# Patient Record
Sex: FEMALE | Race: WHITE | Employment: UNEMPLOYED | URBAN - METROPOLITAN AREA
[De-identification: names, ages, dates, MRNs, and addresses within clinical notes are randomized per-mention and may not be internally consistent; named-entity substitution may affect disease eponyms.]

---

## 2017-01-05 ENCOUNTER — OFFICE VISIT (OUTPATIENT)
Dept: ORTHOPEDIC SURGERY | Age: 52
End: 2017-01-05

## 2017-01-05 VITALS
DIASTOLIC BLOOD PRESSURE: 80 MMHG | SYSTOLIC BLOOD PRESSURE: 130 MMHG | BODY MASS INDEX: 31.54 KG/M2 | WEIGHT: 178 LBS | HEIGHT: 63 IN

## 2017-01-05 DIAGNOSIS — M25.512 CHRONIC LEFT SHOULDER PAIN: ICD-10-CM

## 2017-01-05 DIAGNOSIS — G89.29 CHRONIC LEFT SHOULDER PAIN: ICD-10-CM

## 2017-01-05 DIAGNOSIS — M54.2 NECK PAIN: Primary | ICD-10-CM

## 2017-01-05 PROCEDURE — 73030 X-RAY EXAM OF SHOULDER: CPT | Performed by: ORTHOPAEDIC SURGERY

## 2017-01-05 PROCEDURE — 99214 OFFICE O/P EST MOD 30 MIN: CPT | Performed by: ORTHOPAEDIC SURGERY

## 2017-01-05 PROCEDURE — 72040 X-RAY EXAM NECK SPINE 2-3 VW: CPT | Performed by: ORTHOPAEDIC SURGERY

## 2017-01-05 RX ORDER — PREDNISONE 10 MG/1
TABLET ORAL
Qty: 30 TABLET | Refills: 0 | Status: SHIPPED | OUTPATIENT
Start: 2017-01-05 | End: 2020-05-11

## 2017-01-12 ENCOUNTER — OFFICE VISIT (OUTPATIENT)
Dept: ORTHOPEDIC SURGERY | Age: 52
End: 2017-01-12

## 2017-01-12 VITALS — WEIGHT: 178 LBS | BODY MASS INDEX: 31.54 KG/M2 | HEIGHT: 63 IN

## 2017-01-12 DIAGNOSIS — M54.2 NECK PAIN: Primary | ICD-10-CM

## 2017-01-12 PROCEDURE — 99213 OFFICE O/P EST LOW 20 MIN: CPT | Performed by: ORTHOPAEDIC SURGERY

## 2017-01-13 RX ORDER — DIAZEPAM 5 MG/1
TABLET ORAL
Qty: 30 TABLET | Refills: 0 | OUTPATIENT
Start: 2017-01-13 | End: 2021-10-05

## 2017-01-24 ENCOUNTER — HOSPITAL ENCOUNTER (OUTPATIENT)
Dept: PHYSICAL THERAPY | Age: 52
Discharge: HOME | End: 2017-01-24
Attending: ORTHOPAEDIC SURGERY | Admitting: ORTHOPAEDIC SURGERY

## 2017-01-31 ENCOUNTER — HOSPITAL ENCOUNTER (OUTPATIENT)
Dept: PHYSICAL THERAPY | Age: 52
Discharge: HOME | End: 2017-01-31
Attending: ORTHOPAEDIC SURGERY | Admitting: ORTHOPAEDIC SURGERY

## 2017-02-02 ENCOUNTER — HOSPITAL ENCOUNTER (OUTPATIENT)
Dept: PHYSICAL THERAPY | Age: 52
Discharge: HOME | End: 2017-02-02
Attending: ORTHOPAEDIC SURGERY | Admitting: ORTHOPAEDIC SURGERY

## 2017-02-07 ENCOUNTER — HOSPITAL ENCOUNTER (OUTPATIENT)
Dept: PHYSICAL THERAPY | Age: 52
Discharge: HOME | End: 2017-02-07
Attending: ORTHOPAEDIC SURGERY | Admitting: ORTHOPAEDIC SURGERY

## 2017-02-09 ENCOUNTER — HOSPITAL ENCOUNTER (OUTPATIENT)
Dept: PHYSICAL THERAPY | Age: 52
Discharge: HOME | End: 2017-02-09
Attending: ORTHOPAEDIC SURGERY | Admitting: ORTHOPAEDIC SURGERY

## 2017-02-21 ENCOUNTER — HOSPITAL ENCOUNTER (OUTPATIENT)
Dept: PHYSICAL THERAPY | Age: 52
Discharge: HOME | End: 2017-02-21
Attending: ORTHOPAEDIC SURGERY | Admitting: ORTHOPAEDIC SURGERY

## 2017-02-22 ENCOUNTER — HOSPITAL ENCOUNTER (OUTPATIENT)
Dept: PHYSICAL THERAPY | Age: 52
Discharge: HOME | End: 2017-02-22
Attending: ORTHOPAEDIC SURGERY | Admitting: ORTHOPAEDIC SURGERY

## 2017-02-28 ENCOUNTER — HOSPITAL ENCOUNTER (OUTPATIENT)
Dept: PHYSICAL THERAPY | Age: 52
Discharge: HOME | End: 2017-02-28
Attending: ORTHOPAEDIC SURGERY | Admitting: ORTHOPAEDIC SURGERY

## 2017-03-02 ENCOUNTER — HOSPITAL ENCOUNTER (OUTPATIENT)
Dept: PHYSICAL THERAPY | Age: 52
Discharge: HOME | End: 2017-03-02
Attending: ORTHOPAEDIC SURGERY | Admitting: ORTHOPAEDIC SURGERY

## 2017-03-07 ENCOUNTER — HOSPITAL ENCOUNTER (OUTPATIENT)
Dept: PHYSICAL THERAPY | Age: 52
Discharge: HOME | End: 2017-03-07
Attending: ORTHOPAEDIC SURGERY | Admitting: ORTHOPAEDIC SURGERY

## 2017-03-09 ENCOUNTER — HOSPITAL ENCOUNTER (OUTPATIENT)
Dept: PHYSICAL THERAPY | Age: 52
Discharge: HOME | End: 2017-03-09
Attending: ORTHOPAEDIC SURGERY | Admitting: ORTHOPAEDIC SURGERY

## 2017-03-16 ENCOUNTER — HOSPITAL ENCOUNTER (OUTPATIENT)
Dept: PHYSICAL THERAPY | Age: 52
Discharge: HOME | End: 2017-03-16
Attending: ORTHOPAEDIC SURGERY | Admitting: ORTHOPAEDIC SURGERY

## 2017-03-23 ENCOUNTER — HOSPITAL ENCOUNTER (OUTPATIENT)
Dept: PHYSICAL THERAPY | Age: 52
Discharge: HOME | End: 2017-03-23
Attending: ORTHOPAEDIC SURGERY | Admitting: ORTHOPAEDIC SURGERY

## 2017-03-30 ENCOUNTER — HOSPITAL ENCOUNTER (OUTPATIENT)
Dept: PHYSICAL THERAPY | Age: 52
Discharge: HOME | End: 2017-03-30
Attending: ORTHOPAEDIC SURGERY | Admitting: ORTHOPAEDIC SURGERY

## 2017-04-04 ENCOUNTER — HOSPITAL ENCOUNTER (OUTPATIENT)
Dept: PHYSICAL THERAPY | Age: 52
Discharge: HOME | End: 2017-04-04
Attending: ORTHOPAEDIC SURGERY | Admitting: ORTHOPAEDIC SURGERY

## 2017-04-25 ENCOUNTER — HOSPITAL ENCOUNTER (OUTPATIENT)
Dept: PHYSICAL THERAPY | Age: 52
Discharge: HOME | End: 2017-04-25
Attending: ORTHOPAEDIC SURGERY | Admitting: ORTHOPAEDIC SURGERY

## 2017-04-27 ENCOUNTER — HOSPITAL ENCOUNTER (OUTPATIENT)
Dept: PHYSICAL THERAPY | Age: 52
Discharge: HOME | End: 2017-04-27
Attending: ORTHOPAEDIC SURGERY | Admitting: ORTHOPAEDIC SURGERY

## 2017-10-04 ENCOUNTER — HOSPITAL ENCOUNTER (OUTPATIENT)
Dept: PHYSICAL THERAPY | Age: 52
Discharge: HOME | End: 2017-10-04
Admitting: NEUROLOGICAL SURGERY

## 2017-10-04 NOTE — FLOWSHEET NOTE
Cervical Extension     Cervical sidebending               PRE's     External Rotation  Attempted, but unable to perform due to pain    Internal Rotation     Serratus     Biceps     Triceps     Shrugs     Horizontal Abd with ER     Reverse Flys     EXT     Flexion     Abduction          Cable Column/Theraband     Scapular Retraction 3x10 red    External Rotation 2x5 red Discontinued due to pain   Internal Rotation     Ext     TRIC     Lats     Shrugs     Flex     BIC     PNF                Manual Intervention      Cerv mobs/manip      Suboccipital release/ massage      Scalenes Stretch L 2'     Rib mobilizations 1/2nd rib mob       Therapeutic Exercise and NMR EXR  [x] (25647) Provided verbal/tactile cueing for activities related to strengthening, flexibility, endurance, ROM  for improvements in cervical, postural, scapular, scapulothoracic and UE control with self care, reaching, carrying, lifting, house/yardwork, driving/computer work. [x] (70483) Provided verbal/tactile cueing for activities related to improving balance, coordination, kinesthetic sense, posture, motor skill, proprioception  to assist with cervical, scapular, scapulothoracic and UE control with self care, reaching, carrying, lifting, house/yardwork, driving/computer work. Therapeutic Activities:    [] (31521 or 95760) Provided verbal/tactile cueing for activities related to improving balance, coordination, kinesthetic sense, posture, motor skill, proprioception and motor activation to allow for proper function of cervical, scapular, scapulothoracic and UE control with self care, carrying, lifting, driving/computer work.      Home Exercise Program:    [x] (87618) Reviewed/Progressed HEP activities related to strengthening, flexibility, endurance, ROM of cervical, scapular, scapulothoracic and UE control with self care, reaching, carrying, lifting, house/yardwork, driving/computer work  [] (84351) Reviewed/Progressed HEP activities related to improving balance, coordination, kinesthetic sense, posture, motor skill, proprioception of cervical, scapular, scapulothoracic and UE control with self care, reaching, carrying, lifting, house/yardwork, driving/computer work      Manual Treatments:  PROM / STM / Oscillations-Mobs:  G-I, II, III, IV (PA's, Inf., Post.)  [x] (11989) Provided manual therapy to mobilize soft tissue/joints of cervical/CT, scapular GHJ and UE for the purpose of decreasing headache, modulating pain, promoting relaxation,  increasing ROM, reducing/eliminating soft tissue swelling/inflammation/restriction, improving soft tissue extensibility and allowing for proper ROM for normal function with self care, reaching, carrying, lifting, house/yardwork, driving/computer work    Modalities:  15' ICE 10/4    Charges:  Timed Code Treatment Minutes: 25   Total Treatment Minutes: 10:23-11:37       [x] EVAL (LOW) 21540 (typically 20 minutes face-to-face)  [] EVAL (MOD) 20246 (typically 30 minutes face-to-face)  [] EVAL (HIGH) 62935 (typically 45 minutes face-to-face)  [] RE-EVAL     [x] QO(48293) x  1   [] IONTO  [] NMR (50353) x      [] VASO  [x] Manual (52806) x  1    [] Other:  [] TA x       [] Mech Traction (18631)  [] ES(attended) (71037)      [] ES (un) (73923):     GOALS:  Patient stated goal: Return to PLOF     Therapist goals for Patient:   Short Term Goals: To be achieved in: 2 weeks  1. Independent in HEP and progression per patient tolerance, in order to prevent re-injury. 2. Patient will have a decrease in pain to facilitate improvement in movement, function, and ADLs as indicated by Functional Deficits.     Long Term Goals: To be achieved in: 4 weeks  1. Disability index score of 15% or less for the NDI to assist with reaching prior level of function. 2. Patient will demonstrate increased AROM cervical SB to 30 deg motion to allow for proper joint functioning and decreased pain as indicated by patients Functional Deficits.    3.

## 2017-10-04 NOTE — PLAN OF CARE
Relevant Medical History: Discectomy/ Fusion 6/29/17 20lbs weight restriction, migraines frequently before surgery  Functional Disability Index: PT G-Codes  Functional Assessment Tool Used: NDI  Score: 30% deficit  Functional Limitation: Changing and maintaining body position  Changing and Maintaining Body Position Current Status (): At least 20 percent but less than 40 percent impaired, limited or restricted  Changing and Maintaining Body Position Goal Status (): At least 1 percent but less than 20 percent impaired, limited or restricted    Pain Scale: 0/10  Easing factors: heat, Flexoral  Provocative factors: Lifting overhead, Holding arms at 90 deg. Type: []Constant   [x]Intermittent  []Radiating [x]Localized [x]other:     Numbness/Tingling: None    Occupation/School: Unemployed/ Takes care of grandchildren    Living Status/Prior Level of Function: Independent with ADLs and IADLs, Regular exercise, riding bike. OBJECTIVE:     ROM AROM Comments   Flexion 40    Extension 45    Side bend R 15    Side bend L 15    Rotation R ~45    Rotation L ~45    Shoulder AROM clearing ~150 deg Flexion L ~160 R Pulleys PROM            Strength L R Comments   Neck flexion (C1-2)      Neck side bend (C3)      Shoulder elevation (C4) 5 5    Shoulder abduction (C5) 4- 4+    Elbow flexion and /or wrist extension  (C6) 4 5    Elbow extension and/or wrist flexion  (C7) 4 5    Thumb extension and/or ulnar deviation ((C8)  5 L not assessed due to cortizone shot   Shoulder ER 4- 5    Shoulder IR 4 5    DTR L R Comments   Biceps (C5,C6) 1+ 1+    Brachioradialis (C5,C6) 1+ 1+    Triceps (C7,C8) 1+ 1+              Joint mobility: Cervical PIVM    []Normal    [x]Hypo   []Hyper    Palpation: Elevated 1st/2nd rib on left shoulder. 1/3 TTP    Functional Mobility/Transfers: WNL    Posture: WNL, mild forward shoulders    Gait: (include devices/WB status):  WNL                            [x] Patient history, allergies, meds reviewed. Medical chart reviewed. See intake form. Review Of Systems (ROS):  [x]Performed Review of systems (Integumentary, CardioPulmonary, Neurological) by intake and observation. Intake form has been scanned into medical record. Patient has been instructed to contact their primary care physician regarding ROS issues if not already being addressed at this time. Co-morbidities/Complexities (which will affect course of rehabilitation):   []None           Arthritic conditions   []Rheumatoid arthritis (M05.9)  []Osteoarthritis (M19.91)   Cardiovascular conditions   [x]Hypertension (I10)  []Hyperlipidemia (E78.5)  []Angina pectoris (I20)  []Atherosclerosis (I70)   Musculoskeletal conditions   []Disc pathology   []Congenital spine pathologies   []Prior surgical intervention  []Osteoporosis (M81.8)  []Osteopenia (M85.8)   Endocrine conditions   []Hypothyroid (E03.9)  []Hyperthyroid Gastrointestinal conditions   []Constipation (F99.30)   Metabolic conditions   []Morbid obesity (E66.01)  []Diabetes type 1(E10.65) or 2 (E11.65)   []Neuropathy (G60.9)     Pulmonary conditions   []Asthma (J45)  []Coughing   []COPD (J44.9)   Psychological Disorders  []Anxiety (F41.9)  []Depression (F32.9)   []Other:   [x]Other:   Headaches and migraines       Barriers to/and or personal factors that will affect rehab potential:              [x]Age  [x]Sex              [x]Motivation/Lack of Motivation                        []Co-Morbidities              []Cognitive Function, education/learning barriers              [x]Environmental, home barriers              [x]profession/work barriers  [x]past PT/medical experience  []other:  Justification:     Falls Risk Assessment (30 days):   [x] Falls Risk assessed and no intervention required.   [] Falls Risk assessed and Patient requires intervention due to being higher risk   TUG score (>12s at risk):     [] Falls education provided, including       G-Codes:  PT G-Codes  Functional Assessment Tool Used: NDI  Score: 30% deficit  Functional Limitation: Changing and maintaining body position  Changing and Maintaining Body Position Current Status (): At least 20 percent but less than 40 percent impaired, limited or restricted  Changing and Maintaining Body Position Goal Status (): At least 1 percent but less than 20 percent impaired, limited or restricted    ASSESSMENT: Pt is a 47 yo F who presents today 3 mos. Post cervical discectomy and fusion at C6-7. Examination reveals decreased ROM in CS and Mickey UEs, hypomobility in CS PIVM abd and decreased strength on the L lower extremity which are consistent with the patitents history of cervical pathology, radicular symptoms and recent surgery. Pt will benefit from skilled PT to improve pain, ROM and LUE strength.  10/4    Functional Impairments:     [x]Noted cervical/thoracic/GHJ joint hypomobility   []Noted cervical/thoracic/GHJ joint hypermobility   [x]Decreased cervical/UE functional ROM   [x]Noted Headache pain aggravated by neck movements without dizziness   []Abnormal reflexes/sensation/myotomal/dermatomal deficits   []Decreased DCF control or ability to hold head up   []Decreased RC/scapular/core strength and neuromuscular control    [x]Decreased UE functional strength   []other:      Functional Activity Limitations (from functional questionnaire and intake)   [x]Reduced ability to tolerate prolonged functional positions   [x]Reduced ability or difficulty with changes of positions or transfers between positions   [x]Reduced ability to maintain good posture and demonstrate good body mechanics with sitting, bending, and lifting   [x] Reduced ability or tolerance with driving and/or computer work   [x]Reduced ability to perform lifting, reaching, carrying tasks   [x]Reduced ability to concentrate   [x]Reduced ability to sleep    [x]Reduced ability to tolerate any impact through UE or spine   [x]Reduced ability to ambulate prolonged functional periods/distances   []other:    Participation Restrictions   []Reduced participation in self care activities   [x]Reduced participation in home management activities   [x]Reduced participation in work activities   [x]Reduced participation in social activities. [x]Reduced participation in sport/recreational activities. Classification/Subgrouping:   [x]signs/symptoms consistent with neck pain with mobility deficits     [x]signs/symptoms consistent with neck pain with movement coordinated impairments    []signs/symptoms consistent with neck pain with radiating pain    []signs/symptoms consistent with neck pain with headaches (cervicogenic)    []Signs/symptoms consistent with nerve root involvement including myotome & dermatome dysfunction   []sign/symptoms consistent with facet dysfunction of cervical and thoracic spine    []signs/symptoms consistent suggesting central cord compression/UMN syndromes   []signs/symptoms consistent with discogenic cervical pain   []signs/symptoms consistent with rib dysfunction   []signs/symptoms consistent with postural dysfunction   []signs/symptoms consistent with shoulder pathology    [x]signs/symptoms consistent with post-surgical status including decreased ROM, strength and function.    []signs/symptoms consistent with pathology which may benefit from Dry Needling   []signs/symptoms which may limit the use of advanced manual therapy techniques: (Hypertension, recent trauma, intolerance to end range positions, prior TIA, visual issues, UE myotomes loss )     Prognosis/Rehab Potential:      []Excellent   [x]Good    []Fair   []Poor    Tolerance of evaluation/treatment:    []Excellent   [x]Good    []Fair   []Poor      Physical Therapy Evaluation Complexity Justification  [x] A history of present problem with:  [] no personal factors and/or comorbidities that impact the plan of care;  []1-2 personal factors and/or comorbidities that impact the plan of care  [x]3 personal factors a decrease in pain to facilitate improvement in movement, function, and ADLs as indicated by Functional Deficits. Long Term Goals: To be achieved in: 4 weeks  1. Disability index score of 15% or less for the NDI to assist with reaching prior level of function. 2. Patient will demonstrate increased AROM cervical SB to 30 deg motion to allow for proper joint functioning and decreased pain as indicated by patients Functional Deficits. 3. Patient will demonstrate improve LUE strength to 5/5 shoulder abduction for improved UE function in ADLs. 4. Patient will return to all ADLs and functional activities without increased symptoms or restriction. Electronically signed by:  Yamileth Morales, JESSICA Roman, Student Physical Therapist  Therapist was present, directed the patient's care, made skilled judgement, and was responsible for assessment and treatment of the patient.

## 2017-10-09 ENCOUNTER — HOSPITAL ENCOUNTER (OUTPATIENT)
Dept: PHYSICAL THERAPY | Age: 52
Discharge: HOME | End: 2017-10-09
Admitting: NEUROLOGICAL SURGERY

## 2017-10-09 NOTE — FLOWSHEET NOTE
[facility]  Orthopaedics and Sports Rehabilitation, [facility] [place]  Phone: [phone]   Fax:     [phone]                                                     Physical Therapy Daily Treatment Note  Date:  10/9/2017    Patient Name:  Abdullahi Michaels    :  1965  MRN: [id number]  Restrictions/Precautions:    Medical/Treatment Diagnosis Information:  Diagnosis: M54.2 (ICD-10-CM) - Neck pain, M25.512  Treatment Diagnosis: Post op Neck Pain  Insurance/Certification information:  PT Insurance Information: Tabatha Nuñez  Physician Information:  Referring Practitioner: Toni Das NP  Plan of care signed (Y/N):     Date of Patient follow up with Physician:     G-Code (if applicable):      Date G-Code Applied:  10/4/2017       Progress Note: [x]  Yes  []  No  Next due by: Visit #10      Latex Allergy:  [x]NO      []YES  Preferred Language for Healthcare:   [x]English       []other:    Visit # Insurance Allowable   2    Eval 10/4 unlim     Pain level:  0/10     BEGIN WITH HEAT NPV 10/9    SUBJECTIVE:  Pt reports she has tightness/soreness.   She woke up at about 4 in the morning with a headache that took about 1 hour to wear off.  10/9    OBJECTIVE: See eval   Observation:   Test measurements:      RESTRICTIONS/PRECAUTIONS:     Exercises/Interventions:   Exercise/Equipment Resistance/Repetitions Other comments   Stretching/PROM     CROM     Chin tuck     UT side bend stretch Reviewed for HEP    Levater scap stretch     Scalene stretch     Pectoral stretch 5x30\" Doorway        Isometrics     Cervical retraction 10x10\"    shrugs     Cervical Flexion      Cervical Extension     Cervical sidebending               PRE's     External Rotation  Attempted, but unable to perform due to pain    Internal Rotation     Serratus 2x10 supine  Added 10/9   Biceps     Triceps     Shrugs     Horizontal Abd with ER     Reverse Flys     EXT     Flexion     Abduction          Cable

## 2017-10-11 ENCOUNTER — HOSPITAL ENCOUNTER (OUTPATIENT)
Dept: PHYSICAL THERAPY | Age: 52
Discharge: HOME | End: 2017-10-11
Admitting: NEUROLOGICAL SURGERY

## 2017-10-11 NOTE — FLOWSHEET NOTE
increased symptoms or restriction.                       Progression Towards Functional goals:  [] Patient is progressing as expected towards functional goals listed. [] Progression is slowed due to complexities listed. [] Progression has been slowed due to co-morbidities. [x] Plan just implemented, too soon to assess goals progression  [] Other:     ASSESSMENT:  Pt did respond well to heat and noted decreased muscle tensions. Able to tolerated exercises without any increased pain. She responded well to manual tx and stated at end of session she did not feel at \"tight\" as when she arrived. Continue to monitor and progress as tolerated. 10/11        Treatment/Activity Tolerance:  [x] Patient tolerated treatment well [] Patient limited by fatique  [] Patient limited by pain  [] Patient limited by other medical complications  [] Other:     Patient education :  10/4- Pt educated on implications of pathology, HEP and the course of therapy.     Prognosis: [x] Good [] Fair  [] Poor    Patient Requires Follow-up: [x] Yes  [] No    PLAN: See eval  [x] Continue per plan of care [] Alter current plan (see comments)  [] Plan of care initiated [] Hold pending MD visit [] Discharge    Electronically signed by: Sai Middleton, PT

## 2017-10-17 ENCOUNTER — HOSPITAL ENCOUNTER (OUTPATIENT)
Dept: PHYSICAL THERAPY | Age: 52
Discharge: HOME | End: 2017-10-17
Admitting: NEUROLOGICAL SURGERY

## 2017-10-17 NOTE — FLOWSHEET NOTE
mobilize soft tissue/joints of cervical/CT, scapular GHJ and UE for the purpose of decreasing headache, modulating pain, promoting relaxation,  increasing ROM, reducing/eliminating soft tissue swelling/inflammation/restriction, improving soft tissue extensibility and allowing for proper ROM for normal function with self care, reaching, carrying, lifting, house/yardwork, driving/computer work    Modalities:    10' heat to neck and upper back pre-tx 10/17  15' ICE 10/17    Charges:  Timed Code Treatment Minutes: 30  TE MT    Total Treatment Minutes: [phone]       [x] EVAL (LOW) 50845 (typically 20 minutes face-to-face)  [] EVAL (MOD) 27471 (typically 30 minutes face-to-face)  [] EVAL (HIGH) 61412 (typically 45 minutes face-to-face)  [] RE-EVAL     [x] TO(85655) x  1   [] IONTO  [] NMR (04679) x      [] VASO  [x] Manual (20282) x  1    [] Other:  [x] TA x  1    [] Mech Traction (74548)  [] ES(attended) (64579)      [] ES (un) (92593):     GOALS:  Patient stated goal: Return to PLOF     Therapist goals for Patient:   Short Term Goals: To be achieved in: 2 weeks  1. Independent in HEP and progression per patient tolerance, in order to prevent re-injury. 2. Patient will have a decrease in pain to facilitate improvement in movement, function, and ADLs as indicated by Functional Deficits.     Long Term Goals: To be achieved in: 4 weeks  1. Disability index score of 15% or less for the NDI to assist with reaching prior level of function. 2. Patient will demonstrate increased AROM cervical SB to 30 deg motion to allow for proper joint functioning and decreased pain as indicated by patients Functional Deficits. 3. Patient will demonstrate improve LUE strength to 5/5 shoulder abduction for improved UE function in ADLs.   4. Patient will return to all ADLs and functional activities without increased symptoms or restriction.                       Progression Towards Functional goals:  [] Patient is progressing as expected towards functional goals listed. [] Progression is slowed due to complexities listed. [] Progression has been slowed due to co-morbidities. [x] Plan just implemented, too soon to assess goals progression  [] Other:     ASSESSMENT:  Pt did well today and is challenged by current program.  She does respond well to warm heat - consider dynamic warm up as tolerated by pt.  10/17      Treatment/Activity Tolerance:  [x] Patient tolerated treatment well [] Patient limited by fatique  [] Patient limited by pain  [] Patient limited by other medical complications  [] Other:     Patient education :  10/4- Pt educated on implications of pathology, HEP and the course of therapy.     Prognosis: [x] Good [] Fair  [] Poor    Patient Requires Follow-up: [x] Yes  [] No    PLAN: 2x for 4 weeks 10/4/17-11/4/17  [x] Continue per plan of care [] Alter current plan (see comments)  [] Plan of care initiated [] Hold pending MD visit [] Discharge    Electronically signed by: Sai Middleton, PT

## 2017-10-19 ENCOUNTER — HOSPITAL ENCOUNTER (OUTPATIENT)
Dept: PHYSICAL THERAPY | Age: 52
Discharge: HOME | End: 2017-10-19
Admitting: NEUROLOGICAL SURGERY

## 2017-10-19 NOTE — FLOWSHEET NOTE
[facility]  Orthopaedics and Sports Rehabilitation, [facility] [address]  Phone: [phone]   Fax:     [phone]                                                     Physical Therapy Daily Treatment Note  Date:  10/19/2017    Patient Name:  Lala Whiteside    :  1965  MRN: [id number]  Restrictions/Precautions:    Medical/Treatment Diagnosis Information:  Diagnosis: M54.2 (ICD-10-CM) - Neck pain, M25.512  Treatment Diagnosis: Post op Neck Pain  Insurance/Certification information:  PT Insurance Information: [facility]  Physician Information:  Referring Practitioner: Sheryl Mcmanus NP  Plan of care signed (Y/N):     Date of Patient follow up with Physician:     G-Code (if applicable):      Date G-Code Applied:  10/4/2017       Progress Note: [x]  Yes  []  No  Next due by: Visit #10      Latex Allergy:  [x]NO      []YES  Preferred Language for Healthcare:   [x]English       []other:    Visit # Insurance Allowable   5  10/19    Eval 10/4 unlim     Pain level:  0/10       SUBJECTIVE:  Not as tight as it has been.   10/19    OBJECTIVE:   Observation: posture [facility] 10/19  Test measurements:      RESTRICTIONS/PRECAUTIONS:     Exercises/Interventions:   Exercise/Equipment Resistance/Repetitions Other comments   Stretching/PROM     CROM     Chin tuck     UT side bend stretch Reviewed for HEP    Levater scap stretch     Scalene stretch     Pectoral stretch 5x30\" Doorway        Isometrics     Cervical retraction 10x10\"    shrugs     Cervical Flexion      Cervical Extension     Cervical sidebending               PRE's     External Rotation 2x10 bilat Added 10/19   Internal Rotation     Serratus 2x10 supine  Added 10/9   Biceps     Triceps     Shrugs     Horizontal Abd with ER     Reverse Flys on ball 2x10  Added 10/19   Snow indra on ball 2x10  Added 10/19   Flexion     Abduction          Cable Column/Theraband     Scapular Retraction 3x10 red    External Rotation 2x10 red bilat ^ 10/11   Internal Rotation 3x10 red bilat ^ 10/19   Ext 3x10 red ^ 10/17   TRIC     Lats     Shrugs     Flex     BIC     PNF               Manual intervention   Hawk  - strumming to bilat UT 8'               Therapeutic Exercise and NMR EXR  [x] (53863) Provided verbal/tactile cueing for activities related to strengthening, flexibility, endurance, ROM  for improvements in cervical, postural, scapular, scapulothoracic and UE control with self care, reaching, carrying, lifting, house/yardwork, driving/computer work. [x] (63225) Provided verbal/tactile cueing for activities related to improving balance, coordination, kinesthetic sense, posture, motor skill, proprioception  to assist with cervical, scapular, scapulothoracic and UE control with self care, reaching, carrying, lifting, house/yardwork, driving/computer work. Therapeutic Activities:    [] (24547 or 08450) Provided verbal/tactile cueing for activities related to improving balance, coordination, kinesthetic sense, posture, motor skill, proprioception and motor activation to allow for proper function of cervical, scapular, scapulothoracic and UE control with self care, carrying, lifting, driving/computer work.      Home Exercise Program:    [x] (37613) Reviewed/Progressed HEP activities related to strengthening, flexibility, endurance, ROM of cervical, scapular, scapulothoracic and UE control with self care, reaching, carrying, lifting, house/yardwork, driving/computer work  [] (99567) Reviewed/Progressed HEP activities related to improving balance, coordination, kinesthetic sense, posture, motor skill, proprioception of cervical, scapular, scapulothoracic and UE control with self care, reaching, carrying, lifting, house/yardwork, driving/computer work      Manual Treatments:  PROM / STM / Oscillations-Mobs:  G-I, II, III, IV (PA's, Inf., Post.)  [x] (04246) Provided manual therapy to mobilize soft tissue/joints of cervical/CT, scapular GHJ and UE for the purpose of decreasing headache, modulating pain, promoting relaxation,  increasing ROM, reducing/eliminating soft tissue swelling/inflammation/restriction, improving soft tissue extensibility and allowing for proper ROM for normal function with self care, reaching, carrying, lifting, house/yardwork, driving/computer work    Modalities:    10' heat to neck and upper back pre-tx 10/19  10' ICE 10/19    Charges:  Timed Code Treatment Minutes: 40  TE TA MT    Total Treatment Minutes: 230-025 (65)       [] EVAL (LOW) 92569 (typically 20 minutes face-to-face)  [] EVAL (MOD) 69598 (typically 30 minutes face-to-face)  [] EVAL (HIGH) 03498 (typically 45 minutes face-to-face)  [] RE-EVAL     [x] QL(14881) x  1   [] IONTO  [] NMR (82214) x      [] VASO  [x] Manual (82111) x  1    [] Other:  [x] TA x  1    [] Mech Traction (24760)  [] ES(attended) (34753)      [] ES (un) (75394):     GOALS:  Patient stated goal: Return to PLOF     Therapist goals for Patient:   Short Term Goals: To be achieved in: 2 weeks  1. Independent in HEP and progression per patient tolerance, in order to prevent re-injury. 2. Patient will have a decrease in pain to facilitate improvement in movement, function, and ADLs as indicated by Functional Deficits.     Long Term Goals: To be achieved in: 4 weeks  1. Disability index score of 15% or less for the NDI to assist with reaching prior level of function. 2. Patient will demonstrate increased AROM cervical SB to 30 deg motion to allow for proper joint functioning and decreased pain as indicated by patients Functional Deficits. 3. Patient will demonstrate improve LUE strength to 5/5 shoulder abduction for improved UE function in ADLs. 4. Patient will return to all ADLs and functional activities without increased symptoms or restriction.                       Progression Towards Functional goals:  [] Patient is progressing as expected towards functional goals listed.     [] Progression is slowed due to complexities listed. [] Progression has been slowed due to co-morbidities. [x] Plan just implemented, too soon to assess goals progression  [] Other:     ASSESSMENT:  Pt was able to progress today without increased symptoms. She did experience mild tightness on the left upper trap and referred pain into left upper arm. Plan to transition to more dynamic warm up as tolerated. 10/19      Treatment/Activity Tolerance:  [x] Patient tolerated treatment well [] Patient limited by fatique  [] Patient limited by pain  [] Patient limited by other medical complications  [] Other:     Patient education :  10/4- Pt educated on implications of pathology, HEP and the course of therapy.     Prognosis: [x] Good [] Fair  [] Poor    Patient Requires Follow-up: [x] Yes  [] No    PLAN: 2x for 4 weeks 10/4/17-11/4/17  [x] Continue per plan of care [] Alter current plan (see comments)  [] Plan of care initiated [] Hold pending MD visit [] Discharge    Electronically signed by: Tello Cardenas, PT

## 2017-10-24 ENCOUNTER — HOSPITAL ENCOUNTER (OUTPATIENT)
Dept: PHYSICAL THERAPY | Age: 52
Discharge: HOME | End: 2017-10-24
Admitting: NEUROLOGICAL SURGERY

## 2017-10-24 NOTE — FLOWSHEET NOTE
[facility] [address]  Phone: [phone]   Fax:     [phone]                                                     Physical Therapy Daily Treatment Note  Date:  10/24/2017    Patient Name:  Vikki Birmingham    :  1965  MRN: [id number]  Restrictions/Precautions:    Medical/Treatment Diagnosis Information:  Diagnosis: M54.2 (ICD-10-CM) - Neck pain, M25.512  Treatment Diagnosis: Post op Neck Pain  Insurance/Certification information:  PT Insurance Information: [employer]  Physician Information:  Referring Practitioner: Alfredo Borden NP  Plan of care signed (Y/N):     Date of Patient follow up with Physician:     G-Code (if applicable):      Date G-Code Applied:  10/4/2017       Progress Note: [x]  Yes  []  No  Next due by: Visit #10      Latex Allergy:  [x]NO      []YES  Preferred Language for Healthcare:   [x]English       []other:    Visit # Insurance Allowable   6  10/24    Eval 10/4 unlim     Pain level:  010 10/24    SUBJECTIVE:  Has some excess stiffness from vacuuming over the weekend but has no pain only stiffness L>R  10/24    OBJECTIVE:   Observation:   Test measurements:  Notable restrictions in IASTM assessment of Upper Trap, L>R 10/24    RESTRICTIONS/PRECAUTIONS:     Exercises/Interventions:   Exercise/Equipment Resistance/Repetitions Other comments   Stretching/PROM     CROM     Chin tuck     UT side bend stretch Reviewed for HEP    Levater scap stretch     Scalene stretch     Pectoral stretch 5x30\" Doorway        Isometrics     Cervical retraction 10x10\"    shrugs     Cervical Flexion      Cervical Extension     Cervical sidebending               PRE's     External Rotation 2x10 bilat Added 10/19   Internal Rotation     Serratus 2x10 supine  Added 10/9   Biceps     Triceps     Shrugs     Horizontal Abd with ER     Reverse Flys on ball 2x10  Added 10/19   Snow indra on ball 2x10  Added 10/19 Flexion     Abduction     [place] x10; Cat camel x10 Added 10/24   Cable Column/Theraband     Scapular Retraction 3x10 red Super Set c Ext. External Rotation 3x10 red bilat ^ 10/24   Internal Rotation 3x10 red bilat ^ 10/19   Ext 3x10 red ^ 10/17   TRIC     Lats     Shrugs     Flex     BIC     PNF               Manual intervention   Hawk  - strumming to bilat UT 8' Performed first   Bike BUE/LE 5' Added 10/24         Therapeutic Exercise and NMR EXR  [x] (58280) Provided verbal/tactile cueing for activities related to strengthening, flexibility, endurance, ROM  for improvements in cervical, postural, scapular, scapulothoracic and UE control with self care, reaching, carrying, lifting, house/yardwork, driving/computer work. [x] (13719) Provided verbal/tactile cueing for activities related to improving balance, coordination, kinesthetic sense, posture, motor skill, proprioception  to assist with cervical, scapular, scapulothoracic and UE control with self care, reaching, carrying, lifting, house/yardwork, driving/computer work. Therapeutic Activities:    [] (14507 or 00188) Provided verbal/tactile cueing for activities related to improving balance, coordination, kinesthetic sense, posture, motor skill, proprioception and motor activation to allow for proper function of cervical, scapular, scapulothoracic and UE control with self care, carrying, lifting, driving/computer work.      Home Exercise Program:    [x] (95777) Reviewed/Progressed HEP activities related to strengthening, flexibility, endurance, ROM of cervical, scapular, scapulothoracic and UE control with self care, reaching, carrying, lifting, house/yardwork, driving/computer work  [] (73836) Reviewed/Progressed HEP activities related to improving balance, coordination, kinesthetic sense, posture, motor skill, proprioception of cervical, scapular, scapulothoracic and UE control with self care, reaching, carrying, lifting, house/yardwork, driving/computer work      Manual Treatments:  PROM / STM / Oscillations-Mobs:  G-I, II, III, IV (PA's, Inf., Post.)  [x] (33968) Provided manual therapy to mobilize soft tissue/joints of cervical/CT, scapular GHJ and UE for the purpose of decreasing headache, modulating pain, promoting relaxation,  increasing ROM, reducing/eliminating soft tissue swelling/inflammation/restriction, improving soft tissue extensibility and allowing for proper ROM for normal function with self care, reaching, carrying, lifting, house/yardwork, driving/computer work    Modalities:      10' ICE 10/24    Charges:  Timed Code Treatment Minutes: 39  TE TA MT    Total Treatment Minutes: 12:20-12:35       [] EVAL (LOW) 92827 (typically 20 minutes face-to-face)  [] EVAL (MOD) 88184 (typically 30 minutes face-to-face)  [] EVAL (HIGH) 56968 (typically 45 minutes face-to-face)  [] RE-EVAL     [x] TE(58878) x  1   [] IONTO  [] NMR (26060) x      [] VASO  [x] Manual (29395) x  1    [] Other:  [x] TA x  1    [] Mech Traction (11476)  [] ES(attended) (64475)      [] ES (un) (25935):     GOALS:  Patient stated goal: Return to PLOF     Therapist goals for Patient:   Short Term Goals: To be achieved in: 2 weeks  1. Independent in HEP and progression per patient tolerance, in order to prevent re-injury. 2. Patient will have a decrease in pain to facilitate improvement in movement, function, and ADLs as indicated by Functional Deficits.     Long Term Goals: To be achieved in: 4 weeks  1. Disability index score of 15% or less for the NDI to assist with reaching prior level of function. 2. Patient will demonstrate increased AROM cervical SB to 30 deg motion to allow for proper joint functioning and decreased pain as indicated by patients Functional Deficits. 3. Patient will demonstrate improve LUE strength to 5/5 shoulder abduction for improved UE function in ADLs.   4. Patient will return to all ADLs and functional activities without increased

## 2017-10-26 ENCOUNTER — HOSPITAL ENCOUNTER (OUTPATIENT)
Dept: PHYSICAL THERAPY | Age: 52
Discharge: HOME | End: 2017-10-26
Admitting: NEUROLOGICAL SURGERY

## 2017-10-26 NOTE — FLOWSHEET NOTE
[facility]  Orthopaedics and Sports Rehabilitation, [address] [place] [address]  Phone: [phone]   Fax:     [phone]                                                     Physical Therapy Daily Treatment Note  Date:  10/26/2017    Patient Name:  Indra Yu    :  1965  MRN: [id number]  Restrictions/Precautions:    Medical/Treatment Diagnosis Information:  Diagnosis: M54.2 (ICD-10-CM) - Neck pain, M25.512  Treatment Diagnosis: Post op Neck Pain  Insurance/Certification information:  PT Insurance Information: [facility]  Physician Information:  Referring Practitioner: Iron Sparks NP  Plan of care signed (Y/N):     Date of Patient follow up with Physician:     G-Code (if applicable):      Date G-Code Applied:  10/4/2017       Progress Note: [x]  Yes  []  No  Next due by: Visit #10      Latex Allergy:  [x]NO      []YES  Preferred Language for Healthcare:   [x]English       []other:    Visit # Insurance Allowable   7  10/26    Eval 10/4 unlim     Pain level:  0/10 10/26    SUBJECTIVE:  Pt reports she felt really good on Tuesday, a little tight on Wednesday, and okay today.   10/26    OBJECTIVE:   Observation:   Test measurements:  Trigger point on left upper trap, TTP 1+/3  (10/26)     RESTRICTIONS/PRECAUTIONS:     Exercises/Interventions:   Exercise/Equipment Resistance/Repetitions Other comments   Stretching/PROM     CROM     Chin tuck     UT side bend stretch Reviewed for HEP    Levater scap stretch     Scalene stretch     Pectoral stretch 5x30\" Doorway        Isometrics     Cervical retraction 10x10\"    shrugs     Cervical Flexion      Cervical Extension     Cervical sidebending               PRE's     External Rotation 2x10 bilat Added 10/19   Internal Rotation     Serratus 2x10 supine  Added 10/9   Biceps     Triceps     Shrugs     Horizontal Abd with ER     Reverse Flys on ball 2x10  Added 10/19   Snow indra on ball 2x10  Added 10   Flexion carrying, lifting, house/yardwork, driving/computer work      Manual Treatments:  PROM / STM / Oscillations-Mobs:  G-I, II, III, IV (PA's, Inf., Post.)  [x] (76920) Provided manual therapy to mobilize soft tissue/joints of cervical/CT, scapular GHJ and UE for the purpose of decreasing headache, modulating pain, promoting relaxation,  increasing ROM, reducing/eliminating soft tissue swelling/inflammation/restriction, improving soft tissue extensibility and allowing for proper ROM for normal function with self care, reaching, carrying, lifting, house/yardwork, driving/computer work    Modalities:      10' ICE 10/26    Charges:  Timed Code Treatment Minutes: 39  TE TA MT    Total Treatment Minutes: 798-739       [] EVAL (LOW) 87124 (typically 20 minutes face-to-face)  [] EVAL (MOD) 92497 (typically 30 minutes face-to-face)  [] EVAL (HIGH) 54103 (typically 45 minutes face-to-face)  [] RE-EVAL     [x] LD(46524) x  1   [] IONTO  [] NMR (44226) x      [] VASO  [x] Manual (46865) x  1    [] Other:  [x] TA x  1    [] Mech Traction (00921)  [] ES(attended) (63881)      [] ES (un) (01253):     GOALS:  Patient stated goal: Return to PLOF     Therapist goals for Patient:   Short Term Goals: To be achieved in: 2 weeks  1. Independent in HEP and progression per patient tolerance, in order to prevent re-injury. 2. Patient will have a decrease in pain to facilitate improvement in movement, function, and ADLs as indicated by Functional Deficits.     Long Term Goals: To be achieved in: 4 weeks  1. Disability index score of 15% or less for the NDI to assist with reaching prior level of function. 2. Patient will demonstrate increased AROM cervical SB to 30 deg motion to allow for proper joint functioning and decreased pain as indicated by patients Functional Deficits. 3. Patient will demonstrate improve LUE strength to 5/5 shoulder abduction for improved UE function in ADLs.   4. Patient will return to all ADLs and functional activities without increased symptoms or restriction.                       Progression Towards Functional goals:  [] Patient is progressing as expected towards functional goals listed. [] Progression is slowed due to complexities listed. [] Progression has been slowed due to co-morbidities. [x] Plan just implemented, too soon to assess goals progression  [] Other:     ASSESSMENT:  Pt continues to be challenged with current program -- unable to increase resistance yet. Improved scapular activation with less upper trap activation during band exercises. She is able to tolerate dynamic warm up without complaints and responds well to [facility]  -- decreased resistance noted on right, however trigger point on L upper trap with increased antihistamine reaction following IASTM. 10/26    Treatment/Activity Tolerance:  [x] Patient tolerated treatment well [] Patient limited by fatique  [] Patient limited by pain  [] Patient limited by other medical complications  [] Other:     Patient education :  10/4- Pt educated on implications of pathology, HEP and the course of therapy.     Prognosis: [x] Good [] Fair  [] Poor    Patient Requires Follow-up: [x] Yes  [] No    PLAN: 2x for 4 weeks 10/4/17-11/4/17  [x] Continue per plan of care [] Alter current plan (see comments)  [] Plan of care initiated [] Hold pending MD visit [] Discharge    Electronically signed by: Clarence Bauman PT

## 2017-10-31 ENCOUNTER — HOSPITAL ENCOUNTER (OUTPATIENT)
Dept: PHYSICAL THERAPY | Age: 52
Discharge: HOME | End: 2017-10-31
Admitting: NEUROLOGICAL SURGERY

## 2017-10-31 NOTE — FLOWSHEET NOTE
[facility]  Orthopaedics and Sports Rehabilitation, [facility] [place] [address], [address] [place] [address]  Phone: [phone]   Fax:     [phone]                                                     Physical Therapy Daily Treatment Note  Date:  10/31/2017    Patient Name:  Sj Farrell    :  1965  MRN: [id number]  Restrictions/Precautions:    Medical/Treatment Diagnosis Information:  Diagnosis: M54.2 (ICD-10-CM) - Neck pain, M25.512  Treatment Diagnosis: Post op Neck Pain  Insurance/Certification information:  PT Insurance Information: [facility]  Physician Information:  Referring Practitioner: Reshma Dominique NP  Plan of care signed (Y/N):     Date of Patient follow up with Physician:     G-Code (if applicable):      Date G-Code Applied:  10/4/2017       Progress Note: [x]  Yes  []  No  Next due by: Visit #10      Latex Allergy:  [x]NO      []YES  Preferred Language for Healthcare:   [x]English       []other:    Visit # Insurance Allowable   8  10/31    Eval 10/4 unlim     Pain level:  2/10 10/31    SUBJECTIVE:  Pt reports she is really tight this morning but does not feel like she did anything yesterday to make it worse. She has been doing her HEP without any issues.   10/31    OBJECTIVE:   Observation: good postural awareness 10/31  Test measurements:      RESTRICTIONS/PRECAUTIONS:     Exercises/Interventions:   Exercise/Equipment Resistance/Repetitions Other comments   Stretching/PROM     CROM     Chin tuck     UT side bend stretch Reviewed for HEP    Levater scap stretch     Scalene stretch     Pectoral stretch 5x30\" Doorway        Isometrics     Cervical retraction 10x10\"    shrugs     Cervical Flexion      Cervical Extension     Cervical sidebending               PRE's     External Rotation Internal Rotation     Serratus 2x10 supine  Added 10/9   Biceps     Triceps     Shrugs     Horizontal Abd with ER     Reverse Flys on ball 2x10  Added 10/19   Snow indra on ball 2x10  Added 10/19   Flexion     Abduction     [place] x10; Cat camel x10 Added 10/24   Cable Column/Theraband     Scapular Retraction 3x10 red    External Rotation 1x10 green   2x10 red bilat ^ 10/31   Internal Rotation - bilat 1x10 green   2x10 red  ^ 10/31   Ext 3x10 red ^ 10/17   TRIC     Lats     Shrugs     Flex     BIC     Bilateral ER w/ band  3x5 red Added 10/31             Manual intervention   Hawk  - strumming to bilat UT and scapular region 8' Performed first   Bike BUE/LE 5' Added 10/24         Therapeutic Exercise and NMR EXR  [x] (26282) Provided verbal/tactile cueing for activities related to strengthening, flexibility, endurance, ROM  for improvements in cervical, postural, scapular, scapulothoracic and UE control with self care, reaching, carrying, lifting, house/yardwork, driving/computer work. [x] (11834) Provided verbal/tactile cueing for activities related to improving balance, coordination, kinesthetic sense, posture, motor skill, proprioception  to assist with cervical, scapular, scapulothoracic and UE control with self care, reaching, carrying, lifting, house/yardwork, driving/computer work. Therapeutic Activities:    [x] (03474 or 98457) Provided verbal/tactile cueing for activities related to improving balance, coordination, kinesthetic sense, posture, motor skill, proprioception and motor activation to allow for proper function of cervical, scapular, scapulothoracic and UE control with self care, carrying, lifting, driving/computer work.      Home Exercise Program:    [x] (91936) Reviewed/Progressed HEP activities related to strengthening, flexibility, endurance, ROM of cervical, scapular, scapulothoracic and UE control with self care, reaching, carrying, lifting, house/yardwork, driving/computer work  [] (93587) Reviewed/Progressed HEP activities related to improving balance, coordination, kinesthetic sense, posture, motor skill, proprioception of cervical, scapular, scapulothoracic for improved UE function in ADLs. 4. Patient will return to all ADLs and functional activities without increased symptoms or restriction.                       Progression Towards Functional goals:  [] Patient is progressing as expected towards functional goals listed. [] Progression is slowed due to complexities listed. [] Progression has been slowed due to co-morbidities. [x] Plan just implemented, too soon to assess goals progression  [] Other:     ASSESSMENT:  Pt did well today without complaints. She was able to increase resistance slightly on bands, but program is still appropriately challenging. Utilized estim at the end of session to help decrease muscle tightness. 10/31    Treatment/Activity Tolerance:  [x] Patient tolerated treatment well [] Patient limited by fatique  [] Patient limited by pain  [] Patient limited by other medical complications  [] Other:     Patient education :  10/4- Pt educated on implications of pathology, HEP and the course of therapy.     Prognosis: [x] Good [] Fair  [] Poor    Patient Requires Follow-up: [x] Yes  [] No    PLAN: 2x for 4 weeks 10/4/17-11/4/17  [x] Continue per plan of care [] Alter current plan (see comments)  [] Plan of care initiated [] Hold pending MD visit [] Discharge    Electronically signed by: Ty Chilel PT

## 2017-11-01 ENCOUNTER — HOSPITAL ENCOUNTER (OUTPATIENT)
Dept: PHYSICAL THERAPY | Age: 52
Discharge: HOME | End: 2017-11-30
Attending: NEUROLOGICAL SURGERY | Admitting: NEUROLOGICAL SURGERY

## 2017-11-02 ENCOUNTER — HOSPITAL ENCOUNTER (OUTPATIENT)
Dept: PHYSICAL THERAPY | Age: 52
Discharge: HOME | End: 2017-11-02
Admitting: NEUROLOGICAL SURGERY

## 2017-11-02 NOTE — FLOWSHEET NOTE
[facility]  Orthopaedics and Sports Rehabilitation, [facility] [address], [address] [place] [address]  Phone: [phone]   Fax:     [phone]                                                     Physical Therapy Daily Treatment Note  Date:  2017    Patient Name:  Sj Farrell    :  1965  MRN: [id number]  Restrictions/Precautions:    Medical/Treatment Diagnosis Information:  Diagnosis: M54.2 (ICD-10-CM) - Neck pain, M25.512  Treatment Diagnosis: Post op Neck Pain  Insurance/Certification information:  PT Insurance Information: [facility]  Physician Information:  Referring Practitioner: Reshma Dominique NP  Plan of care signed (Y/N):     Date of Patient follow up with Physician:     G-Code (if applicable):      Date G-Code Applied:  10/4/2017 Reassess npv       Progress Note: [x]  Yes  []  No  Next due by: Visit #10      Latex Allergy:  [x]NO      []YES  Preferred Language for Healthcare:   [x]English       []other:    Visit # Insurance Allowable       Eval 10/4 unlim     Pain level:  2/10 11/2    SUBJECTIVE:  Pt reports an older lady fell in the parking lot at [employer] yesterday -- she had to help her which increased neck pain and created a headache, but not a migraine.      OBJECTIVE:   Observation: good postural awareness 10/31  Test measurements:      RESTRICTIONS/PRECAUTIONS:     Exercises/Interventions:   Exercise/Equipment Resistance/Repetitions Other comments   Stretching/PROM     CROM     Chin tuck     UT side bend stretch Reviewed for HEP    Levater scap stretch     Scalene stretch     Pectoral stretch 5x30\" Doorway        Isometrics     Cervical retraction 10x10\"    shrugs     Cervical Flexion      Cervical Extension     Cervical sidebending               PRE's     External Rotation Internal Rotation     Serratus 2x10 supine  Added 10/9   Biceps     Triceps     Shrugs     Horizontal Abd with ER     Reverse Flys on ball 2x10  Added 10/19   Snow indra on ball 2x10 Added 10/19   Flexion     Abduction     Quadruped [place]ocks x10; Cat camel x10 Added 10/24   Cable Column/Theraband     Scapular Retraction 3x10 green  ^ 11/2   External Rotation 3x10 green  ^ 11/2   Internal Rotation - bilat 3x10 green  ^ 11/2   Ext 3x10 green  ^ 11/2   TRIC     Lats     Shrugs     Flex     BIC     Bilateral ER w/ band  3x5 red Added 10/31             Manual intervention   Hawk  - strumming to bilat UT and scapular region 8' Performed first   Bike BUE/LE 5' Added 10/24         Therapeutic Exercise and NMR EXR  [x] (64384) Provided verbal/tactile cueing for activities related to strengthening, flexibility, endurance, ROM  for improvements in cervical, postural, scapular, scapulothoracic and UE control with self care, reaching, carrying, lifting, house/yardwork, driving/computer work. [x] (01064) Provided verbal/tactile cueing for activities related to improving balance, coordination, kinesthetic sense, posture, motor skill, proprioception  to assist with cervical, scapular, scapulothoracic and UE control with self care, reaching, carrying, lifting, house/yardwork, driving/computer work. Therapeutic Activities:    [x] (81750 or 99474) Provided verbal/tactile cueing for activities related to improving balance, coordination, kinesthetic sense, posture, motor skill, proprioception and motor activation to allow for proper function of cervical, scapular, scapulothoracic and UE control with self care, carrying, lifting, driving/computer work.      Home Exercise Program:    [x] (49985) Reviewed/Progressed HEP activities related to strengthening, flexibility, endurance, ROM of cervical, scapular, scapulothoracic and UE control with self care, reaching, carrying, lifting, house/yardwork, driving/computer work  [] (07940) Reviewed/Progressed HEP activities related to improving balance, coordination, kinesthetic sense, posture, motor skill, proprioception of cervical, scapular, scapulothoracic and UE

## 2017-11-08 ENCOUNTER — HOSPITAL ENCOUNTER (OUTPATIENT)
Dept: PHYSICAL THERAPY | Age: 52
Discharge: HOME | End: 2017-11-08
Admitting: NEUROLOGICAL SURGERY

## 2017-11-08 NOTE — PLAN OF CARE
Information: [facility]  Physician Information:  Referring Practitioner: Janet Billings NP  Plan of care signed (Y/N):     Date of Patient follow up with Physician: January 2018    G-Code (if applicable):      Date G-Code Applied:  11/8/17  PT G-Codes  Functional Assessment Tool Used: NDI  Score: 30% deficit   Functional Limitation: Changing and maintaining body position  Changing and Maintaining Body Position Current Status (): At least 20 percent but less than 40 percent impaired, limited or restricted  Changing and Maintaining Body Position Goal Status (): At least 1 percent but less than 20 percent impaired, limited or restricted    Progress Note: [x]  Yes  []  No  Next due by: Visit #20      Latex Allergy:  [x]NO      []YES  Preferred Language for Healthcare:   [x]English       []other:    Visit # Insurance Allowable   10  11/8    Eval 10/4 unlim     Pain level:  2/10 11/8    SUBJECTIVE:  Pt reports she continues to be tight. She still reports difficulty performing house tasks without increasing pain/symptoms. She also has yet to return to recreational activities like sewing in fear of it aggravating her symptoms.  11/8     OBJECTIVE:   Observation: good postural awareness 11/8  Test measurements:         ROM 11/8 AROM Comments   Flexion 45     Extension 45     Side bend R 35  \"uncomfortable\"    Side bend L 35     Rotation R 65 Goniometer   Rotation L 65 Goniometer    Shoulder AROM clearing WFL                   [id number]/8 L R Comments   Neck flexion (C1-2)         Neck side bend (C3)    0     Shoulder elevation (C4) 5 5     Shoulder abduction (C5) 4+  4+     Elbow flexion and /or wrist extension  (C6) 4+  5     Elbow extension and/or wrist flexion  (C7) 4+ pain  5     Thumb extension and/or ulnar deviation ((C8) 5 5    Shoulder ER 4 5     Shoulder IR 4+  5             RESTRICTIONS/PRECAUTIONS:     Exercises/Interventions:   Exercise/Equipment Resistance/Repetitions Other comments   Stretching/PROM CROM     Chin tuck     UT side bend stretch Reviewed for HEP    Levater scap stretch 3x30\" each  Added 11/8   Child's pose with arm thread 3x30\"  Added 11/8   Pectoral stretch 5x30\" Doorway   Pulleys           Isometrics     Cervical retraction 10x10\"    shrugs     Cervical Flexion      Cervical Extension     Cervical sidebending               PRE's     External Rotation Internal Rotation     Serratus 2x10 supine  Added 10/9   Biceps     Triceps     Shrugs     Horizontal Abd with ER     Reverse Flys on ball 2x10  Added 10/19   Snow indra on ball 2x10  Added 10/19   Flexion     Abduction     [place] x10; Cat camel x10 Added 10/24   Cable Column/Theraband     Scapular Retraction 3x10 green  ^ 11/2   External Rotation 3x10 green  ^ 11/2   Internal Rotation - bilat 3x10 green  ^ 11/2   Ext 3x10 green  ^ 11/2   TRIC     Lats     Shrugs     Flex     BIC     Bilateral ER w/ band  3x5 red Added 10/31             Manual intervention   Hawk  - strumming to bilat UT and scapular region 8' Performed first   Bike BUE 5' Added 10/24         Therapeutic Exercise and NMR EXR  [x] (58816) Provided verbal/tactile cueing for activities related to strengthening, flexibility, endurance, ROM  for improvements in cervical, postural, scapular, scapulothoracic and UE control with self care, reaching, carrying, lifting, house/yardwork, driving/computer work. [x] (07998) Provided verbal/tactile cueing for activities related to improving balance, coordination, kinesthetic sense, posture, motor skill, proprioception  to assist with cervical, scapular, scapulothoracic and UE control with self care, reaching, carrying, lifting, house/yardwork, driving/computer work.     Therapeutic Activities:    [x] (58316 or 91473) Provided verbal/tactile cueing for activities related to improving balance, coordination, kinesthetic sense, posture, motor skill, proprioception and motor activation to allow for proper function of cervical, scapular, scapulothoracic and UE control with self care, carrying, lifting, driving/computer work. Home Exercise Program:    [x] (32411) Reviewed/Progressed HEP activities related to strengthening, flexibility, endurance, ROM of cervical, scapular, scapulothoracic and UE control with self care, reaching, carrying, lifting, house/yardwork, driving/computer work  [] (78079) Reviewed/Progressed HEP activities related to improving balance, coordination, kinesthetic sense, posture, motor skill, proprioception of cervical, scapular, scapulothoracic and UE control with self care, reaching, carrying, lifting, house/yardwork, driving/computer work      Manual Treatments:  PROM / STM / Oscillations-Mobs:  G-I, II, III, IV (PA's, Inf., Post.)  [x] (43277) Provided manual therapy to mobilize soft tissue/joints of cervical/CT, scapular GHJ and UE for the purpose of decreasing headache, modulating pain, promoting relaxation,  increasing ROM, reducing/eliminating soft tissue swelling/inflammation/restriction, improving soft tissue extensibility and allowing for proper ROM for normal function with self care, reaching, carrying, lifting, house/yardwork, driving/computer work    Modalities:      15' ICE & INF Estim 11/8      Charges:  Timed Code Treatment Minutes: 39  TE TA MT    Total Treatment Minutes: 4450-6431  Estim/ice        [] EVAL (LOW) 41893 (typically 20 minutes face-to-face)  [] EVAL (MOD) 08267 (typically 30 minutes face-to-face)  [] EVAL (HIGH) 37160 (typically 45 minutes face-to-face)  [] RE-EVAL     [x] WM(82404) x  1   [] IONTO  [] NMR (86939) x      [] VASO  [x] Manual (17308) x  1    [] Other:  [x] TA x  1    [] Mech Traction (46796)  [] ES(attended) (87684)      [x] ES (un) (75376): INF to shoulders/neck    GOALS:  Patient stated goal: Return to PLOF     Therapist goals for Patient:   Short Term Goals: To be achieved in: 2 weeks  1.  Independent in HEP and progression per patient tolerance, in order to prevent

## 2017-11-15 ENCOUNTER — HOSPITAL ENCOUNTER (OUTPATIENT)
Dept: PHYSICAL THERAPY | Age: 52
Discharge: HOME | End: 2017-11-15
Admitting: NEUROLOGICAL SURGERY

## 2017-11-15 NOTE — FLOWSHEET NOTE
[facility]  Orthopaedics and Sports Rehabilitation, [address] [place] [address]  Phone: [phone]   Fax:     [phone]                                                    Physical Therapy Daily Treatment Note  Date:  11/15/2017    Patient Name:  Indra Yu    :  1965  MRN: [id number]  Restrictions/Precautions:    Medical/Treatment Diagnosis Information:  Diagnosis: M54.2 (ICD-10-CM) - Neck pain, M25.512  Treatment Diagnosis: Post op Neck Pain  Insurance/Certification information:  PT Insurance Information: [facility]  Physician Information:  Referring Practitioner: Iron Sparks NP  Plan of care signed (Y/N):     Date of Patient follow up with Physician: 2018    G-Code (if applicable):      Date G-Code Applied:  17       Progress Note: [x]  Yes  []  No  Next due by: Visit #20      Latex Allergy:  [x]NO      []YES  Preferred Language for Healthcare:   [x]English       []other:    Visit # Insurance Allowable   11  11/15    Eval 10/4 unlim     Pain level:  1/10 11/15     SUBJECTIVE:  Still getting tightness, especially on L side of the neck.    11/15     OBJECTIVE:   Observation: good postural awareness   Test measurements:         ROM  AROM Comments   Flexion 45     Extension 45     Side bend R 35  \"uncomfortable\"    Side bend L 35     Rotation R 65 Goniometer   Rotation L 65 Goniometer    Shoulder AROM clearing WFL                   Dldskrpc82/8 L R Comments   Neck flexion (C1-2)         Neck side bend (C3)    0     Shoulder elevation (C4) 5 5     Shoulder abduction (C5) 4+  4+     Elbow flexion and /or wrist extension  (C6) 4+  5     Elbow extension and/or wrist flexion  (C7) 4+ pain  5     Thumb extension and/or ulnar deviation ((C8) 5 5    Shoulder ER 4 5     Shoulder IR 4+  5             RESTRICTIONS/PRECAUTIONS:     Exercises/Interventions:   Exercise/Equipment Resistance/Repetitions Other comments   Stretching/PROM     CROM     Chin

## 2017-11-28 ENCOUNTER — HOSPITAL ENCOUNTER (OUTPATIENT)
Dept: PHYSICAL THERAPY | Age: 52
Discharge: HOME | End: 2017-11-28
Admitting: NEUROLOGICAL SURGERY

## 2017-11-28 NOTE — FLOWSHEET NOTE
Resistance/Repetitions Other comments   Stretching/PROM     CROM     Chin tuck     UT side bend stretch Reviewed for HEP    Levater scap stretch 3x30\" each  Added 11/8   Cross body stretch  3x30\"  Added 11/8   Pectoral stretch 5x30\" Doorway   Pulleys  5x5\" hold each  Added 11/15        Isometrics     Cervical retraction 10x10\"    shrugs     Cervical Flexion      Cervical Extension     Cervical sidebending               PRE's     External Rotation Internal Rotation     Serratus 3x10 supine  ^ 11/15   Biceps     Triceps     Shrugs     Horizontal Abd with ER     Reverse Flys on ball 3x10  ^ 11/15   Snow indra on ball 3x10  ^ 11/15   Flexion     Abduction     [place]rup[place] x10; Cat camel x10 Added 10/24   Cable Column/Theraband     Scapular Retraction 3x10 red Dec 11/28   External Rotation 3x10 red Dec 11/28   Internal Rotation - bilat 3x10 red  Dec 11/28   Ext 3x10 red Dec 11/28   TRIC     Lats     Shrugs     Flex     BIC     Bilateral ER w/ band  3x5 red Added 10/31             Manual intervention   Hawk  - strumming to bilat UT and scapular region 12'   Bike BUE 5' Added 10/24         Therapeutic Exercise and NMR EXR  [x] (54465) Provided verbal/tactile cueing for activities related to strengthening, flexibility, endurance, ROM  for improvements in cervical, postural, scapular, scapulothoracic and UE control with self care, reaching, carrying, lifting, house/yardwork, driving/computer work. [x] (31369) Provided verbal/tactile cueing for activities related to improving balance, coordination, kinesthetic sense, posture, motor skill, proprioception  to assist with cervical, scapular, scapulothoracic and UE control with self care, reaching, carrying, lifting, house/yardwork, driving/computer work.     Therapeutic Activities:    [x] (11701 or 79319) Provided verbal/tactile cueing for activities related to improving balance, coordination, kinesthetic sense, posture, motor skill, proprioception and motor activation

## 2017-12-01 ENCOUNTER — HOSPITAL ENCOUNTER (OUTPATIENT)
Dept: PHYSICAL THERAPY | Age: 52
Discharge: HOME | End: 2017-12-31
Attending: NEUROLOGICAL SURGERY | Admitting: NEUROLOGICAL SURGERY

## 2017-12-06 ENCOUNTER — HOSPITAL ENCOUNTER (OUTPATIENT)
Dept: PHYSICAL THERAPY | Age: 52
Discharge: HOME | End: 2017-12-06
Admitting: NEUROLOGICAL SURGERY

## 2017-12-06 NOTE — PLAN OF CARE
[facility]  Orthopaedics and Sports Rehabilitation, [address] [place] [address], [address] [place] [address]  Phone: [phone]   Fax:     [phone]                                                   Physical Therapy Re-Certification Plan of Care    Dear Riky Orta NP,    We had the pleasure of treating the following patient for physical therapy services at [address] [place] [address]. A summary of our findings can be found in the updated assessment below. This includes our plan of care. If you have any questions or concerns regarding these findings, please do not hesitate to contact me at the office phone number checked above. Thank you for the referral.     Physician Signature:________________________________Date:__________________  By signing above (or electronic signature), therapists plan is approved by physician      Overall Response to Treatment:   [x]Patient is responding well to treatment and improvement is noted with regards to goals   []Patient should continue to improve in reasonable time if they continue HEP   []Patient has plateaued and is no longer responding to skilled PT intervention    []Patient is getting worse and would benefit from return to referring MD   []Patient unable to adhere to initial POC   [x]Other: Rocky Griffiths has been making progress with her cervical ROM, strength, and function. She continues to report increased muscle tightness with increases in her activity that is hindering her overall progress. She has responded well to IASTM and estim. At this time, it would be beneficial to try dry needling to assist with this issue. Also, consider home estim unit to assist with pain/symptom management.       Date range of previous POC Visits: 13    Total Visits: 17-17            Physical Therapy Daily Treatment Note  Date:  2017    Patient Name:  Fabián Dunlap    :  1965  MRN: [id number]  Restrictions/Precautions: Medical/Treatment Diagnosis Information:  Diagnosis: M54.2 (ICD-10-CM) - Neck pain, M25.512  Treatment Diagnosis: Post op Neck Pain  Insurance/Certification information:  PT Insurance Information: [facility]  Physician Information:  Referring Practitioner: Trini Bhardwaj NP  Plan of care signed (Y/N):     Date of Patient follow up with Physician: January 2018    G-Code (if applicable):      Date G-Code Applied:  12/6/17  PT G-Codes  Functional Assessment Tool Used: NDI   Score: 20% deficit   Functional Limitation: Changing and maintaining body position  Changing and Maintaining Body Position Current Status (): At least 20 percent but less than 40 percent impaired, limited or restricted  Changing and Maintaining Body Position Goal Status (): At least 1 percent but less than 20 percent impaired, limited or restricted    Progress Note:     Next due by: Visit #20      Latex Allergy:  [x]NO      []YES  Preferred Language for Healthcare:   [x]English       []other:    Visit # Insurance Allowable   13  12/6    Eval 10/4 unlim     Pain level:  0/10, just stiffness 12/6     SUBJECTIVE: Only complaints of tightness/soreness. Mobility and strength seems to be improving, but muscles still get tight with increased activities. 12/6     OBJECTIVE:  Reassess ROM and strength at next visit.     Observation: good postural awareness 12/6  Test measurements:         ROM 11/8 AROM Comments   Flexion 45     Extension 45     Side bend R 35  \"uncomfortable\"    Side bend L 35     Rotation R 65 Goniometer   Rotation L 65 Goniometer    Shoulder AROM clearing WFL                   Strength 11/8 L R Comments   Neck flexion (C1-2)         Neck side bend (C3)       Shoulder elevation (C4) 5 5     Shoulder abduction (C5) 4+  4+     Elbow flexion and /or wrist extension  (C6) 4+  5     Elbow extension and/or wrist flexion  (C7) 4+ pain  5     Thumb extension and/or ulnar deviation ((C8) 5 5    Shoulder ER 4 5     Shoulder IR 4+  5   coordination, kinesthetic sense, posture, motor skill, proprioception and motor activation to allow for proper function of cervical, scapular, scapulothoracic and UE control with self care, carrying, lifting, driving/computer work.      Home Exercise Program:    [x] (60991) Reviewed/Progressed HEP activities related to strengthening, flexibility, endurance, ROM of cervical, scapular, scapulothoracic and UE control with self care, reaching, carrying, lifting, house/yardwork, driving/computer work  [] (00435) Reviewed/Progressed HEP activities related to improving balance, coordination, kinesthetic sense, posture, motor skill, proprioception of cervical, scapular, scapulothoracic and UE control with self care, reaching, carrying, lifting, house/yardwork, driving/computer work      Manual Treatments:  PROM / STM / Oscillations-Mobs:  G-I, II, III, IV (PA's, Inf., Post.)  [x] (81192) Provided manual therapy to mobilize soft tissue/joints of cervical/CT, scapular GHJ and UE for the purpose of decreasing headache, modulating pain, promoting relaxation,  increasing ROM, reducing/eliminating soft tissue swelling/inflammation/restriction, improving soft tissue extensibility and allowing for proper ROM for normal function with self care, reaching, carrying, lifting, house/yardwork, driving/computer work    Modalities:      15' IFC 12/6      Charges:  Timed Code Treatment Minutes: 40  TE TA MT    Total Treatment Minutes: 324-1972  Estim       [] EVAL (LOW) 85614 (typically 20 minutes face-to-face)  [] EVAL (MOD) 23618 (typically 30 minutes face-to-face)  [] EVAL (HIGH) 17977 (typically 45 minutes face-to-face)  [] RE-EVAL     [x] GH(20076) x  1   [] IONTO  [] NMR (00056) x      [] VASO  [x] Manual (65014) x  1    [] Other:  [x] TA x  1    [] Mech Traction (28880)  [] ES(attended) (61239)      [x] ES (un) (04166): VMS burst to left upper trap 10', Premod 10'    GOALS:  Patient stated goal: Return to [facility]     Therapist goals for care initiated [] Hold pending MD visit [] Discharge    Electronically signed by: Stephane Saldivar PT

## 2017-12-12 ENCOUNTER — HOSPITAL ENCOUNTER (OUTPATIENT)
Dept: PHYSICAL THERAPY | Age: 52
Discharge: HOME | End: 2017-12-12
Admitting: NEUROLOGICAL SURGERY

## 2017-12-12 NOTE — PLAN OF CARE
[facility]  Orthopaedics and Sports Rehabilitation, [facility] [address]  Phone: [phone]   Fax:     [phone]                   Physical Therapy Daily Treatment Note  Date:  2017    Patient Name:  Kehinde Seth    :  1965  MRN: [id number]  Restrictions/Precautions:    Medical/Treatment Diagnosis Information:  Diagnosis: M54.2 (ICD-10-CM) - Neck pain, M25.512  Treatment Diagnosis: Post op Neck Pain  Insurance/Certification information:  PT Insurance Information: [facility]  Physician Information:  Referring Practitioner: Shabbir Cormier NP  Plan of care signed (Y/N):     Date of Patient follow up with Physician: 2018    G-Code (if applicable):      Date G-Code Applied:  17       Progress Note:     Next due by: Visit #20      Latex Allergy:  [x]NO      []YES  Preferred Language for Healthcare:   [x]English       []other:    Visit # Insurance Allowable   14      Eval 10/4 unlim     Pain level:  0/10, just stiffness      SUBJECTIVE: Increased pain after last session.        OBJECTIVE:    Observation: good postural awareness   Test measurements:         ROM 11/8 AROM Comments   Flexion 45     Extension 45     Side bend R 35  \"uncomfortable\"    Side bend L 35     Rotation R 65 Goniometer   Rotation L 65 Goniometer    Shoulder AROM clearing WFL                   Strength 11/8 L R Comments   Neck flexion (C1-2)         Neck side bend (C3)       Shoulder elevation (C4) 5 5     Shoulder abduction (C5) 4+  4+     Elbow flexion and /or wrist extension  (C6) 4+  5     Elbow extension and/or wrist flexion  (C7) 4+ pain  5     Thumb extension and/or ulnar deviation ((C8) 5 5    Shoulder ER 4 5     Shoulder IR 4+  5             RESTRICTIONS/PRECAUTIONS:     Exercises/Interventions:   Exercise/Equipment Resistance/Repetitions Other comments   Stretching/PROM     CROM     Chin tuck     UT side bend stretch Reviewed for HEP    Stacey scap stretch 3x30\" each  Added 11/8   Cross body stretch  3x30\"  Added 11/8   Pectoral stretch 5x30\" Doorway   Pulleys  10x10\" hold each  ^ 12/6        Isometrics     Cervical retraction 10x10\"    shrugs     Cervical Flexion      Cervical Extension     Cervical sidebending               PRE's     External Rotation 1x10 1#  2x10 bilat  ^ 12/12   Internal Rotation     Serratus 3x10 supine  ^ 11/15   Biceps 3x10 3#  Added 12/12   Triceps     Shrugs     Horizontal Abd with ER     Reverse Flys on ball 3x10  ^ 11/15   Snow indra on ball 3x10  ^ 11/15   Flexion     Abduction     Quadruped Rocks x10; Cat camel x10 Added 10/24   Cable Column/Theraband     Scapular Retraction 3x10 blue  ^ 12/12   External Rotation 3x10 blue ^ 12/12   Internal Rotation - bilat 3x10 blue ^ 12/12   Ext 3x10 blue   ^ 12/12   TRIC 3x10 green  Added 12/12   Lats     Shrugs     Flex     BIC     Bilateral ER w/ band  3x5 green  ^ 12/12             Manual intervention Suboccipital release, distraction, levator scap stretch, UT stretch   8' - after dry needling   Bike BUE 5' Added 10/24         Therapeutic Exercise and NMR EXR  [x] (81930) Provided verbal/tactile cueing for activities related to strengthening, flexibility, endurance, ROM  for improvements in cervical, postural, scapular, scapulothoracic and UE control with self care, reaching, carrying, lifting, house/yardwork, driving/computer work. [x] (65701) Provided verbal/tactile cueing for activities related to improving balance, coordination, kinesthetic sense, posture, motor skill, proprioception  to assist with cervical, scapular, scapulothoracic and UE control with self care, reaching, carrying, lifting, house/yardwork, driving/computer work.     Therapeutic Activities:    [x] (13450 or 12014) Provided verbal/tactile cueing for activities related to improving balance, coordination, kinesthetic sense, posture, motor skill, proprioception and motor activation to allow for proper function of cervical, scapular, scapulothoracic and UE control with self care, carrying, lifting, driving/computer work. Home Exercise Program:    [x] (98577) Reviewed/Progressed HEP activities related to strengthening, flexibility, endurance, ROM of cervical, scapular, scapulothoracic and UE control with self care, reaching, carrying, lifting, house/yardwork, driving/computer work  [] (45454) Reviewed/Progressed HEP activities related to improving balance, coordination, kinesthetic sense, posture, motor skill, proprioception of cervical, scapular, scapulothoracic and UE control with self care, reaching, carrying, lifting, house/yardwork, driving/computer work      Manual Treatments:  PROM / STM / Oscillations-Mobs:  G-I, II, III, IV (PA's, Inf., Post.)  [x] (84673) Provided manual therapy to mobilize soft tissue/joints of cervical/CT, scapular GHJ and UE for the purpose of decreasing headache, modulating pain, promoting relaxation,  increasing ROM, reducing/eliminating soft tissue swelling/inflammation/restriction, improving soft tissue extensibility and allowing for proper ROM for normal function with self care, reaching, carrying, lifting, house/yardwork, driving/computer work    PT reviewed precautions, contraindications, and indications with pt in addition to application of dry needling within established plan of care and expected outcomes; pt verbalized understanding with all questions answered. Pt had signed consent form for dry needling and PT obtained written consent with updated plan of care from MD before beginning. Dry needling manual therapy: consisted on the placement of 4 needles in the following muscles: thoracic left multifidus T3-4, left infraspintatus, left upper trap, and Iliocostalis thoracis. A 60 mm needle was inserted, piston, rotated, and coned to produce intramuscular mobilization.   These techniques were used to restore functional range of motion, reduce muscle spasm and induce healing in the      Progression Towards Functional goals:  [x] Patient is progressing as expected towards functional goals listed. [] Progression is slowed due to complexities listed. [] Progression has been slowed due to co-morbidities. [] Plan just implemented, too soon to assess goals progression  [] Other:     ASSESSMENT:  Pt jacqueline the addition of dry needling well. PT observed an LTR with dry needling in the infraspinatus. She reported pinching with dry needling. Pt did well with progressions on her exercises without complaints. 12/12    Treatment/Activity Tolerance:  [x] Patient tolerated treatment well [] Patient limited by fatique  [] Patient limited by pain  [] Patient limited by other medical complications  [] Other:     Patient education :  10/4- Pt educated on implications of pathology, HEP and the course of therapy.     Prognosis: [x] Good [] Fair  [] Poor    Patient Requires Follow-up: [x] Yes  [] No    PLAN: 1-2x for 4 weeks 12/6/17-1/6/18    [x] Continue per plan of care [] Alter current plan (see comments)  [] Plan of care initiated [] Hold pending MD visit [] Discharge    Electronically signed by: Aguilar Ballard PT

## 2017-12-14 ENCOUNTER — HOSPITAL ENCOUNTER (OUTPATIENT)
Dept: PHYSICAL THERAPY | Age: 52
Discharge: HOME | End: 2017-12-14
Admitting: NEUROLOGICAL SURGERY

## 2017-12-14 NOTE — FLOWSHEET NOTE
[facility]  Orthopaedics and Sports Rehabilitation, [address] [place] [address]  Phone: [phone]   Fax:     [phone]                   Physical Therapy Daily Treatment Note  Date:  2017    Patient Name:  Kehinde Seth    :  1965  MRN: [id number]  Restrictions/Precautions:    Medical/Treatment Diagnosis Information:  Diagnosis: M54.2 (ICD-10-CM) - Neck pain, M25.512  Treatment Diagnosis: Post op Neck Pain  Insurance/Certification information:  PT Insurance Information: [facility]  Physician Information:  Referring Practitioner: Shabbir Cormier NP  Plan of care signed (Y/N):     Date of Patient follow up with Physician: 2018    G-Code (if applicable):      Date G-Code Applied:  17       Progress Note:     Next due by: Visit #20      Latex Allergy:  [x]NO      []YES  Preferred Language for Healthcare:   [x]English       []other:    Visit # Insurance Allowable   15      Eval 10/4 unlim     Pain level:  0/10, just stiffness      SUBJECTIVE: Pt reports her left upper trap has been feeling very good after dry needling -- she feels like the right side is tighter now. She was able to sew for several hours without issues.        OBJECTIVE:    Observation: good postural awareness   Test measurements:         ROM 11/8 AROM Comments   Flexion 45     Extension 45     Side bend R 35  \"uncomfortable\"    Side bend L 35     Rotation R 65 Goniometer   Rotation L 65 Goniometer    Shoulder AROM clearing WFL                   Strength 11/8 L R Comments   Neck flexion (C1-2)         Neck side bend (C3)       Shoulder elevation (C4) 5 5     Shoulder abduction (C5) 4+  4+     Elbow flexion and /or wrist extension  (C6) 4+  5     Elbow extension and/or wrist flexion  (C7) 4+ pain  5     Thumb extension and/or ulnar deviation ((C8) 5 5    Shoulder ER 4 5     Shoulder IR 4+  5             RESTRICTIONS/PRECAUTIONS:     Exercises/Interventions: related to improving balance, coordination, kinesthetic sense, posture, motor skill, proprioception and motor activation to allow for proper function of cervical, scapular, scapulothoracic and UE control with self care, carrying, lifting, driving/computer work. Home Exercise Program:    [x] (63043) Reviewed/Progressed HEP activities related to strengthening, flexibility, endurance, ROM of cervical, scapular, scapulothoracic and UE control with self care, reaching, carrying, lifting, house/yardwork, driving/computer work  [] (48906) Reviewed/Progressed HEP activities related to improving balance, coordination, kinesthetic sense, posture, motor skill, proprioception of cervical, scapular, scapulothoracic and UE control with self care, reaching, carrying, lifting, house/yardwork, driving/computer work      Manual Treatments:  PROM / STM / Oscillations-Mobs:  G-I, II, III, IV (PA's, Inf., Post.)  [x] (54185) Provided manual therapy to mobilize soft tissue/joints of cervical/CT, scapular GHJ and UE for the purpose of decreasing headache, modulating pain, promoting relaxation,  increasing ROM, reducing/eliminating soft tissue swelling/inflammation/restriction, improving soft tissue extensibility and allowing for proper ROM for normal function with self care, reaching, carrying, lifting, house/yardwork, driving/computer work    PT reviewed precautions, contraindications, and indications with pt in addition to application of dry needling within established plan of care and expected outcomes; pt verbalized understanding with all questions answered. Pt had signed consent form for dry needling and PT obtained written consent with updated plan of care from MD before beginning. Dry needling manual therapy: consisted on the placement of 4 needles in the following muscles: bilateral infraspintatus and bilateral upper trap. A 60 mm needle was inserted, piston, rotated, and coned to produce intramuscular mobilization. These techniques were used to restore functional range of motion, reduce muscle spasm and induce healing in the corresponding musculature. (02869)  Clean Technique was utilized today while applying Dry needling treatment. The treatment sites where cleaned with 70% solution of  isopropyl alcohol . The PT washed their hands and utilized treatment gloves along with hand  prior to inserting the needles. All needles where removed and discarded in the appropriate sharps container. 15' Perfromed by Rachana Grier, PT  12/14      Modalities:          Charges:  Timed Code Treatment Minutes: 2[facility]    Total Treatment Minutes: [id number]        [] EVAL (LOW) 51660 (typically 20 minutes face-to-face)  [] EVAL (MOD) 99729 (typically 30 minutes face-to-face)  [] EVAL (HIGH) 88308 (typically 45 minutes face-to-face)  [] RE-EVAL     [x] HA(80930) x  1   [] IONTO  [] NMR (99934) x      [] VASO  [x] Manual (79805) x  2    [] Other:  [x] TA x  1    [] Mech Traction (89541)  [] ES(attended) (29136)      [] ES (un) (08700): VMS burst to left upper trap 10', Premod 10'    GOALS:  Patient stated goal: Return to [facility]     Therapist goals for Patient:   Short Term Goals: To be achieved in: 2 weeks  1. Independent in HEP and progression per patient tolerance, in order to prevent re-injury. MET  2. Patient will have a decrease in pain to facilitate improvement in movement, function, and ADLs as indicated by Functional Deficits. MET     Long Term Goals: To be achieved in: 4 weeks  1. Disability index score of 15% or less for the NDI to assist with reaching prior level of function. In progress  2. Patient will demonstrate increased AROM cervical SB to 30 deg motion to allow for proper joint functioning and decreased pain as indicated by patients Functional Deficits. MET  3. Patient will demonstrate improve LUE strength to 5/5 shoulder abduction for improved UE function in ADLs. In progress  4.  Patient will return to all ADLs and functional activities without increased symptoms or restriction. In progress                       Progression Towards Functional goals:  [x] Patient is progressing as expected towards functional goals listed. [] Progression is slowed due to complexities listed. [] Progression has been slowed due to co-morbidities. [] Plan just implemented, too soon to assess goals progression  [] Other:     ASSESSMENT:  PT observed bilateral LTRs with dry needling with infraspinatus and upper trap. She reported being sore upon completion, but was able to jacqueline exercises. 12/14    Treatment/Activity Tolerance:  [x] Patient tolerated treatment well [] Patient limited by fatique  [] Patient limited by pain  [] Patient limited by other medical complications  [] Other:     Patient education :  10/4- Pt educated on implications of pathology, HEP and the course of therapy.     Prognosis: [x] Good [] Fair  [] Poor    Patient Requires Follow-up: [x] Yes  [] No    PLAN: 1-2x for 4 weeks 12/6/17-1/6/18    [x] Continue per plan of care [] Alter current plan (see comments)  [] Plan of care initiated [] Hold pending MD visit [] Discharge    Electronically signed by: Keyona Jacobs, PT

## 2017-12-19 ENCOUNTER — HOSPITAL ENCOUNTER (OUTPATIENT)
Dept: PHYSICAL THERAPY | Age: 52
Discharge: HOME | End: 2017-12-19
Admitting: NEUROLOGICAL SURGERY

## 2017-12-19 NOTE — FLOWSHEET NOTE
[facility]  Orthopaedics and Sports Rehabilitation, [facility] [address] [place] [address]  Phone: [phone]   Fax:     [phone]                   Physical Therapy Daily Treatment Note  Date:  2017    Patient Name:  Indra Yu    :  1965  MRN: [id number]  Restrictions/Precautions:    Medical/Treatment Diagnosis Information:  Diagnosis: M54.2 (ICD-10-CM) - Neck pain, M25.512  Treatment Diagnosis: Post op Neck Pain  Insurance/Certification information:  PT Insurance Information: [facility]  Physician Information:  Referring Practitioner: Iron Sparks NP  Plan of care signed (Y/N):     Date of Patient follow up with Physician: 2018    G-Code (if applicable):      Date G-Code Applied:  17       Progress Note:     Next due by: Visit #20      Latex Allergy:  [x]NO      []YES  Preferred Language for Healthcare:   [x]English       []other:    Visit # Insurance Allowable   16      Eval 10/ unlim     Pain level:  0/10, just stiffness      SUBJECTIVE: Pt reports she is doing well, just a little stiffness in the mornings.        OBJECTIVE:    Observation: good postural awareness   Test measurements:         ROM 11/8 AROM Comments   Flexion 45     Extension 45     Side bend R 35  \"uncomfortable\"    Side bend L 35     Rotation R 65 Goniometer   Rotation L 65 Goniometer    Shoulder AROM clearing WFL                   Strength 11/8 L R Comments   Neck flexion (C1-2)         Neck side bend (C3)       Shoulder elevation (C4) 5 5     Shoulder abduction (C5) 4+  4+     Elbow flexion and /or wrist extension  (C6) 4+  5     Elbow extension and/or wrist flexion  (C7) 4+ pain  5     Thumb extension and/or ulnar deviation ((C8) 5 5    Shoulder ER 4 5     Shoulder IR 4+  5             RESTRICTIONS/PRECAUTIONS:     Exercises/Interventions:   Exercise/Equipment Resistance/Repetitions Other comments   Stretching/PROM     CROM     Chin tuck     UT side bend activation to allow for proper function of cervical, scapular, scapulothoracic and UE control with self care, carrying, lifting, driving/computer work. Home Exercise Program:    [x] (31626) Reviewed/Progressed HEP activities related to strengthening, flexibility, endurance, ROM of cervical, scapular, scapulothoracic and UE control with self care, reaching, carrying, lifting, house/yardwork, driving/computer work  [] (57802) Reviewed/Progressed HEP activities related to improving balance, coordination, kinesthetic sense, posture, motor skill, proprioception of cervical, scapular, scapulothoracic and UE control with self care, reaching, carrying, lifting, house/yardwork, driving/computer work      Manual Treatments:  PROM / STM / Oscillations-Mobs:  G-I, II, III, IV (PA's, Inf., Post.)  [x] (75155) Provided manual therapy to mobilize soft tissue/joints of cervical/CT, scapular GHJ and UE for the purpose of decreasing headache, modulating pain, promoting relaxation,  increasing ROM, reducing/eliminating soft tissue swelling/inflammation/restriction, improving soft tissue extensibility and allowing for proper ROM for normal function with self care, reaching, carrying, lifting, house/yardwork, driving/computer work    PT reviewed precautions, contraindications, and indications with pt in addition to application of dry needling within established plan of care and expected outcomes; pt verbalized understanding with all questions answered. Pt had signed consent form for dry needling and PT obtained written consent with updated plan of care from MD before beginning. Dry needling manual therapy: consisted on the placement of 4 needles in the following muscles: bilateral thoracic iliocostalis and bilateral infraspinatus. A 60 mm needle was inserted, piston, rotated, and coned to produce intramuscular mobilization.   These techniques were used to restore functional range of motion, reduce muscle spasm and induce      Progression Towards Functional goals:  [x] Patient is progressing as expected towards functional goals listed. [] Progression is slowed due to complexities listed. [] Progression has been slowed due to co-morbidities. [] Plan just implemented, too soon to assess goals progression  [] Other:     ASSESSMENT:  PT observed bilateral LTRs with dry needling with infraspinatus, but pt reported pinching with iliocostalis dry needling. She reported soreness upon completion. Able to tolerate exercises with minimal soreness noted. 12/19    Treatment/Activity Tolerance:  [x] Patient tolerated treatment well [] Patient limited by fatique  [] Patient limited by pain  [] Patient limited by other medical complications  [] Other:     Patient education :  10/4- Pt educated on implications of pathology, HEP and the course of therapy.     Prognosis: [x] Good [] Fair  [] Poor    Patient Requires Follow-up: [x] Yes  [] No    PLAN: 1-2x for 4 weeks 12/6/17-1/6/18    [x] Continue per plan of care [] Alter current plan (see comments)  [] Plan of care initiated [] Hold pending MD visit [] Discharge    Electronically signed by: Demetrice May PT

## 2018-01-01 ENCOUNTER — HOSPITAL ENCOUNTER (OUTPATIENT)
Dept: PHYSICAL THERAPY | Age: 53
Discharge: HOME | End: 2018-01-31
Attending: NEUROLOGICAL SURGERY | Admitting: NEUROLOGICAL SURGERY

## 2018-01-02 ENCOUNTER — HOSPITAL ENCOUNTER (OUTPATIENT)
Dept: PHYSICAL THERAPY | Age: 53
Discharge: HOME | End: 2018-01-02
Admitting: NEUROLOGICAL SURGERY

## 2018-01-02 NOTE — PLAN OF CARE
scapulothoracic and UE control with self care, carrying, lifting, driving/computer work. Home Exercise Program:    [x] (15998) Reviewed/Progressed HEP activities related to strengthening, flexibility, endurance, ROM of cervical, scapular, scapulothoracic and UE control with self care, reaching, carrying, lifting, house/yardwork, driving/computer work  [] (41324) Reviewed/Progressed HEP activities related to improving balance, coordination, kinesthetic sense, posture, motor skill, proprioception of cervical, scapular, scapulothoracic and UE control with self care, reaching, carrying, lifting, house/yardwork, driving/computer work      Manual Treatments:  PROM / STM / Oscillations-Mobs:  G-I, II, III, IV (PA's, Inf., Post.)  [x] (19833) Provided manual therapy to mobilize soft tissue/joints of cervical/CT, scapular GHJ and UE for the purpose of decreasing headache, modulating pain, promoting relaxation,  increasing ROM, reducing/eliminating soft tissue swelling/inflammation/restriction, improving soft tissue extensibility and allowing for proper ROM for normal function with self care, reaching, carrying, lifting, house/yardwork, driving/computer work      Modalities:          Charges:  Timed Code Treatment Minutes:  39  TE TA MT    Total Treatment Minutes: 847-941        [] EVAL (LOW) 49977 (typically 20 minutes face-to-face)  [] EVAL (MOD) 97371 (typically 30 minutes face-to-face)  [] EVAL (HIGH) 68859 (typically 45 minutes face-to-face)  [] RE-EVAL     [x] FS(95992) x  1   [] IONTO  [] NMR (02945) x      [] VASO  [x] Manual (75867) x  1    [] Other:  [x] TA x  1    [] Mech Traction (46141)  [] ES(attended) (59411)      [] ES (un) (28621): VMS burst to left upper trap 10', Premod 10'    GOALS:  Patient stated goal: Return to [facility]     Therapist goals for Patient:   Short Term Goals: To be achieved in: 2 weeks  1. Independent in HEP and progression per patient tolerance, in order to prevent re-injury. MET  2.

## 2018-01-10 ENCOUNTER — HOSPITAL ENCOUNTER (OUTPATIENT)
Dept: PHYSICAL THERAPY | Age: 53
Discharge: HOME | End: 2018-01-10
Admitting: NEUROLOGICAL SURGERY

## 2018-01-10 NOTE — FLOWSHEET NOTE
Exercise/Equipment Resistance/Repetitions Other comments   Stretching/PROM     CROM     Chin tuck     UT side bend stretch Reviewed for HEP    Levater scap stretch 3x30\" each  Added 11/8   Cross body stretch  3x30\"  Added 11/8   Pectoral stretch 5x30\" Doorway   Pulleys  10x10\" hold each flexion & extension ^ 12/19        Isometrics     Cervical retraction 10x10\"    shrugs     Cervical Flexion      Cervical Extension     Cervical sidebending               PRE's     External Rotation 3x10 2# ^ 12/19   Internal Rotation     Serratus 3x10 supine  ^ 11/15   Biceps 3x10 4#  ^ 1/2   Triceps     Shrugs     Horizontal Abd with ER     Reverse Flys on ball 3x10  ^ 11/15   Snow indra on ball 3x10  ^ 11/15   Flexion     Abduction     Quadruped Rocks x10; Cat camel x10 Added 10/24   Cable Column/Theraband     Scapular Retraction 3x10 blk  ^ 1/2   External Rotation 3x10 blk ^ 1/2   Internal Rotation - bilat 3x10 blk ^ 1/2   Ext 3x10 blk ^ 1/2   TRIC 3x10 blk ^ 1/2   Lats     Shrugs     Flex     BIC     Bilateral ER w/ band  3x10 blue  ^ 1/2             Manual intervention Suboccipital release, distraction, levator scap stretch, UT stretch   8' - after dry needling   Bike BUE 5' Added 10/24         Therapeutic Exercise and NMR EXR  [x] (46580) Provided verbal/tactile cueing for activities related to strengthening, flexibility, endurance, ROM  for improvements in cervical, postural, scapular, scapulothoracic and UE control with self care, reaching, carrying, lifting, house/yardwork, driving/computer work. [x] (26359) Provided verbal/tactile cueing for activities related to improving balance, coordination, kinesthetic sense, posture, motor skill, proprioception  to assist with cervical, scapular, scapulothoracic and UE control with self care, reaching, carrying, lifting, house/yardwork, driving/computer work.     Therapeutic Activities:    [x] (37540 or 88137) Provided verbal/tactile cueing for activities related to improving balance, coordination, kinesthetic sense, posture, motor skill, proprioception and motor activation to allow for proper function of cervical, scapular, scapulothoracic and UE control with self care, carrying, lifting, driving/computer work. Home Exercise Program:    [x] (70879) Reviewed/Progressed HEP activities related to strengthening, flexibility, endurance, ROM of cervical, scapular, scapulothoracic and UE control with self care, reaching, carrying, lifting, house/yardwork, driving/computer work  [] (95009) Reviewed/Progressed HEP activities related to improving balance, coordination, kinesthetic sense, posture, motor skill, proprioception of cervical, scapular, scapulothoracic and UE control with self care, reaching, carrying, lifting, house/yardwork, driving/computer work      Manual Treatments:  PROM / STM / Oscillations-Mobs:  G-I, II, III, IV (PA's, Inf., Post.)  [x] (41031) Provided manual therapy to mobilize soft tissue/joints of cervical/CT, scapular GHJ and UE for the purpose of decreasing headache, modulating pain, promoting relaxation,  increasing ROM, reducing/eliminating soft tissue swelling/inflammation/restriction, improving soft tissue extensibility and allowing for proper ROM for normal function with self care, reaching, carrying, lifting, house/yardwork, driving/computer work     PT reviewed precautions, contraindications, and indications with pt in addition to application of dry needling within established plan of care and expected outcomes; pt verbalized understanding with all questions answered. Pt had signed consent form for dry needling and PT obtained written consent with updated plan of care from MD before beginning.       Dry needling manual therapy: consisted on the placement of 4 needles in the following muscles: bilateral thoracic iliocostalis and bilateral infraspinatus, and bilateral upper trapezius.  A 60 mm needle was inserted, piston, rotated, and coned to produce intramuscular mobilization.  These techniques were used to restore functional range of motion, reduce muscle spasm and induce healing in the corresponding musculature. (70216)  Clean Technique was utilized today while applying Dry needling treatment.  The treatment sites where cleaned with 70% solution of  isopropyl alcohol.  The PT washed their hands and utilized treatment gloves along with hand  prior to inserting the needles.  All needles where removed and discarded in the appropriate sharps container.      Modalities:          Charges:  Timed Code Treatment Minutes:  45  TE TA MT    Total Treatment Minutes: 80  705-825        [] EVAL (LOW) 08411 (typically 20 minutes face-to-face)  [] EVAL (MOD) 45442 (typically 30 minutes face-to-face)  [] EVAL (HIGH) 07772 (typically 45 minutes face-to-face)  [] RE-EVAL     [x] HT(71219) x  1   [] IONTO  [] NMR (30946) x      [] VASO  [x] Manual (45031) x  1    [] Other:  [x] TA x  1    [] Mech Traction (90948)  [] ES(attended) (06409)      [] ES (un) (11078): VMS burst to left upper trap 10', Premod 10'    GOALS:  Patient stated goal: Return to [facility]     Therapist goals for Patient:   Short Term Goals: To be achieved in: 2 weeks  1. Independent in HEP and progression per patient tolerance, in order to prevent re-injury. MET  2. Patient will have a decrease in pain to facilitate improvement in movement, function, and ADLs as indicated by Functional Deficits. MET     Long Term Goals: To be achieved in: 4 weeks  1. Disability index score of 15% or less for the NDI to assist with reaching prior level of function. In progress  2. Patient will demonstrate increased AROM cervical SB to 30 deg motion to allow for proper joint functioning and decreased pain as indicated by patients Functional Deficits. MET  3. Patient will demonstrate improve LUE strength to 5/5 shoulder abduction for improved UE function in ADLs. In progress  4.  Patient will return to all ADLs and functional activities without increased symptoms or restriction. In progress                       Progression Towards Functional goals:  [x] Patient is progressing as expected towards functional goals listed. [] Progression is slowed due to complexities listed. [] Progression has been slowed due to co-morbidities. [] Plan just implemented, too soon to assess goals progression  [] Other:     ASSESSMENT:      Treatment/Activity Tolerance:  [x] Patient tolerated treatment well [] Patient limited by fatique  [] Patient limited by pain  [] Patient limited by other medical complications  [x] Other: 1/10 Good tolerance to dry needling this session. Muscle twitch noted in bilateral upper trapezius and patient reporting soreness following needling session. Patient able to complete all exercises with no increase in symptoms or pain. Monitor response next session. Patient education :  10/4- Pt educated on implications of pathology, HEP and the course of therapy. 1/2 - Discussed modifying position while reading to decrease prolonged cervical flexion     Prognosis: [x] Good [] Fair  [] Poor    Patient Requires Follow-up: [x] Yes  [] No    PLAN: 1x every other week for 1 month.   1/2/18-2/2/18   [x] Continue per plan of care [] Alter current plan (see comments)  [] Plan of care initiated [] Hold pending MD visit [] Discharge    Electronically signed by: Sophie Fabian, PT, DPT

## 2018-01-16 ENCOUNTER — HOSPITAL ENCOUNTER (OUTPATIENT)
Dept: PHYSICAL THERAPY | Age: 53
Discharge: HOME | End: 2018-01-16
Admitting: NEUROLOGICAL SURGERY

## 2018-01-16 NOTE — FLOWSHEET NOTE
each  Added 11/8   Cross body stretch  3x30\"  Added 11/8   Pectoral stretch 5x30\" Doorway   Pulleys  10x10\" hold each flexion & extension ^ 12/19        Isometrics     Cervical retraction 10x10\"    shrugs     Cervical Flexion      Cervical Extension     Cervical sidebending               PRE's     External Rotation 3x10 2# ^ 12/19   Internal Rotation     Serratus 3x10 supine  ^ 11/15   Biceps 3x10 4#  ^ 1/2   Triceps     Shrugs     Horizontal Abd with ER     Reverse Flys on ball 3x10 1# ^ 1/16   Snow indra on ball 3x10 1# ^ 1/16   Flexion     Abduction     Quadruped Rocks x10; Cat camel x10 Added 10/24   Cable Column/Theraband     Scapular Retraction 3x10 blk  ^ 1/2   External Rotation 3x10 blk ^ 1/2   Internal Rotation - bilat 3x10 blk ^ 1/2   Ext 3x10 blk ^ 1/2   TRIC 3x10 blk ^ 1/2   Lats     Shrugs     Flex     BIC     Bilateral ER w/ band  3x10 blue  ^ 1/2             Manual intervention Suboccipital release, distraction, levator scap stretch, UT stretch   8' - after dry needling   Bike BUE 5' Added 10/24         Therapeutic Exercise and NMR EXR  [x] (10522) Provided verbal/tactile cueing for activities related to strengthening, flexibility, endurance, ROM  for improvements in cervical, postural, scapular, scapulothoracic and UE control with self care, reaching, carrying, lifting, house/yardwork, driving/computer work. [x] (30656) Provided verbal/tactile cueing for activities related to improving balance, coordination, kinesthetic sense, posture, motor skill, proprioception  to assist with cervical, scapular, scapulothoracic and UE control with self care, reaching, carrying, lifting, house/yardwork, driving/computer work.     Therapeutic Activities:    [x] (20993 or 48400) Provided verbal/tactile cueing for activities related to improving balance, coordination, kinesthetic sense, posture, motor skill, proprioception and motor activation to allow for proper function of cervical, scapular, scapulothoracic and

## 2018-01-30 ENCOUNTER — HOSPITAL ENCOUNTER (OUTPATIENT)
Dept: PHYSICAL THERAPY | Age: 53
Discharge: HOME | End: 2018-01-30
Admitting: NEUROLOGICAL SURGERY

## 2018-01-30 NOTE — FLOWSHEET NOTE
[facility]  Orthopaedics and Sports Rehabilitation, [facility] [address] [place] [address]  Phone: [phone]   Fax:     [phone]    Physical Therapy Daily Treatment Note  Date:  2018    Patient Name:  Maykel Coto    :  1965  MRN: [id number]  Restrictions/Precautions:    Medical/Treatment Diagnosis Information:  Diagnosis: M54.2 (ICD-10-CM) - Neck pain, M25.512  Treatment Diagnosis: Post op Neck Pain  Insurance/Certification information:  PT Insurance Information: [facility]  Physician Information:  Referring Practitioner: Lefty Mac NP  Plan of care signed (Y/N):     Date of Patient follow up with Physician: 2018    G-Code (if applicable):      Date G-Code Applied:  17       Progress Note:     Next due by: Visit #20      Latex Allergy:  [x]NO      []YES  Preferred Language for Healthcare:   [x]English       []other:    Visit # Insurance Allowable   16 in 2017    3  1/30    Eval 10/4 unlim     Pain level:  0/10, just tightness       SUBJECTIVE:  Patient states that she has good days and bad days. She states that she has had a lot going on at home but is doing okay. She states that she thinks she is heading in the right direction and is having more good days than bad. She reports having no nerve pain in UE for past two weeks after last dry needling session.     OBJECTIVE:    Observation: good postural awareness 1/2  Test measurements:         ROM 1/2 AROM Comments   Flexion 50     Extension 45     Side bend R 40    Side bend L 40     Rotation R 80 Goniometer   Rotation L 80 Goniometer    Shoulder AROM clearing WFL                   Strength 1/2 L R Comments   Neck flexion (C1-2)         Neck side bend (C3)       Shoulder elevation (C4) 5 5     Shoulder abduction (C5) 5 5     Elbow flexion and /or wrist extension  (C6) 5 5     Elbow extension and/or wrist flexion  (C7) 5 5     Thumb extension and/or ulnar deviation ((C8) 5 5    Shoulder ER 4+ 5     Shoulder IR 5 5             RESTRICTIONS/PRECAUTIONS:     Exercises/Interventions:   Exercise/Equipment Resistance/Repetitions Other comments   Stretching/PROM     CROM     Chin tuck     UT side bend stretch Reviewed for HEP    Levater scap stretch 3x30\" each  Added 11/8   Cross body stretch  3x30\"  Added 11/8   Pectoral stretch 5x30\" Doorway   Pulleys  10x10\" hold each flexion & extension ^ 12/19        Isometrics     Cervical retraction 10x10\"    shrugs     Cervical Flexion      Cervical Extension     Cervical sidebending               PRE's     External Rotation Internal Rotation Serratus Biceps Triceps Shrugs Horizontal Abd with ER Reverse Flys on ball Snow indra on ball Flexion Abduction Quadruped Cable Column/Theraband     Scapular Retraction 3x10 blk  ^ 1/2   External Rotation 3x10 blk ^ 1/2   Internal Rotation - bilat 3x10 blk ^ 1/2   Ext 3x10 blk ^ 1/2   TRIC 3x10 blk ^ 1/2   Lats     Shrugs     Flex     BIC     Bilateral ER w/ band  3x10 blue  ^ 1/2             Manual intervention Suboccipital release, distraction, levator scap stretch, UT stretch   8' - after dry needling   Bike BUE 5' Added 10/24         Therapeutic Exercise and NMR EXR  [x] (44468) Provided verbal/tactile cueing for activities related to strengthening, flexibility, endurance, ROM  for improvements in cervical, postural, scapular, scapulothoracic and UE control with self care, reaching, carrying, lifting, house/yardwork, driving/computer work. [x] (93134) Provided verbal/tactile cueing for activities related to improving balance, coordination, kinesthetic sense, posture, motor skill, proprioception  to assist with cervical, scapular, scapulothoracic and UE control with self care, reaching, carrying, lifting, house/yardwork, driving/computer work.     Therapeutic Activities:    [x] (86331 or 25648) Provided verbal/tactile cueing for activities related to improving balance, coordination, kinesthetic sense, posture, motor skill, functional activities without increased symptoms or restriction. In progress                       Progression Towards Functional goals:  [x] Patient is progressing as expected towards functional goals listed. [] Progression is slowed due to complexities listed. [] Progression has been slowed due to co-morbidities. [] Plan just implemented, too soon to assess goals progression  [] Other:     ASSESSMENT:      Treatment/Activity Tolerance:  [x] Patient tolerated treatment well [] Patient limited by fatique  [] Patient limited by pain  [] Patient limited by other medical complications  [x] Other: 1/30 Patient tolerated treatment well this session. Noted muscle twitch in bilateral UT this session. Patient reported feeling fine at end of session. Continue to progress as tolerated. Patient education :  10/4- Pt educated on implications of pathology, HEP and the course of therapy. 1/2 - Discussed modifying position while reading to decrease prolonged cervical flexion     Prognosis: [x] Good [] Fair  [] Poor    Patient Requires Follow-up: [x] Yes  [] No    PLAN: 1x every other week for 1 month.   1/2/18-2/2/18   [x] Continue per plan of care [] Alter current plan (see comments)  [] Plan of care initiated [] Hold pending MD visit [] Discharge    Electronically signed by: Mahsa Luis, PT, DPT

## 2018-02-01 ENCOUNTER — HOSPITAL ENCOUNTER (OUTPATIENT)
Dept: PHYSICAL THERAPY | Age: 53
Discharge: HOME | End: 2018-02-28
Attending: NEUROLOGICAL SURGERY | Admitting: NEUROLOGICAL SURGERY

## 2018-02-20 ENCOUNTER — HOSPITAL ENCOUNTER (OUTPATIENT)
Dept: PHYSICAL THERAPY | Age: 53
Discharge: HOME | End: 2018-02-21
Admitting: NEUROLOGICAL SURGERY

## 2018-02-20 NOTE — DISCHARGE SUMMARY
[facility]  Orthopaedics and Sports Rehabilitation, [address] [place] [address], [address] [place] [address]  Phone: [phone]   Fax:     [phone]       Physical Therapy Discharge Summary    Dear Ting Alvarado NP,     We had the pleasure of treating the following patient for physical therapy services at [address] [place] [address]. A summary of our findings can be found in the discharge summary below. If you have any questions or concerns regarding these findings, please do not hesitate to contact me at the office phone number checked above. Thank you for the referral.     Physician Signature:________________________________Date:__________________  By signing above (or electronic signature), therapists plan is approved by physician      Overall Response to Treatment:   []Patient is responding well to treatment and improvement is noted with regards  to goals   [x]Patient should continue to improve in reasonable time if they continue HEP   []Patient has plateaued and is no longer responding to skilled PT intervention    []Patient is getting worse and would benefit from return to referring MD   []Patient unable to adhere to initial POC   [x]Other: Patient is currently independent with symptom management and home exercise program. Patient reports understanding of the importance of continued compliance with home exercise program after discharge in order to prevent further injury. Patient should reach all long term goals with compliance to home exercise program upon discharge.         Date range of Visits: 10/4/18-18    Total Visits: 20        Physical Therapy Daily Treatment Note  Date:  2018    Patient Name:  Ganesh Magdaleno    :  1965  MRN: [id number]  Restrictions/Precautions:    Medical/Treatment Diagnosis Information:  Diagnosis: M54.2 (ICD-10-CM) - Neck pain, M25.512  Treatment Diagnosis: Post op Neck Pain  Insurance/Certification information:  PT Insurance Information: [facility]  Physician Information:  Referring Practitioner: Yolette Poole NP  Plan of care signed (Y/N):     Date of Patient follow up with Physician: prn    G-Code (if applicable):      Date G-Code Applied:  2/20/18  PT G-Codes  Functional Assessment Tool Used: NDI   Score: 12% deficit   Functional Limitation: Changing and maintaining body position  Changing and Maintaining Body Position Current Status (): At least 1 percent but less than 20 percent impaired, limited or restricted  Changing and Maintaining Body Position Goal Status (): At least 1 percent but less than 20 percent impaired, limited or restricted  Changing and Maintaining Body Position Discharge Status (): At least 1 percent but less than 20 percent impaired, limited or restricted    Progress Note:     Next due by: Visit #20      Latex Allergy:  [x]NO      []YES  Preferred Language for Healthcare:   [x]English       []other:    Visit # Insurance Allowable   16 in 2017    4  2/20    Eval 10/4 unlim     Pain level:  0/10 2/20     SUBJECTIVE: 2/20 Pt reports she has been doing very well. No major issues since she was last seen.       OBJECTIVE:    Observation: good postural awareness 2/20  Test measurements:         ROM 1/2 AROM Comments   Flexion 50     Extension 45     Side bend R 40    Side bend L 40     Rotation R 80 Goniometer   Rotation L 80 Goniometer    Shoulder AROM clearing WFL                   Strength 1/2 L R Comments   Neck flexion (C1-2)         Neck side bend (C3)       Shoulder elevation (C4) 5 5     Shoulder abduction (C5) 5 5     Elbow flexion and /or wrist extension  (C6) 5 5     Elbow extension and/or wrist flexion  (C7) 5 5     Thumb extension and/or ulnar deviation ((C8) 5 5    Shoulder ER 4+ 5     Shoulder IR 5 5             RESTRICTIONS/PRECAUTIONS:     Exercises/Interventions:   Exercise/Equipment Resistance/Repetitions Other comments   Stretching/PROM     CROM     Chin tuck     UT side bend stretch Reviewed self care, carrying, lifting, driving/computer work. Home Exercise Program:    [x] (16569) Reviewed/Progressed HEP activities related to strengthening, flexibility, endurance, ROM of cervical, scapular, scapulothoracic and UE control with self care, reaching, carrying, lifting, house/yardwork, driving/computer work  [] (12154) Reviewed/Progressed HEP activities related to improving balance, coordination, kinesthetic sense, posture, motor skill, proprioception of cervical, scapular, scapulothoracic and UE control with self care, reaching, carrying, lifting, house/yardwork, driving/computer work      Manual Treatments:  PROM / STM / Oscillations-Mobs:  G-I, II, III, IV (PA's, Inf., Post.)  [x] (92820) Provided manual therapy to mobilize soft tissue/joints of cervical/CT, scapular GHJ and UE for the purpose of decreasing headache, modulating pain, promoting relaxation,  increasing ROM, reducing/eliminating soft tissue swelling/inflammation/restriction, improving soft tissue extensibility and allowing for proper ROM for normal function with self care, reaching, carrying, lifting, house/yardwork, driving/computer work     PT reviewed precautions, contraindications, and indications with pt in addition to application of dry needling within established plan of care and expected outcomes; pt verbalized understanding with all questions answered. Pt had signed consent form for dry needling and PT obtained written consent with updated plan of care from MD before beginning.       Dry needling manual therapy: consisted on the placement of 1 needle in the following muscles: Left upper trapezius. A  30 mm needle was inserted, piston, rotated, and coned to produce intramuscular mobilization.  These techniques were used to restore functional range of motion, reduce muscle spasm and induce healing in the corresponding musculature.  (02577)  Clean Technique was utilized today while applying Dry needling treatment.  The treatment sites where cleaned with 70% solution of  isopropyl alcohol.  The PT washed their hands and utilized treatment gloves along with hand  prior to inserting the needles.  All needles where removed and discarded in the appropriate sharps container.     Dry needling treatment provided by Rupinder Lancaster PT, DPT  2/20    Assessment: muscle twitch noted in left upper trapezius with increased ROM and decreased muscle tension noted following treatment  2/20    Modalities:        Charges:  Timed Code Treatment Minutes: 267 Striiv    Total Treatment Minutes: 852-248        [] EVAL (LOW) 03606 (typically 20 minutes face-to-face)  [] EVAL (MOD) 55235 (typically 30 minutes face-to-face)  [] EVAL (HIGH) 63705 (typically 45 minutes face-to-face)  [] RE-EVAL     [x] KS(76018) x  1   [] IONTO  [] NMR (09467) x      [] VASO  [x] Manual (52718) x  2    [] Other:  [x] TA x  1    [] Mech Traction (73731)  [] ES(attended) (95808)      [] ES (un) (15116): VMS burst to left upper trap 10', Premod 10'    GOALS:  Patient stated goal: Return to [facility]     Therapist goals for Patient:   Short Term Goals: To be achieved in: 2 weeks  1. Independent in HEP and progression per patient tolerance, in order to prevent re-injury. MET  2. Patient will have a decrease in pain to facilitate improvement in movement, function, and ADLs as indicated by Functional Deficits. MET     Long Term Goals: To be achieved in: 4 weeks  1. Disability index score of 15% or less for the NDI to assist with reaching prior level of function. MET  2. Patient will demonstrate increased AROM cervical SB to 30 deg motion to allow for proper joint functioning and decreased pain as indicated by patients Functional Deficits. MET  3. Patient will demonstrate improve LUE strength to 5/5 shoulder abduction for improved UE function in ADLs. In progress  4. Patient will return to all ADLs and functional activities without increased symptoms or restriction.  MET

## 2018-03-01 ENCOUNTER — HOSPITAL ENCOUNTER (OUTPATIENT)
Dept: PHYSICAL THERAPY | Age: 53
Discharge: HOME | End: 2018-03-31
Attending: NEUROLOGICAL SURGERY | Admitting: NEUROLOGICAL SURGERY

## 2019-05-10 ENCOUNTER — HOSPITAL ENCOUNTER (OUTPATIENT)
Dept: PHYSICAL THERAPY | Age: 54
Setting detail: THERAPIES SERIES
Discharge: HOME | End: 2019-05-10
Payer: COMMERCIAL

## 2019-05-10 PROCEDURE — 97140 MANUAL THERAPY 1/> REGIONS: CPT | Performed by: PHYSICAL THERAPIST

## 2019-05-10 PROCEDURE — 97161 PT EVAL LOW COMPLEX 20 MIN: CPT | Performed by: PHYSICAL THERAPIST

## 2019-05-10 PROCEDURE — 97110 THERAPEUTIC EXERCISES: CPT | Performed by: PHYSICAL THERAPIST

## 2019-05-10 NOTE — FLOWSHEET NOTE
[address] [facility], [facility] [employer] [address], [address] [place] [address]  Phone: [phone]   Fax:     [phone]                                                     Physical Therapy Daily Treatment Note  Date:  5/10/2019    Patient Name:  Alvarado Hsu    :  1965  MRN: [id number]  Restrictions/Precautions:    Medical/Treatment Diagnosis Information:  · Diagnosis: M54.12 (ICD-10-CM) - Radiculopathy, cervical region  · Treatment Diagnosis: M54.2 (ICD-10-CM) - Neck pain  Insurance/Certification information:  PT Insurance Information: [facility] (unlimited)   Physician Information:  Referring Practitioner: Cristiana Zamorano MD   Plan of care signed (Y/N):     Date of Patient follow up with Physician:     G-Code (if applicable):      Date G-Code Applied: 5/10    NDI 42% deficit     Progress Note: [x]  Yes  []  No  Next due by: Visit #10      Latex Allergy:  [x]NO      []YES  Preferred Language for Healthcare:   [x]English       []other:    Visit # Insurance Allowable   1 Unlimited     Pain level:  -10/10     SUBJECTIVE:  See eval    OBJECTIVE: See eval   Observation:    Test measurements:      RESTRICTIONS/PRECAUTIONS:     Exercises/Interventions:   Exercise/Equipment Resistance/Repetitions Other comments   Stretching/PROM     CROM     Chin tuck     UT side bend stretch     Levater scap stretch     Scalene stretch     Pectoral stretch 5x30\" rotation to R     Medial nerve flossing           Isometrics     Cervical retraction 10x10\"     shrugs     Cervical Flexion      Cervical Extension     Cervical sidebending               PRE's     External Rotation     Internal Rotation     Serratus     Biceps     Triceps     Shrugs     Horizontal Abd with ER     Reverse Flys     EXT     Flexion     Abduction          Cable Column/Theraband     Scapular Retraction     External Rotation     Internal Rotation     Ext     TRIC     Lats     Shrugs     Flex     BIC     PNF Manul interventions 8' suboccipital release   6' STM L upper trap              Therapeutic Exercise and NMR EXR  [x] (62154) Provided verbal/tactile cueing for activities related to strengthening, flexibility, endurance, ROM  for improvements in cervical, postural, scapular, scapulothoracic and UE control with self care, reaching, carrying, lifting, house/yardwork, driving/computer work. [x] (57202) Provided verbal/tactile cueing for activities related to improving balance, coordination, kinesthetic sense, posture, motor skill, proprioception  to assist with cervical, scapular, scapulothoracic and UE control with self care, reaching, carrying, lifting, house/yardwork, driving/computer work. Therapeutic Activities:    [] (32934 or 18463) Provided verbal/tactile cueing for activities related to improving balance, coordination, kinesthetic sense, posture, motor skill, proprioception and motor activation to allow for proper function of cervical, scapular, scapulothoracic and UE control with self care, carrying, lifting, driving/computer work.      Home Exercise Program:    [x] (54968) Reviewed/Progressed HEP activities related to strengthening, flexibility, endurance, ROM of cervical, scapular, scapulothoracic and UE control with self care, reaching, carrying, lifting, house/yardwork, driving/computer work  [] (28803) Reviewed/Progressed HEP activities related to improving balance, coordination, kinesthetic sense, posture, motor skill, proprioception of cervical, scapular, scapulothoracic and UE control with self care, reaching, carrying, lifting, house/yardwork, driving/computer work      Manual Treatments:  PROM / STM / Oscillations-Mobs:  G-I, II, III, IV (PA's, Inf., Post.)  [x] (74567) Provided manual therapy to mobilize soft tissue/joints of cervical/CT, scapular GHJ and UE for the purpose of decreasing headache, modulating pain, promoting relaxation,  increasing ROM, reducing/eliminating soft tissue

## 2019-05-10 NOTE — PLAN OF CARE
The [address] [place] [address] and [address]  Phone [phone]  Fax [phone]                                                          Physical Therapy Certification    Dear Referring Practitioner: Jenise Curtis MD ,    We had the pleasure of evaluating the following patient for physical therapy services at [address] [place] [address]. A summary of our findings can be found in the initial assessment below. This includes our plan of care. If you have any questions or concerns regarding these findings, please do not hesitate to contact me at the office phone number checked above. Thank you for the referral.       Physician Signature:_______________________________Date:__________________  By signing above (or electronic signature), therapists plan is approved by physician      Patient: Mary Chamberlain   : 1965   MRN: [id number]  Referring Physician: Referring Practitioner: Jenise Curtis MD       Evaluation Date: 5/10/2019      Medical Diagnosis Information:  Diagnosis: M54.12 (ICD-10-CM) - Radiculopathy, cervical region   Treatment Diagnosis: M54.2 (ICD-10-CM) - Neck pain                                         Insurance information: PT Insurance Information: [employer] (unlimited)     Precautions/ Contra-indications:   Latex Allergy:  [x]NO      []YES  Preferred Language for Healthcare:   [x]English       []other:    SUBJECTIVE: Patient stated complaint: Pt reports she has been having radiating symptoms that came back about 7 months ago. No JULY. She states it started with a L sided headache that would not go away. It gradually progressed to radiating symptoms into the face, arm to the hand, and shoulder blade. She does state her depth perception occasionally feels off. She has been having swelling again in the neck and upper trap. She states the headaches and radiating symptoms are the worst part.   She has been compliant with her home exercises she was given following her last round of physical therapy.       Imaging: MRI of neck - slight bulge in C5-6, MRI of brain, EMG (no significant findings in any)       Relevant Medical History: previous surgery   Functional Disability Index:   NDI - 42% deficit     Pain Scale: 4-10/10  Easing factors: ice sometimes helps   Provocative factors: reaching behind the back, lifting kids, sewing, reading     Type: [x]Constant   []Intermittent  []Radiating []Localized []other:     Numbness/Tingling: + in L UE mainly to thumb and index finger and burning sensation in scalp and shoulder blade    Occupation/School: Watching [employer]    Living Status/Prior Level of Function: Independent with ADLs and IADLs, playing with grandchildren     OBJECTIVE:     ROM 5/10 AROM Comments   Flexion 35 Stretch    Extension 45 Stretch    Side bend R 30 Stretch    Side bend L 25 Pain    Rotation R Min restriction    Rotation L Mod restriction  Pain    Shoulder AROM clearing WFL (pain and radiating symptoms into L UE)              Strength 5/10 L R Comments   Neck flexion (C1-2)      Neck side bend (C3)      Shoulder elevation (C4) 4 p!  5    Shoulder abduction (C5) 4- p!  5    Elbow flexion and /or wrist extension  (C6) 5 5    Elbow extension and/or wrist flexion  (C7) 5 5    Thumb extension and/or ulnar deviation ((C8) 5 5    Abduction and /or adduction of hand intrinsics (T1) 5 5          DTR L R Comments   Biceps (C5,C6) 2+ 2+    Brachioradialis (C5,C6) 2+ 2+    Triceps (C7,C8) 2+ 2+              Special Tests Results Comments   Foraminal compression test  NT    Distraction test NT     Upper limb tension test +    Shoulder abduction(relief) test  +    Vertebral artery test  -    Valsalva test -    Neurologic Signs +    Joint play assessments  NT     other         Joint mobility:    []Normal    [x]Hypo   []Hyper    Palpation: TTP on left 1st rib, paraspinals, left clavicle, and left side of suboccipital reation    Functional Mobility/Transfers: WFL     Posture: WFL     Bandages/Dressings/Incisions: incision from previous surgery healed     Gait: (include devices/WB status): WNL     Orthopedic Special Tests: see above                       [x] Patient history, allergies, meds reviewed. Medical chart reviewed. See intake form. Review Of Systems (ROS):  [x]Performed Review of systems (Integumentary, CardioPulmonary, Neurological) by intake and observation. Intake form has been scanned into medical record. Patient has been instructed to contact their primary care physician regarding ROS issues if not already being addressed at this time.       Co-morbidities/Complexities (which will affect course of rehabilitation):   []None           Arthritic conditions   []Rheumatoid arthritis (M05.9)  []Osteoarthritis (M19.91)   Cardiovascular conditions   []Hypertension (I10)  []Hyperlipidemia (E78.5)  []Angina pectoris (I20)  []Atherosclerosis (I70)   Musculoskeletal conditions   [x]Disc pathology   []Congenital spine pathologies   [x]Prior surgical intervention  []Osteoporosis (M81.8)  []Osteopenia (M85.8)   Endocrine conditions   []Hypothyroid (E03.9)  []Hyperthyroid Gastrointestinal conditions   []Constipation (U39.60)   Metabolic conditions   []Morbid obesity (E66.01)  []Diabetes type 1(E10.65) or 2 (E11.65)   [x]Neuropathy (G60.9)     Pulmonary conditions   []Asthma (J45)  []Coughing   []COPD (J44.9)   Psychological Disorders  []Anxiety (F41.9)  []Depression (F32.9)   []Other:   []Other:          Barriers to/and or personal factors that will affect rehab potential:              [x]Age  [x]Sex              [x]Motivation/Lack of Motivation                        []Co-Morbidities              []Cognitive Function, education/learning barriers              []Environmental, home barriers              []profession/work barriers  [x]past PT/medical experience  []other:  Justification: Above factors may effect pt's ability to progress as expected    Falls Risk Assessment (30 days):   [x] Falls Risk assessed and no intervention required. [] Falls Risk assessed and Patient requires intervention due to being higher risk   TUG score (>12s at risk):     [] Falls education provided, including       G-Codes:   NDI 42% deficit     ASSESSMENT:   Functional Impairments:     [x]Noted cervical/thoracic/GHJ joint hypomobility   []Noted cervical/thoracic/GHJ joint hypermobility   [x]Decreased cervical/UE functional ROM   [x]Noted Headache pain aggravated by neck movements with/without dizziness   []Abnormal reflexes/sensation/myotomal/dermatomal deficits   [x]Decreased DCF control or ability to hold head up   [x]Decreased RC/scapular/core strength and neuromuscular control    [x]Decreased UE functional strength   []other:      Functional Activity Limitations (from functional questionnaire and intake)   [x]Reduced ability to tolerate prolonged functional positions   [x]Reduced ability or difficulty with changes of positions or transfers between positions   [x]Reduced ability to maintain good posture and demonstrate good body mechanics with sitting, bending, and lifting   [x] Reduced ability or tolerance with driving and/or computer work   [x]Reduced ability to perform lifting, reaching, carrying tasks   [x]Reduced ability to concentrate   [x]Reduced ability to sleep    [x]Reduced ability to tolerate any impact through UE or spine   []Reduced ability to ambulate prolonged functional periods/distances   []other:    Participation Restrictions   []Reduced participation in self care activities   [x]Reduced participation in home management activities   [x]Reduced participation in work activities   [x]Reduced participation in social activities. []Reduced participation in sport/recreational activities.     Classification/Subgrouping:   []signs/symptoms consistent with neck pain with mobility deficits     []signs/symptoms consistent with neck pain with movement coordinated impairments    []signs/symptoms consistent with neck pain with radiating pain    [x]signs/symptoms consistent with neck pain with headaches (cervicogenic)    []Signs/symptoms consistent with nerve root involvement including myotome & dermatome dysfunction   []sign/symptoms consistent with facet dysfunction of cervical and thoracic spine    []signs/symptoms consistent suggesting central cord compression/UMN syndromes   []signs/symptoms consistent with discogenic cervical pain   []signs/symptoms consistent with rib dysfunction   []signs/symptoms consistent with postural dysfunction   []signs/symptoms consistent with shoulder pathology    []signs/symptoms consistent with post-surgical status including decreased ROM, strength and function.    []signs/symptoms consistent with pathology which may benefit from Dry Needling   []signs/symptoms which may limit the use of advanced manual therapy techniques: (Hypertension, recent trauma, intolerance to end range positions, prior TIA, visual issues, UE myotomes loss )     Prognosis/Rehab Potential:      []Excellent   [x]Good    []Fair   []Poor    Tolerance of evaluation/treatment:    []Excellent   [x]Good    []Fair   []Poor      Physical Therapy Evaluation Complexity Justification  [x] A history of present problem with:  [] no personal factors and/or comorbidities that impact the plan of care;  [x]1-2 personal factors and/or comorbidities that impact the plan of care  []3 personal factors and/or comorbidities that impact the plan of care  [x] An examination of body systems using standardized tests and measures addressing any of the following: body structures and functions (impairments), activity limitations, and/or participation restrictions;:  [] a total of 1-2 or more elements   [] a total of 3 or more elements   [x] a total of 4 or more elements   [x] A clinical presentation with:  [x] stable and/or uncomplicated characteristics   [] evolving clinical presentation with changing characteristics  [] unstable and unpredictable characteristics;   [x] Clinical decision making of [x] low, [] moderate, [] high complexity using standardized patient assessment instrument and/or measurable assessment of functional outcome. [x] EVAL (LOW) 91177 (typically 20 minutes face-to-face)  [] EVAL (MOD) 82731 (typically 30 minutes face-to-face)  [] EVAL (HIGH) 67924 (typically 45 minutes face-to-face)  [] RE-EVAL     PLAN:   Frequency/Duration:  2 days per week for 6 Weeks:  Interventions:  [x]  Therapeutic exercise including: strength training, ROM, for cervical spine,scapula, core and Upper extremity, including postural re-education. [x]  NMR activation and proprioception for Deep cervical flexors, periscapular and RC muscles and Core, including postural re-education. [x]  Manual therapy as indicated for C/T spine, ribs, Soft tissue to include: Dry Needling/IASTM, STM, PROM, Gr I-IV mobilizations, manipulation. [x] Modalities as needed that may include: thermal agents, E-stim, Biofeedback, US, iontophoresis as indicated  [x] Patient education on joint protection, postural re-education, activity modification, progression of HEP. HEP instruction: (see scanned forms)    GOALS:  Patient stated goal: Pt would like to get rid of headaches and gain strength; Return to sewing, bowling, and lifting grandkids without pain     Therapist goals for Patient:   Short Term Goals: To be achieved in: 2-4 weeks  1. Independent in HEP and progression per patient tolerance, in order to prevent re-injury. 2. Patient will have a decrease in pain to facilitate improvement in movement, function, and ADLs as indicated by Functional Deficits. Long Term Goals: To be achieved in: 8-10 weeks  1. Disability index score of 20% or less for the NDI to assist with reaching prior level of function.    2. Patient will demonstrate increased AROM to [facility] of cervical/thoracic spine to allow for proper

## 2019-05-13 ENCOUNTER — HOSPITAL ENCOUNTER (OUTPATIENT)
Dept: PHYSICAL THERAPY | Age: 54
Setting detail: THERAPIES SERIES
Discharge: HOME | End: 2019-05-13
Payer: COMMERCIAL

## 2019-05-13 PROCEDURE — 97140 MANUAL THERAPY 1/> REGIONS: CPT | Performed by: PHYSICAL THERAPIST

## 2019-05-13 PROCEDURE — 97110 THERAPEUTIC EXERCISES: CPT | Performed by: PHYSICAL THERAPIST

## 2019-05-13 NOTE — FLOWSHEET NOTE
[address] and [facility], [facility] [address] [place] [address], [address] [place] [address]  Phone: [phone]   Fax:     [phone]                                                     Physical Therapy Daily Treatment Note  Date:  2019    Patient Name:  Dennis Kidd    :  1965  MRN: [id number]  Restrictions/Precautions:    Medical/Treatment Diagnosis Information:  · Diagnosis: M54.12 (ICD-10-CM) - Radiculopathy, cervical region  · Treatment Diagnosis: M54.2 (ICD-10-CM) - Neck pain  Insurance/Certification information:  PT Insurance Information: [facility] (unlimited)   Physician Information:  Referring Practitioner: Syed Wallace MD   Plan of care signed (Y/N):     Date of Patient follow up with Physician:     G-Code (if applicable):      Date G-Code Applied: 5/10    NDI 42% deficit     Progress Note: [x]  Yes  []  No  Next due by: Visit #10      Latex Allergy:  [x]NO      []YES  Preferred Language for Healthcare:   [x]English       []other:    Visit # Insurance Allowable   2 Unlimited     Pain level:  -2/10     SUBJECTIVE: Pt reports she was very sore and had pain after her initial visit.   She states she actually does not     OBJECTIVE: See eval   Observation:    Test measurements:      RESTRICTIONS/PRECAUTIONS:     Exercises/Interventions:   Exercise/Equipment Resistance/Repetitions Other comments   Stretching/PROM     CROM     Chin tuck     UT side bend stretch     Levater scap stretch     Scalene stretch     Pectoral stretch 5x30\" rotation to R     Medial nerve flossing  10x5 pumps          Isometrics     Cervical retraction 10x10\" seated    shrugs     Cervical Flexion      Cervical Extension     Cervical sidebending               PRE's     External Rotation     Internal Rotation     Serratus     Biceps     Triceps     Shrugs     Horizontal Abd with ER     Reverse Flys     EXT     Flexion     Abduction          Cable Column/Theraband     Scapular cervical/CT, scapular GHJ and UE for the purpose of decreasing headache, modulating pain, promoting relaxation,  increasing ROM, reducing/eliminating soft tissue swelling/inflammation/restriction, improving soft tissue extensibility and allowing for proper ROM for normal function with self care, reaching, carrying, lifting, house/yardwork, driving/computer work    Modalities:  Heat 15'     Charges:  Timed Code Treatment Minutes: 25   Total Treatment Minutes: 737-188       [] EVAL (LOW) 64898 (typically 20 minutes face-to-face)  [] EVAL (MOD) 88872 (typically 30 minutes face-to-face)  [] EVAL (HIGH) 14944 (typically 45 minutes face-to-face)  [] RE-EVAL     [x] JA(24349) x  1   [] IONTO  [] NMR (42198) x      [] VASO  [x] Manual (85905) x  1    [] Other:  [] TA x       [] Mech Traction (14524)  [] ES(attended) (68477)      [] ES (un) (09145):    GOALS:    Patient stated goal: Pt would like to get rid of headaches and gain strength; Return to sewing, bowling, and lifting grandkids without pain      Therapist goals for Patient:   Short Term Goals: To be achieved in: 2-4 weeks  1. Independent in HEP and progression per patient tolerance, in order to prevent re-injury. 2. Patient will have a decrease in pain to facilitate improvement in movement, function, and ADLs as indicated by Functional Deficits.     Long Term Goals: To be achieved in: 8-10 weeks  1. Disability index score of 20% or less for the NDI to assist with reaching prior level of function. 2. Patient will demonstrate increased AROM to [facility] of cervical/thoracic spine to allow for proper joint functioning as indicated by patients Functional Deficits. 3. Patient will demonstrate an increase in postural awareness and control and activation of  Deep cervical stabilizers (MMT at least 4+/5) to allow for proper functional mobility as indicated by patients Functional Deficits.    4. Patient will return to daily functional activities without increased symptoms or restriction. 5. Patient will be able to sew without increased symptoms or restriction. 6. Patient will report less than or = to 3 headaches per week.         Progression Towards Functional goals:  [] Patient is progressing as expected towards functional goals listed. [] Progression is slowed due to complexities listed. [] Progression has been slowed due to co-morbidities. [x] Plan just implemented, too soon to assess goals progression  [] Other:     ASSESSMENT:  See eval    Treatment/Activity Tolerance:  [x] Patient tolerated treatment well [] Patient limited by fatique  [] Patient limited by pain  [] Patient limited by other medical complications  [x] Other:. Patient education :  5/10 Patient education on PT and plan of care including diagnosis, prognosis, treatment goals and options. Patient agrees with discussed POC and treatment and is aware of rehab process. Pt was also educated on clinic layout and use of modalities. Prognosis: [x] Good [] Fair  [] Poor    Patient Requires Follow-up: [x] Yes  [] No    PLAN: See eval  [] Continue per plan of care [] Alter current plan (see comments)  [x] Plan of care initiated [] Hold pending MD visit [] Discharge    Electronically signed by: Jose Hernández PT     *If patient does not return for further follow ups after this date. Please consider this as the patients discharge from physical therapy.

## 2019-05-15 ENCOUNTER — HOSPITAL ENCOUNTER (OUTPATIENT)
Dept: PHYSICAL THERAPY | Age: 54
Setting detail: THERAPIES SERIES
Discharge: HOME | End: 2019-05-15
Payer: COMMERCIAL

## 2019-05-15 PROCEDURE — 97110 THERAPEUTIC EXERCISES: CPT | Performed by: PHYSICAL THERAPIST

## 2019-05-15 PROCEDURE — 97140 MANUAL THERAPY 1/> REGIONS: CPT | Performed by: PHYSICAL THERAPIST

## 2019-05-15 NOTE — FLOWSHEET NOTE
[employer]  20x  Added 5/15        Cable Column/Theraband     Scapular Retraction 2x10 green  ^ 5/15   External Rotation     Internal Rotation     Ext     TRIC     Lats     Shrugs     Flex     BIC     No moneys  3x10 red  Added 5/13        Manul interventions 8' suboccipital release   6' STM L upper trap              Therapeutic Exercise and NMR EXR  [x] (12517) Provided verbal/tactile cueing for activities related to strengthening, flexibility, endurance, ROM  for improvements in cervical, postural, scapular, scapulothoracic and UE control with self care, reaching, carrying, lifting, house/yardwork, driving/computer work. [x] (41825) Provided verbal/tactile cueing for activities related to improving balance, coordination, kinesthetic sense, posture, motor skill, proprioception  to assist with cervical, scapular, scapulothoracic and UE control with self care, reaching, carrying, lifting, house/yardwork, driving/computer work. Therapeutic Activities:    [] (77753 or 50718) Provided verbal/tactile cueing for activities related to improving balance, coordination, kinesthetic sense, posture, motor skill, proprioception and motor activation to allow for proper function of cervical, scapular, scapulothoracic and UE control with self care, carrying, lifting, driving/computer work.      Home Exercise Program:    [x] (69177) Reviewed/Progressed HEP activities related to strengthening, flexibility, endurance, ROM of cervical, scapular, scapulothoracic and UE control with self care, reaching, carrying, lifting, house/yardwork, driving/computer work  [] (98543) Reviewed/Progressed HEP activities related to improving balance, coordination, kinesthetic sense, posture, motor skill, proprioception of cervical, scapular, scapulothoracic and UE control with self care, reaching, carrying, lifting, house/yardwork, driving/computer work      Manual Treatments:  PROM / STM / Oscillations-Mobs:  G-I, II, III, IV (PA's, Inf.,

## 2019-05-20 ENCOUNTER — HOSPITAL ENCOUNTER (OUTPATIENT)
Dept: PHYSICAL THERAPY | Age: 54
Setting detail: THERAPIES SERIES
Discharge: HOME | End: 2019-05-20
Payer: COMMERCIAL

## 2019-05-20 PROCEDURE — 97140 MANUAL THERAPY 1/> REGIONS: CPT | Performed by: PHYSICAL THERAPIST

## 2019-05-20 PROCEDURE — 97110 THERAPEUTIC EXERCISES: CPT | Performed by: PHYSICAL THERAPIST

## 2019-05-20 NOTE — FLOWSHEET NOTE
[address] [facility], [address] [employer] [address] [place] [address], [address] [place] [address]  Phone: [phone]   Fax:     [phone]                                                     Physical Therapy Daily Treatment Note  Date:  2019    Patient Name:  Flako Kunz    :  1965  MRN: [id number]  Restrictions/Precautions:    Medical/Treatment Diagnosis Information:  · Diagnosis: M54.12 (ICD-10-CM) - Radiculopathy, cervical region  · Treatment Diagnosis: M54.2 (ICD-10-CM) - Neck pain  Insurance/Certification information:  PT Insurance Information: [facility] (unlimited)   Physician Information:  Referring Practitioner: Judah Hanson MD   Plan of care signed (Y/N):     Date of Patient follow up with Physician:     G-Code (if applicable):      Date G-Code Applied: 5/10    NDI 42% deficit     Progress Note: [x]  Yes  []  No  Next due by: Visit #10      Latex Allergy:  [x]NO      []YES  Preferred Language for Healthcare:   [x]English       []other:    Visit # Insurance Allowable   4 Unlimited     Pain level:  3/10 ()     SUBJECTIVE: Pt reports mild symptoms - burning in the arm.      OBJECTIVE: See eval   Observation:    Test measurements:      RESTRICTIONS/PRECAUTIONS:     Exercises/Interventions:   Exercise/Equipment Resistance/Repetitions Other comments   Stretching/PROM     CROM     Chin tuck     UT side bend stretch     Levater scap stretch     Scalene stretch     Pectoral stretch 5x30\" rotation to R     Medial nerve flossing  10x5 pumps     lv pose w/ L UE thread  3x30\"  Added 5/15        Isometrics     Cervical retraction 10x10\" seated    shrugs     Cervical Flexion      Cervical Extension     Cervical sidebending               PRE's     External Rotation     Internal Rotation     Serratus     Biceps     Triceps     Shrugs     Horizontal Abd with ER     Reverse Flys     EXT     Flexion     Standing snow angels  20x  Added 5/15        Cable Column/Theraband     Scapular Retraction 2x10 green  ^ 5/15   External Rotation     Internal Rotation     Ext     TRIC     Lats     Shrugs     Flex     BIC     No moneys  3x10 red  Added 5/13        Manul interventions 8' suboccipital release   6' STM L upper trap              Therapeutic Exercise and NMR EXR  [x] (99197) Provided verbal/tactile cueing for activities related to strengthening, flexibility, endurance, ROM  for improvements in cervical, postural, scapular, scapulothoracic and UE control with self care, reaching, carrying, lifting, house/yardwork, driving/computer work. [x] (15143) Provided verbal/tactile cueing for activities related to improving balance, coordination, kinesthetic sense, posture, motor skill, proprioception  to assist with cervical, scapular, scapulothoracic and UE control with self care, reaching, carrying, lifting, house/yardwork, driving/computer work. Therapeutic Activities:    [] (36067 or 67609) Provided verbal/tactile cueing for activities related to improving balance, coordination, kinesthetic sense, posture, motor skill, proprioception and motor activation to allow for proper function of cervical, scapular, scapulothoracic and UE control with self care, carrying, lifting, driving/computer work.      Home Exercise Program:    [x] (25237) Reviewed/Progressed HEP activities related to strengthening, flexibility, endurance, ROM of cervical, scapular, scapulothoracic and UE control with self care, reaching, carrying, lifting, house/yardwork, driving/computer work  [] (87461) Reviewed/Progressed HEP activities related to improving balance, coordination, kinesthetic sense, posture, motor skill, proprioception of cervical, scapular, scapulothoracic and UE control with self care, reaching, carrying, lifting, house/yardwork, driving/computer work      Manual Treatments:  PROM / STM / Oscillations-Mobs:  G-I, II, III, IV (PA's, Inf., Post.)  [x] (98533) Provided manual therapy to mobilize soft tissue/joints of cervical/CT, scapular GHJ and UE for the purpose of decreasing headache, modulating pain, promoting relaxation,  increasing ROM, reducing/eliminating soft tissue swelling/inflammation/restriction, improving soft tissue extensibility and allowing for proper ROM for normal function with self care, reaching, carrying, lifting, house/yardwork, driving/computer work    Modalities:  Post- Ice 15'      Charges:  Timed Code Treatment Minutes: 25   Total Treatment Minutes: [id number]       [] EVAL (LOW) 39370 (typically 20 minutes face-to-face)  [] EVAL (MOD) 01323 (typically 30 minutes face-to-face)  [] EVAL (HIGH) 61537 (typically 45 minutes face-to-face)  [] RE-EVAL     [x] CQ(22484) x  1   [] IONTO  [] NMR (53067) x      [] VASO  [x] Manual (41374) x  1    [] Other:  [] TA x       [] Mech Traction (15384)  [] ES(attended) (21069)      [] ES (un) (51867):    GOALS:    Patient stated goal: Pt would like to get rid of headaches and gain strength; Return to sewing, bowling, and lifting grandkids without pain      Therapist goals for Patient:   Short Term Goals: To be achieved in: 2-4 weeks  1. Independent in HEP and progression per patient tolerance, in order to prevent re-injury. 2. Patient will have a decrease in pain to facilitate improvement in movement, function, and ADLs as indicated by Functional Deficits.     Long Term Goals: To be achieved in: 8-10 weeks  1. Disability index score of 20% or less for the NDI to assist with reaching prior level of function. 2. Patient will demonstrate increased AROM to [facility] of cervical/thoracic spine to allow for proper joint functioning as indicated by patients Functional Deficits. 3. Patient will demonstrate an increase in postural awareness and control and activation of  Deep cervical stabilizers (MMT at least 4+/5) to allow for proper functional mobility as indicated by patients Functional Deficits.    4. Patient will return to daily functional activities without increased symptoms or restriction. 5. Patient will be able to sew without increased symptoms or restriction. 6. Patient will report less than or = to 3 headaches per week.         Progression Towards Functional goals:  [] Patient is progressing as expected towards functional goals listed. [] Progression is slowed due to complexities listed. [] Progression has been slowed due to co-morbidities. [x] Plan just implemented, too soon to assess goals progression  [] Other:     ASSESSMENT:  See eval    Treatment/Activity Tolerance:  [x] Patient tolerated treatment well [] Patient limited by fatique  [] Patient limited by pain  [] Patient limited by other medical complications  [x] Other: Pt did well during session today with minimal burning in her arm noted after corner stretch. She has decreased swelling and inflammation in suboccipital region. Monitor and progress as tolerated. Plan on getting dry needling to address significant tightness in L upper trap npv.  5/20    Patient education :  5/10 Patient education on PT and plan of care including diagnosis, prognosis, treatment goals and options. Patient agrees with discussed POC and treatment and is aware of rehab process. Pt was also educated on clinic layout and use of modalities. Prognosis: [x] Good [] Fair  [] Poor    Patient Requires Follow-up: [x] Yes  [] No    PLAN: See eval  [x] Continue per plan of care [] Alter current plan (see comments)  [] Plan of care initiated [] Hold pending MD visit [] Discharge    Electronically signed by: Tex Tillman PT     *If patient does not return for further follow ups after this date. Please consider this as the patients discharge from physical therapy.

## 2019-05-22 ENCOUNTER — HOSPITAL ENCOUNTER (OUTPATIENT)
Dept: PHYSICAL THERAPY | Age: 54
Setting detail: THERAPIES SERIES
Discharge: HOME | End: 2019-05-22
Payer: COMMERCIAL

## 2019-05-22 PROCEDURE — 97110 THERAPEUTIC EXERCISES: CPT | Performed by: PHYSICAL THERAPIST

## 2019-05-22 PROCEDURE — 97140 MANUAL THERAPY 1/> REGIONS: CPT | Performed by: PHYSICAL THERAPIST

## 2019-05-22 NOTE — FLOWSHEET NOTE
[address] [facility], [facility] [address], [address] [place] [address]  Phone: [phone]   Fax:     [phone]                                                     Physical Therapy Daily Treatment Note  Date:  2019    Patient Name:  Caro Ruby    :  1965  MRN: [id number]  Restrictions/Precautions:    Medical/Treatment Diagnosis Information:  · Diagnosis: M54.12 (ICD-10-CM) - Radiculopathy, cervical region  · Treatment Diagnosis: M54.2 (ICD-10-CM) - Neck pain  Insurance/Certification information:  PT Insurance Information: [facility] (unlimited)   Physician Information:  Referring Practitioner: Manny Laura MD   Plan of care signed (Y/N):     Date of Patient follow up with Physician:     G-Code (if applicable):      Date G-Code Applied: 5/10    NDI 42% deficit     Progress Note: [x]  Yes  []  No  Next due by: Visit #10      Latex Allergy:  [x]NO      []YES  Preferred Language for Healthcare:   [x]English       []other:    Visit # Insurance Allowable   5 Unlimited     Pain level:  3/10 ()     SUBJECTIVE: Pt states her only complaint today is her burning sensation in her neck and shoulder.      OBJECTIVE: See eval   Observation:    Test measurements:      RESTRICTIONS/PRECAUTIONS:     Exercises/Interventions:   Exercise/Equipment Resistance/Repetitions Other comments   Stretching/PROM     CROM     Chin tuck     UT side bend stretch     Levater scap stretch     Scalene stretch     Pectoral stretch 5x30\" rotation to R     Medial nerve flossing  10x5 pumps     Added 5/15 Not performed today due to sunburn on legs         Isometrics     Cervical retraction 10x10\" seated    shrugs     Cervical Flexion      Cervical Extension     Cervical sidebending               PRE's     External Rotation     Internal Rotation     Serratus     Biceps     Triceps     Shrugs     Horizontal Abd with ER     Reverse Flys     EXT     Flexion Oscillations-Mobs:  G-I, II, III, IV (PA's, Inf., Post.)  [x] (02919) Provided manual therapy to mobilize soft tissue/joints of cervical/CT, scapular GHJ and UE for the purpose of decreasing headache, modulating pain, promoting relaxation,  increasing ROM, reducing/eliminating soft tissue swelling/inflammation/restriction, improving soft tissue extensibility and allowing for proper ROM for normal function with self care, reaching, carrying, lifting, house/yardwork, driving/computer work    Modalities:  Post- Ice 15'      Charges:  Timed Code Treatment Minutes: 33   Total Treatment Minutes: [id number]       [] EVAL (LOW) 45811 (typically 20 minutes face-to-face)  [] EVAL (MOD) 76886 (typically 30 minutes face-to-face)  [] EVAL (HIGH) 74774 (typically 45 minutes face-to-face)  [] RE-EVAL     [x] IQ(31235) x  1   [] IONTO  [] NMR (70389) x      [] VASO  [x] Manual (81906) x  1    [] Other:  [] TA x       [] Mech Traction (27375)  [] ES(attended) (53384)      [] ES (un) (98127):    GOALS:    Patient stated goal: Pt would like to get rid of headaches and gain strength; Return to sewing, bowling, and lifting grandkids without pain      Therapist goals for Patient:   Short Term Goals: To be achieved in: 2-4 weeks  1. Independent in HEP and progression per patient tolerance, in order to prevent re-injury. 2. Patient will have a decrease in pain to facilitate improvement in movement, function, and ADLs as indicated by Functional Deficits.     Long Term Goals: To be achieved in: 8-10 weeks  1. Disability index score of 20% or less for the NDI to assist with reaching prior level of function. 2. Patient will demonstrate increased AROM to [facility] of cervical/thoracic spine to allow for proper joint functioning as indicated by patients Functional Deficits.    3. Patient will demonstrate an increase in postural awareness and control and activation of  Deep cervical stabilizers (MMT at least 4+/5) to allow for proper functional mobility as indicated by patients Functional Deficits. 4. Patient will return to daily functional activities without increased symptoms or restriction. 5. Patient will be able to sew without increased symptoms or restriction. 6. Patient will report less than or = to 3 headaches per week.         Progression Towards Functional goals:  [] Patient is progressing as expected towards functional goals listed. [] Progression is slowed due to complexities listed. [] Progression has been slowed due to co-morbidities. [x] Plan just implemented, too soon to assess goals progression  [] Other:     ASSESSMENT:  See eval    Treatment/Activity Tolerance:  [x] Patient tolerated treatment well [] Patient limited by fatique  [] Patient limited by pain  [] Patient limited by other medical complications  [x] Other: Pt did report increased burning sensation after snow angels. She presents with min swelling and inflammation in suboccipital region. Monitor and progress as tolerated. Plan on getting dry needling to address significant tightness in L upper trap npv -- scheduled with Junior Roel DPT.  5/22    Patient education :  5/10 Patient education on PT and plan of care including diagnosis, prognosis, treatment goals and options. Patient agrees with discussed POC and treatment and is aware of rehab process. Pt was also educated on clinic layout and use of modalities. Prognosis: [x] Good [] Fair  [] Poor    Patient Requires Follow-up: [x] Yes  [] No    PLAN: See yesi  [x] Continue per plan of care [] Alter current plan (see comments)  [] Plan of care initiated [] Hold pending MD visit [] Discharge    Electronically signed by: Abbie Loges, PT     *If patient does not return for further follow ups after this date. Please consider this as the patients discharge from physical therapy.

## 2019-05-29 ENCOUNTER — HOSPITAL ENCOUNTER (OUTPATIENT)
Dept: PHYSICAL THERAPY | Age: 54
Setting detail: THERAPIES SERIES
Discharge: HOME | End: 2019-05-29
Payer: COMMERCIAL

## 2019-05-29 PROCEDURE — 97140 MANUAL THERAPY 1/> REGIONS: CPT

## 2019-05-29 PROCEDURE — 97110 THERAPEUTIC EXERCISES: CPT

## 2019-05-29 NOTE — FLOWSHEET NOTE
[address] [facility], [facility] [address], [address] [place] [address]  Phone: [phone]   Fax:     [phone]                                                     Physical Therapy Daily Treatment Note  Date:  2019    Patient Name:  Ebenezer Ramirez    :  1965  MRN: [id number]  Restrictions/Precautions:    Medical/Treatment Diagnosis Information:  · Diagnosis: M54.12 (ICD-10-CM) - Radiculopathy, cervical region  · Treatment Diagnosis: M54.2 (ICD-10-CM) - Neck pain  Insurance/Certification information:  PT Insurance Information: [facility] (unlimited)   Physician Information:  Referring Practitioner: Marianna Child MD   Plan of care signed (Y/N):     Date of Patient follow up with Physician:     G-Code (if applicable):      Date G-Code Applied: 5/10    NDI 42% deficit     Progress Note: [x]  Yes  []  No  Next due by: Visit #10      Latex Allergy:  [x]NO      []YES  Preferred Language for Healthcare:   [x]English       []other:    Visit # Insurance Allowable   6 Unlimited     Pain level:  1/10 ()     SUBJECTIVE: Patient states that she is feeling better. She states that she was sick over the weekend and didn't do a whole lot.  She states that she still feels tight.     OBJECTIVE: See eval   Observation:    Test measurements:      RESTRICTIONS/PRECAUTIONS:     Exercises/Interventions:   Exercise/Equipment Resistance/Repetitions Other comments   Stretching/PROM     CROM     Chin tuck     UT side bend stretch     Levater scap stretch     Scalene stretch     Pectoral stretch 5x30\" rotation to R     Medial nerve flossing  10x5 pumps     Added 5/15 Not performed today due to sunburn on legs         Isometrics     Cervical retraction 10x10\" seated    shrugs     Cervical Flexion      Cervical Extension     Cervical sidebending               PRE's     External Rotation     Internal Rotation     Serratus     Biceps     Triceps     Shrugs Horizontal Abd with ER     Reverse Flys     EXT     Flexion     Standing snow angels  20x  Added 5/15        Cable Column/Theraband     Scapular Retraction 2x10 green  ^ 5/15   External Rotation     Internal Rotation     Ext     TRIC     Lats     Shrugs     Flex     BIC     No moneys  3x10 red  Added 5/13        Manual interventions 10' suboccipital release,  L rib 1 mobs grade II/III   6' STM L upper trap; DN (see below)              Therapeutic Exercise and NMR EXR  [x] (23019) Provided verbal/tactile cueing for activities related to strengthening, flexibility, endurance, ROM  for improvements in cervical, postural, scapular, scapulothoracic and UE control with self care, reaching, carrying, lifting, house/yardwork, driving/computer work. [x] (75136) Provided verbal/tactile cueing for activities related to improving balance, coordination, kinesthetic sense, posture, motor skill, proprioception  to assist with cervical, scapular, scapulothoracic and UE control with self care, reaching, carrying, lifting, house/yardwork, driving/computer work. Therapeutic Activities:    [] (03369 or 86117) Provided verbal/tactile cueing for activities related to improving balance, coordination, kinesthetic sense, posture, motor skill, proprioception and motor activation to allow for proper function of cervical, scapular, scapulothoracic and UE control with self care, carrying, lifting, driving/computer work.      Home Exercise Program:    [x] (13787) Reviewed/Progressed HEP activities related to strengthening, flexibility, endurance, ROM of cervical, scapular, scapulothoracic and UE control with self care, reaching, carrying, lifting, house/yardwork, driving/computer work  [] (24756) Reviewed/Progressed HEP activities related to improving balance, coordination, kinesthetic sense, posture, motor skill, proprioception of cervical, scapular, scapulothoracic and UE control with self care, reaching, carrying, lifting, house/yardwork, driving/computer work      Manual Treatments:  PROM / STM / Oscillations-Mobs:  G-I, II, III, IV (PA's, Inf., Post.)  [x] (29319) Provided manual therapy to mobilize soft tissue/joints of cervical/CT, scapular GHJ and UE for the purpose of decreasing headache, modulating pain, promoting relaxation,  increasing ROM, reducing/eliminating soft tissue swelling/inflammation/restriction, improving soft tissue extensibility and allowing for proper ROM for normal function with self care, reaching, carrying, lifting, house/yardwork, driving/computer work     PT reviewed precautions, contraindications, and indications with pt in addition to application of dry needling within established plan of care and expected outcomes; pt verbalized understanding with all questions answered. Pt had signed consent form for dry needling and PT obtained written consent with updated plan of care from MD before beginning. Dry needling manual therapy: consisted on the placement of 2 needles in the following muscles: L upper trapezius, and L infraspinatus  A 1 inch needle was inserted, piston, rotated, and coned to produce intramuscular mobilization. These techniques were used to restore functional range of motion, reduce muscle spasm and induce healing in the corresponding musculature. (32382)  Clean Technique was utilized today while applying Dry needling treatment. The treatment sites where cleaned with 70% solution of  isopropyl alcohol . The PT washed their hands and utilized treatment gloves along with hand  prior to inserting the needles. All needles where removed and discarded in the appropriate sharps container.       Performed by Lisa Romero DPT  10 min     Modalities:  P  15 min  Start 5/29    Charges:  Timed Code Treatment Minutes: 35   Total Treatment Minutes: 30 [phone]       [] EVAL (LOW) 37109 (typically 20 minutes face-to-face)  [] EVAL (MOD) 82471 (typically 30 minutes face-to-face)  [] EVAL (HIGH) 99559 (typically 45 minutes face-to-face)  [] RE-EVAL     [x] BU(42759) x  1   [] IONTO  [] NMR (35446) x      [] VASO  [x] Manual (23140) x  1    [] Other:  [] TA x       [] Mech Traction (66168)  [] ES(attended) (05289)      [] ES (un) (72379):    GOALS:    Patient stated goal: Pt would like to get rid of headaches and gain strength; Return to sewing, bowling, and lifting grandkids without pain      Therapist goals for Patient:   Short Term Goals: To be achieved in: 2-4 weeks  1. Independent in HEP and progression per patient tolerance, in order to prevent re-injury. 2. Patient will have a decrease in pain to facilitate improvement in movement, function, and ADLs as indicated by Functional Deficits.     Long Term Goals: To be achieved in: 8-10 weeks  1. Disability index score of 20% or less for the NDI to assist with reaching prior level of function. 2. Patient will demonstrate increased AROM to [facility] of cervical/thoracic spine to allow for proper joint functioning as indicated by patients Functional Deficits. 3. Patient will demonstrate an increase in postural awareness and control and activation of  Deep cervical stabilizers (MMT at least 4+/5) to allow for proper functional mobility as indicated by patients Functional Deficits. 4. Patient will return to daily functional activities without increased symptoms or restriction. 5. Patient will be able to sew without increased symptoms or restriction. 6. Patient will report less than or = to 3 headaches per week.         Progression Towards Functional goals:  [] Patient is progressing as expected towards functional goals listed. [] Progression is slowed due to complexities listed. [] Progression has been slowed due to co-morbidities.   [x] Plan just implemented, too soon to assess goals progression  [] Other:     ASSESSMENT:  See eval    Treatment/Activity Tolerance:  [x] Patient tolerated treatment well [] Patient limited by shavonne  []

## 2019-05-31 ENCOUNTER — HOSPITAL ENCOUNTER (OUTPATIENT)
Dept: PHYSICAL THERAPY | Age: 54
Setting detail: THERAPIES SERIES
Discharge: HOME | End: 2019-05-31
Payer: COMMERCIAL

## 2019-05-31 PROCEDURE — 97110 THERAPEUTIC EXERCISES: CPT

## 2019-05-31 PROCEDURE — 97140 MANUAL THERAPY 1/> REGIONS: CPT

## 2019-05-31 NOTE — FLOWSHEET NOTE
[address] [facility], [facility] [place] [address], [address] [place] [address]  Phone: [phone]   Fax:     [phone]                                                     Physical Therapy Daily Treatment Note  Date:  2019    Patient Name:  Norah Alamo    :  1965  MRN: [id number]  Restrictions/Precautions:    Medical/Treatment Diagnosis Information:  · Diagnosis: M54.12 (ICD-10-CM) - Radiculopathy, cervical region  · Treatment Diagnosis: M54.2 (ICD-10-CM) - Neck pain  Insurance/Certification information:  PT Insurance Information: [facility] (unlimited)   Physician Information:  Referring Practitioner: Elizabeth Tan MD   Plan of care signed (Y/N):     Date of Patient follow up with Physician:     G-Code (if applicable):      Date G-Code Applied: 5/10    NDI 42% deficit     Progress Note: [x]  Yes  []  No  Next due by: Visit #10      Latex Allergy:  [x]NO      []YES  Preferred Language for Healthcare:   [x]English       []other:    Visit # Insurance Allowable   7 Unlimited     Pain level:  0/10 tightness present ()     SUBJECTIVE: Patient states that she did well after her last dry needling session. She states that she was sore and tight but no true pain.      OBJECTIVE: See eval   Observation:    Test measurements:      RESTRICTIONS/PRECAUTIONS:     Exercises/Interventions:   Exercise/Equipment Resistance/Repetitions Other comments   Stretching/PROM     CROM     Chin tuck     UT side bend stretch     Levater scap stretch     Scalene stretch     Pectoral stretch 5x30\" rotation to R     Medial nerve flossing  10x5 pumps     Added 5/15 Not performed today due to sunburn on legs         Isometrics     Cervical retraction 10x10\" seated    shrugs     Cervical Flexion      Cervical Extension     Cervical sidebending               PRE's     External Rotation     Internal Rotation     Serratus     Biceps     Triceps     Shrugs     Horizontal Abd with ER     Reverse Flys     EXT     Flexion     Standing snow angels  20x  Added 5/15        Cable Column/Theraband     Scapular Retraction 2x10 green  ^ 5/15   External Rotation     Internal Rotation     Ext 3 x 10; green Start 5/31   TRIC     Lats     Shrugs     Flex     BIC     No moneys  3x10 red  Added 5/13        Manual interventions 10' suboccipital release,  L rib 1 mobs grade II/III   6' STM L upper trap; DN (see below)              Therapeutic Exercise and NMR EXR  [x] (88583) Provided verbal/tactile cueing for activities related to strengthening, flexibility, endurance, ROM  for improvements in cervical, postural, scapular, scapulothoracic and UE control with self care, reaching, carrying, lifting, house/yardwork, driving/computer work. [x] (08616) Provided verbal/tactile cueing for activities related to improving balance, coordination, kinesthetic sense, posture, motor skill, proprioception  to assist with cervical, scapular, scapulothoracic and UE control with self care, reaching, carrying, lifting, house/yardwork, driving/computer work. Therapeutic Activities:    [] (40579 or 45717) Provided verbal/tactile cueing for activities related to improving balance, coordination, kinesthetic sense, posture, motor skill, proprioception and motor activation to allow for proper function of cervical, scapular, scapulothoracic and UE control with self care, carrying, lifting, driving/computer work.      Home Exercise Program:    [x] (73400) Reviewed/Progressed HEP activities related to strengthening, flexibility, endurance, ROM of cervical, scapular, scapulothoracic and UE control with self care, reaching, carrying, lifting, house/yardwork, driving/computer work  [] (41348) Reviewed/Progressed HEP activities related to improving balance, coordination, kinesthetic sense, posture, motor skill, proprioception of cervical, scapular, scapulothoracic and UE control with self care, reaching, carrying, lifting,

## 2019-06-05 ENCOUNTER — HOSPITAL ENCOUNTER (OUTPATIENT)
Dept: PHYSICAL THERAPY | Age: 54
Setting detail: THERAPIES SERIES
Discharge: HOME | End: 2019-06-05
Payer: COMMERCIAL

## 2019-06-05 PROCEDURE — 97110 THERAPEUTIC EXERCISES: CPT

## 2019-06-05 PROCEDURE — 97140 MANUAL THERAPY 1/> REGIONS: CPT

## 2019-06-05 PROCEDURE — 97112 NEUROMUSCULAR REEDUCATION: CPT

## 2019-06-05 NOTE — FLOWSHEET NOTE
[address] [facility], [facility] [employer] [address] [place] [address], [address] [place] [address]  Phone: [phone]   Fax:     [phone]                                                     Physical Therapy Daily Treatment Note  Date:  2019    Patient Name:  Iza Nicole    :  1965  MRN: [id number]  Restrictions/Precautions:    Medical/Treatment Diagnosis Information:  · Diagnosis: M54.12 (ICD-10-CM) - Radiculopathy, cervical region  · Treatment Diagnosis: M54.2 (ICD-10-CM) - Neck pain  Insurance/Certification information:  PT Insurance Information: [facility] (unlimited)   Physician Information:  Referring Practitioner: Beverly Canela MD   Plan of care signed (Y/N):     Date of Patient follow up with Physician:     G-Code (if applicable):      Date G-Code Applied: 5/10    NDI 42% deficit     Progress Note: [x]  Yes  []  No  Next due by: Visit #10      Latex Allergy:  [x]NO      []YES  Preferred Language for Healthcare:   [x]English       []other:    Visit # Insurance Allowable   8 Unlimited     Pain level:  0/10 tightness present ()     SUBJECTIVE:  Patient states that she was really sore after last session along the medial border of scapula. Patient states that she still feels tight.     OBJECTIVE: See eval   Observation:    Test measurements:      RESTRICTIONS/PRECAUTIONS:     Exercises/Interventions:   Exercise/Equipment Resistance/Repetitions Other comments   Stretching/PROM     CROM     Chin tuck     UT side bend stretch     Levater scap stretch     Scalene stretch     Pectoral stretch 5x30\" rotation to R     Medial nerve flossing  10x5 pumps     Added 5/15 Not performed today due to sunburn on legs         Isometrics     Cervical retraction 10x10\" seated    shrugs     Cervical Flexion      Cervical Extension     Cervical sidebending     scapular retraction (prone) 10 sec x 10 Start         PRE's     External Rotation     Internal Rotation cervical, scapular, scapulothoracic and UE control with self care, reaching, carrying, lifting, house/yardwork, driving/computer work      Manual Treatments:  PROM / STM / Oscillations-Mobs:  G-I, II, III, IV (PA's, Inf., Post.)  [x] (12414) Provided manual therapy to mobilize soft tissue/joints of cervical/CT, scapular GHJ and UE for the purpose of decreasing headache, modulating pain, promoting relaxation,  increasing ROM, reducing/eliminating soft tissue swelling/inflammation/restriction, improving soft tissue extensibility and allowing for proper ROM for normal function with self care, reaching, carrying, lifting, house/yardwork, driving/computer work     PT reviewed precautions, contraindications, and indications with pt in addition to application of dry needling within established plan of care and expected outcomes; pt verbalized understanding with all questions answered. Pt had signed consent form for dry needling and PT obtained written consent with updated plan of care from MD before beginning. Dry needling manual therapy: consisted on the placement of 2 needles in the following muscles: L upper trapezius, and L infraspinatus, R UT, and R infraspinatus. A 1 inch needle was inserted, piston, rotated, and coned to produce intramuscular mobilization. These techniques were used to restore functional range of motion, reduce muscle spasm and induce healing in the corresponding musculature. (44610)  Clean Technique was utilized today while applying Dry needling treatment. The treatment sites where cleaned with 70% solution of  isopropyl alcohol . The PT washed their hands and utilized treatment gloves along with hand  prior to inserting the needles. All needles where removed and discarded in the appropriate sharps container.       Performed by Clyde Mills DPT  15 min     Modalities:  P  15 min  6/5    Charges:  Timed Code Treatment Minutes: 55   Total Treatment Minutes: 38 [id number] [] EVAL (LOW) 43968 (typically 20 minutes face-to-face)  [] EVAL (MOD) 19775 (typically 30 minutes face-to-face)  [] EVAL (HIGH) 84610 (typically 45 minutes face-to-face)  [] RE-EVAL     [x] ZF(20293) x  1   [] IONTO  [x] NMR (56258) x  1   [] VASO  [x] Manual (70514) x  2    [] Other:  [] TA x       [] Mech Traction (38527)  [] ES(attended) (34458)      [] ES (un) (70142):    GOALS:    Patient stated goal: Pt would like to get rid of headaches and gain strength; Return to sewing, bowling, and lifting grandkids without pain      Therapist goals for Patient:   Short Term Goals: To be achieved in: 2-4 weeks  1. Independent in HEP and progression per patient tolerance, in order to prevent re-injury. 2. Patient will have a decrease in pain to facilitate improvement in movement, function, and ADLs as indicated by Functional Deficits.     Long Term Goals: To be achieved in: 8-10 weeks  1. Disability index score of 20% or less for the NDI to assist with reaching prior level of function. 2. Patient will demonstrate increased AROM to [facility] of cervical/thoracic spine to allow for proper joint functioning as indicated by patients Functional Deficits. 3. Patient will demonstrate an increase in postural awareness and control and activation of  Deep cervical stabilizers (MMT at least 4+/5) to allow for proper functional mobility as indicated by patients Functional Deficits. 4. Patient will return to daily functional activities without increased symptoms or restriction. 5. Patient will be able to sew without increased symptoms or restriction. 6. Patient will report less than or = to 3 headaches per week.         Progression Towards Functional goals:  [] Patient is progressing as expected towards functional goals listed. [] Progression is slowed due to complexities listed. [] Progression has been slowed due to co-morbidities.   [x] Plan just implemented, too soon to assess goals progression  [] Other:

## 2019-06-07 ENCOUNTER — APPOINTMENT (OUTPATIENT)
Dept: PHYSICAL THERAPY | Age: 54
End: 2019-06-07
Payer: COMMERCIAL

## 2019-06-10 ENCOUNTER — HOSPITAL ENCOUNTER (OUTPATIENT)
Dept: PHYSICAL THERAPY | Age: 54
Setting detail: THERAPIES SERIES
Discharge: HOME | End: 2019-06-10
Payer: COMMERCIAL

## 2019-06-10 PROCEDURE — 97110 THERAPEUTIC EXERCISES: CPT

## 2019-06-10 PROCEDURE — 97140 MANUAL THERAPY 1/> REGIONS: CPT

## 2019-06-10 NOTE — FLOWSHEET NOTE
Shrugs     Horizontal Abd with ER     Reverse Flys     EXT     Flexion     Standing snow angels  20x  Added 5/15        Cable Column/Theraband     TRIC     Lats     Shrugs     Flex     BIC     No moneys  3x10 red  Added 5/13   Manual interventions 10' suboccipital release,  L rib 1 mobs grade II/III   6' STM L upper trap; DN (see below)              Therapeutic Exercise and NMR EXR  [x] (31080) Provided verbal/tactile cueing for activities related to strengthening, flexibility, endurance, ROM  for improvements in cervical, postural, scapular, scapulothoracic and UE control with self care, reaching, carrying, lifting, house/yardwork, driving/computer work. [x] (03663) Provided verbal/tactile cueing for activities related to improving balance, coordination, kinesthetic sense, posture, motor skill, proprioception  to assist with cervical, scapular, scapulothoracic and UE control with self care, reaching, carrying, lifting, house/yardwork, driving/computer work. Therapeutic Activities:    [] (42383 or 63427) Provided verbal/tactile cueing for activities related to improving balance, coordination, kinesthetic sense, posture, motor skill, proprioception and motor activation to allow for proper function of cervical, scapular, scapulothoracic and UE control with self care, carrying, lifting, driving/computer work.      Home Exercise Program:    [x] (93693) Reviewed/Progressed HEP activities related to strengthening, flexibility, endurance, ROM of cervical, scapular, scapulothoracic and UE control with self care, reaching, carrying, lifting, house/yardwork, driving/computer work  [] (67155) Reviewed/Progressed HEP activities related to improving balance, coordination, kinesthetic sense, posture, motor skill, proprioception of cervical, scapular, scapulothoracic and UE control with self care, reaching, carrying, lifting, house/yardwork, driving/computer work      Manual Treatments:  PROM / STM / Oscillations-Mobs: VM(50617) x  1   [] IONTO  [] NMR (42301) x      [] VASO  [x] Manual (47536) x  1    [] Other:  [] TA x       [] Mech Traction (45819)  [] ES(attended) (41739)      [] ES (un) (81681):    GOALS:    Patient stated goal: Pt would like to get rid of headaches and gain strength; Return to sewing, bowling, and lifting grandkids without pain      Therapist goals for Patient:   Short Term Goals: To be achieved in: 2-4 weeks  1. Independent in HEP and progression per patient tolerance, in order to prevent re-injury. 2. Patient will have a decrease in pain to facilitate improvement in movement, function, and ADLs as indicated by Functional Deficits.     Long Term Goals: To be achieved in: 8-10 weeks  1. Disability index score of 20% or less for the NDI to assist with reaching prior level of function. 2. Patient will demonstrate increased AROM to [facility] of cervical/thoracic spine to allow for proper joint functioning as indicated by patients Functional Deficits. 3. Patient will demonstrate an increase in postural awareness and control and activation of  Deep cervical stabilizers (MMT at least 4+/5) to allow for proper functional mobility as indicated by patients Functional Deficits. 4. Patient will return to daily functional activities without increased symptoms or restriction. 5. Patient will be able to sew without increased symptoms or restriction. 6. Patient will report less than or = to 3 headaches per week.         Progression Towards Functional goals:  [] Patient is progressing as expected towards functional goals listed. [] Progression is slowed due to complexities listed. [] Progression has been slowed due to co-morbidities.   [x] Plan just implemented, too soon to assess goals progression  [] Other:     ASSESSMENT:  See eval    Treatment/Activity Tolerance:  [x] Patient tolerated treatment well [] Patient limited by fatique  [] Patient limited by pain  [] Patient limited by other medical complications  [x] Other: Pt tolerated treatment well this session. Good tolerance to addition of dry needling this session; multiple muscle twitching noted in left upper trap and right upper trap with left being worse than right side. No ill side effects noted. 6/10    Patient education :  5/10 Patient education on PT and plan of care including diagnosis, prognosis, treatment goals and options. Patient agrees with discussed POC and treatment and is aware of rehab process. Pt was also educated on clinic layout and use of modalities. Prognosis: [x] Good [] Fair  [] Poor    Patient Requires Follow-up: [x] Yes  [] No    PLAN: See eval  [x] Continue per plan of care [] Alter current plan (see comments)  [] Plan of care initiated [] Hold pending MD visit [] Discharge    Electronically signed by: Pebbles Montiel PT, DPT     *If patient does not return for further follow ups after this date. Please consider this as the patients discharge from physical therapy.

## 2019-06-12 ENCOUNTER — HOSPITAL ENCOUNTER (OUTPATIENT)
Dept: PHYSICAL THERAPY | Age: 54
Setting detail: THERAPIES SERIES
Discharge: HOME | End: 2019-06-12
Payer: COMMERCIAL

## 2019-06-12 PROCEDURE — 97530 THERAPEUTIC ACTIVITIES: CPT

## 2019-06-12 PROCEDURE — 97140 MANUAL THERAPY 1/> REGIONS: CPT

## 2019-06-12 PROCEDURE — 97110 THERAPEUTIC EXERCISES: CPT

## 2019-06-12 NOTE — PLAN OF CARE
The [address] [place] [address] [facility], [address] [place] [address], [address] [place] [address]  Phone: [phone]   Fax:     [phone]                                                    Physical Therapy Re-Certification Plan of Care    Dear  ,    We had the pleasure of treating the following patient for physical therapy services at [address] [place] [address]. A summary of our findings can be found in the updated assessment below. This includes our plan of care. If you have any questions or concerns regarding these findings, please do not hesitate to contact me at the office phone number checked above.   Thank you for the referral.     Physician Signature:________________________________Date:__________________  By signing above (or electronic signature), therapists plan is approved by physician      Overall Response to Treatment:   [x]Patient is responding well to treatment and improvement is noted with regards  to goals   []Patient should continue to improve in reasonable time if they continue HEP   []Patient has plateaued and is no longer responding to skilled PT intervention    []Patient is getting worse and would benefit from return to referring MD   []Patient unable to adhere to initial POC   []Other:     Date range of previous POC Visits: 5/10/19-19    Total Visits: 10        Physical Therapy Daily Treatment Note  Date:  2019    Patient Name:  Mary Díaz    :  1965  MRN: [id number]  Restrictions/Precautions:    Medical/Treatment Diagnosis Information:  · Diagnosis: M54.12 (ICD-10-CM) - Radiculopathy, cervical region  · Treatment Diagnosis: M54.2 (ICD-10-CM) - Neck pain  Insurance/Certification information:  PT Insurance Information: [employer] (unlimited)   Physician Information:  Referring Practitioner: Holley Ochoa MD   Plan of care signed (Y/N):     Date of Patient follow up with Physician:      G-Code (if applicable):      Date G-Code Applied: 6/12    NDI 14% deficit     Progress Note: [x]  Yes  []  No  Next due by: Visit #20      Latex Allergy:  [x]NO      []YES  Preferred Language for Healthcare:   [x]English       []other:    Visit # Insurance Allowable   10 Unlimited     Pain level:  0/10 tightness present (6/12)     SUBJECTIVE: 6/12 Patient states that she feels tight but she has no nerve pain.      OBJECTIVE: See eval   Observation:    Test measurements:      RESTRICTIONS/PRECAUTIONS:     Exercises/Interventions:   Exercise/Equipment Resistance/Repetitions Other comments   Stretching/PROM     CROM     Crossbody UE stretch 30 sec x 5 Start 6/12   Chin tuck     UT side bend stretch 30 sec x 5 Start 6/12   Levater scap stretch     Scalene stretch     Pectoral stretch 5x30\" rotation to R     Medial nerve flossing  10x5 pumps  5/13   Added 5/15 Not performed today due to sunburn on legs 5/22        Isometrics     Cervical retraction 10x10\" seated    shrugs     Cervical Flexion      Cervical Extension     Cervical sidebending     scapular retraction (prone) 10 sec x 10 Start 6/5        PRE's     External Rotation     Internal Rotation     Serratus     Biceps     Triceps     Shrugs     Horizontal Abd with ER     Reverse Flys     EXT     Flexion     Standing snow angels  20x  Added 5/15        Cable Column/Theraband     External Rotation 3 x 10; 1# Start 6/12   Internal Rotation 3 x 10; 3# Start 6/12   TRIC     Lats     Shrugs     Flex     BIC     No moneys  3x10 red  Added 5/13   Horizontal abduction x 10; red Start 6/5   Manual interventions 10' suboccipital release,  L rib 1 mobs grade II/III   6' STM L upper trap; DN (see below)              Therapeutic Exercise and NMR EXR  [x] (37938) Provided verbal/tactile cueing for activities related to strengthening, flexibility, endurance, ROM  for improvements in cervical, postural, scapular, scapulothoracic and UE control with self care, reaching, carrying, lifting, house/yardwork, driving/computer MET     Long Term Goals: To be achieved in: 8-10 weeks  1. Disability index score of 20% or less for the NDI to assist with reaching prior level of function. MET  2. Patient will demonstrate increased AROM to [facility] of cervical/thoracic spine to allow for proper joint functioning as indicated by patients Functional Deficits. Progressing Towards  3. Patient will demonstrate an increase in postural awareness and control and activation of  Deep cervical stabilizers (MMT at least 4+/5) to allow for proper functional mobility as indicated by patients Functional Deficits. Progressing Towards   4. Patient will return to daily functional activities without increased symptoms or restriction. Progressing Towards  5. Patient will be able to sew without increased symptoms or restriction. Progressing Towards  6. Patient will report less than or = to 3 headaches per week. Progressing Towards       Progression Towards Functional goals:  [] Patient is progressing as expected towards functional goals listed. [] Progression is slowed due to complexities listed. [] Progression has been slowed due to co-morbidities. [x] Plan just implemented, too soon to assess goals progression  [] Other:     ASSESSMENT:  See eval    Treatment/Activity Tolerance:  [x] Patient tolerated treatment well [] Patient limited by fatique  [] Patient limited by pain  [] Patient limited by other medical complications  [x] Other: Pt continues to make progress with DN and therapy. Patient has met all short term goals and is having less pain and symptoms during the week than previously. Will continue to benefit with continued therapy. Patient tolerated treatment well this session. Good tolerance to addition of dry needling this session; multiple muscle twitching noted in left upper trap and right upper trap with left being worse than right side. No ill side effects noted.   6/10    Patient education :  5/10 Patient education on PT and plan of care including

## 2019-06-17 ENCOUNTER — HOSPITAL ENCOUNTER (OUTPATIENT)
Dept: PHYSICAL THERAPY | Age: 54
Setting detail: THERAPIES SERIES
Discharge: HOME | End: 2019-06-17
Payer: COMMERCIAL

## 2019-06-17 PROCEDURE — 97530 THERAPEUTIC ACTIVITIES: CPT

## 2019-06-17 PROCEDURE — 97110 THERAPEUTIC EXERCISES: CPT

## 2019-06-17 PROCEDURE — 97140 MANUAL THERAPY 1/> REGIONS: CPT

## 2019-06-17 NOTE — FLOWSHEET NOTE
[address] [facility], [address] [place] [address], [address] [place] [address]  Phone: [phone]   Fax:     [phone]                                                     Physical Therapy Daily Treatment Note  Date:  2019    Patient Name:  Elisabet Tan    :  1965  MRN: [id number]  Restrictions/Precautions:    Medical/Treatment Diagnosis Information:  · Diagnosis: M54.12 (ICD-10-CM) - Radiculopathy, cervical region  · Treatment Diagnosis: M54.2 (ICD-10-CM) - Neck pain  Insurance/Certification information:  PT Insurance Information: [facility] (unlimited)   Physician Information:  Referring Practitioner: Lina Salvador MD   Plan of care signed (Y/N):     Date of Patient follow up with Physician:      G-Code (if applicable):      Date G-Code Applied:     NDI 14% deficit     Progress Note: [x]  Yes  []  No  Next due by: Visit #20      Latex Allergy:  [x]NO      []YES  Preferred Language for Healthcare:   [x]English       []other:    Visit # Insurance Allowable   11 Unlimited     Pain level:  0/10 tightness present ()     SUBJECTIVE:  Patient states that she had a headache yesterday and her neck was sore. She states that she held the baby for awhile yesterday too. She states that she woke up with a headache this morning but it has gone away. She states that she was sore after her last session but states that she hasnt had a headache for the past few days.     OBJECTIVE: See eval   Observation:    Test measurements:      RESTRICTIONS/PRECAUTIONS:     Exercises/Interventions:   Exercise/Equipment Resistance/Repetitions Other comments   Stretching/PROM     CROM     Crossbody UE stretch 30 sec x 5 Start    Chin tuck     UT side bend stretch 30 sec x 5 Start    Levater scap stretch     Scalene stretch     Pectoral stretch 5x30\" rotation to R     Medial nerve flossing  10x5 pumps     Added 5/15 Not performed today due to sunburn on legs 5/22        Isometrics     Cervical retraction 10x10\" seated    shrugs     Cervical Flexion      Cervical Extension     Cervical sidebending     scapular retraction (prone) 10 sec x 10 Start 6/5        PRE's     External Rotation     Internal Rotation     Serratus     Biceps     Triceps     Shrugs     Horizontal Abd with ER     Reverse Flys     EXT     Flexion     Standing snow angels  20x  Added 5/15        Cable Column/Theraband     External Rotation 3 x 10; 1# Start 6/12   Internal Rotation 3 x 10; 3# Start 6/12   TRIC     Lats     Shrugs     Flex     BIC     No moneys  3x10 red  Added 5/13   Horizontal abduction x 10; red Start 6/5   Manual interventions 10' suboccipital release,  L rib 1 mobs grade II/III   6' STM L upper trap; DN (see below)              Therapeutic Exercise and NMR EXR  [x] (29591) Provided verbal/tactile cueing for activities related to strengthening, flexibility, endurance, ROM  for improvements in cervical, postural, scapular, scapulothoracic and UE control with self care, reaching, carrying, lifting, house/yardwork, driving/computer work. [x] (29999) Provided verbal/tactile cueing for activities related to improving balance, coordination, kinesthetic sense, posture, motor skill, proprioception  to assist with cervical, scapular, scapulothoracic and UE control with self care, reaching, carrying, lifting, house/yardwork, driving/computer work. Therapeutic Activities:    [x] (16035 or 31703) Provided verbal/tactile cueing for activities related to improving balance, coordination, kinesthetic sense, posture, motor skill, proprioception and motor activation to allow for proper function of cervical, scapular, scapulothoracic and UE control with self care, carrying, lifting, driving/computer work.      Home Exercise Program:    [x] (46645) Reviewed/Progressed HEP activities related to strengthening, flexibility, endurance, ROM of cervical, scapular, scapulothoracic and UE control with self care, reaching, carrying, lifting, house/yardwork, driving/computer work  [] (12758) Reviewed/Progressed HEP activities related to improving balance, coordination, kinesthetic sense, posture, motor skill, proprioception of cervical, scapular, scapulothoracic and UE control with self care, reaching, carrying, lifting, house/yardwork, driving/computer work      Manual Treatments:  PROM / STM / Oscillations-Mobs:  G-I, II, III, IV (PA's, Inf., Post.)  [x] (20084) Provided manual therapy to mobilize soft tissue/joints of cervical/CT, scapular GHJ and UE for the purpose of decreasing headache, modulating pain, promoting relaxation,  increasing ROM, reducing/eliminating soft tissue swelling/inflammation/restriction, improving soft tissue extensibility and allowing for proper ROM for normal function with self care, reaching, carrying, lifting, house/yardwork, driving/computer work     PT reviewed precautions, contraindications, and indications with pt in addition to application of dry needling within established plan of care and expected outcomes; pt verbalized understanding with all questions answered. Pt had signed consent form for dry needling and PT obtained written consent with updated plan of care from MD before beginning. Dry needling manual therapy: consisted on the placement of 2 needles in the following muscles: L upper trapezius, and L infraspinatus, R UT, and R infraspinatus. A 1 inch needle was inserted, piston, rotated, and coned to produce intramuscular mobilization. These techniques were used to restore functional range of motion, reduce muscle spasm and induce healing in the corresponding musculature. (94408)  Clean Technique was utilized today while applying Dry needling treatment. The treatment sites where cleaned with 70% solution of  isopropyl alcohol . The PT washed their hands and utilized treatment gloves along with hand  prior to inserting the needles.   All needles where removed and discarded in the appropriate sharps container. Performed by Randene Homans, DPT  15 min  6/17     Modalities:  MHP  15 min  6/10    Charges:  Timed Code Treatment Minutes: 45   Total Treatment Minutes: 91  [phone]       [] EVAL (LOW) 95507 (typically 20 minutes face-to-face)  [] EVAL (MOD) 67651 (typically 30 minutes face-to-face)  [] EVAL (HIGH) 07472 (typically 45 minutes face-to-face)  [] RE-EVAL     [x] VI(01534) x  1   [] IONTO  [] NMR (39046) x      [] VASO  [x] Manual (33353) x  1    [] Other:  [x] TA x  1    [] Mech Traction (41032)  [] ES(attended) (91653)      [] ES (un) (92335):    GOALS:    Patient stated goal: Pt would like to get rid of headaches and gain strength; Return to sewing, bowling, and lifting grandkids without pain      Therapist goals for Patient:   Short Term Goals: To be achieved in: 2-4 weeks  1. Independent in HEP and progression per patient tolerance, in order to prevent re-injury. MET  2. Patient will have a decrease in pain to facilitate improvement in movement, function, and ADLs as indicated by Functional Deficits. MET     Long Term Goals: To be achieved in: 8-10 weeks  1. Disability index score of 20% or less for the NDI to assist with reaching prior level of function. MET  2. Patient will demonstrate increased AROM to [facility] of cervical/thoracic spine to allow for proper joint functioning as indicated by patients Functional Deficits. Progressing Towards  3. Patient will demonstrate an increase in postural awareness and control and activation of  Deep cervical stabilizers (MMT at least 4+/5) to allow for proper functional mobility as indicated by patients Functional Deficits. Progressing Towards   4. Patient will return to daily functional activities without increased symptoms or restriction. Progressing Towards  5. Patient will be able to sew without increased symptoms or restriction. Progressing Towards  6. Patient will report less than or = to 3 headaches per week. Progressing Towards       Progression Towards Functional goals:  [x] Patient is progressing as expected towards functional goals listed. [] Progression is slowed due to complexities listed. [] Progression has been slowed due to co-morbidities. [] Plan just implemented, too soon to assess goals progression  [] Other:     ASSESSMENT:  See eval    Treatment/Activity Tolerance:  [x] Patient tolerated treatment well [] Patient limited by fatique  [] Patient limited by pain  [] Patient limited by other medical complications  [x] Other:  Patient tolerated treatment well this session. Good tolerance to dry needling this session; multiple muscle twitching noted in left upper trap and right upper trap with left being worse than right side. Reported soreness and tightness at end of session. No ill side effects noted. 6/17    Patient education :  5/10 Patient education on PT and plan of care including diagnosis, prognosis, treatment goals and options. Patient agrees with discussed POC and treatment and is aware of rehab process. Pt was also educated on clinic layout and use of modalities. Prognosis: [x] Good [] Fair  [] Poor    Patient Requires Follow-up: [x] Yes  [] No    PLAN: 1-2x week for 4 weeks (6/12/19-7/12/19)  [x] Continue per plan of care [] Alter current plan (see comments)  [] Plan of care initiated [] Hold pending MD visit [] Discharge    Electronically signed by: Clotilde Arellano PT, DPT     *If patient does not return for further follow ups after this date. Please consider this as the patients discharge from physical therapy.

## 2019-06-19 ENCOUNTER — HOSPITAL ENCOUNTER (OUTPATIENT)
Dept: PHYSICAL THERAPY | Age: 54
Setting detail: THERAPIES SERIES
Discharge: HOME | End: 2019-06-19
Payer: COMMERCIAL

## 2019-06-19 PROCEDURE — 97530 THERAPEUTIC ACTIVITIES: CPT

## 2019-06-19 PROCEDURE — 97140 MANUAL THERAPY 1/> REGIONS: CPT

## 2019-06-19 PROCEDURE — 97110 THERAPEUTIC EXERCISES: CPT

## 2019-06-19 NOTE — FLOWSHEET NOTE
[facility], [facility] [address]  Phone: [phone]   Fax:     [phone]                                                     Physical Therapy Daily Treatment Note  Date:  2019    Patient Name:  Billie Perez    :  1965  MRN: [id number]  Restrictions/Precautions:    Medical/Treatment Diagnosis Information:  · Diagnosis: M54.12 (ICD-10-CM) - Radiculopathy, cervical region  · Treatment Diagnosis: M54.2 (ICD-10-CM) - Neck pain  Insurance/Certification information:  PT Insurance Information: [employer] (unlimited)   Physician Information:  Referring Practitioner: Christina Martinez MD   Plan of care signed (Y/N):     Date of Patient follow up with Physician:      G-Code (if applicable):      Date G-Code Applied:     NDI 14% deficit     Progress Note: [x]  Yes  []  No  Next due by: Visit #20      Latex Allergy:  [x]NO      []YES  Preferred Language for Healthcare:   [x]English       []other:    Visit # Insurance Allowable   12 Unlimited     Pain level:  0/10 tightness present ()     SUBJECTIVE:  Patient states that she has a headache today and is sore and tight. She states that the headache started yesterday sometime.      OBJECTIVE: See eval   Observation:    Test measurements:      RESTRICTIONS/PRECAUTIONS:     Exercises/Interventions:   Exercise/Equipment Resistance/Repetitions Other comments   Stretching/PROM     CROM     Crossbody UE stretch 30 sec x 5 Start    Chin tuck     UT side bend stretch 30 sec x 5 Start    Levater scap stretch     Scalene stretch     Pectoral stretch 5x30\" rotation to R     Medial nerve flossing  10x5 pumps     Added 5/15 Not performed today due to sunburn on legs         Isometrics     Cervical retraction 10x10\" seated    shrugs     Cervical Flexion      Cervical Extension     Cervical sidebending     scapular retraction (prone) 10 sec x 10 Start  PRE's     External Rotation     Internal Rotation     Serratus     Biceps     Triceps     Shrugs     Horizontal Abd with ER     Reverse Flys     EXT     Flexion     Standing snow angels  20x  Added 5/15        Cable Column/Theraband     Scapular Retraction 2x10 green  ^ 5/15   External Rotation 3 x 10; 1# Start 6/12   Internal Rotation 3 x 10; 3# Start 6/12   TRIC     Lats     Shrugs     Flex     BIC     No moneys  3x10 red  Added 5/13   Horizontal abduction x 10; red Start 6/5   Manual interventions 10' suboccipital release,  L rib 1 mobs grade II/III   6' STM L upper trap; DN (see below)              Therapeutic Exercise and NMR EXR  [x] (58547) Provided verbal/tactile cueing for activities related to strengthening, flexibility, endurance, ROM  for improvements in cervical, postural, scapular, scapulothoracic and UE control with self care, reaching, carrying, lifting, house/yardwork, driving/computer work. [x] (36072) Provided verbal/tactile cueing for activities related to improving balance, coordination, kinesthetic sense, posture, motor skill, proprioception  to assist with cervical, scapular, scapulothoracic and UE control with self care, reaching, carrying, lifting, house/yardwork, driving/computer work. Therapeutic Activities:    [x] (90587 or 34504) Provided verbal/tactile cueing for activities related to improving balance, coordination, kinesthetic sense, posture, motor skill, proprioception and motor activation to allow for proper function of cervical, scapular, scapulothoracic and UE control with self care, carrying, lifting, driving/computer work.      Home Exercise Program:    [x] (47471) Reviewed/Progressed HEP activities related to strengthening, flexibility, endurance, ROM of cervical, scapular, scapulothoracic and UE control with self care, reaching, carrying, lifting, house/yardwork, driving/computer work  [] (39790) Reviewed/Progressed HEP activities related to improving balance, coordination, kinesthetic sense, posture, motor skill, proprioception of cervical, scapular, scapulothoracic and UE control with self care, reaching, carrying, lifting, house/yardwork, driving/computer work      Manual Treatments:  PROM / STM / Oscillations-Mobs:  G-I, II, III, IV (PA's, Inf., Post.)  [x] (21559) Provided manual therapy to mobilize soft tissue/joints of cervical/CT, scapular GHJ and UE for the purpose of decreasing headache, modulating pain, promoting relaxation,  increasing ROM, reducing/eliminating soft tissue swelling/inflammation/restriction, improving soft tissue extensibility and allowing for proper ROM for normal function with self care, reaching, carrying, lifting, house/yardwork, driving/computer work     PT reviewed precautions, contraindications, and indications with pt in addition to application of dry needling within established plan of care and expected outcomes; pt verbalized understanding with all questions answered. Pt had signed consent form for dry needling and PT obtained written consent with updated plan of care from MD before beginning. Dry needling manual therapy: consisted on the placement of 2 needles in the following muscles: L upper trapezius, and L infraspinatus, R UT, and R infraspinatus. A 1 inch needle was inserted, piston, rotated, and coned to produce intramuscular mobilization. These techniques were used to restore functional range of motion, reduce muscle spasm and induce healing in the corresponding musculature. (54577)  Clean Technique was utilized today while applying Dry needling treatment. The treatment sites where cleaned with 70% solution of  isopropyl alcohol . The PT washed their hands and utilized treatment gloves along with hand  prior to inserting the needles. All needles where removed and discarded in the appropriate sharps container.       Performed by Harvinder Herrera DPT  15 min  6/19     Modalities:  MHP  15 min 6/19    Charges:  Timed Code Treatment Minutes: 45   Total Treatment Minutes: 21  [id number]       [] EVAL (LOW) 08545 (typically 20 minutes face-to-face)  [] EVAL (MOD) 46197 (typically 30 minutes face-to-face)  [] EVAL (HIGH) 95155 (typically 45 minutes face-to-face)  [] RE-EVAL     [x] ZY(63853) x  1   [] IONTO  [] NMR (85428) x      [] VASO  [x] Manual (50143) x  1    [] Other:  [x] TA x  1    [] Mech Traction (20290)  [] ES(attended) (99347)      [] ES (un) (75692):    GOALS:    Patient stated goal: Pt would like to get rid of headaches and gain strength; Return to sewing, bowling, and lifting grandkids without pain      Therapist goals for Patient:   Short Term Goals: To be achieved in: 2-4 weeks  1. Independent in HEP and progression per patient tolerance, in order to prevent re-injury. MET  2. Patient will have a decrease in pain to facilitate improvement in movement, function, and ADLs as indicated by Functional Deficits. MET     Long Term Goals: To be achieved in: 8-10 weeks  1. Disability index score of 20% or less for the NDI to assist with reaching prior level of function. MET  2. Patient will demonstrate increased AROM to [facility] of cervical/thoracic spine to allow for proper joint functioning as indicated by patients Functional Deficits. Progressing Towards  3. Patient will demonstrate an increase in postural awareness and control and activation of  Deep cervical stabilizers (MMT at least 4+/5) to allow for proper functional mobility as indicated by patients Functional Deficits. Progressing Towards   4. Patient will return to daily functional activities without increased symptoms or restriction. Progressing Towards  5. Patient will be able to sew without increased symptoms or restriction. Progressing Towards  6. Patient will report less than or = to 3 headaches per week. Progressing Towards       Progression Towards Functional goals:  [x] Patient is progressing as expected towards functional goals listed.

## 2019-06-24 ENCOUNTER — HOSPITAL ENCOUNTER (OUTPATIENT)
Dept: PHYSICAL THERAPY | Age: 54
Setting detail: THERAPIES SERIES
Discharge: HOME | End: 2019-06-24
Payer: COMMERCIAL

## 2019-06-24 NOTE — FLOWSHEET NOTE
Physical Therapy  Cancellation/No-show Note  Patient Name:  Lucille Noyola  :  1965   Date:  2019  Cancelled visits to date: 01  No-shows to date: 0    For today's appointment patient:  [x]  Cancelled  []  Rescheduled appointment  []  No-show     Reason given by patient:  []  Patient ill  []  Conflicting appointment  []  No transportation    []  Conflict with work  [x]  No reason given  []  Other:     Comments:      Electronically signed by:  Girish Lott PT, DPT

## 2019-06-26 ENCOUNTER — HOSPITAL ENCOUNTER (OUTPATIENT)
Dept: PHYSICAL THERAPY | Age: 54
Setting detail: THERAPIES SERIES
Discharge: HOME | End: 2019-06-26
Payer: COMMERCIAL

## 2019-06-26 PROCEDURE — 97110 THERAPEUTIC EXERCISES: CPT

## 2019-06-26 PROCEDURE — 97140 MANUAL THERAPY 1/> REGIONS: CPT

## 2019-06-26 NOTE — FLOWSHEET NOTE
[address] [facility], [facility] [address] [place] [address], [address] [place] [address]  Phone: [phone]   Fax:     [phone]                                                     Physical Therapy Daily Treatment Note  Date:  2019    Patient Name:  Mary Chamberlain    :  1965  MRN: [id number]  Restrictions/Precautions:    Medical/Treatment Diagnosis Information:  · Diagnosis: M54.12 (ICD-10-CM) - Radiculopathy, cervical region  · Treatment Diagnosis: M54.2 (ICD-10-CM) - Neck pain  Insurance/Certification information:  PT Insurance Information: [facility] (unlimited)   Physician Information:  Referring Practitioner: Jenise Curtis MD   Plan of care signed (Y/N):     Date of Patient follow up with Physician:      G-Code (if applicable):      Date G-Code Applied:     NDI 14% deficit     Progress Note: [x]  Yes  []  No  Next due by: Visit #20      Latex Allergy:  [x]NO      []YES  Preferred Language for Healthcare:   [x]English       []other:    Visit # Insurance Allowable   13 Unlimited     Pain level:  0/10 tightness present ()     SUBJECTIVE:  Patient states that she feels about the same. She states that she had a headache all day yesterday but she held the baby in swim lessons.       OBJECTIVE: See eval   Observation:    Test measurements:      RESTRICTIONS/PRECAUTIONS:     Exercises/Interventions:   Exercise/Equipment Resistance/Repetitions Other comments   Stretching/PROM     CROM     Crossbody UE stretch 30 sec x 5 Start    Chin tuck     UT side bend stretch 30 sec x 5 Start    Levater scap stretch     Scalene stretch     Pectoral stretch 5x30\" rotation to R     Medial nerve flossing  10x5 pumps     Added 5/15 Not performed today due to sunburn on legs         Isometrics     Cervical retraction 10x10\" seated    shrugs     Cervical Flexion      Cervical Extension     Cervical sidebending     scapular retraction (prone) 10 sec x 10 Start 6/5        PRE's     External Rotation     Internal Rotation     Serratus     Biceps     Triceps     Shrugs     Horizontal Abd with ER     Reverse Flys     EXT     Flexion     Standing snow angels  20x  Added 5/15        Cable Column/Theraband     Scapular Retraction 2x10 green  ^ 5/15   External Rotation 3 x 10; 1# Start 6/12   Internal Rotation 3 x 10; 3# Start 6/12   TRIC     Lats     Shrugs     Flex     BIC     No moneys  3x10 red  Added 5/13   Horizontal abduction x 10; red Start 6/5   Manual interventions 10' suboccipital release,  L rib 1 mobs grade II/III   6' STM L upper trap; DN (see below)     STM Bilateral medial border of scapula and thoracic paraspinals 15 min       Therapeutic Exercise and NMR EXR  [x] (79833) Provided verbal/tactile cueing for activities related to strengthening, flexibility, endurance, ROM  for improvements in cervical, postural, scapular, scapulothoracic and UE control with self care, reaching, carrying, lifting, house/yardwork, driving/computer work. [x] (04569) Provided verbal/tactile cueing for activities related to improving balance, coordination, kinesthetic sense, posture, motor skill, proprioception  to assist with cervical, scapular, scapulothoracic and UE control with self care, reaching, carrying, lifting, house/yardwork, driving/computer work. Therapeutic Activities:    [x] (07081 or 22734) Provided verbal/tactile cueing for activities related to improving balance, coordination, kinesthetic sense, posture, motor skill, proprioception and motor activation to allow for proper function of cervical, scapular, scapulothoracic and UE control with self care, carrying, lifting, driving/computer work.      Home Exercise Program:    [x] (79772) Reviewed/Progressed HEP activities related to strengthening, flexibility, endurance, ROM of cervical, scapular, scapulothoracic and UE control with self care, reaching, carrying, lifting, house/yardwork, driving/computer work  [] (33894) Reviewed/Progressed HEP activities related to improving balance, coordination, kinesthetic sense, posture, motor skill, proprioception of cervical, scapular, scapulothoracic and UE control with self care, reaching, carrying, lifting, house/yardwork, driving/computer work      Manual Treatments:  PROM / STM / Oscillations-Mobs:  G-I, II, III, IV (PA's, Inf., Post.)  [x] (96946) Provided manual therapy to mobilize soft tissue/joints of cervical/CT, scapular GHJ and UE for the purpose of decreasing headache, modulating pain, promoting relaxation,  increasing ROM, reducing/eliminating soft tissue swelling/inflammation/restriction, improving soft tissue extensibility and allowing for proper ROM for normal function with self care, reaching, carrying, lifting, house/yardwork, driving/computer work     PT reviewed precautions, contraindications, and indications with pt in addition to application of dry needling within established plan of care and expected outcomes; pt verbalized understanding with all questions answered. Pt had signed consent form for dry needling and PT obtained written consent with updated plan of care from MD before beginning. Dry needling manual therapy: consisted on the placement of 2 needles in the following muscles: L upper trapezius, and L infraspinatus, R UT, and R infraspinatus. A 1 inch needle was inserted, piston, rotated, and coned to produce intramuscular mobilization. These techniques were used to restore functional range of motion, reduce muscle spasm and induce healing in the corresponding musculature. (51537)  Clean Technique was utilized today while applying Dry needling treatment. The treatment sites where cleaned with 70% solution of  isopropyl alcohol . The PT washed their hands and utilized treatment gloves along with hand  prior to inserting the needles. All needles where removed and discarded in the appropriate sharps container.       Performed by Nancy Villalta Luis Fernando Feliciano DPT  15 min  6/26     Modalities:  MHP  15 min  6/26    Charges:  Timed Code Treatment Minutes: 45   Total Treatment Minutes: 75  [id number]       [] EVAL (LOW) 29781 (typically 20 minutes face-to-face)  [] EVAL (MOD) 21575 (typically 30 minutes face-to-face)  [] EVAL (HIGH) 07834 (typically 45 minutes face-to-face)  [] RE-EVAL     [x] HJ(53743) x  1   [] IONTO  [] NMR (32535) x      [] VASO  [x] Manual (08678) x  2    [] Other:  [] TA x       [] Mech Traction (83212)  [] ES(attended) (39846)      [] ES (un) (18270):    GOALS:    Patient stated goal: Pt would like to get rid of headaches and gain strength; Return to sewing, bowling, and lifting grandkids without pain      Therapist goals for Patient:   Short Term Goals: To be achieved in: 2-4 weeks  1. Independent in HEP and progression per patient tolerance, in order to prevent re-injury. MET  2. Patient will have a decrease in pain to facilitate improvement in movement, function, and ADLs as indicated by Functional Deficits. MET     Long Term Goals: To be achieved in: 8-10 weeks  1. Disability index score of 20% or less for the NDI to assist with reaching prior level of function. MET  2. Patient will demonstrate increased AROM to [facility] of cervical/thoracic spine to allow for proper joint functioning as indicated by patients Functional Deficits. Progressing Towards  3. Patient will demonstrate an increase in postural awareness and control and activation of  Deep cervical stabilizers (MMT at least 4+/5) to allow for proper functional mobility as indicated by patients Functional Deficits. Progressing Towards   4. Patient will return to daily functional activities without increased symptoms or restriction. Progressing Towards  5. Patient will be able to sew without increased symptoms or restriction. Progressing Towards  6. Patient will report less than or = to 3 headaches per week.   Progressing Towards       Progression Towards Functional goals:  [x] Patient is progressing as expected towards functional goals listed. [] Progression is slowed due to complexities listed. [] Progression has been slowed due to co-morbidities. [] Plan just implemented, too soon to assess goals progression  [] Other:     ASSESSMENT:  See eval    Treatment/Activity Tolerance:  [x] Patient tolerated treatment well [] Patient limited by fatique  [] Patient limited by pain  [] Patient limited by other medical complications  [x] Other:  Patient tolerated treatment well this session. Good tolerance to dry needling this session; multiple muscle twitching noted in left upper trap and right upper trap with left being worse than right side. Multiple trigger points noted along medial border of scapula with palpation; STM added to address tightness and pain. No ill side effects noted. Monitor symptoms next session  6/26    Patient education :  5/10 Patient education on PT and plan of care including diagnosis, prognosis, treatment goals and options. Patient agrees with discussed POC and treatment and is aware of rehab process. Pt was also educated on clinic layout and use of modalities. Prognosis: [x] Good [] Fair  [] Poor    Patient Requires Follow-up: [x] Yes  [] No    PLAN: 1-2x week for 4 weeks (6/12/19-7/12/19)  [x] Continue per plan of care [] Alter current plan (see comments)  [] Plan of care initiated [] Hold pending MD visit [] Discharge    Electronically signed by: Sol Albarado, PT, DPT     *If patient does not return for further follow ups after this date. Please consider this as the patients discharge from physical therapy.

## 2019-06-28 ENCOUNTER — HOSPITAL ENCOUNTER (OUTPATIENT)
Dept: PHYSICAL THERAPY | Age: 54
Setting detail: THERAPIES SERIES
Discharge: HOME | End: 2019-06-28
Payer: COMMERCIAL

## 2019-06-28 PROCEDURE — 97530 THERAPEUTIC ACTIVITIES: CPT

## 2019-06-28 PROCEDURE — 97110 THERAPEUTIC EXERCISES: CPT

## 2019-06-28 PROCEDURE — 97140 MANUAL THERAPY 1/> REGIONS: CPT

## 2019-06-28 NOTE — FLOWSHEET NOTE
[address] [facility], [address] [place] [address], [address] [place] [address]  Phone: [phone]   Fax:     [phone]                                                     Physical Therapy Daily Treatment Note  Date:  2019    Patient Name:  Melony Arias    :  1965  MRN: [id number]  Restrictions/Precautions:    Medical/Treatment Diagnosis Information:  · Diagnosis: M54.12 (ICD-10-CM) - Radiculopathy, cervical region  · Treatment Diagnosis: M54.2 (ICD-10-CM) - Neck pain  Insurance/Certification information:  PT Insurance Information: [facility] (unlimited)   Physician Information:  Referring Practitioner: Lory Schwartz MD   Plan of care signed (Y/N):     Date of Patient follow up with Physician:      G-Code (if applicable):      Date G-Code Applied:     NDI 14% deficit     Progress Note: [x]  Yes  []  No  Next due by: Visit #20      Latex Allergy:  [x]NO      []YES  Preferred Language for Healthcare:   [x]English       []other:    Visit # Insurance Allowable   14 Unlimited     Pain level:  0/10 tightness present ()     SUBJECTIVE:  Patient states that her arm is sore but she has been swimming a lot. Patient states that she felt okay after her last session.  She states that she still feels     OBJECTIVE: See eval   Observation:    Test measurements:      RESTRICTIONS/PRECAUTIONS:     Exercises/Interventions:   Exercise/Equipment Resistance/Repetitions Other comments   Stretching/PROM     CROM     Crossbody UE stretch 30 sec x 5 Start    Chin tuck     UT side bend stretch 30 sec x 5 Start    Levater scap stretch     Scalene stretch     Pectoral stretch 5x30\" rotation to R     Medial nerve flossing  10x5 pumps     Added 5/15 Not performed today due to sunburn on legs         Isometrics     Cervical retraction 10x10\" seated    shrugs     Cervical Flexion      Cervical Extension     Cervical sidebending     scapular retraction (prone) 10 sec x 10 Start 6/5        PRE's     External Rotation     Internal Rotation     Serratus     Biceps     Triceps     Shrugs     Horizontal Abd with ER     Reverse Flys     EXT     Flexion     Standing snow angels  20x  Added 5/15        Cable Column/Theraband     Scapular Retraction 2x10 green  ^ 5/15   External Rotation 3 x 10; 1# Start 6/12   Internal Rotation 3 x 10; 3# Start 6/12   Ext 3 x 10; green Start 5/31   TRIC     Lats     Shrugs     Flex     BIC     No moneys  3x10 red  Added 5/13   Horizontal abduction x 10; red Start 6/5   Manual interventions 10' suboccipital release,  L rib 1 mobs grade II/III   6' STM L upper trap; DN (see below)     STM Bilateral medial border of scapula and thoracic paraspinals 15 min       Therapeutic Exercise and NMR EXR  [x] (04422) Provided verbal/tactile cueing for activities related to strengthening, flexibility, endurance, ROM  for improvements in cervical, postural, scapular, scapulothoracic and UE control with self care, reaching, carrying, lifting, house/yardwork, driving/computer work. [x] (09615) Provided verbal/tactile cueing for activities related to improving balance, coordination, kinesthetic sense, posture, motor skill, proprioception  to assist with cervical, scapular, scapulothoracic and UE control with self care, reaching, carrying, lifting, house/yardwork, driving/computer work. Therapeutic Activities:    [x] (96464 or 90492) Provided verbal/tactile cueing for activities related to improving balance, coordination, kinesthetic sense, posture, motor skill, proprioception and motor activation to allow for proper function of cervical, scapular, scapulothoracic and UE control with self care, carrying, lifting, driving/computer work.      Home Exercise Program:    [x] (83182) Reviewed/Progressed HEP activities related to strengthening, flexibility, endurance, ROM of cervical, scapular, scapulothoracic and UE control with self care, reaching, carrying, lifting, house/yardwork, driving/computer work  [] (33028) Reviewed/Progressed HEP activities related to improving balance, coordination, kinesthetic sense, posture, motor skill, proprioception of cervical, scapular, scapulothoracic and UE control with self care, reaching, carrying, lifting, house/yardwork, driving/computer work      Manual Treatments:  PROM / STM / Oscillations-Mobs:  G-I, II, III, IV (PA's, Inf., Post.)  [x] (95396) Provided manual therapy to mobilize soft tissue/joints of cervical/CT, scapular GHJ and UE for the purpose of decreasing headache, modulating pain, promoting relaxation,  increasing ROM, reducing/eliminating soft tissue swelling/inflammation/restriction, improving soft tissue extensibility and allowing for proper ROM for normal function with self care, reaching, carrying, lifting, house/yardwork, driving/computer work     PT reviewed precautions, contraindications, and indications with pt in addition to application of dry needling within established plan of care and expected outcomes; pt verbalized understanding with all questions answered. Pt had signed consent form for dry needling and PT obtained written consent with updated plan of care from MD before beginning. Dry needling manual therapy: consisted on the placement of 2 needles in the following muscles: L upper trapezius, and L infraspinatus, R UT, and R infraspinatus. A 1 inch needle was inserted, piston, rotated, and coned to produce intramuscular mobilization. These techniques were used to restore functional range of motion, reduce muscle spasm and induce healing in the corresponding musculature. (06727)  Clean Technique was utilized today while applying Dry needling treatment. The treatment sites where cleaned with 70% solution of  isopropyl alcohol . The PT washed their hands and utilized treatment gloves along with hand  prior to inserting the needles.   All needles where removed and discarded in the appropriate sharps container. Performed by Elmer Cadena DPT  15 min  6/28     Modalities:  MHP  15 min  6/26    Charges:  Timed Code Treatment Minutes: 55   Total Treatment Minutes: 75  [phone]       [] EVAL (LOW) 11577 (typically 20 minutes face-to-face)  [] EVAL (MOD) 18902 (typically 30 minutes face-to-face)  [] EVAL (HIGH) 83907 (typically 45 minutes face-to-face)  [] RE-EVAL     [x] DT(01700) x  1   [] IONTO  [] NMR (19803) x      [] VASO  [x] Manual (75182) x  2    [] Other:  [x] TA x  1    [] Mech Traction (76306)  [] ES(attended) (37167)      [] ES (un) (64687):    GOALS:    Patient stated goal: Pt would like to get rid of headaches and gain strength; Return to sewing, bowling, and lifting grandkids without pain      Therapist goals for Patient:   Short Term Goals: To be achieved in: 2-4 weeks  1. Independent in HEP and progression per patient tolerance, in order to prevent re-injury. MET  2. Patient will have a decrease in pain to facilitate improvement in movement, function, and ADLs as indicated by Functional Deficits. MET     Long Term Goals: To be achieved in: 8-10 weeks  1. Disability index score of 20% or less for the NDI to assist with reaching prior level of function. MET  2. Patient will demonstrate increased AROM to [facility] of cervical/thoracic spine to allow for proper joint functioning as indicated by patients Functional Deficits. Progressing Towards  3. Patient will demonstrate an increase in postural awareness and control and activation of  Deep cervical stabilizers (MMT at least 4+/5) to allow for proper functional mobility as indicated by patients Functional Deficits. Progressing Towards   4. Patient will return to daily functional activities without increased symptoms or restriction. Progressing Towards  5. Patient will be able to sew without increased symptoms or restriction. Progressing Towards  6. Patient will report less than or = to 3 headaches per week.   Progressing Towards       Progression Towards Functional goals:  [x] Patient is progressing as expected towards functional goals listed. [] Progression is slowed due to complexities listed. [] Progression has been slowed due to co-morbidities. [] Plan just implemented, too soon to assess goals progression  [] Other:     ASSESSMENT:  See eval    Treatment/Activity Tolerance:  [x] Patient tolerated treatment well [] Patient limited by fatique  [] Patient limited by pain  [] Patient limited by other medical complications  [x] Other:  Patient tolerated treatment well this session. Good tolerance to dry needling this session; multiple muscle twitching noted in left upper trap and right upper trap. Multiple trigger points noted along medial border of scapula with palpation; STM added to address tightness and pain. No ill side effects noted. Monitor symptoms next session  6/28    Patient education :  5/10 Patient education on PT and plan of care including diagnosis, prognosis, treatment goals and options. Patient agrees with discussed POC and treatment and is aware of rehab process. Pt was also educated on clinic layout and use of modalities. Prognosis: [x] Good [] Fair  [] Poor    Patient Requires Follow-up: [x] Yes  [] No    PLAN: 1-2x week for 4 weeks (6/12/19-7/12/19)  [x] Continue per plan of care [] Alter current plan (see comments)  [] Plan of care initiated [] Hold pending MD visit [] Discharge    Electronically signed by: Marla Jade, PT, DPT     *If patient does not return for further follow ups after this date. Please consider this as the patients discharge from physical therapy.

## 2019-07-01 ENCOUNTER — HOSPITAL ENCOUNTER (OUTPATIENT)
Dept: PHYSICAL THERAPY | Age: 54
Setting detail: THERAPIES SERIES
Discharge: HOME | End: 2019-07-01
Payer: COMMERCIAL

## 2019-07-01 PROCEDURE — 97140 MANUAL THERAPY 1/> REGIONS: CPT

## 2019-07-01 PROCEDURE — 97530 THERAPEUTIC ACTIVITIES: CPT

## 2019-07-01 NOTE — DISCHARGE SUMMARY
endurance, ROM  for improvements in cervical, postural, scapular, scapulothoracic and UE control with self care, reaching, carrying, lifting, house/yardwork, driving/computer work. [x] (85562) Provided verbal/tactile cueing for activities related to improving balance, coordination, kinesthetic sense, posture, motor skill, proprioception  to assist with cervical, scapular, scapulothoracic and UE control with self care, reaching, carrying, lifting, house/yardwork, driving/computer work. Therapeutic Activities:    [x] (43854 or 33151) Provided verbal/tactile cueing for activities related to improving balance, coordination, kinesthetic sense, posture, motor skill, proprioception and motor activation to allow for proper function of cervical, scapular, scapulothoracic and UE control with self care, carrying, lifting, driving/computer work.      Home Exercise Program:    [x] (24527) Reviewed/Progressed HEP activities related to strengthening, flexibility, endurance, ROM of cervical, scapular, scapulothoracic and UE control with self care, reaching, carrying, lifting, house/yardwork, driving/computer work  [] (39747) Reviewed/Progressed HEP activities related to improving balance, coordination, kinesthetic sense, posture, motor skill, proprioception of cervical, scapular, scapulothoracic and UE control with self care, reaching, carrying, lifting, house/yardwork, driving/computer work      Manual Treatments:  PROM / STM / Oscillations-Mobs:  G-I, II, III, IV (PA's, Inf., Post.)  [x] (25909) Provided manual therapy to mobilize soft tissue/joints of cervical/CT, scapular GHJ and UE for the purpose of decreasing headache, modulating pain, promoting relaxation,  increasing ROM, reducing/eliminating soft tissue swelling/inflammation/restriction, improving soft tissue extensibility and allowing for proper ROM for normal function with self care, reaching, carrying, lifting, house/yardwork, driving/computer work     PT reviewed order to prevent re-injury. MET  2. Patient will have a decrease in pain to facilitate improvement in movement, function, and ADLs as indicated by Functional Deficits. MET     Long Term Goals: To be achieved in: 8-10 weeks  1. Disability index score of 20% or less for the NDI to assist with reaching prior level of function. MET  2. Patient will demonstrate increased AROM to [facility] of cervical/thoracic spine to allow for proper joint functioning as indicated by patients Functional Deficits. Progressing Towards  3. Patient will demonstrate an increase in postural awareness and control and activation of  Deep cervical stabilizers (MMT at least 4+/5) to allow for proper functional mobility as indicated by patients Functional Deficits. Progressing Towards   4. Patient will return to daily functional activities without increased symptoms or restriction. Progressing Towards  5. Patient will be able to sew without increased symptoms or restriction. Progressing Towards  6. Patient will report less than or = to 3 headaches per week. Progressing Towards       Progression Towards Functional goals:  [x] Patient is progressing as expected towards functional goals listed. [] Progression is slowed due to complexities listed. [] Progression has been slowed due to co-morbidities. [] Plan just implemented, too soon to assess goals progression  [] Other:     ASSESSMENT:  See eval    Treatment/Activity Tolerance:  [x] Patient tolerated treatment well [] Patient limited by fatique  [] Patient limited by pain  [] Patient limited by other medical complications  [x] Other:  Discharge at this time; Patient may return if symptoms return. Patient education :  5/10 Patient education on PT and plan of care including diagnosis, prognosis, treatment goals and options. Patient agrees with discussed POC and treatment and is aware of rehab process. Pt was also educated on clinic layout and use of modalities.       Prognosis: [x] Good [] Fair  []

## 2019-10-03 ENCOUNTER — OFFICE VISIT (OUTPATIENT)
Dept: ORTHOPEDIC SURGERY | Age: 54
End: 2019-10-03
Payer: COMMERCIAL

## 2019-10-03 VITALS — BODY MASS INDEX: 31.52 KG/M2 | HEIGHT: 63 IN | WEIGHT: 177.91 LBS

## 2019-10-03 DIAGNOSIS — M25.552 LEFT HIP PAIN: Primary | ICD-10-CM

## 2019-10-03 PROCEDURE — 3017F COLORECTAL CA SCREEN DOC REV: CPT | Performed by: ORTHOPAEDIC SURGERY

## 2019-10-03 PROCEDURE — G8484 FLU IMMUNIZE NO ADMIN: HCPCS | Performed by: ORTHOPAEDIC SURGERY

## 2019-10-03 PROCEDURE — G8427 DOCREV CUR MEDS BY ELIG CLIN: HCPCS | Performed by: ORTHOPAEDIC SURGERY

## 2019-10-03 PROCEDURE — 99204 OFFICE O/P NEW MOD 45 MIN: CPT | Performed by: ORTHOPAEDIC SURGERY

## 2019-10-03 PROCEDURE — 1036F TOBACCO NON-USER: CPT | Performed by: ORTHOPAEDIC SURGERY

## 2019-10-03 PROCEDURE — G8417 CALC BMI ABV UP PARAM F/U: HCPCS | Performed by: ORTHOPAEDIC SURGERY

## 2019-10-10 ENCOUNTER — OFFICE VISIT (OUTPATIENT)
Dept: ORTHOPEDIC SURGERY | Age: 54
End: 2019-10-10
Payer: COMMERCIAL

## 2019-10-10 VITALS — HEIGHT: 63 IN | WEIGHT: 177.91 LBS | BODY MASS INDEX: 31.52 KG/M2

## 2019-10-10 DIAGNOSIS — M25.552 LEFT HIP PAIN: Primary | ICD-10-CM

## 2019-10-10 PROCEDURE — 99214 OFFICE O/P EST MOD 30 MIN: CPT | Performed by: ORTHOPAEDIC SURGERY

## 2019-10-10 PROCEDURE — G8484 FLU IMMUNIZE NO ADMIN: HCPCS | Performed by: ORTHOPAEDIC SURGERY

## 2019-10-10 PROCEDURE — 3017F COLORECTAL CA SCREEN DOC REV: CPT | Performed by: ORTHOPAEDIC SURGERY

## 2019-10-10 PROCEDURE — G8427 DOCREV CUR MEDS BY ELIG CLIN: HCPCS | Performed by: ORTHOPAEDIC SURGERY

## 2019-10-10 PROCEDURE — G8417 CALC BMI ABV UP PARAM F/U: HCPCS | Performed by: ORTHOPAEDIC SURGERY

## 2019-10-10 PROCEDURE — 1036F TOBACCO NON-USER: CPT | Performed by: ORTHOPAEDIC SURGERY

## 2019-10-31 ENCOUNTER — HOSPITAL ENCOUNTER (OUTPATIENT)
Dept: PHYSICAL THERAPY | Age: 54
Setting detail: THERAPIES SERIES
Discharge: HOME | End: 2019-10-31
Payer: COMMERCIAL

## 2019-10-31 PROCEDURE — 97112 NEUROMUSCULAR REEDUCATION: CPT | Performed by: PHYSICAL THERAPIST

## 2019-10-31 PROCEDURE — 97110 THERAPEUTIC EXERCISES: CPT | Performed by: PHYSICAL THERAPIST

## 2019-10-31 PROCEDURE — 97161 PT EVAL LOW COMPLEX 20 MIN: CPT | Performed by: PHYSICAL THERAPIST

## 2019-11-06 ENCOUNTER — HOSPITAL ENCOUNTER (OUTPATIENT)
Dept: PHYSICAL THERAPY | Age: 54
Setting detail: THERAPIES SERIES
Discharge: HOME | End: 2019-11-06
Payer: COMMERCIAL

## 2019-11-06 PROCEDURE — 97112 NEUROMUSCULAR REEDUCATION: CPT | Performed by: PHYSICAL THERAPIST

## 2019-11-06 PROCEDURE — 97110 THERAPEUTIC EXERCISES: CPT | Performed by: PHYSICAL THERAPIST

## 2019-11-13 ENCOUNTER — HOSPITAL ENCOUNTER (OUTPATIENT)
Dept: PHYSICAL THERAPY | Age: 54
Setting detail: THERAPIES SERIES
Discharge: HOME | End: 2019-11-13
Payer: COMMERCIAL

## 2019-11-13 PROCEDURE — 97110 THERAPEUTIC EXERCISES: CPT | Performed by: PHYSICAL THERAPIST

## 2019-11-13 PROCEDURE — 97112 NEUROMUSCULAR REEDUCATION: CPT | Performed by: PHYSICAL THERAPIST

## 2019-11-15 ENCOUNTER — APPOINTMENT (OUTPATIENT)
Dept: PHYSICAL THERAPY | Age: 54
End: 2019-11-15
Payer: COMMERCIAL

## 2019-11-20 ENCOUNTER — HOSPITAL ENCOUNTER (OUTPATIENT)
Dept: PHYSICAL THERAPY | Age: 54
Setting detail: THERAPIES SERIES
Discharge: HOME | End: 2019-11-20
Payer: COMMERCIAL

## 2019-11-20 PROCEDURE — 97110 THERAPEUTIC EXERCISES: CPT | Performed by: PHYSICAL THERAPIST

## 2019-11-20 PROCEDURE — 97112 NEUROMUSCULAR REEDUCATION: CPT | Performed by: PHYSICAL THERAPIST

## 2019-11-27 ENCOUNTER — APPOINTMENT (OUTPATIENT)
Dept: PHYSICAL THERAPY | Age: 54
End: 2019-11-27
Payer: COMMERCIAL

## 2019-11-29 ENCOUNTER — APPOINTMENT (OUTPATIENT)
Dept: PHYSICAL THERAPY | Age: 54
End: 2019-11-29
Payer: COMMERCIAL

## 2020-01-15 ENCOUNTER — HOSPITAL ENCOUNTER (OUTPATIENT)
Dept: PHYSICAL THERAPY | Age: 55
Setting detail: THERAPIES SERIES
Discharge: HOME | End: 2020-01-15
Payer: COMMERCIAL

## 2020-01-15 PROCEDURE — 97140 MANUAL THERAPY 1/> REGIONS: CPT | Performed by: PHYSICAL THERAPIST

## 2020-01-15 PROCEDURE — 97110 THERAPEUTIC EXERCISES: CPT | Performed by: PHYSICAL THERAPIST

## 2020-01-15 NOTE — PLAN OF CARE
The [facility], [address] [employer] [address] [place] [address], [address] [place] [address]  Phone: [phone]   Fax:     [phone]     Physical Therapy Re-Certification Plan of Care    Dear Referring Practitioner: Constanza Whyte. Emilia Blizzard, MD ,    We had the pleasure of treating the following patient for physical therapy services at [address] [place] [address]. A summary of our findings can be found in the updated assessment below. This includes our plan of care. If you have any questions or concerns regarding these findings, please do not hesitate to contact me at the office phone number checked above. Thank you for the referral.     Physician Signature:________________________________Date:__________________  By signing above (or electronic signature), therapists plan is approved by physician      Overall Response to Treatment:   []Patient is responding well to treatment and improvement is noted with regards to goals   []Patient should continue to improve in reasonable time if they continue HEP   []Patient has plateaued and is no longer responding to skilled PT intervention    []Patient is getting worse and would benefit from return to referring MD   []Patient unable to adhere to initial POC   [x]Other: Pt returns to PT after having a consultation with Dr. Coco Black. She still presents with limitations in her hip ROM, strength, and function. She will benefit from skilled physical therapy in order to decrease hip pain and improve core and glute strength.       Date range of previous POC Visits: 10/31/19-1/15/20    Total Visits: 4              Physical Therapy Daily Treatment Note  Date:  1/15/2020    Patient Name:  Clotilde Forte    :  1965  MRN: [id number]  Restrictions/Precautions:    Medical/Treatment Diagnosis Information:  Diagnosis: M16.12 (ICD-10-CM) - Unilateral primary osteoarthritis, left hip  Treatment Diagnosis: L hip pain  Insurance/Certification information:  PT Insurance Information: [facility] 80/20 UNL  Physician Information:  Referring Practitioner: Jorje Stringer MD  Has the plan of care been signed (Y/N):        []  Yes  [x]  No     Date of Patient follow up with Physician: prn      Is this a Progress Report:     [x]  Yes  []  No        If Yes:  Date Range for reporting period:  Beginning 10/31/19  Ending 1/15/20    Progress report will be due (10 Rx or 30 days whichever is less): 10 visits or 7/84       Recertification will be due (POC Duration  / 90 days whichever is less): 2/15/2020         Visit # Insurance Allowable Auth Required   4 80/20  UNL []  Yes [x]  No        Functional Scale: LEFS 75%  Date assessed:  10/31/2019       Latex Allergy:  [x]NO      []YES  Preferred Language for Healthcare:   [x]English       []other:      Pain level: 3-4 /10 1/15    SUBJECTIVE:  1/15 Pt reports she went to Dr. Marine Sawyer down in [place]. She did get an MRI, CT scan, xrays, and PT eval.  MD did not see anything significant on the images. She did get a cortisone injection which she cannot tell if it has helped a lot. She has continued to modify her activities at this time. She is here to continue physical therapy. She reports having burning pain in her hip throughout the day. No change in status or new injuries.        OBJECTIVE:   ROM 1/15 PROM AROM Comments     Left Right Left Right     Flexion  109  118 102 pain+ 110     Extension             Abduction     55 pain+ 40     Adduction             ER     25 pain+ 35  seated    IR     40 pain+ 45  seated   Knee flexion     130 140        Flexibility 1/15 Left Right Comments   Hamstrings 50 60 Passive SLR    ITB (Obers test)         Hip flexor(Mikhail test)  + -     gastroc         Rectus femoris Prone 120 with pain anterior leg                     Strength not tested due to pain 1/15 Left Right Comments   Hip flexors         Hip extension         Hip abduction         Hip adduction         Hip ER         Hip IR       Quads         Hamstrings            RESTRICTIONS/PRECAUTIONS:     Exercises/Interventions:     ROM/STRETCHES     Seated hamstring  [id number]    Seated gastroc strap [id number]    Supine piriformis [id number]    Supine figure 4     Quad stand at chair      ITB     Butterfly     Hip flexor stretch kneeling     PREs     Quad sets supine [id number]    Glut sets supine [id number]    adduc sets supine knees straight     Supine abduction with tband     Seated hip flexion with ER     Clams SL chace with strap     SL dead lift     Supine clams with tubing     Wall sit with tband     bridges     Quadruped opposite LE/UE reaches     Standing hamstring curl           Manual interventions  23' total  Long axis distraction, prone PAs, hip IR/ER stretching, rolling to quads, hamstrings, TFL        Therapeutic Exercise and NMR EXR  [x] (07913) Provided verbal/tactile cueing for activities related to strengthening, flexibility, endurance, ROM for improvements in LE, proximal hip, and core control with self care, mobility, lifting, ambulation. [x] (67108) Provided verbal/tactile cueing for activities related to improving balance, coordination, kinesthetic sense, posture, motor skill, proprioception  to assist with LE, proximal hip, and core control in self care, mobility, lifting, ambulation and eccentric single leg control.      NMR and Therapeutic Activities:    [] (50576 or 22303) Provided verbal/tactile cueing for activities related to improving balance, coordination, kinesthetic sense, posture, motor skill, proprioception and motor activation to allow for proper function of core, proximal hip and LE with self care and ADLs  [] (19323) Gait Re-education- Provided training and instruction to the patient for proper LE, core and proximal hip recruitment and positioning and eccentric body weight control with ambulation re-education including up and down stairs     Home Exercise Program:    [x] (95283) Reviewed/Progressed HEP activities related to strengthening, flexibility, endurance, ROM of core, proximal hip and LE for functional self-care, mobility, lifting and ambulation/stair navigation   [] (36542)Reviewed/Progressed HEP activities related to improving balance, coordination, kinesthetic sense, posture, motor skill, proprioception of core, proximal hip and LE for self care, mobility, lifting, and ambulation/stair navigation      Manual Treatments:  PROM / STM / Oscillations-Mobs:  G-I, II, III, IV (PA's, Inf., Post.)  [x] (55665) Provided manual therapy to mobilize LE, proximal hip and/or LS spine soft tissue/joints for the purpose of modulating pain, promoting relaxation,  increasing ROM, reducing/eliminating soft tissue swelling/inflammation/restriction, improving soft tissue extensibility and allowing for proper ROM for normal function with self care, mobility, lifting and ambulation. Modalities: declined     Charges:  Timed Code Treatment Minutes: 72'   Total Treatment Minutes: 9:15-10:22       [] EVAL (LOW) 28276 (typically 20 minutes face-to-face)  [] EVAL (MOD) 62480 (typically 30 minutes face-to-face)  [] EVAL (HIGH) 39211 (typically 45 minutes face-to-face)  [] RE-EVAL     [x] OI(35540) x 2     [] IONTO  [] NMR (68531) x     [] VASO  [x] Manual (91274) x 2      [] Other:  [] TA x      [] Mech Traction (74141)  [] ES(attended) (92675)      [] ES (un) (54358):     GOALS:  Patient stated goal: decrease pain with adls     [] Progressing: [] Met: [] Not Met: [] Adjusted     Therapist goals for Patient: All goals not met. Extended on 1/15  Short Term Goals: To be achieved in: 2-4 weeks  1. Independent in HEP and progression per patient tolerance, in order to prevent re-injury. [] Progressing: [] Met: [] Not Met: [] Adjusted   2. Patient will have a decrease in pain to facilitate improvement in movement, function, and ADLs as indicated by Functional Deficits. [] Progressing: [] Met: [] Not Met: [] Adjusted     Long Term Goals:  To be achieved in: 12 weeks  1. Disability index score of 50% or less for the LEFS to assist with reaching prior level of function. [] Progressing: [] Met: [] Not Met: [] Adjusted  2. Patient will demonstrate increased AROM to max potential to allow for proper joint functioning as indicated by patients Functional Deficits. [] Progressing: [] Met: [] Not Met: [] Adjusted  3. Patient will demonstrate an increase in Strength to good proximal hip strength and control,  to allow for proper functional mobility as indicated by patients Functional Deficits. [] Progressing: [] Met: [] Not Met: [] Adjusted  4. Patient will return to  functional activities including light adls  without increased symptoms or restriction. [] Progressing: [] Met: [] Not Met: [] Adjusted  5. Walk in community without pain  [] Progressing: [] Met: [] Not Met: [] Adjusted                          Overall Progression Towards Functional goals/ Treatment Progress Update:  [] Patient is progressing as expected towards functional goals listed. [] Progression is slowed due to complexities/Impairments listed. [] Progression has been slowed due to co-morbidities. [] Plan just implemented, too soon to assess goals progression <30days   [] Goals require adjustment due to lack of progress  [] Patient is not progressing as expected and requires additional follow up with physician  [x] Other: Goals not met due to high levels of pain and discontinuing PT. Pt has now had another MD consultation and a cortisone injection which has helped to manage pain level.       Prognosis for POC: [x] Good [] Fair  [] Poor      Patient requires continued skilled intervention: [x] Yes  [] No    Treatment/Activity Tolerance:  [x] Patient able to complete treatment with modifications   [] Patient limited by fatigue  [x] Patient limited by pain     [] Patient limited by other medical complications  [x] Other: Pt did report increased pain in her hip after reassessing her ROM and flexibility. Symptoms did calm down after manual treatment. 1/15      Patient Education:  10/31 Reviewed diagnosis, POC, HEP and its importance. PLAN: 2 days per week for 4 Weeks: 1/15-2/15/20  [x] Continue per plan of care [] Alter current plan (see comments above)  [] Plan of care initiated [] Hold pending MD visit [] Discharge    Electronically signed by:  Joey Gomez PT      Note: If patient does not return for scheduled/ recommended follow up visits, this note will serve as a discharge from care along with most recent update on progress.

## 2020-01-22 ENCOUNTER — HOSPITAL ENCOUNTER (OUTPATIENT)
Dept: PHYSICAL THERAPY | Age: 55
Setting detail: THERAPIES SERIES
Discharge: HOME | End: 2020-01-22
Payer: COMMERCIAL

## 2020-01-22 PROCEDURE — 97140 MANUAL THERAPY 1/> REGIONS: CPT | Performed by: PHYSICAL THERAPIST

## 2020-01-22 PROCEDURE — 97110 THERAPEUTIC EXERCISES: CPT | Performed by: PHYSICAL THERAPIST

## 2020-01-22 NOTE — PLAN OF CARE
anterior leg                     Strength not tested due to pain 1/15 Left Right Comments   Hip flexors         Hip extension         Hip abduction         Hip adduction         Hip ER         Hip IR         Quads         Hamstrings            RESTRICTIONS/PRECAUTIONS:     Exercises/Interventions:     ROM/STRETCHES     Seated hamstring  [id number]    Seated gastroc strap [id number]    Supine piriformis 92catv2    Supine figure 4     Quad stand at chair      ITB     [employer] Added 1/22   Hip flexor stretch kneeling     PREs     Quad sets supine [id number]    Glut sets supine [id number]    adduc sets supine knees straight     Supine abduction with tband     Seated hip flexion with ER     Clams SL chace with strap     SL dead lift     Supine clams with tubing     Wall sit with tband     bridges 3x10  Added 1/22   Quadruped opposite LE/UE reaches     Prone extensions 3x10  Added 1/22                  Manual interventions  27' total  Long axis distraction, lateral hip distraction prone PAs at hip and PSIS, hip IR/ER stretching, Hawk  HG9 tohamstrings, TFL, calf        Therapeutic Exercise and NMR EXR  [x] (62772) Provided verbal/tactile cueing for activities related to strengthening, flexibility, endurance, ROM for improvements in LE, proximal hip, and core control with self care, mobility, lifting, ambulation. [x] (70571) Provided verbal/tactile cueing for activities related to improving balance, coordination, kinesthetic sense, posture, motor skill, proprioception  to assist with LE, proximal hip, and core control in self care, mobility, lifting, ambulation and eccentric single leg control.      NMR and Therapeutic Activities:    [] (82330 or 65093) Provided verbal/tactile cueing for activities related to improving balance, coordination, kinesthetic sense, posture, motor skill, proprioception and motor activation to allow for proper function of core, proximal hip and LE with self care and ADLs  [] (91157) Gait Re-education- Provided training and instruction to the patient for proper LE, core and proximal hip recruitment and positioning and eccentric body weight control with ambulation re-education including up and down stairs     Home Exercise Program:    [x] (73661) Reviewed/Progressed HEP activities related to strengthening, flexibility, endurance, ROM of core, proximal hip and LE for functional self-care, mobility, lifting and ambulation/stair navigation   [] (73741)Reviewed/Progressed HEP activities related to improving balance, coordination, kinesthetic sense, posture, motor skill, proprioception of core, proximal hip and LE for self care, mobility, lifting, and ambulation/stair navigation      Manual Treatments:  PROM / STM / Oscillations-Mobs:  G-I, II, III, IV (PA's, Inf., Post.)  [x] (65616) Provided manual therapy to mobilize LE, proximal hip and/or LS spine soft tissue/joints for the purpose of modulating pain, promoting relaxation,  increasing ROM, reducing/eliminating soft tissue swelling/inflammation/restriction, improving soft tissue extensibility and allowing for proper ROM for normal function with self care, mobility, lifting and ambulation. Modalities:     Charges:  Timed Code Treatment Minutes: 45   Total Treatment Minutes: 7957-8066       [] EVAL (LOW) 68361 (typically 20 minutes face-to-face)  [] EVAL (MOD) 78693 (typically 30 minutes face-to-face)  [] EVAL (HIGH) 98077 (typically 45 minutes face-to-face)  [] RE-EVAL     [x] LB(53601) x 1     [] IONTO  [] NMR (67646) x     [] VASO  [x] Manual (64764) x 2      [] Other:  [] TA x      [] Mech Traction (05872)  [] ES(attended) (65031)      [] ES (un) (96298):     GOALS:  Patient stated goal: decrease pain with adls     [] Progressing: [] Met: [] Not Met: [] Adjusted     Therapist goals for Patient: All goals not met. Extended on 1/15  Short Term Goals: To be achieved in: 2-4 weeks  1.  Independent in HEP and progression per patient tolerance, in order to prevent re-injury. [] Progressing: [] Met: [] Not Met: [] Adjusted   2. Patient will have a decrease in pain to facilitate improvement in movement, function, and ADLs as indicated by Functional Deficits. [] Progressing: [] Met: [] Not Met: [] Adjusted     Long Term Goals: To be achieved in: 12 weeks  1. Disability index score of 50% or less for the LEFS to assist with reaching prior level of function. [] Progressing: [] Met: [] Not Met: [] Adjusted  2. Patient will demonstrate increased AROM to max potential to allow for proper joint functioning as indicated by patients Functional Deficits. [] Progressing: [] Met: [] Not Met: [] Adjusted  3. Patient will demonstrate an increase in Strength to good proximal hip strength and control,  to allow for proper functional mobility as indicated by patients Functional Deficits. [] Progressing: [] Met: [] Not Met: [] Adjusted  4. Patient will return to  functional activities including light adls  without increased symptoms or restriction. [] Progressing: [] Met: [] Not Met: [] Adjusted  5. Walk in community without pain  [] Progressing: [] Met: [] Not Met: [] Adjusted                          Overall Progression Towards Functional goals/ Treatment Progress Update:  [] Patient is progressing as expected towards functional goals listed. [] Progression is slowed due to complexities/Impairments listed. [] Progression has been slowed due to co-morbidities. [] Plan just implemented, too soon to assess goals progression <30days   [] Goals require adjustment due to lack of progress  [] Patient is not progressing as expected and requires additional follow up with physician  [x] Other: Goals not met due to high levels of pain and discontinuing PT. Pt has now had another MD consultation and a cortisone injection which has helped to manage pain level.       Prognosis for POC: [x] Good [] Fair  [] Poor      Patient requires continued skilled intervention: [x]

## 2020-01-24 ENCOUNTER — HOSPITAL ENCOUNTER (OUTPATIENT)
Dept: PHYSICAL THERAPY | Age: 55
Setting detail: THERAPIES SERIES
Discharge: HOME | End: 2020-01-24
Payer: COMMERCIAL

## 2020-01-24 ENCOUNTER — APPOINTMENT (OUTPATIENT)
Dept: PHYSICAL THERAPY | Age: 55
End: 2020-01-24
Payer: COMMERCIAL

## 2020-01-24 PROCEDURE — 97110 THERAPEUTIC EXERCISES: CPT | Performed by: PHYSICAL THERAPIST

## 2020-01-24 PROCEDURE — 97140 MANUAL THERAPY 1/> REGIONS: CPT | Performed by: PHYSICAL THERAPIST

## 2020-01-24 NOTE — FLOWSHEET NOTE
The [facility] and [address] [facility], [facility] [address] [facility], [address] [facility]  Phone: [phone]   Fax:     [phone]        Physical Therapy Daily Treatment Note  Date:  2020    Patient Name:  Buck Sorto    :  1965  MRN: [id number]  Restrictions/Precautions:    Medical/Treatment Diagnosis Information:  Diagnosis: M16.12 (ICD-10-CM) - Unilateral primary osteoarthritis, left hip  Treatment Diagnosis: L hip pain  Insurance/Certification information:  PT Insurance Information: [facility] 80/20 UNL  Physician Information:  Referring Practitioner: Ju Jones MD  Has the plan of care been signed (Y/N):        []  Yes  [x]  No     Date of Patient follow up with Physician: prn      Is this a Progress Report:     [x]  Yes  []  No        If Yes:  Date Range for reporting period:  Beginning 10/31/19  Ending 1/15/20    Progress report will be due (10 Rx or 30 days whichever is less): 10 visits or        Recertification will be due (POC Duration  / 90 days whichever is less): 2/15/2020         Visit # Insurance Allowable Auth Required   6   80/20  UNL []  Yes [x]  No        Functional Scale: LEFS 75%  Date assessed:  10/31/2019       Latex Allergy:  [x]NO      []YES  Preferred Language for Healthcare:   [x]English       []other:      Pain level: 0 /10     SUBJECTIVE:   no major issues.       OBJECTIVE:   ROM 1/15 PROM AROM Comments     Left Right Left Right     Flexion  109  118 102 pain+ 110     Extension             Abduction     55 pain+ 40     Adduction             ER     25 pain+ 35  seated    IR     40 pain+ 45  seated   Knee flexion     130 140        Flexibility 1/15 Left Right Comments   Hamstrings 50 60 Passive SLR    ITB (Obers test)         Hip flexor(Mikhail test)  + -     gastroc         Rectus femoris Prone 120 with pain anterior leg                     Strength not tested due to pain 1/15 Left Right Comments   Hip flexors       fatigue  [x] Patient limited by pain     [] Patient limited by other medical complications  [x] Other: increased relief after manual tx 1/24        Patient Education:  10/31 Reviewed diagnosis, POC, HEP and its importance. PLAN: 2 days per week for 4 Weeks: 1/15-2/15/20  [x] Continue per plan of care [] Alter current plan (see comments above)  [] Plan of care initiated [] Hold pending MD visit [] Discharge    Electronically signed by:  Laura Webb PT      Note: If patient does not return for scheduled/ recommended follow up visits, this note will serve as a discharge from care along with most recent update on progress.

## 2020-01-29 ENCOUNTER — HOSPITAL ENCOUNTER (OUTPATIENT)
Dept: PHYSICAL THERAPY | Age: 55
Setting detail: THERAPIES SERIES
Discharge: HOME | End: 2020-01-29
Payer: COMMERCIAL

## 2020-01-29 PROCEDURE — 97140 MANUAL THERAPY 1/> REGIONS: CPT | Performed by: PHYSICAL THERAPIST

## 2020-01-29 PROCEDURE — 97110 THERAPEUTIC EXERCISES: CPT | Performed by: PHYSICAL THERAPIST

## 2020-01-29 NOTE — FLOWSHEET NOTE
The 1100 [facility] and 500 [facility], [address] [place] [address] 3360 [facility], [address] [facility]  Phone: [phone]   Fax:     [phone]        Physical Therapy Daily Treatment Note  Date:  2020    Patient Name:  Clotilde Forte    :  1965  MRN: [id number]  Restrictions/Precautions:    Medical/Treatment Diagnosis Information:  Diagnosis: M16.12 (ICD-10-CM) - Unilateral primary osteoarthritis, left hip  Treatment Diagnosis: L hip pain  Insurance/Certification information:  PT Insurance Information: [facility] 80/20 UNL  Physician Information:  Referring Practitioner: Mj Mast MD  Has the plan of care been signed (Y/N):        []  Yes  [x]  No     Date of Patient follow up with Physician: prn      Is this a Progress Report:     [x]  Yes  []  No        If Yes:  Date Range for reporting period:  Beginning 10/31/19  Ending 1/15/20    Progress report will be due (10 Rx or 30 days whichever is less): 10 visits or        Recertification will be due (POC Duration  / 90 days whichever is less): 2/15/2020         Visit # Insurance Allowable Auth Required   7   80/20  UNL []  Yes [x]  No        Functional Scale: LEFS 75%  Date assessed:  10/31/2019       Latex Allergy:  [x]NO      []YES  Preferred Language for Healthcare:   [x]English       []other:      Pain level: 2 /10 1/29    SUBJECTIVE:   no major issues.       OBJECTIVE:   ROM 1/15 PROM AROM Comments     Left Right Left Right     Flexion  109  118 102 pain+ 110     Extension             Abduction     55 pain+ 40     Adduction             ER     25 pain+ 35  seated    IR     40 pain+ 45  seated   Knee flexion     130 140        Flexibility 1/15 Left Right Comments   Hamstrings 50 60 Passive SLR    ITB (Obers test)         Hip flexor(Mikhail test)  + -     gastroc         Rectus femoris Prone 120 with pain anterior leg                     Strength not tested due to pain 1/15 Left Right Comments   Hip flexors       Hip extension         Hip abduction         Hip adduction         Hip ER         Hip IR         Quads         Hamstrings            RESTRICTIONS/PRECAUTIONS:     Exercises/Interventions:     ROM/STRETCHES     Seated hamstring  [id number]    Seated gastroc strap 31ujrr8    Supine piriformis 65fqif6    Supine figure 4     Quad stand at chair     Adduction stretch with strap 20 sec x 3  Added 1/29   Mikhail stretch  41csun8 Added 1/22   Lateral trunk rotation - hooklying 10x10\"  Added 1/29   PREs           adduc sets supine knees straight     Supine abduction with tband     Seated hip flexion with ER     Clams SL chace with strap     SL dead lift     Supine clams with tubing     Wall sit with tband     bridges 3x10  Added 1/22   Quadruped opposite LE/UE reaches     Prone extensions 3x10  Added 1/22                  Manual interventions  30' total  Long axis distraction, lateral hip distraction prone PAs at hip and PSIS, PSIS mobs, hip IR/ER & prone quad stretching; Hawk  HG9 to hamstrings, TFL, calf; rolling to quad, hamstring, glutes        Therapeutic Exercise and NMR EXR  [x] (69106) Provided verbal/tactile cueing for activities related to strengthening, flexibility, endurance, ROM for improvements in LE, proximal hip, and core control with self care, mobility, lifting, ambulation. [x] (69322) Provided verbal/tactile cueing for activities related to improving balance, coordination, kinesthetic sense, posture, motor skill, proprioception  to assist with LE, proximal hip, and core control in self care, mobility, lifting, ambulation and eccentric single leg control.      NMR and Therapeutic Activities:    [] (44293 or 04203) Provided verbal/tactile cueing for activities related to improving balance, coordination, kinesthetic sense, posture, motor skill, proprioception and motor activation to allow for proper function of core, proximal hip and LE with self care and ADLs  [] (39556) Gait Re-education- Provided training and instruction to the patient for proper LE, core and proximal hip recruitment and positioning and eccentric body weight control with ambulation re-education including up and down stairs     Home Exercise Program:    [x] (98965) Reviewed/Progressed HEP activities related to strengthening, flexibility, endurance, ROM of core, proximal hip and LE for functional self-care, mobility, lifting and ambulation/stair navigation   [] (09860)Reviewed/Progressed HEP activities related to improving balance, coordination, kinesthetic sense, posture, motor skill, proprioception of core, proximal hip and LE for self care, mobility, lifting, and ambulation/stair navigation      Manual Treatments:  PROM / STM / Oscillations-Mobs:  G-I, II, III, IV (PA's, Inf., Post.)  [x] (14911) Provided manual therapy to mobilize LE, proximal hip and/or LS spine soft tissue/joints for the purpose of modulating pain, promoting relaxation,  increasing ROM, reducing/eliminating soft tissue swelling/inflammation/restriction, improving soft tissue extensibility and allowing for proper ROM for normal function with self care, mobility, lifting and ambulation. Modalities:     Charges:  Timed Code Treatment Minutes: 45   Total Treatment Minutes: 370-0227       [] EVAL (LOW) 21480 (typically 20 minutes face-to-face)  [] EVAL (MOD) 30331 (typically 30 minutes face-to-face)  [] EVAL (HIGH) 14707 (typically 45 minutes face-to-face)  [] RE-EVAL     [x] CR(73126) x 1     [] IONTO  [] NMR (75402) x     [] VASO  [x] Manual (73504) x 2      [] Other:  [] TA x      [] Mech Traction (47176)  [] ES(attended) (60143)      [] ES (un) (50711):     GOALS:  Patient stated goal: decrease pain with adls     [] Progressing: [] Met: [] Not Met: [] Adjusted     Therapist goals for Patient: All goals not met. Extended on 1/15  Short Term Goals: To be achieved in: 2-4 weeks  1. Independent in HEP and progression per patient tolerance, in order to prevent re-injury. [] Progressing: [] Met: [x] Not Met: [] Adjusted   2. Patient will have a decrease in pain to facilitate improvement in movement, function, and ADLs as indicated by Functional Deficits. [] Progressing: [] Met: [x] Not Met: [] Adjusted     Long Term Goals: To be achieved in: 12 weeks  1. Disability index score of 50% or less for the LEFS to assist with reaching prior level of function. [] Progressing: [] Met: [x] Not Met: [] Adjusted  2. Patient will demonstrate increased AROM to max potential to allow for proper joint functioning as indicated by patients Functional Deficits. [] Progressing: [] Met: [x] Not Met: [] Adjusted  3. Patient will demonstrate an increase in Strength to good proximal hip strength and control,  to allow for proper functional mobility as indicated by patients Functional Deficits. [] Progressing: [] Met: [x] Not Met: [] Adjusted  4. Patient will return to  functional activities including light adls  without increased symptoms or restriction. [] Progressing: [] Met: [x] Not Met: [] Adjusted  5. Walk in community without pain  [] Progressing: [] Met: [x] Not Met: [] Adjusted                          Overall Progression Towards Functional goals/ Treatment Progress Update:  [] Patient is progressing as expected towards functional goals listed. [] Progression is slowed due to complexities/Impairments listed. [] Progression has been slowed due to co-morbidities. [] Plan just implemented, too soon to assess goals progression <30days   [] Goals require adjustment due to lack of progress  [] Patient is not progressing as expected and requires additional follow up with physician  [x] Other: Goals not met due to high levels of pain and discontinuing PT. Pt has now had another MD consultation and a cortisone injection which has helped to manage pain level.       Prognosis for POC: [x] Good [] Fair  [] Poor      Patient requires continued skilled intervention: [x] Yes  []

## 2020-01-31 ENCOUNTER — HOSPITAL ENCOUNTER (OUTPATIENT)
Dept: PHYSICAL THERAPY | Age: 55
Setting detail: THERAPIES SERIES
Discharge: HOME | End: 2020-01-31
Payer: COMMERCIAL

## 2020-01-31 PROCEDURE — 97110 THERAPEUTIC EXERCISES: CPT | Performed by: PHYSICAL THERAPIST

## 2020-01-31 PROCEDURE — 97140 MANUAL THERAPY 1/> REGIONS: CPT | Performed by: PHYSICAL THERAPIST

## 2020-01-31 NOTE — FLOWSHEET NOTE
The 1100 [facility] and 500 [facility], [facility] [address] 3360 [facility], [address] [place] [address]  Phone: [phone]   Fax:     [phone]        Physical Therapy Daily Treatment Note  Date:  2020    Patient Name:  Taiwo Nicholas    :  1965  MRN: [id number]  Restrictions/Precautions:    Medical/Treatment Diagnosis Information:  Diagnosis: M16.12 (ICD-10-CM) - Unilateral primary osteoarthritis, left hip  Treatment Diagnosis: L hip pain  Insurance/Certification information:  PT Insurance Information: [facility] 80/20 UNL  Physician Information:  Referring Practitioner: Raya Davis MD  Has the plan of care been signed (Y/N):        []  Yes  [x]  No     Date of Patient follow up with Physician: prn      Is this a Progress Report:     [x]  Yes  []  No        If Yes:  Date Range for reporting period:  Beginning 10/31/19  Ending 1/15/20    Progress report will be due (10 Rx or 30 days whichever is less): 10 visits or 060       Recertification will be due (POC Duration  / 90 days whichever is less): 2/15/2020         Visit # Insurance Allowable Auth Required   8   80/  UNL []  Yes [x]  No        Functional Scale: LEFS 75%  Date assessed:  10/31/2019       Latex Allergy:  [x]NO      []YES  Preferred Language for Healthcare:   [x]English       []other:      Pain level: 0 /10     SUBJECTIVE:   Muscle soreness noted today, but no pain. She has no neurological symptoms currently.         OBJECTIVE:   ROM 1/15 PROM AROM Comments     Left Right Left Right     Flexion  109  118 102 pain+ 110     Extension             Abduction     55 pain+ 40     Adduction             ER     25 pain+ 35  seated    IR     40 pain+ 45  seated   Knee flexion     130 140        Flexibility 1/15 Left Right Comments   Hamstrings 50 60 Passive SLR    ITB (Obers test)         Hip flexor(Mikhail test)  + -     gastroc         Rectus femoris Prone 120 with pain anterior leg             Strength not tested due to pain 1/15 Left Right Comments   Hip flexors         Hip extension         Hip abduction         Hip adduction         Hip ER         Hip IR         Quads         Hamstrings            RESTRICTIONS/PRECAUTIONS:     Exercises/Interventions:     ROM/STRETCHES     Seated hamstring  22ilgh3 ^ 1/31   Incline stretch  30qebg4 ^ 1/31   Supine piriformis 35qvbp2 ^ 1/31   Supine figure 4     SKTC  10x10\"  Added 1/31   Adduction stretch with strap 30 sec x 3  ^ 1/31   Mikhail stretch  29jwpt5 Added 1/22   Lateral trunk rotation - hooklying 10x10\"  Added 1/29   PREs           adduc sets supine knees straight     Supine abduction with tband     Seated hip flexion with ER     Clams SL chace with strap     SL dead lift     Supine clams with tubing     Wall sit with tband     bridges 3x10  Added 1/22   Quadruped opposite LE/UE reaches     Prone extensions 3x10  Added 1/22                  Manual interventions  25' total  Long axis distraction, lateral hip distraction prone PAs at hip and PSIS, PSIS mobs, hip IR/ER & prone quad stretching; Hawk  HG9 to hamstrings, TFL, calf; rolling to quad, hamstring, glutes        Therapeutic Exercise and NMR EXR  [x] (19222) Provided verbal/tactile cueing for activities related to strengthening, flexibility, endurance, ROM for improvements in LE, proximal hip, and core control with self care, mobility, lifting, ambulation. [x] (72886) Provided verbal/tactile cueing for activities related to improving balance, coordination, kinesthetic sense, posture, motor skill, proprioception  to assist with LE, proximal hip, and core control in self care, mobility, lifting, ambulation and eccentric single leg control.      NMR and Therapeutic Activities:    [] (77438 or 57646) Provided verbal/tactile cueing for activities related to improving balance, coordination, kinesthetic sense, posture, motor skill, proprioception and motor activation to allow for proper function of core, Independent in HEP and progression per patient tolerance, in order to prevent re-injury. [] Progressing: [] Met: [x] Not Met: [] Adjusted   2. Patient will have a decrease in pain to facilitate improvement in movement, function, and ADLs as indicated by Functional Deficits. [] Progressing: [] Met: [x] Not Met: [] Adjusted     Long Term Goals: To be achieved in: 12 weeks  1. Disability index score of 50% or less for the LEFS to assist with reaching prior level of function. [] Progressing: [] Met: [x] Not Met: [] Adjusted  2. Patient will demonstrate increased AROM to max potential to allow for proper joint functioning as indicated by patients Functional Deficits. [] Progressing: [] Met: [x] Not Met: [] Adjusted  3. Patient will demonstrate an increase in Strength to good proximal hip strength and control,  to allow for proper functional mobility as indicated by patients Functional Deficits. [] Progressing: [] Met: [x] Not Met: [] Adjusted  4. Patient will return to  functional activities including light adls  without increased symptoms or restriction. [] Progressing: [] Met: [x] Not Met: [] Adjusted  5. Walk in community without pain  [] Progressing: [] Met: [x] Not Met: [] Adjusted                          Overall Progression Towards Functional goals/ Treatment Progress Update:  [] Patient is progressing as expected towards functional goals listed. [] Progression is slowed due to complexities/Impairments listed. [] Progression has been slowed due to co-morbidities. [] Plan just implemented, too soon to assess goals progression <30days   [] Goals require adjustment due to lack of progress  [] Patient is not progressing as expected and requires additional follow up with physician  [x] Other: Goals not met due to high levels of pain and discontinuing PT. Pt has now had another MD consultation and a cortisone injection which has helped to manage pain level.       Prognosis for POC: [x] Good [] Fair  [] Poor      Patient requires continued skilled intervention: [x] Yes  [] No    Treatment/Activity Tolerance:  [x] Patient able to complete treatment with modifications   [] Patient limited by fatigue  [x] Patient limited by pain     [] Patient limited by other medical complications  [x] Other: no complaints during session today - gradually progress flexibility and strength as able. 1/31        Patient Education:  10/31 Reviewed diagnosis, POC, HEP and its importance. PLAN: 2 days per week for 4 Weeks: 1/15-2/15/20  [x] Continue per plan of care [] Alter current plan (see comments above)  [] Plan of care initiated [] Hold pending MD visit [] Discharge    Electronically signed by:  Amadeo Juárez PT      Note: If patient does not return for scheduled/ recommended follow up visits, this note will serve as a discharge from care along with most recent update on progress.

## 2020-02-05 ENCOUNTER — APPOINTMENT (OUTPATIENT)
Dept: PHYSICAL THERAPY | Age: 55
End: 2020-02-05
Payer: COMMERCIAL

## 2020-02-07 ENCOUNTER — APPOINTMENT (OUTPATIENT)
Dept: PHYSICAL THERAPY | Age: 55
End: 2020-02-07
Payer: COMMERCIAL

## 2020-02-13 ENCOUNTER — HOSPITAL ENCOUNTER (OUTPATIENT)
Dept: PHYSICAL THERAPY | Age: 55
Setting detail: THERAPIES SERIES
Discharge: HOME | End: 2020-02-13
Payer: COMMERCIAL

## 2020-02-13 PROCEDURE — 97140 MANUAL THERAPY 1/> REGIONS: CPT | Performed by: PHYSICAL THERAPIST

## 2020-02-13 PROCEDURE — 97110 THERAPEUTIC EXERCISES: CPT | Performed by: PHYSICAL THERAPIST

## 2020-02-13 NOTE — FLOWSHEET NOTE
The 1100 [facility] and 500 [facility], [facility] [address] 3360 [facility], [address] [facility]  Phone: [phone]   Fax:     [phone]        Physical Therapy Daily Treatment Note  Date:  2020    Patient Name:  Cullen Aschoff    :  1965  MRN: [id number]  Restrictions/Precautions:    Medical/Treatment Diagnosis Information:  Diagnosis: M16.12 (ICD-10-CM) - Unilateral primary osteoarthritis, left hip  Treatment Diagnosis: L hip pain  Insurance/Certification information:  PT Insurance Information: [facility] 80/20 UNL  Physician Information:  Referring Practitioner: Sybil Ross MD  Has the plan of care been signed (Y/N):        []  Yes  [x]  No     Date of Patient follow up with Physician: prn      Is this a Progress Report:     [x]  Yes  []  No        If Yes:  Date Range for reporting period:  Beginning 10/31/19  Ending 1/15/20    Progress report will be due (10 Rx or 30 days whichever is less): 10 visits or        Recertification will be due (POC Duration  / 90 days whichever is less): 2/15/2020         Visit # Insurance Allowable Auth Required   9   80/20  UNL []  Yes [x]  No        Functional Scale: LEFS 75%  Date assessed:  10/31/2019       Latex Allergy:  [x]NO      []YES  Preferred Language for Healthcare:   [x]English       []other:      Pain level: 0 /10     SUBJECTIVE:   pt reports her pain is a lot better. She still has most of her discomfort after sitting more than 20-30 minutes.        OBJECTIVE:   ROM 1/15 PROM AROM Comments     Left Right Left Right     Flexion  109  118 102 pain+ 110     Extension             Abduction     55 pain+ 40     Adduction             ER     25 pain+ 35  seated    IR     40 pain+ 45  seated   Knee flexion     130 140        Flexibility /15 Left Right Comments   Hamstrings 50 60 Passive SLR    ITB (Obers test)         Hip flexor(Mikhail test)  + -     gastroc         Rectus femoris Prone 120 with pain anterior leg                     Strength not tested due to pain 1/15 Left Right Comments   Hip flexors         Hip extension         Hip abduction         Hip adduction         Hip ER         Hip IR         Quads         Hamstrings            RESTRICTIONS/PRECAUTIONS:     Exercises/Interventions:     ROM/STRETCHES     Seated hamstring  16nvzj7 ^ 1/31   Incline stretch  88ihft1 ^ 1/31   Supine piriformis 59onwf9 ^ 1/31   Supine figure 4     SKTC  10x10\"  Added 1/31   Adduction stretch with strap 30 sec x 3  ^ 1/31   Mikhail stretch  93dmxy0 Added 1/22   Kneeling flexor stretch  61rwsi6 Added 2/13   Lateral trunk rotation - hooklying 10x10\"  Added 1/29   PREs           adduc sets supine knees straight     Supine abduction with tband     Seated hip flexion with ER     Clams SL chace with strap     SL dead lift     Supine clams with tubing     Wall sit with tband     bridges 3x10  Added 1/22   Quadruped opposite LE/UE reaches     Prone extensions 3x10  Added 1/22                  Manual interventions  25' total  Long axis distraction, lateral hip distraction prone PAs at hip and PSIS, PSIS mobs, hip IR/ER & prone quad stretching; Hawk  HG9 to hamstrings, TFL, calf; rolling to quad, hamstring, glutes        Therapeutic Exercise and NMR EXR  [x] (27927) Provided verbal/tactile cueing for activities related to strengthening, flexibility, endurance, ROM for improvements in LE, proximal hip, and core control with self care, mobility, lifting, ambulation. [x] (77349) Provided verbal/tactile cueing for activities related to improving balance, coordination, kinesthetic sense, posture, motor skill, proprioception  to assist with LE, proximal hip, and core control in self care, mobility, lifting, ambulation and eccentric single leg control.      NMR and Therapeutic Activities:    [] (77577 or 09153) Provided verbal/tactile cueing for activities related to improving balance, coordination, kinesthetic sense, posture, motor skill, proprioception and motor activation to allow for proper function of core, proximal hip and LE with self care and ADLs  [] (35350) Gait Re-education- Provided training and instruction to the patient for proper LE, core and proximal hip recruitment and positioning and eccentric body weight control with ambulation re-education including up and down stairs     Home Exercise Program:    [x] (99125) Reviewed/Progressed HEP activities related to strengthening, flexibility, endurance, ROM of core, proximal hip and LE for functional self-care, mobility, lifting and ambulation/stair navigation   [] (26038)Reviewed/Progressed HEP activities related to improving balance, coordination, kinesthetic sense, posture, motor skill, proprioception of core, proximal hip and LE for self care, mobility, lifting, and ambulation/stair navigation      Manual Treatments:  PROM / STM / Oscillations-Mobs:  G-I, II, III, IV (PA's, Inf., Post.)  [x] (05839) Provided manual therapy to mobilize LE, proximal hip and/or LS spine soft tissue/joints for the purpose of modulating pain, promoting relaxation,  increasing ROM, reducing/eliminating soft tissue swelling/inflammation/restriction, improving soft tissue extensibility and allowing for proper ROM for normal function with self care, mobility, lifting and ambulation. Modalities:     Charges:  Timed Code Treatment Minutes: 38   Total Treatment Minutes: 205-303       [] EVAL (LOW) 83519 (typically 20 minutes face-to-face)  [] EVAL (MOD) 20218 (typically 30 minutes face-to-face)  [] EVAL (HIGH) 59495 (typically 45 minutes face-to-face)  [] RE-EVAL     [x] BD(36607) x 1     [] IONTO  [] NMR (16195) x     [] VASO  [x] Manual (54058) x 2      [] Other:  [] TA x      [] Mech Traction (41058)  [] ES(attended) (79668)      [] ES (un) (52397):     GOALS:  Patient stated goal: decrease pain with adls     [] Progressing: [] Met: [] Not Met: [] Adjusted     Therapist goals for Patient: All goals not met. Extended on 1/15  Short Term Goals: To be achieved in: 2-4 weeks  1. Independent in HEP and progression per patient tolerance, in order to prevent re-injury. [] Progressing: [] Met: [x] Not Met: [] Adjusted   2. Patient will have a decrease in pain to facilitate improvement in movement, function, and ADLs as indicated by Functional Deficits. [] Progressing: [] Met: [x] Not Met: [] Adjusted     Long Term Goals: To be achieved in: 12 weeks  1. Disability index score of 50% or less for the LEFS to assist with reaching prior level of function. [] Progressing: [] Met: [x] Not Met: [] Adjusted  2. Patient will demonstrate increased AROM to max potential to allow for proper joint functioning as indicated by patients Functional Deficits. [] Progressing: [] Met: [x] Not Met: [] Adjusted  3. Patient will demonstrate an increase in Strength to good proximal hip strength and control,  to allow for proper functional mobility as indicated by patients Functional Deficits. [] Progressing: [] Met: [x] Not Met: [] Adjusted  4. Patient will return to  functional activities including light adls  without increased symptoms or restriction. [] Progressing: [] Met: [x] Not Met: [] Adjusted  5. Walk in community without pain  [] Progressing: [] Met: [x] Not Met: [] Adjusted                          Overall Progression Towards Functional goals/ Treatment Progress Update:  [] Patient is progressing as expected towards functional goals listed. [] Progression is slowed due to complexities/Impairments listed. [] Progression has been slowed due to co-morbidities. [] Plan just implemented, too soon to assess goals progression <30days   [] Goals require adjustment due to lack of progress  [] Patient is not progressing as expected and requires additional follow up with physician  [x] Other: Goals not met due to high levels of pain and discontinuing PT.   Pt has now had another MD consultation and a cortisone injection which has

## 2020-02-14 ENCOUNTER — HOSPITAL ENCOUNTER (OUTPATIENT)
Dept: PHYSICAL THERAPY | Age: 55
Setting detail: THERAPIES SERIES
Discharge: HOME | End: 2020-02-14
Payer: COMMERCIAL

## 2020-02-14 PROCEDURE — 97110 THERAPEUTIC EXERCISES: CPT | Performed by: PHYSICAL THERAPIST

## 2020-02-14 PROCEDURE — 97140 MANUAL THERAPY 1/> REGIONS: CPT | Performed by: PHYSICAL THERAPIST

## 2020-02-14 NOTE — FLOWSHEET NOTE
The [address] [facility] Montemayor Douban [address] [facility], [address] [facility]  Phone: [phone]   Fax:     [phone]        Physical Therapy Daily Treatment Note  Date:  2020    Patient Name:  Dandre James    :  1965  MRN: [id number]  Restrictions/Precautions:    Medical/Treatment Diagnosis Information:  Diagnosis: M16.12 (ICD-10-CM) - Unilateral primary osteoarthritis, left hip  Treatment Diagnosis: L hip pain  Insurance/Certification information:  PT Insurance Information: [facility] 80/20 UNL  Physician Information:  Referring Practitioner: Rebel Ash MD  Has the plan of care been signed (Y/N):        []  Yes  [x]  No     Date of Patient follow up with Physician: prn      Is this a Progress Report:     [x]  Yes  []  No        If Yes:  Date Range for reporting period:  Beginning 10/31/19  Ending 1/15/20    Progress report will be due (10 Rx or 30 days whichever is less): 10 visits or 3/27       Recertification will be due (POC Duration  / 90 days whichever is less): 2/15/2020         Visit # Insurance Allowable Auth Required   10   80/20  UNL []  Yes [x]  No        Functional Scale: LEFS 75%  Date assessed:  10/31/2019       Latex Allergy:  [x]NO      []YES  Preferred Language for Healthcare:   [x]English       []other:      Pain level: 2 /10 2/14    SUBJECTIVE:   Pt reports she had increased pain after her visit yesterday.         OBJECTIVE:   ROM 1/15 PROM AROM Comments     Left Right Left Right     Flexion 120 ()  118 102 pain+ 110     Extension             Abduction     55 pain+ 40     Adduction             ER     25 pain+ 35  seated    IR     40 pain+ 45  seated   Knee flexion     130 140        Flexibility 1/15 Left Right Comments   Hamstrings 50 60 Passive SLR    ITB (Obers test)         Hip flexor(Mikhail test)  + -     gastroc         Rectus femoris Prone 120 with pain anterior leg                     Strength not tested due proper function of core, proximal hip and LE with self care and ADLs  [] (16685) Gait Re-education- Provided training and instruction to the patient for proper LE, core and proximal hip recruitment and positioning and eccentric body weight control with ambulation re-education including up and down stairs     Home Exercise Program:    [x] (38278) Reviewed/Progressed HEP activities related to strengthening, flexibility, endurance, ROM of core, proximal hip and LE for functional self-care, mobility, lifting and ambulation/stair navigation   [] (53835)Reviewed/Progressed HEP activities related to improving balance, coordination, kinesthetic sense, posture, motor skill, proprioception of core, proximal hip and LE for self care, mobility, lifting, and ambulation/stair navigation      Manual Treatments:  PROM / STM / Oscillations-Mobs:  G-I, II, III, IV (PA's, Inf., Post.)  [x] (13595) Provided manual therapy to mobilize LE, proximal hip and/or LS spine soft tissue/joints for the purpose of modulating pain, promoting relaxation,  increasing ROM, reducing/eliminating soft tissue swelling/inflammation/restriction, improving soft tissue extensibility and allowing for proper ROM for normal function with self care, mobility, lifting and ambulation. Modalities:     Charges:  Timed Code Treatment Minutes: 40   Total Treatment Minutes: 061-53:07       [] EVAL (LOW) 66045 (typically 20 minutes face-to-face)  [] EVAL (MOD) 75012 (typically 30 minutes face-to-face)  [] EVAL (HIGH) 45836 (typically 45 minutes face-to-face)  [] RE-EVAL     [x] HV(20935) x 1     [] IONTO  [] NMR (59216) x     [] VASO  [x] Manual (30615) x 2      [] Other:  [] TA x      [] Mech Traction (91938)  [] ES(attended) (75029)      [] ES (un) (22918):     GOALS:  Patient stated goal: decrease pain with adls     [] Progressing: [] Met: [] Not Met: [] Adjusted     Therapist goals for Patient: All goals not met. Extended on 1/15  Short Term Goals:  To be achieved in: 2-4 weeks  1. Independent in HEP and progression per patient tolerance, in order to prevent re-injury. [] Progressing: [] Met: [x] Not Met: [] Adjusted   2. Patient will have a decrease in pain to facilitate improvement in movement, function, and ADLs as indicated by Functional Deficits. [] Progressing: [] Met: [x] Not Met: [] Adjusted     Long Term Goals: To be achieved in: 12 weeks  1. Disability index score of 50% or less for the LEFS to assist with reaching prior level of function. [] Progressing: [] Met: [x] Not Met: [] Adjusted  2. Patient will demonstrate increased AROM to max potential to allow for proper joint functioning as indicated by patients Functional Deficits. [] Progressing: [] Met: [x] Not Met: [] Adjusted  3. Patient will demonstrate an increase in Strength to good proximal hip strength and control,  to allow for proper functional mobility as indicated by patients Functional Deficits. [] Progressing: [] Met: [x] Not Met: [] Adjusted  4. Patient will return to  functional activities including light adls  without increased symptoms or restriction. [] Progressing: [] Met: [x] Not Met: [] Adjusted  5. Walk in community without pain  [] Progressing: [] Met: [x] Not Met: [] Adjusted                          Overall Progression Towards Functional goals/ Treatment Progress Update:  [] Patient is progressing as expected towards functional goals listed. [] Progression is slowed due to complexities/Impairments listed. [] Progression has been slowed due to co-morbidities. [] Plan just implemented, too soon to assess goals progression <30days   [] Goals require adjustment due to lack of progress  [] Patient is not progressing as expected and requires additional follow up with physician  [x] Other: Goals not met due to high levels of pain and discontinuing PT. Pt has now had another MD consultation and a cortisone injection which has helped to manage pain level.       Prognosis

## 2020-02-19 ENCOUNTER — HOSPITAL ENCOUNTER (OUTPATIENT)
Dept: PHYSICAL THERAPY | Age: 55
Setting detail: THERAPIES SERIES
Discharge: HOME | End: 2020-02-19
Payer: COMMERCIAL

## 2020-02-19 PROCEDURE — 97112 NEUROMUSCULAR REEDUCATION: CPT | Performed by: PHYSICAL THERAPIST

## 2020-02-19 PROCEDURE — 97110 THERAPEUTIC EXERCISES: CPT | Performed by: PHYSICAL THERAPIST

## 2020-02-19 PROCEDURE — 97140 MANUAL THERAPY 1/> REGIONS: CPT | Performed by: PHYSICAL THERAPIST

## 2020-02-19 NOTE — FLOWSHEET NOTE
[address] [facility], [facility] [employer] [address] [place] [address], [address] [place] [address]  Phone: [phone]   Fax:     [phone]        Physical Therapy Daily Treatment Note  Date:  2020    Patient Name:  Janeth Escudero    :  1965  MRN: [id number]  Restrictions/Precautions:    Medical/Treatment Diagnosis Information:  Diagnosis: M16.12 (ICD-10-CM) - Unilateral primary osteoarthritis, left hip  Treatment Diagnosis: L hip pain  Insurance/Certification information:  PT Insurance Information: [facility] 80/20 UNL  Physician Information:  Referring Practitioner: Darian Flores MD  Has the plan of care been signed (Y/N):        []  Yes  [x]  No     Date of Patient follow up with Physician: prn      Is this a Progress Report:     [x]  Yes  []  No        If Yes:  Date Range for reporting period:  Beginning 10/31/19  Ending 1/15/20    Progress report will be due (10 Rx or 30 days whichever is less): 10 visits or        Recertification will be due (POC Duration  / 90 days whichever is less): 2/15/2020         Visit # Insurance Allowable Auth Required   11    Progress note npv 80/20  UNL []  Yes [x]  No        Functional Scale: LEFS 75%  Date assessed:  10/31/2019       Latex Allergy:  [x]NO      []YES  Preferred Language for Healthcare:   [x]English       []other:      Pain level: 1 /10 2/19    SUBJECTIVE:   no new issues to report     OBJECTIVE:   ROM 1/15 PROM AROM Comments     Left Right Left Right     Flexion 120 ()  118 102 pain+ 110     Extension             Abduction     55 pain+ 40     Adduction             ER     25 pain+ 35  seated    IR     40 pain+ 45  seated   Knee flexion     130 140        Flexibility 1/15 Left Right Comments   Hamstrings 50 60 Passive SLR    ITB (Obers test)         Hip flexor(Mikhail test)  + -     gastroc         Rectus femoris Prone 120 with pain anterior leg                     Strength not tested due to pain 1/15 Left in: 2-4 weeks  1. Independent in HEP and progression per patient tolerance, in order to prevent re-injury. [] Progressing: [] Met: [x] Not Met: [] Adjusted   2. Patient will have a decrease in pain to facilitate improvement in movement, function, and ADLs as indicated by Functional Deficits. [] Progressing: [] Met: [x] Not Met: [] Adjusted     Long Term Goals: To be achieved in: 12 weeks  1. Disability index score of 50% or less for the LEFS to assist with reaching prior level of function. [] Progressing: [] Met: [x] Not Met: [] Adjusted  2. Patient will demonstrate increased AROM to max potential to allow for proper joint functioning as indicated by patients Functional Deficits. [] Progressing: [] Met: [x] Not Met: [] Adjusted  3. Patient will demonstrate an increase in Strength to good proximal hip strength and control,  to allow for proper functional mobility as indicated by patients Functional Deficits. [] Progressing: [] Met: [x] Not Met: [] Adjusted  4. Patient will return to  functional activities including light adls  without increased symptoms or restriction. [] Progressing: [] Met: [x] Not Met: [] Adjusted  5. Walk in community without pain  [] Progressing: [] Met: [x] Not Met: [] Adjusted                          Overall Progression Towards Functional goals/ Treatment Progress Update:  [] Patient is progressing as expected towards functional goals listed. [] Progression is slowed due to complexities/Impairments listed. [] Progression has been slowed due to co-morbidities. [] Plan just implemented, too soon to assess goals progression <30days   [] Goals require adjustment due to lack of progress  [] Patient is not progressing as expected and requires additional follow up with physician  [x] Other: Goals not met due to high levels of pain and discontinuing PT. Pt has now had another MD consultation and a cortisone injection which has helped to manage pain level.       Prognosis for

## 2020-02-21 ENCOUNTER — HOSPITAL ENCOUNTER (OUTPATIENT)
Dept: PHYSICAL THERAPY | Age: 55
Setting detail: THERAPIES SERIES
Discharge: HOME | End: 2020-02-21
Payer: COMMERCIAL

## 2020-02-21 PROCEDURE — 97110 THERAPEUTIC EXERCISES: CPT | Performed by: PHYSICAL THERAPIST

## 2020-02-21 PROCEDURE — 97140 MANUAL THERAPY 1/> REGIONS: CPT | Performed by: PHYSICAL THERAPIST

## 2020-02-21 PROCEDURE — 97112 NEUROMUSCULAR REEDUCATION: CPT | Performed by: PHYSICAL THERAPIST

## 2020-02-21 NOTE — PLAN OF CARE
The [facility] [address], [address] [employer] [facility] [address], [address] [place] [address]  Phone: [phone]   Fax:     [phone]     Physical Therapy Re-Certification Plan of Care    Dear Dr. Micah Morales,    We had the pleasure of treating the following patient for physical therapy services at [address] [place] [address]. A summary of our findings can be found in the updated assessment below. This includes our plan of care. If you have any questions or concerns regarding these findings, please do not hesitate to contact me at the office phone number checked above. Thank you for the referral.     Physician Signature:________________________________Date:__________________  By signing above (or electronic signature), therapists plan is approved by physician      Overall Response to Treatment:   [x]Patient is responding well to treatment and improvement is noted with regards to goals   []Patient should continue to improve in reasonable time if they continue HEP   []Patient has plateaued and is no longer responding to skilled PT intervention    []Patient is getting worse and would benefit from return to referring MD   []Patient unable to adhere to initial POC   [x]Other: Pt is progressing well overall in physical therapy. She is very motivated and consistently attending her appointments. She presents with improvements in her ROM, strength, and function. Pt will continue to benefit from skilled physical therapy in order to monitor and progress her strengthening routine. Discussed with pt if symptoms worsen with strengthening exercises on land she may benefit from aquatic therapy to minimize pain.       Date range of previous POC Visits: 1/15/20-20     Total Visits: 12            Physical Therapy Daily Treatment Note  Date:  2020    Patient Name:  Ashley Neal    :  1965  MRN: [id number]  Restrictions/Precautions:    Medical/Treatment Diagnosis Information:  Diagnosis: M16.12 (ICD-10-CM) - Unilateral primary osteoarthritis, left hip  Treatment Diagnosis: L hip pain  Insurance/Certification information:  PT Insurance Information: [facility] 80/20 UNL  Physician Information:  Referring Practitioner: Michelle Carver MD  Has the plan of care been signed (Y/N):        []  Yes  [x]  No     Date of Patient follow up with Physician: prn      Is this a Progress Report:     [x]  Yes  []  No        If Yes:  Date Range for reporting period:  Beginning 1/15/20  Ending 2/21/20    Progress report will be due (10 Rx or 30 days whichever is less): 10 visits or 7/45       Recertification will be due (POC Duration  / 90 days whichever is less): 2/15/2020         Visit # Insurance Allowable Auth Required   12  2/21 80/20  UNL []  Yes [x]  No        Functional Scale: LEFS 64/80 - 20% deficit  Date assessed:  2/21/2019       Latex Allergy:  [x]NO      []YES  Preferred Language for Healthcare:   [x]English       []other:      Pain level: 2 /10 2/21    SUBJECTIVE:  Pt reports she had about 20 minutes of stabbing pains after her last visit.   She states that did subside but she still has some burning sensation in her inner thigh.  2/21    OBJECTIVE:   ROM 2/21 PROM AROM Comments     Left Right Left Right     Flexion 120 (2/14)  118 107 p! 110     Extension             Abduction     55 pain+ 57     Adduction             ER     25 pain+ 35  seated    IR     40 pain+ 45  seated   Knee flexion     135 140        Flexibility 2/21 Left Right Comments   Hamstrings 75 90  Passive SLR    ITB (Obers test)         Hip flexor(Mikhail test)  + -     gastroc         Rectus femoris                     Strength 2/21 Left Right Comments   Hip flexors  3+ p!   4-     Hip extension         Hip abduction  4-   4-     Hip adduction  3+ p!   4+     Hip ER         Hip IR         Quads  4+ 4+     Hamstrings 4+ 4+    Ankle DF 5 5        RESTRICTIONS/PRECAUTIONS:     Exercises/Interventions:     BIKE 5' ROM/STRETCHES     Seated hamstring  [id number] ^ 1/31   Incline stretch  54mpvv7 ^ 1/31   Supine piriformis 93yhlu3 ^ 1/31   Supine figure 4     SKTC  10x10\"  Added 1/31   Adduction stretch with strap 30 sec x 3  ^ 1/31   Mikhail stretch  54zxsr6 Added 1/22   Kneeling flexor stretch  87oheg9 Added 2/13   Lateral trunk rotation - hooklying 10x10\"  Added 1/29   PREs           adduc sets supine knees straight     Supine abduction with tband     Seated hip flexion with ER     Clams SL  3x10  Added 2/14   SL dead lift 3x10 B Added 2/19   Supine clams with tubing     Wall sit with tband 3x30\" green  Added 2/19   bridges 3x10  Added 1/22   Seated marching on ball  3x10   Added 2/19   Prone extensions 3x10  Added 1/22                  Manual interventions  25' total  Long axis distraction, lateral hip distraction , hip IR/ER & prone quad stretching; Hawk  HG9 to hamstrings, TFL, calf; rolling to quad, hamstring, glutes        Therapeutic Exercise and NMR EXR  [x] (92580) Provided verbal/tactile cueing for activities related to strengthening, flexibility, endurance, ROM for improvements in LE, proximal hip, and core control with self care, mobility, lifting, ambulation. [x] (45591) Provided verbal/tactile cueing for activities related to improving balance, coordination, kinesthetic sense, posture, motor skill, proprioception  to assist with LE, proximal hip, and core control in self care, mobility, lifting, ambulation and eccentric single leg control.      NMR and Therapeutic Activities:    [x] (01153 or 04990) Provided verbal/tactile cueing for activities related to improving balance, coordination, kinesthetic sense, posture, motor skill, proprioception and motor activation to allow for proper function of core, proximal hip and LE with self care and ADLs  [] (89224) Gait Re-education- Provided training and instruction to the patient for proper LE, core and proximal hip recruitment and positioning and eccentric body weight control with ambulation re-education including up and down stairs     Home Exercise Program:    [x] (92864) Reviewed/Progressed HEP activities related to strengthening, flexibility, endurance, ROM of core, proximal hip and LE for functional self-care, mobility, lifting and ambulation/stair navigation   [] (22265)Reviewed/Progressed HEP activities related to improving balance, coordination, kinesthetic sense, posture, motor skill, proprioception of core, proximal hip and LE for self care, mobility, lifting, and ambulation/stair navigation      Manual Treatments:  PROM / STM / Oscillations-Mobs:  G-I, II, III, IV (PA's, Inf., Post.)  [x] (62273) Provided manual therapy to mobilize LE, proximal hip and/or LS spine soft tissue/joints for the purpose of modulating pain, promoting relaxation,  increasing ROM, reducing/eliminating soft tissue swelling/inflammation/restriction, improving soft tissue extensibility and allowing for proper ROM for normal function with self care, mobility, lifting and ambulation. Modalities:  Ice 15'     Charges:  Timed Code Treatment Minutes: 58   Total Treatment Minutes: 9:25-11:05       [] EVAL (LOW) 06675 (typically 20 minutes face-to-face)  [] EVAL (MOD) 68391 (typically 30 minutes face-to-face)  [] EVAL (HIGH) 26833 (typically 45 minutes face-to-face)  [] RE-EVAL     [x] WT(70736) x 1     [] IONTO  [x] NMR (73642) x 1    [] VASO  [x] Manual (36103) x 2      [] Other:  [] TA x      [] Mech Traction (13295)  [] ES(attended) (81563)      [] ES (un) (59517):     GOALS:  Patient stated goal: decrease pain with adls     [] Progressing: [] Met: [] Not Met: [] Adjusted     Therapist goals for Patient: All goals not met. Extended on 1/15  Short Term Goals: To be achieved in: 2-4 weeks  1. Independent in HEP and progression per patient tolerance, in order to prevent re-injury. [] Progressing: [] Met: [x] Not Met: [] Adjusted   2.  Patient will have a decrease in pain to facilitate improvement in movement, function, and ADLs as indicated by Functional Deficits. [] Progressing: [] Met: [x] Not Met: [] Adjusted     Long Term Goals: To be achieved in: 12 weeks  1. Disability index score of 50% or less for the LEFS to assist with reaching prior level of function. [] Progressing: [x] Met: [] Not Met: [] Adjusted   1a. (new 2/21) Disability index score of 10% or less for the LEFS to assist with reaching prior level of function. [] Progressing: [x] Met: [] Not Met: [] Adjusted   2. Patient will demonstrate increased AROM to max potential to allow for proper joint functioning as indicated by patients Functional Deficits. [x] Progressing: [] Met: [] Not Met: [] Adjusted  3. Patient will demonstrate an increase in Strength to good proximal hip strength and control,  to allow for proper functional mobility as indicated by patients Functional Deficits. [x] Progressing: [] Met: [] Not Met: [] Adjusted  4. Patient will return to  functional activities including light adls without increased symptoms or restriction. [] Progressing: [x] Met: [] Not Met: [] Adjusted  5. Walk in community without pain  [] Progressing: [x] Met: [] Not Met: [] Adjusted          6. (new 2/21) Patient will be able to walk at least 2 miles at brisk pace without increased symptoms. [] Progressing: [] Met: [] Not Met: [] Adjusted                          Overall Progression Towards Functional goals/ Treatment Progress Update:  [x] Patient is progressing as expected towards functional goals listed. [] Progression is slowed due to complexities/Impairments listed. [] Progression has been slowed due to co-morbidities.   [] Plan just implemented, too soon to assess goals progression <30days   [] Goals require adjustment due to lack of progress  [] Patient is not progressing as expected and requires additional follow up with physician  [] Other:     Prognosis for POC: [x] Good [] Fair  [] Poor      Patient requires continued skilled intervention: [x] Yes  [] No    Treatment/Activity Tolerance:  [x] Patient able to complete treatment with modifications   [] Patient limited by fatigue  [x] Patient limited by pain     [] Patient limited by other medical complications  [x] Other: pt did well today and was able to complete exercises - she did have increased soreness and \"burning\" by the end of session 2/21        Patient Education:  10/31 Reviewed diagnosis, POC, HEP and its importance. 2/21 provided some aquatic exercises she can do while out of town             PLAN: 2 days per week for 4 Weeks: 2/21  [x] Continue per plan of care [] Alter current plan (see comments above)  [] Plan of care initiated [] Hold pending MD visit [] Discharge    Electronically signed by:  Waldo Berry PT      Note: If patient does not return for scheduled/ recommended follow up visits, this note will serve as a discharge from care along with most recent update on progress.

## 2020-03-03 ENCOUNTER — HOSPITAL ENCOUNTER (OUTPATIENT)
Dept: PHYSICAL THERAPY | Age: 55
Setting detail: THERAPIES SERIES
Discharge: HOME | End: 2020-03-03
Payer: COMMERCIAL

## 2020-03-03 PROCEDURE — 97140 MANUAL THERAPY 1/> REGIONS: CPT | Performed by: PHYSICAL THERAPIST

## 2020-03-03 PROCEDURE — 97112 NEUROMUSCULAR REEDUCATION: CPT | Performed by: PHYSICAL THERAPIST

## 2020-03-03 PROCEDURE — 97110 THERAPEUTIC EXERCISES: CPT | Performed by: PHYSICAL THERAPIST

## 2020-03-03 NOTE — FLOWSHEET NOTE
GOALS:  Patient stated goal: decrease pain with adls     [] Progressing: [] Met: [] Not Met: [] Adjusted     Therapist goals for Patient: All goals not met. Extended on 1/15  Short Term Goals: To be achieved in: 2-4 weeks  1. Independent in HEP and progression per patient tolerance, in order to prevent re-injury. [] Progressing: [] Met: [x] Not Met: [] Adjusted   2. Patient will have a decrease in pain to facilitate improvement in movement, function, and ADLs as indicated by Functional Deficits. [] Progressing: [] Met: [x] Not Met: [] Adjusted     Long Term Goals: To be achieved in: 12 weeks  1. Disability index score of 50% or less for the LEFS to assist with reaching prior level of function. [] Progressing: [x] Met: [] Not Met: [] Adjusted   1a. (new 2/21) Disability index score of 10% or less for the LEFS to assist with reaching prior level of function. [] Progressing: [x] Met: [] Not Met: [] Adjusted   2. Patient will demonstrate increased AROM to max potential to allow for proper joint functioning as indicated by patients Functional Deficits. [x] Progressing: [] Met: [] Not Met: [] Adjusted  3. Patient will demonstrate an increase in Strength to good proximal hip strength and control,  to allow for proper functional mobility as indicated by patients Functional Deficits. [x] Progressing: [] Met: [] Not Met: [] Adjusted  4. Patient will return to  functional activities including light adls without increased symptoms or restriction. [] Progressing: [x] Met: [] Not Met: [] Adjusted  5. Walk in community without pain  [] Progressing: [x] Met: [] Not Met: [] Adjusted          6. (new 2/21) Patient will be able to walk at least 2 miles at brisk pace without increased symptoms. [] Progressing: [] Met: [] Not Met: [] Adjusted                          Overall Progression Towards Functional goals/ Treatment Progress Update:  [x] Patient is progressing as expected towards functional goals listed. [] Progression is slowed due to complexities/Impairments listed. [] Progression has been slowed due to co-morbidities. [] Plan just implemented, too soon to assess goals progression <30days   [] Goals require adjustment due to lack of progress  [] Patient is not progressing as expected and requires additional follow up with physician  [] Other:     Prognosis for POC: [x] Good [] Fair  [] Poor      Patient requires continued skilled intervention: [x] Yes  [] No    Treatment/Activity Tolerance:  [x] Patient able to complete treatment with modifications   [] Patient limited by fatigue  [x] Patient limited by pain     [] Patient limited by other medical complications  [x] Other: pt did well today and was able to complete exercises without increasing pain. She still notes some burning in anterior thigh, but it did not get worse during session. 3/3      Patient Education:  10/31 Reviewed diagnosis, POC, HEP and its importance. 2/21 provided some aquatic exercises she can do while out of town             PLAN: 2 days per week for 4 Weeks: 2/21  [x] Continue per plan of care [] Alter current plan (see comments above)  [] Plan of care initiated [] Hold pending MD visit [] Discharge    Electronically signed by:  Jonathan Johnson PT      Note: If patient does not return for scheduled/ recommended follow up visits, this note will serve as a discharge from care along with most recent update on progress.

## 2020-03-06 ENCOUNTER — HOSPITAL ENCOUNTER (OUTPATIENT)
Dept: PHYSICAL THERAPY | Age: 55
Setting detail: THERAPIES SERIES
Discharge: HOME | End: 2020-03-06
Payer: COMMERCIAL

## 2020-03-06 PROCEDURE — 97140 MANUAL THERAPY 1/> REGIONS: CPT | Performed by: PHYSICAL THERAPIST

## 2020-03-06 PROCEDURE — 97110 THERAPEUTIC EXERCISES: CPT | Performed by: PHYSICAL THERAPIST

## 2020-03-06 PROCEDURE — 97112 NEUROMUSCULAR REEDUCATION: CPT | Performed by: PHYSICAL THERAPIST

## 2020-03-06 NOTE — FLOWSHEET NOTE
flexors  3+ p!   4-     Hip extension         Hip abduction  4-   4-     Hip adduction  3+ p!   4+     Hip ER         Hip IR         Quads  4+ 4+     Hamstrings 4+ 4+    Ankle DF 5 5        RESTRICTIONS/PRECAUTIONS:     Exercises/Interventions:           ROM/STRETCHES     Prone on elbows  5'  Added 3/6   Seated hamstring  41kfut8 ^ 1/31   Incline stretch  97zpbq8 ^ 1/31   Supine piriformis 46yypr3 ^ 1/31   Supine figure 4     SKTC  10x10\"  Added 1/31   Adduction stretch with strap 30 sec x 3  ^ 1/31   Mikhail stretch  33edry9 Added 1/22   Kneeling flexor stretch  93mkoi9 Added 2/13   Prone quad stretch  74ssvn9  Added 3/3   Lateral trunk rotation - hooklying 10x10\"  Added 1/29   PREs           adduc sets supine knees straight     Supine abduction with tband     Seated hip flexion with ER     Clams SL  3x10  Added 2/14   SL dead lift 3x10 B Added 2/19   Supine clams with tubing     Wall sit with tband 3x30\" green  Added 2/19   bridges 3x10  Added 1/22   Seated marching on ball  3x10   Added 2/19   Prone extensions 3x10  Added 1/22   Standing back extension 2x10  Added 3/6             Manual interventions  20' total  Long axis distraction (DL & SL), prone lumbar PAs, , hip IR/ER & prone quad stretching; Therapeutic Exercise and NMR EXR  [x] (52584) Provided verbal/tactile cueing for activities related to strengthening, flexibility, endurance, ROM for improvements in LE, proximal hip, and core control with self care, mobility, lifting, ambulation. [x] (89280) Provided verbal/tactile cueing for activities related to improving balance, coordination, kinesthetic sense, posture, motor skill, proprioception  to assist with LE, proximal hip, and core control in self care, mobility, lifting, ambulation and eccentric single leg control.      NMR and Therapeutic Activities:    [x] (68694 or 02817) Provided verbal/tactile cueing for activities related to improving balance, coordination, kinesthetic sense, posture, motor implemented, too soon to assess goals progression <30days   [] Goals require adjustment due to lack of progress  [] Patient is not progressing as expected and requires additional follow up with physician  [] Other:     Prognosis for POC: [x] Good [] Fair  [] Poor      Patient requires continued skilled intervention: [x] Yes  [] No    Treatment/Activity Tolerance:  [x] Patient able to complete treatment with modifications   [] Patient limited by fatigue  [x] Patient limited by pain     [] Patient limited by other medical complications  [x] Other: Pt's neurological symptoms into LE may be due to low back versus hip. Focus on extension based exercises to attempt to centralize symptoms. May be a candidate for lumbar traction. 3/6      Patient Education:  10/31 Reviewed diagnosis, POC, HEP and its importance. 2/21 provided some aquatic exercises she can do while out of town             PLAN: 2 days per week for 4 Weeks: 2/21  [x] Continue per plan of care [] Alter current plan (see comments above)  [] Plan of care initiated [] Hold pending MD visit [] Discharge    Electronically signed by:  An Multani PT      Note: If patient does not return for scheduled/ recommended follow up visits, this note will serve as a discharge from care along with most recent update on progress.

## 2020-03-10 ENCOUNTER — HOSPITAL ENCOUNTER (OUTPATIENT)
Dept: PHYSICAL THERAPY | Age: 55
Setting detail: THERAPIES SERIES
Discharge: HOME | End: 2020-03-10
Payer: COMMERCIAL

## 2020-03-10 PROCEDURE — 97112 NEUROMUSCULAR REEDUCATION: CPT | Performed by: PHYSICAL THERAPIST

## 2020-03-10 PROCEDURE — 97110 THERAPEUTIC EXERCISES: CPT | Performed by: PHYSICAL THERAPIST

## 2020-03-10 NOTE — FLOWSHEET NOTE
Provided training and instruction to the patient for proper LE, core and proximal hip recruitment and positioning and eccentric body weight control with ambulation re-education including up and down stairs     Home Exercise Program:    [x] (68304) Reviewed/Progressed HEP activities related to strengthening, flexibility, endurance, ROM of core, proximal hip and LE for functional self-care, mobility, lifting and ambulation/stair navigation   [] (68267)Reviewed/Progressed HEP activities related to improving balance, coordination, kinesthetic sense, posture, motor skill, proprioception of core, proximal hip and LE for self care, mobility, lifting, and ambulation/stair navigation      Manual Treatments:  PROM / STM / Oscillations-Mobs:  G-I, II, III, IV (PA's, Inf., Post.)  [x] (09937) Provided manual therapy to mobilize LE, proximal hip and/or LS spine soft tissue/joints for the purpose of modulating pain, promoting relaxation,  increasing ROM, reducing/eliminating soft tissue swelling/inflammation/restriction, improving soft tissue extensibility and allowing for proper ROM for normal function with self care, mobility, lifting and ambulation. Modalities:  Ice 15'     Charges:  Timed Code Treatment Minutes: 50   Total Treatment Minutes: 7696-6636       [] EVAL (LOW) 91195 (typically 20 minutes face-to-face)  [] EVAL (MOD) 26302 (typically 30 minutes face-to-face)  [] EVAL (HIGH) 09968 (typically 45 minutes face-to-face)  [] RE-EVAL     [x] KU(08634) x 2     [] IONTO  [x] NMR (95664) x 1    [] VASO  [] Manual (12975) x       [] Other:  [] TA x      [] Mech Traction (03094)  [] ES(attended) (01374)      [] ES (un) (36346):     GOALS:  Patient stated goal: decrease pain with adls     [] Progressing: [] Met: [] Not Met: [] Adjusted     Therapist goals for Patient: All goals not met. Extended on 1/15  Short Term Goals: To be achieved in: 2-4 weeks  1.  Independent in HEP and progression per patient tolerance, in order to prevent re-injury. [] Progressing: [] Met: [x] Not Met: [] Adjusted   2. Patient will have a decrease in pain to facilitate improvement in movement, function, and ADLs as indicated by Functional Deficits. [] Progressing: [] Met: [x] Not Met: [] Adjusted     Long Term Goals: To be achieved in: 12 weeks  1. Disability index score of 50% or less for the LEFS to assist with reaching prior level of function. [] Progressing: [x] Met: [] Not Met: [] Adjusted   1a. (new 2/21) Disability index score of 10% or less for the LEFS to assist with reaching prior level of function. [] Progressing: [x] Met: [] Not Met: [] Adjusted   2. Patient will demonstrate increased AROM to max potential to allow for proper joint functioning as indicated by patients Functional Deficits. [x] Progressing: [] Met: [] Not Met: [] Adjusted  3. Patient will demonstrate an increase in Strength to good proximal hip strength and control,  to allow for proper functional mobility as indicated by patients Functional Deficits. [x] Progressing: [] Met: [] Not Met: [] Adjusted  4. Patient will return to  functional activities including light adls without increased symptoms or restriction. [] Progressing: [x] Met: [] Not Met: [] Adjusted  5. Walk in community without pain  [] Progressing: [x] Met: [] Not Met: [] Adjusted          6. (new 2/21) Patient will be able to walk at least 2 miles at brisk pace without increased symptoms. [] Progressing: [] Met: [] Not Met: [] Adjusted                          Overall Progression Towards Functional goals/ Treatment Progress Update:  [x] Patient is progressing as expected towards functional goals listed. [] Progression is slowed due to complexities/Impairments listed. [] Progression has been slowed due to co-morbidities.   [] Plan just implemented, too soon to assess goals progression <30days   [] Goals require adjustment due to lack of progress  [] Patient is not progressing as expected and requires additional follow up with physician  [] Other:     Prognosis for POC: [x] Good [] Fair  [] Poor      Patient requires continued skilled intervention: [x] Yes  [] No    Treatment/Activity Tolerance:  [x] Patient able to complete treatment with modifications   [] Patient limited by fatigue  [] Patient limited by pain     [] Patient limited by other medical complications  [x] Other: Pt tolerated treatment well overall today. Introduced ITB stretch to help with symptoms on lateral hip. Radiating symptoms did decrease after performing her stretches. Held on WB activities since those seem to aggravate the hip. Utilize Alter G to decrease WB on certain exercises when indicated. 3/10      Patient Education:  10/31 Reviewed diagnosis, POC, HEP and its importance. 2/21 provided some aquatic exercises she can do while out of town             PLAN: 2 days per week for 4 Weeks: 2/21  [x] Continue per plan of care [] Alter current plan (see comments above)  [] Plan of care initiated [] Hold pending MD visit [] Discharge    Electronically signed by:  Catrachito Salazar PT      Note: If patient does not return for scheduled/ recommended follow up visits, this note will serve as a discharge from care along with most recent update on progress.

## 2020-03-12 ENCOUNTER — HOSPITAL ENCOUNTER (OUTPATIENT)
Dept: PHYSICAL THERAPY | Age: 55
Setting detail: THERAPIES SERIES
Discharge: HOME | End: 2020-03-12
Payer: COMMERCIAL

## 2020-03-12 PROCEDURE — 97140 MANUAL THERAPY 1/> REGIONS: CPT | Performed by: PHYSICAL THERAPIST

## 2020-03-12 PROCEDURE — 97110 THERAPEUTIC EXERCISES: CPT | Performed by: PHYSICAL THERAPIST

## 2020-03-12 NOTE — FLOWSHEET NOTE
abduction  4-   4-     Hip adduction  3+ p!   4+     Hip ER         Hip IR         Quads  4+ 4+     Hamstrings 4+ 4+    Ankle DF 5 5        RESTRICTIONS/PRECAUTIONS:     Exercises/Interventions:           ROM/STRETCHES     Prone on elbows  5'  Added 3/6   Seated hamstring  98bvlf9 ^ 1/31   Incline stretch  36dewr3 ^ 1/31   Supine piriformis 15eqgv6 ^ 1/31   ITB stretch 5x30\"  Added 3/10   SKTC  10x10\"  Added 1/31   Adduction stretch with strap 30 sec x 3  ^ 1/31   Mikhail stretch  13nxqp5 Added 1/22   Kneeling flexor stretch  80ecvb1 Added 2/13   Prone quad stretch  23jjmc9  Added 3/3   Lateral trunk rotation - hooklying 10x10\"  Added 1/29   PREs           adduc sets supine knees straight     Supine abduction with tband     Seated hip flexion with ER     Clams SL  3x10  Added 2/14   SL dead lift Supine clams with tubing     Wall sit with tband bridges 3x10  Added 1/22   Seated marching on ball  3x10   Added 2/19   Prone extensions 3x10  Added 1/22   Prone hip extension 3x10  Added 3/10   Fish tail  3x10  Added 3/10        15' total prone lumbar PAs, PSIS mobs, hip IR/ER & prone quad stretching, hamstring stretchrolling to quad, hamstring, glutes    Therapeutic Exercise and NMR EXR  [x] (59587) Provided verbal/tactile cueing for activities related to strengthening, flexibility, endurance, ROM for improvements in LE, proximal hip, and core control with self care, mobility, lifting, ambulation. [x] (64340) Provided verbal/tactile cueing for activities related to improving balance, coordination, kinesthetic sense, posture, motor skill, proprioception  to assist with LE, proximal hip, and core control in self care, mobility, lifting, ambulation and eccentric single leg control.      NMR and Therapeutic Activities:    [x] (59459 or 72006) Provided verbal/tactile cueing for activities related to improving balance, coordination, kinesthetic sense, posture, motor skill, proprioception and motor activation to allow for proper function of core, proximal hip and LE with self care and ADLs  [] (44570) Gait Re-education- Provided training and instruction to the patient for proper LE, core and proximal hip recruitment and positioning and eccentric body weight control with ambulation re-education including up and down stairs     Home Exercise Program:    [x] (86581) Reviewed/Progressed HEP activities related to strengthening, flexibility, endurance, ROM of core, proximal hip and LE for functional self-care, mobility, lifting and ambulation/stair navigation   [] (25563)Reviewed/Progressed HEP activities related to improving balance, coordination, kinesthetic sense, posture, motor skill, proprioception of core, proximal hip and LE for self care, mobility, lifting, and ambulation/stair navigation      Manual Treatments:  PROM / STM / Oscillations-Mobs:  G-I, II, III, IV (PA's, Inf., Post.)  [x] (20609) Provided manual therapy to mobilize LE, proximal hip and/or LS spine soft tissue/joints for the purpose of modulating pain, promoting relaxation,  increasing ROM, reducing/eliminating soft tissue swelling/inflammation/restriction, improving soft tissue extensibility and allowing for proper ROM for normal function with self care, mobility, lifting and ambulation. Modalities:  Ice 15'     Charges:  Timed Code Treatment Minutes: 50   Total Treatment Minutes: 038-7828       [] EVAL (LOW) 95948 (typically 20 minutes face-to-face)  [] EVAL (MOD) 24405 (typically 30 minutes face-to-face)  [] EVAL (HIGH) 19859 (typically 45 minutes face-to-face)  [] RE-EVAL     [x] NQ(24514) x 2    [] IONTO  [] NMR (82899) x     [] VASO  [x] Manual (28655) x 1       [] Other:  [] TA x      [] Mech Traction (84854)  [] ES(attended) (54448)      [] ES (un) (47399):     GOALS:  Patient stated goal: decrease pain with adls     [] Progressing: [] Met: [] Not Met: [] Adjusted     Therapist goals for Patient: All goals not met. Extended on 1/15  Short Term Goals:  To be achieved in: 2-4 weeks  1. Independent in HEP and progression per patient tolerance, in order to prevent re-injury. [] Progressing: [] Met: [x] Not Met: [] Adjusted   2. Patient will have a decrease in pain to facilitate improvement in movement, function, and ADLs as indicated by Functional Deficits. [] Progressing: [] Met: [x] Not Met: [] Adjusted     Long Term Goals: To be achieved in: 12 weeks  1. Disability index score of 50% or less for the LEFS to assist with reaching prior level of function. [] Progressing: [x] Met: [] Not Met: [] Adjusted   1a. (new 2/21) Disability index score of 10% or less for the LEFS to assist with reaching prior level of function. [] Progressing: [x] Met: [] Not Met: [] Adjusted   2. Patient will demonstrate increased AROM to max potential to allow for proper joint functioning as indicated by patients Functional Deficits. [x] Progressing: [] Met: [] Not Met: [] Adjusted  3. Patient will demonstrate an increase in Strength to good proximal hip strength and control,  to allow for proper functional mobility as indicated by patients Functional Deficits. [x] Progressing: [] Met: [] Not Met: [] Adjusted  4. Patient will return to  functional activities including light adls without increased symptoms or restriction. [] Progressing: [x] Met: [] Not Met: [] Adjusted  5. Walk in community without pain  [] Progressing: [x] Met: [] Not Met: [] Adjusted          6. (new 2/21) Patient will be able to walk at least 2 miles at brisk pace without increased symptoms. [] Progressing: [] Met: [] Not Met: [] Adjusted                          Overall Progression Towards Functional goals/ Treatment Progress Update:  [x] Patient is progressing as expected towards functional goals listed. [] Progression is slowed due to complexities/Impairments listed. [] Progression has been slowed due to co-morbidities.   [] Plan just implemented, too soon to assess goals progression <30days   [] Goals

## 2020-03-17 ENCOUNTER — HOSPITAL ENCOUNTER (OUTPATIENT)
Dept: PHYSICAL THERAPY | Age: 55
Setting detail: THERAPIES SERIES
Discharge: HOME | End: 2020-03-17
Payer: COMMERCIAL

## 2020-03-17 ENCOUNTER — APPOINTMENT (OUTPATIENT)
Dept: PHYSICAL THERAPY | Age: 55
End: 2020-03-17
Payer: COMMERCIAL

## 2020-03-17 PROCEDURE — 97110 THERAPEUTIC EXERCISES: CPT | Performed by: PHYSICAL THERAPIST

## 2020-03-17 NOTE — FLOWSHEET NOTE
Rectus femoris                     Strength 2/21 Left Right Comments   Hip flexors  3+ p!   4-     Hip extension         Hip abduction  4-   4-     Hip adduction  3+ p!   4+     Hip ER         Hip IR         Quads  4+ 4+     Hamstrings 4+ 4+    Ankle DF 5 5        RESTRICTIONS/PRECAUTIONS:     Exercises/Interventions:           ROM/STRETCHES     Prone on elbows  5'  Added 3/6   Seated hamstring  18layw0 ^ 1/31   Incline stretch  47ouqs3 ^ 1/31   Supine piriformis 43kgma1 ^ 1/31   ITB stretch 5x30\"  Added 3/10   SKTC  10x10\"  Added 1/31   Adduction stretch with strap 30 sec x 3  ^ 1/31   Mikhail stretch  31ckjk1 Added 1/22   Kneeling flexor stretch  29njnc0 Added 2/13   Prone quad stretch  30nnoz4  Added 3/3   Lateral trunk rotation - hooklying 10x10\"  Added 1/29   PREs           adduc sets supine knees straight     Supine abduction with tband     Seated hip flexion with ER     Clams SL  3x10  Added 2/14   SL dead lift Supine clams with tubing     Wall sit with tband bridges 3x10  Added 1/22   Seated marching on ball  3x10   Added 2/19   Prone extensions 3x10  Added 1/22   Prone hip extension 3x10  Added 3/10   Fish tail  3x10  Added 3/10            Therapeutic Exercise and NMR EXR  [x] (62736) Provided verbal/tactile cueing for activities related to strengthening, flexibility, endurance, ROM for improvements in LE, proximal hip, and core control with self care, mobility, lifting, ambulation. [x] (10508) Provided verbal/tactile cueing for activities related to improving balance, coordination, kinesthetic sense, posture, motor skill, proprioception  to assist with LE, proximal hip, and core control in self care, mobility, lifting, ambulation and eccentric single leg control.      NMR and Therapeutic Activities:    [x] (96541 or 29816) Provided verbal/tactile cueing for activities related to improving balance, coordination, kinesthetic sense, posture, motor skill, proprioception and motor activation to allow for tissue extensibility and allowing for proper ROM for normal function with self care, mobility, lifting and ambulation. Modalities:  Ice 15'     Charges:  Timed Code Treatment Minutes: 35   Total Treatment Minutes: 7783-0972       [] EVAL (LOW) 18767 (typically 20 minutes face-to-face)  [] EVAL (MOD) 54164 (typically 30 minutes face-to-face)  [] EVAL (HIGH) 27278 (typically 45 minutes face-to-face)  [] RE-EVAL     [x] UW(28748) x 2    [] IONTO  [] NMR (77327) x     [] VASO  [] Manual (65586) x       [] Other:  [] TA x      [] Mech Traction (45379)  [] ES(attended) (43063)      [] ES (un) (25267):     GOALS:  Patient stated goal: decrease pain with adls     [] Progressing: [] Met: [] Not Met: [] Adjusted     Therapist goals for Patient: All goals not met. Extended on 1/15  Short Term Goals: To be achieved in: 2-4 weeks  1. Independent in HEP and progression per patient tolerance, in order to prevent re-injury. [] Progressing: [] Met: [x] Not Met: [] Adjusted   2. Patient will have a decrease in pain to facilitate improvement in movement, function, and ADLs as indicated by Functional Deficits. [] Progressing: [] Met: [x] Not Met: [] Adjusted     Long Term Goals: To be achieved in: 12 weeks  1. Disability index score of 50% or less for the LEFS to assist with reaching prior level of function. [] Progressing: [x] Met: [] Not Met: [] Adjusted   1a. (new 2/21) Disability index score of 10% or less for the LEFS to assist with reaching prior level of function. [] Progressing: [x] Met: [] Not Met: [] Adjusted   2. Patient will demonstrate increased AROM to max potential to allow for proper joint functioning as indicated by patients Functional Deficits. [x] Progressing: [] Met: [] Not Met: [] Adjusted  3. Patient will demonstrate an increase in Strength to good proximal hip strength and control,  to allow for proper functional mobility as indicated by patients Functional Deficits.    [x] Progressing: [] Met:

## 2020-03-19 ENCOUNTER — HOSPITAL ENCOUNTER (OUTPATIENT)
Dept: PHYSICAL THERAPY | Age: 55
Setting detail: THERAPIES SERIES
Discharge: HOME | End: 2020-03-19
Payer: COMMERCIAL

## 2020-03-19 PROCEDURE — 97112 NEUROMUSCULAR REEDUCATION: CPT | Performed by: PHYSICAL THERAPIST

## 2020-03-19 PROCEDURE — 97110 THERAPEUTIC EXERCISES: CPT | Performed by: PHYSICAL THERAPIST

## 2020-03-24 ENCOUNTER — APPOINTMENT (OUTPATIENT)
Dept: PHYSICAL THERAPY | Age: 55
End: 2020-03-24
Payer: COMMERCIAL

## 2020-03-25 ENCOUNTER — TELEPHONE (OUTPATIENT)
Dept: PHYSICAL THERAPY | Age: 55
End: 2020-03-25

## 2020-03-25 NOTE — TELEPHONE ENCOUNTER
Called to check in on pt's current status. She states she is doing pretty well overall. She is doing her exercises every other day and began to introduce the stationary bike. She did not have any pain or burning yesterday, but she does noticed increased symptoms after prolonged sitting. Pt will call with any questions or concerns moving forward.      Hannah Nickerson, PT

## 2020-03-26 ENCOUNTER — APPOINTMENT (OUTPATIENT)
Dept: PHYSICAL THERAPY | Age: 55
End: 2020-03-26
Payer: COMMERCIAL

## 2020-03-31 ENCOUNTER — TELEPHONE (OUTPATIENT)
Dept: PHYSICAL THERAPY | Age: 55
End: 2020-03-31

## 2020-03-31 NOTE — TELEPHONE ENCOUNTER
Called pt to check in on patient's current status. She states her pain has been as high as 8/10. She walked ~20 minutes Thursday, Friday and Saturday. She does have increased pain on the days she does her PT exercises. Discussed with pt to break her exercises up and do half every other day and the other ones on the alternate days. Discussed decreasing recreational walks due to high levels of pain and only do 5-10 minutes on stationary bike. Pt will make modifications and will do a telehealth call on Friday to check in.      Freedom Turner, PT

## 2020-04-03 ENCOUNTER — HOSPITAL ENCOUNTER (OUTPATIENT)
Dept: PHYSICAL THERAPY | Age: 55
Setting detail: THERAPIES SERIES
Discharge: HOME | End: 2020-04-03
Payer: COMMERCIAL

## 2020-04-03 PROCEDURE — 97110 THERAPEUTIC EXERCISES: CPT | Performed by: PHYSICAL THERAPIST

## 2020-04-03 NOTE — FLOWSHEET NOTE
strength and control,  to allow for proper functional mobility as indicated by patients Functional Deficits. [x] Progressing: [] Met: [] Not Met: [] Adjusted  4. Patient will return to  functional activities including light adls without increased symptoms or restriction. [] Progressing: [x] Met: [] Not Met: [] Adjusted  5. Walk in community without pain  [] Progressing: [x] Met: [] Not Met: [] Adjusted          6. (new 2/21) Patient will be able to walk at least 2 miles at brisk pace without increased symptoms. [] Progressing: [] Met: [] Not Met: [] Adjusted                          Overall Progression Towards Functional goals/ Treatment Progress Update:  [x] Patient is progressing as expected towards functional goals listed. [] Progression is slowed due to complexities/Impairments listed. [] Progression has been slowed due to co-morbidities. [] Plan just implemented, too soon to assess goals progression <30days   [] Goals require adjustment due to lack of progress  [] Patient is not progressing as expected and requires additional follow up with physician  [] Other:     Prognosis for POC: [x] Good [] Fair  [] Poor      Patient requires continued skilled intervention: [x] Yes  [] No    Treatment/Activity Tolerance:  [x] Patient able to complete treatment well   [] Patient limited by fatigue  [] Patient limited by pain     [] Patient limited by other medical complications  [] Other:         Patient Education:  10/31 Reviewed diagnosis, POC, HEP and its importance. 2/21 provided some aquatic exercises she can do while out of town             PLAN: Continue with virtual visit check ins at this time.   4/3  [x] Continue per plan of care [] Alter current plan (see comments above)  [] Plan of care initiated [] Hold pending MD visit [] Discharge    Electronically signed by:  Cordell Aase, PT      Note: If patient does not return for scheduled/ recommended follow up visits, this note will serve as a

## 2020-04-20 ENCOUNTER — OFFICE VISIT (OUTPATIENT)
Dept: ORTHOPEDIC SURGERY | Age: 55
End: 2020-04-20
Payer: COMMERCIAL

## 2020-04-20 VITALS — WEIGHT: 166 LBS | HEIGHT: 63 IN | BODY MASS INDEX: 29.41 KG/M2

## 2020-04-20 PROCEDURE — 3017F COLORECTAL CA SCREEN DOC REV: CPT | Performed by: ORTHOPAEDIC SURGERY

## 2020-04-20 PROCEDURE — 99214 OFFICE O/P EST MOD 30 MIN: CPT | Performed by: ORTHOPAEDIC SURGERY

## 2020-04-20 PROCEDURE — G8417 CALC BMI ABV UP PARAM F/U: HCPCS | Performed by: ORTHOPAEDIC SURGERY

## 2020-04-20 PROCEDURE — 96372 THER/PROPH/DIAG INJ SC/IM: CPT | Performed by: ORTHOPAEDIC SURGERY

## 2020-04-20 PROCEDURE — 1036F TOBACCO NON-USER: CPT | Performed by: ORTHOPAEDIC SURGERY

## 2020-04-20 PROCEDURE — G8427 DOCREV CUR MEDS BY ELIG CLIN: HCPCS | Performed by: ORTHOPAEDIC SURGERY

## 2020-04-20 RX ORDER — DEXAMETHASONE SODIUM PHOSPHATE 10 MG/ML
7 INJECTION INTRAMUSCULAR; INTRAVENOUS ONCE
Status: CANCELLED | OUTPATIENT
Start: 2020-04-20

## 2020-04-20 RX ORDER — DEXAMETHASONE SODIUM PHOSPHATE 10 MG/ML
7 INJECTION INTRAMUSCULAR; INTRAVENOUS ONCE
Status: COMPLETED | OUTPATIENT
Start: 2020-04-20 | End: 2020-04-20

## 2020-04-20 RX ORDER — PREDNISONE 10 MG/1
TABLET ORAL
Qty: 30 TABLET | Refills: 0 | Status: SHIPPED | OUTPATIENT
Start: 2020-04-20 | End: 2020-04-20 | Stop reason: CLARIF

## 2020-04-20 RX ORDER — DIAZEPAM 5 MG/1
5 TABLET ORAL EVERY 6 HOURS PRN
Qty: 28 TABLET | Refills: 0 | Status: SHIPPED | OUTPATIENT
Start: 2020-04-20 | End: 2020-04-27

## 2020-04-20 RX ADMIN — DEXAMETHASONE SODIUM PHOSPHATE 7 MG: 10 INJECTION INTRAMUSCULAR; INTRAVENOUS at 12:07

## 2020-04-20 NOTE — PROGRESS NOTES
History of Present Illness:  Maxim Rubio is a 47 y.o. female being seen today for new problem more low back muscle spasm and pain with some radicular pain to the anterior leg    Chief complaint that brought the patient in the office today: Low back and leg pain      Location mostly lumbar pain  Severity moderate to severe 1 week  Duration 1 week  Associated sign/symptoms pain, muscle spasm, loss of motion    I have reviewed and discussed the below Pain assessment findings with the patient. Pain Assessment  Location of Pain: Back  Location Modifiers: Right, Posterior  Severity of Pain: 2  Quality of Pain: Aching, Throbbing  Duration of Pain: Persistent  Frequency of Pain: Constant  Aggravating Factors: Other (Comment)(sitting)  Limiting Behavior: Some  Relieving Factors: Rest  Result of Injury: No  Work-Related Injury: No  Are there other pain locations you wish to document?: No    Medical History  Patient's medications, allergies, past medical, surgical, social and family histories were reviewed and updated as appropriate.     Past Medical History:   Diagnosis Date    Arthritis     Hypercholesteremia      Family History   Problem Relation Age of Onset    High Cholesterol Mother     High Blood Pressure Mother     High Cholesterol Father     Heart Disease Father     Alzheimer's Disease Sister      Social History     Socioeconomic History    Marital status:      Spouse name: None    Number of children: None    Years of education: None    Highest education level: None   Occupational History    None   Social Needs    Financial resource strain: None    Food insecurity     Worry: None     Inability: None    Transportation needs     Medical: None     Non-medical: None   Tobacco Use    Smoking status: Never Smoker    Smokeless tobacco: Never Used   Substance and Sexual Activity    Alcohol use: Yes     Comment: social    Drug use: No    Sexual activity: Yes     Partners: Male   Lifestyle    patient's chart under the Media tab. Examination:    General Exam:    Vitals: Height 5' 3\" (1.6 m), weight 166 lb (75.3 kg). Constitutional: Patient is adequately groomed with no evidence of malnutrition  Mental Status: The patient is oriented to time, place and person. The patient's mood and affect are appropriate. Lymphatic: The lymphatic examination bilaterally reveals all areas to be without enlargement or induration. Vascular: Examination reveals no swelling or calf tenderness. Peripheral pulses are palpable and 2+. Neurological: The patient has good coordination. There is no weakness or sensory deficit. Skin:    Head/Neck: inspection reveals no rashes, ulcerations or lesions. Trunk:  inspection reveals no rashes, ulcerations or lesions. Right Lower Extremity: inspection reveals no rashes, ulcerations or lesions. Left Lower Extremity: inspection reveals no rashes, ulcerations or lesions. Lumbar/Sacral Spine Examination  Inspection: No obvious deformity    Palpation: Mid low back pain with muscle spasm    Rang of Motion: Limited secondary to pain and spasm    Strength: Quadricep, hamstring, EHL, hip flexor, internal and external rotation of the hip against resistance, all 5 over 5 equal bilaterally    Special Tests: Pedal pulses are +2/4 capillary refill is brisk sensation is intact negative Homans negative Percy negative Biggs's test negative straight leg raise, +2/4 and equal bilaterally for patella and Achilles  , Lumbar pain to palpation and muscle spasm    Skin: There are no rashes, ulcerations or lesions. Gait: Antalgic    Additional Comments:     Additional Examinations:  Right Upper Extremity:  Examination of the right upper extremity does not show any tenderness, deformity or injury. Range of motion is unremarkable. There is no gross instability. There are no rashes, ulcerations or lesions.   Strength and tone are normal.  Left Upper Extremity: Examination of the left upper extremity does not show any tenderness, deformity or injury. Range of motion is unremarkable. There is no gross instability. There are no rashes, ulcerations or lesions. Strength and tone are normal.  Thoracic Spine: Examination of the thoracic spine does not show any tenderness, deformity or injury. Range of motion is unremarkable. There is no gross instability. There are no rashes, ulcerations or lesions. Strength and tone are normal.  Neck: Examination of the neck does not show any tenderness, deformity or injury. Range of motion is unremarkable. There is no gross instability. There are no rashes, ulcerations or lesions. Strength and tone are normal.      Diagnostic Testing:    Views lateral lumbar spine and I independently reviewed these films today in my office,   Body Part lumbar spine impression in looking at the comparison from a year ago she does have some muscle spasm with straightening of the lordosis otherwise there is no fracture no dislocation no masses no tumors        Assessment: Lumbar strain with radiculopathy    Impression: Same \"better    Office Procedures: In the right gluteal area after an alcohol prep and a Betadine prep I used a 25-gauge 1-1/2 inch needle to inject 7 mg of dexamethasone IM following this I put a sterile Band-Aid over this and given typewritten instructions to return if she has any concerns or problems  No orders of the defined types were placed in this encounter. Treatment Plan: Valium, shot of dexamethasone, physical therapy, follow-up      Lidia Cooks. CAROLE Freitas. [address] [place] [address] 20 and Sports Medicine  [facility]  Board Certified  [facility] [employer] [facility] Team Physician      Disclaimer: \"This note was dictated with voice recognition software. Though review and correction are routine, we apologize for any errors. \"

## 2020-04-24 ENCOUNTER — TELEPHONE (OUTPATIENT)
Dept: ORTHOPEDIC SURGERY | Age: 55
End: 2020-04-24

## 2020-04-24 ENCOUNTER — HOSPITAL ENCOUNTER (OUTPATIENT)
Dept: PHYSICAL THERAPY | Age: 55
Setting detail: THERAPIES SERIES
Discharge: HOME | End: 2020-04-24
Payer: COMMERCIAL

## 2020-04-24 PROCEDURE — 97161 PT EVAL LOW COMPLEX 20 MIN: CPT | Performed by: PHYSICAL THERAPIST

## 2020-04-24 PROCEDURE — 97110 THERAPEUTIC EXERCISES: CPT | Performed by: PHYSICAL THERAPIST

## 2020-04-24 NOTE — FLOWSHEET NOTE
home with patients home unit from past injury, alternate ice and heat    Charges:  Timed Code Treatment Minutes: 20   Total Treatment Minutes: 120-220       [x] EVAL (LOW) 82938 (typically 20 minutes face-to-face)  [] EVAL (MOD) 00623 (typically 30 minutes face-to-face)  [] EVAL (HIGH) 38622 (typically 45 minutes face-to-face)  [] RE-EVAL     [x] AO(95426) x 1    [] IONTO  [] NMR (63373) x     [] VASO  [] Manual (03069) x      [] Other:  [] TA x      [] Mech Traction (41929)  [] ES(attended) (51767)      [] ES (un) (47492):     Goals:   Patient stated goal: adls and sew without c/o    []? Progressing: []? Met: []? Not Met: []? Adjusted     Therapist goals for Patient:   Short Term Goals: To be achieved in: 2 weeks  1. Independent in HEP and progression per patient tolerance, in order to prevent re-injury. []? Progressing: []? Met: []? Not Met: []? Adjusted   2. Patient will have a decrease in pain to facilitate improvement in movement, function, and ADLs as indicated by Functional Deficits. []? Progressing: []? Met: []? Not Met: []? Adjusted     Long Term Goals: To be achieved in: 12 weeks  1. Disability index score of 20% or less for the [place] to assist with reaching prior level of function. []? Progressing: []? Met: []? Not Met: []? Adjusted  2. Patient will demonstrate increased AROM to WNL, good LS mobility, good hip ROM to allow for proper joint functioning as indicated by patients Functional Deficits.   []? Progressing: []? Met: []? Not Met: []? Adjusted  3. Patient will demonstrate an increase in Strength to good proximal hip and core activation to allow for proper functional mobility as indicated by patients Functional Deficits. []? Progressing: []? Met: []? Not Met: []? Adjusted  4. Patient will return to light adls  functional activities without increased symptoms or restriction. []? Progressing: []? Met: []? Not Met: []? Adjusted  5.    Patient will return to [facility]  functional activities

## 2020-04-24 NOTE — PLAN OF CARE
grad kids , but not currently     Living Status/Prior Level of Function: Independent with ADLs and IADLs, sewing     OBJECTIVE:   ROM   Comments   Trunk flexion Mid shin    Trunk extension 50%    Trunk R sidebend AK    Trunk L sidebend AK    Trunk R rotation     Trunk L rotation     HS flexibility                        Strength Left Right Comments   Hip flexion(L2) 4 4    Knee extension(L3) 4 4    Knee flexion(S1-2) 4 4    Ankle dorsiflexion(L4) 4 4    Toe extension(L5) 4 4    Ankle eversion/plantar flexion(S1) 4 4    Hip abd             Special tests   Comments   SLR      Slump test +L      Pelvic symmetry       Segmental Spinal mobility      Heel walk neg     Toe walk neg     Tandem walk                 DTRs Left Right Comments   Patellar(L3-L4) 2 3     Achilles(S1-S2) 2 2                  Joint mobility:               []Normal               [x]Hypo not formally assessed due to guarding              []Hyper     Palpation:      Functional Mobility/Transfers: painful but modified independent      Posture: WFL with protective posturing      Bandages/Dressings/Incisions:      Gait: (include devices/WB status) protective posture                          [x] Patient history, allergies, meds reviewed. Medical chart reviewed. See intake form. Review Of Systems (ROS):  [x]Performed Review of systems (Integumentary, CardioPulmonary, Neurological) by intake and observation. Intake form has been scanned into medical record. Patient has been instructed to contact their primary care physician regarding ROS issues if not already being addressed at this time.        Co-morbidities/Complexities (which will affect course of rehabilitation):   []None              Arthritic conditions   []Rheumatoid arthritis (M05.9)  []Osteoarthritis (M19.91)    Cardiovascular conditions   [x]Hypertension (I10)  []Hyperlipidemia (E78.5)  []Angina pectoris (I20)  []Atherosclerosis (I70)    Musculoskeletal conditions   [x]Disc pathology posture and demonstrate good body mechanics with sitting, bending, and lifting              [x]Reduced ability to sleep              [x] Reduced ability or tolerance with driving and/or computer work              [x]Reduced ability to perform lifting, reaching, carrying tasks              [x]Reduced ability to squat              [x]Reduced ability to forward bend              [x]Reduced ability to ambulate prolonged functional periods/distances/surfaces              [x]Reduced ability to ascend/descend stairs              []other:       Participation Restrictions              [x]Reduced participation in self care activities              [x]Reduced participation in home management activities              [x]Reduced participation in work activities              [x]Reduced participation in social activities. [x]Reduced participation in sport/recreational activities. Classification:              []Signs/symptoms consistent with Lumbar instability/stabilization subgroup. []Signs/symptoms consistent with Lumbar mobilization/manipulation subgroup, myotomes and dermatomes intact. Meets manipulation criteria. [x]Signs/symptoms consistent with Lumbar direction specific/centralization subgroup              [x]Signs/symptoms consistent with Lumbar traction subgroup                            []Signs/symptoms consistent with lumbar facet dysfunction              []Signs/symptoms consistent with lumbar stenosis type dysfunction              []Signs/symptoms consistent with nerve root involvement including myotome & dermatome dysfunction              []Signs/symptoms consistent with post-surgical status including: decreased ROM, strength and function.               []signs/symptoms consistent with pathology which may benefit from Dry needling                []other:       Prognosis/Rehab Potential:                                       []Excellent              [x]Good []Fair              []Poor     Tolerance of evaluation/treatment:               []Excellent              []Good                 [x]Fair              []Poor     Physical Therapy Evaluation Complexity Justification  [x] A history of present problem with:  [] no personal factors and/or comorbidities that impact the plan of care;  [x]1-2 personal factors and/or comorbidities that impact the plan of care  []3 personal factors and/or comorbidities that impact the plan of care  [x] An examination of body systems using standardized tests and measures addressing any of the following: body structures and functions (impairments), activity limitations, and/or participation restrictions;:  [] a total of 1-2 or more elements   [x] a total of 3 or more elements   [] a total of 4 or more elements   [x] A clinical presentation with:  [] stable and/or uncomplicated characteristics   [] evolving clinical presentation with changing characteristics  [] unstable and unpredictable characteristics;   [x] Clinical decision making of [x] low, [] moderate, [] high complexity using standardized patient assessment instrument and/or measurable assessment of functional outcome. [x] EVAL (LOW) 71678 (typically 20 minutes face-to-face)  [] EVAL (MOD) 32486 (typically 30 minutes face-to-face)  [] EVAL (HIGH) 00380 (typically 45 minutes face-to-face)  [] RE-EVAL            PLAN:      Frequency/Duration:  1-2 days per week for 12 Weeks:  Interventions:  [x]  Therapeutic exercise including: strength training, ROM, for LE, Glutes and core   [x]  NMR activation and proprioception for glutes , LE and Core   [x]  Manual therapy as indicated for Hip complex, LE and spine to include: Dry Needling/IASTM, STM, PROM, Gr I-IV mobilizations, manipulation.              [x]  Modalities as needed that may include: thermal agents, E-stim, Biofeedback, US, iontophoresis as indicated  [x]  Patient education on joint protection, postural re-education, activity

## 2020-04-28 ENCOUNTER — HOSPITAL ENCOUNTER (OUTPATIENT)
Dept: PHYSICAL THERAPY | Age: 55
Setting detail: THERAPIES SERIES
Discharge: HOME | End: 2020-04-28
Payer: COMMERCIAL

## 2020-04-28 PROCEDURE — 97110 THERAPEUTIC EXERCISES: CPT | Performed by: PHYSICAL THERAPIST

## 2020-04-28 NOTE — FLOWSHEET NOTE
planks     Tband lat pulls     Tband rows         Manual Intervention             Prone PA      GISTM/STM      Lumbar Manip      SI Manip      Hip belt mobs      Hip LA distraction         Therapeutic Exercise and NMR EXR  [x] (51968) Provided verbal/tactile cueing for activities related to strengthening, flexibility, endurance, ROM  for improvements in proximal hip and core control with self care, mobility, lifting and ambulation.  [] (53908) Provided verbal/tactile cueing for activities related to improving balance, coordination, kinesthetic sense, posture, motor skill, proprioception  to assist with core control in self care, mobility, lifting, and ambulation.      Therapeutic Activities:    [] (43539 or 77481) Provided verbal/tactile cueing for activities related to improving balance, coordination, kinesthetic sense, posture, motor skill, proprioception and motor activation to allow for proper function  with self care and ADLs  [] (00994) Provided training and instruction to the patient for proper core and proximal hip recruitment and positioning with ambulation re-education     Home Exercise Program:    [x] (33909) Reviewed/Progressed HEP activities related to strengthening, flexibility, endurance, ROM of core, proximal hip and LE for functional self-care, mobility, lifting and ambulation   [] (55389) Reviewed/Progressed HEP activities related to improving balance, coordination, kinesthetic sense, posture, motor skill, proprioception of core, proximal hip and LE for self care, mobility, lifting, and ambulation      Manual Treatments:  PROM / STM / Oscillations-Mobs:  G-I, II, III, IV (PA's, Inf., Post.)  [] (59491) Provided manual therapy to mobilize proximal hip and LS spine soft tissue/joints for the purpose of modulating pain, promoting relaxation,  increasing ROM, reducing/eliminating soft tissue swelling/inflammation/restriction, improving soft tissue extensibility and allowing for proper ROM for normal Progressing: []? Met: []? Not Met: []? Adjusted  5. Patient will return to [facility] [address]  functional activities without increased symptoms or restriction. []? Progressing: []? Met: []? Not Met: []? Adjusted          Overall Progression Towards Functional goals/ Treatment Progress Update:  [] Patient is progressing as expected towards functional goals listed. [] Progression is slowed due to complexities/Impairments listed. [] Progression has been slowed due to co-morbidities. [x] Plan just implemented, too soon to assess goals progression <30days   [] Goals require adjustment due to lack of progress  [] Patient is not progressing as expected and requires additional follow up with physician  [] Other    Prognosis for POC: [x] Good [] Fair  [] Poor      Patient requires continued skilled intervention: [x] Yes  [] No    Treatment/Activity Tolerance:  [x] Patient able to complete treatment  [] Patient limited by fatigue  [x] Patient limited by pain     [] Patient limited by other medical complications  [x] Other: some improvement being noted by patient . Able to perform some ext type ex. Patient education:Reviewed diagnosis, POC, HEP and its importance. Avoid sitting. Access Code: [email or web address]   URL: [employer]-System/   Date: 04/24/2020   Prepared by: Georgina Or     Exercises   Hooklying Single Knee to Chest Stretch - 3 reps - 20 hold - 2x daily - 7x weekly   Supine Posterior Pelvic Tilt - 10 reps - 10 hold - 2x daily - 7x weekly   Hooklying Lumbar Rotation - 10 reps - 3 sets - 1x daily - 7x weekly   Supine with Legs Supported at 90/90 - 2-3x daily - 7x weekly     Access Code: [email or web address]   URL: [employer]System/   Date: 04/28/2020 reviewed or performed ex with video call  Prepared by: Georgina Or     Exercises   Hooklying Single Knee to Chest Stretch - 3 reps - 20 hold - 2x daily - 7x weekly   Supine Posterior Pelvic Tilt - 10 reps - 10 hold - 2x daily - 7x weekly   Hooklying Lumbar

## 2020-05-01 ENCOUNTER — HOSPITAL ENCOUNTER (OUTPATIENT)
Dept: PHYSICAL THERAPY | Age: 55
Setting detail: THERAPIES SERIES
Discharge: HOME | End: 2020-05-01
Payer: COMMERCIAL

## 2020-05-01 PROCEDURE — 97112 NEUROMUSCULAR REEDUCATION: CPT | Performed by: PHYSICAL THERAPIST

## 2020-05-01 PROCEDURE — 97110 THERAPEUTIC EXERCISES: CPT | Performed by: PHYSICAL THERAPIST

## 2020-05-01 NOTE — FLOWSHEET NOTE
by: Aida Arteaga     Exercises   Hooklying Single Knee to Chest Stretch - 3 reps - 20 hold - 2x daily - 7x weekly   Supine Posterior Pelvic Tilt - 10 reps - 10 hold - 2x daily - 7x weekly   Hooklying Lumbar Rotation - 10 reps - 3 sets - 1x daily - 7x weekly   Supine with Legs Supported at 90/90 - 2-3x daily - 7x weekly   Lying Prone with 2 Pillows - 1-2x daily - 7x weekly   Prone on Elbows Stretch - 1-2x daily - 7x weekly   Standing Back Extension - 10 reps - 3 sets - 2-3x daily - 7x w      Prognosis: [x] Good [] Fair  [] Poor    Patient Requires Follow-up: [x] Yes  [] No    PLAN: See eval 4/24/2020   [x] Continue per plan of care [] Alter current plan (see comments)  [] Plan of care initiated [] Hold pending MD visit [] Discharge    Electronically signed by: Aida Arteaga PT,     *If patient does not return for further follow ups after this date. Please consider this as the patients discharge from physical therapy.

## 2020-05-04 ENCOUNTER — OFFICE VISIT (OUTPATIENT)
Dept: ORTHOPEDIC SURGERY | Age: 55
End: 2020-05-04
Payer: COMMERCIAL

## 2020-05-04 VITALS — TEMPERATURE: 98.4 F | WEIGHT: 166.01 LBS | BODY MASS INDEX: 29.41 KG/M2 | HEIGHT: 63 IN

## 2020-05-04 PROBLEM — M54.50 ACUTE RIGHT-SIDED LOW BACK PAIN WITHOUT SCIATICA: Status: ACTIVE | Noted: 2020-05-04

## 2020-05-04 PROCEDURE — 1036F TOBACCO NON-USER: CPT | Performed by: ORTHOPAEDIC SURGERY

## 2020-05-04 PROCEDURE — 99214 OFFICE O/P EST MOD 30 MIN: CPT | Performed by: ORTHOPAEDIC SURGERY

## 2020-05-04 PROCEDURE — G8427 DOCREV CUR MEDS BY ELIG CLIN: HCPCS | Performed by: ORTHOPAEDIC SURGERY

## 2020-05-04 PROCEDURE — 3017F COLORECTAL CA SCREEN DOC REV: CPT | Performed by: ORTHOPAEDIC SURGERY

## 2020-05-04 PROCEDURE — G8417 CALC BMI ABV UP PARAM F/U: HCPCS | Performed by: ORTHOPAEDIC SURGERY

## 2020-05-04 RX ORDER — DIAZEPAM 5 MG
5 TABLET ORAL SEE ADMIN INSTRUCTIONS
Qty: 5 TABLET | Refills: 0 | Status: SHIPPED | OUTPATIENT
Start: 2020-05-04 | End: 2020-05-05

## 2020-05-04 NOTE — PROGRESS NOTES
5/4/20  History of Present Illness:  Tere Gibson is a 47 y.o. female recheck evaluation lumbar spine last time we saw her we gave her an injection of dexamethasone and start her in physical therapy she is feeling better but still has pain with sitting and pain with daily activities    Location lumbar spine with right-sided radiculopathy  Severity improving  Duration several weeks now  Associated sign/symptoms pain, loss of motion, pain with sitting, almost pain all the time    Medical History  Patient's medications, allergies, past medical, surgical, social and family histories were reviewed and updated as appropriate.     Past Medical History:   Diagnosis Date    Arthritis     Hypercholesteremia      Family History   Problem Relation Age of Onset    High Cholesterol Mother     High Blood Pressure Mother     High Cholesterol Father     Heart Disease Father     Alzheimer's Disease Sister      Social History     Socioeconomic History    Marital status:      Spouse name: None    Number of children: None    Years of education: None    Highest education level: None   Occupational History    None   Social Needs    Financial resource strain: None    Food insecurity     Worry: None     Inability: None    Transportation needs     Medical: None     Non-medical: None   Tobacco Use    Smoking status: Never Smoker    Smokeless tobacco: Never Used   Substance and Sexual Activity    Alcohol use: Yes     Comment: social    Drug use: No    Sexual activity: Yes     Partners: Male   Lifestyle    Physical activity     Days per week: None     Minutes per session: None    Stress: None   Relationships    Social connections     Talks on phone: None     Gets together: None     Attends Denominational service: None     Active member of club or organization: None     Attends meetings of clubs or organizations: None     Relationship status: None    Intimate partner violence     Fear of current or ex partner: None Emotionally abused: None     Physically abused: None     Forced sexual activity: None   Other Topics Concern    None   Social History Narrative    None     Current Outpatient Medications   Medication Sig Dispense Refill    diazepam (VALIUM) 5 MG tablet 1 PO TID FOR MUSCLE SPASMS. 30 tablet 0    predniSONE (DELTASONE) 10 MG tablet Days1-2:50mg PO QD,Days3-4:40mg PO QD,Days5-6:30mg PO QD,Days7-8:20mg PO QD,Days 9-10:10mg PO QD 30 tablet 0    celecoxib (CELEBREX) 200 MG capsule Take 1 capsule by mouth daily for 30 doses. 30 capsule 0    celecoxib (CELEBREX) 200 MG capsule Take 1 capsule by mouth daily. 30 capsule 2    cetirizine (ZYRTEC) 10 MG tablet Take 10 mg by mouth daily.  estrogens, conjugated, (PREMARIN) 1.25 MG tablet Take 0.9 mg by mouth daily.  pravastatin (PRAVACHOL) 40 MG tablet Take 40 mg by mouth daily.  Calcium Carbonate-Vitamin D (CALCIUM + D PO) Take 1 tablet by mouth daily.  Multiple Vitamin (THERA) TABS Take 1 tablet by mouth daily. No current facility-administered medications for this visit. Allergies   Allergen Reactions    Codeine Itching    Darvocet [Propoxyphene N-Acetaminophen] Itching    Percocet [Oxycodone-Acetaminophen] Itching    Keflex [Cephalexin] Rash    Zocor [Simvastatin] Rash       REVIEW OF SYSTEMS:   Pertinent items are noted in HPI  Review of systems reviewed from Patient History Form dated on 5/4/2020 and available in the patient's chart under the Media tab. Examination:    General Exam:    Vitals: Temperature 98.4 °F (36.9 °C), height 5' 2.99\" (1.6 m), weight 166 lb 0.1 oz (75.3 kg). Constitutional: Patient is adequately groomed with no evidence of malnutrition  Mental Status: The patient is oriented to time, place and person. The patient's mood and affect are appropriate.         Lumbar/Sacral Spine Examination  Inspection: Inspection shows no obvious deformity    Palpation: She has pain

## 2020-05-05 ENCOUNTER — HOSPITAL ENCOUNTER (OUTPATIENT)
Dept: PHYSICAL THERAPY | Age: 55
Setting detail: THERAPIES SERIES
Discharge: HOME | End: 2020-05-05
Payer: COMMERCIAL

## 2020-05-05 PROCEDURE — 97110 THERAPEUTIC EXERCISES: CPT | Performed by: PHYSICAL THERAPIST

## 2020-05-05 NOTE — FLOWSHEET NOTE
The [facility] and 500 [facility], [address] [place] [address] 3360 [facility], [address] [place] [address]  Phone: [phone]   Fax:     [phone]    Physical Therapy Daily Treatment Note  Date:  2020    Patient Name:  Sharron Chun    :  1965  MRN: [id number]  Restrictions/Precautions:    Medical/Treatment Diagnosis Information:  Diagnosis: M54.5 (ICD-10-CM) - Acute right-sided low back pain without sciatica  Treatment Diagnosis: low back pain  Insurance/Certification information:  PT Insurance Information: [facility] 80/20 UNL  Physician Information:  Referring Practitioner: Michael Dent DO  Has the plan of care been signed (Y/N):        [x]  Yes  []  No     Date of Patient follow up with Physician:       Is this a Progress Report:     []  Yes  [x]  No        If Yes:  Date Range for reporting period:  Beginning  Ending    Progress report will be due (10 Rx or 30 days whichever is less):       Recertification will be due (POC Duration  / 90 days whichever is less):         Visit # Insurance Allowable Auth Required   4vv unl  80/20 []  Yes []  No        Functional Scale: [place] 66%  Date assessed:  2020    Latex Allergy:  [x]NO      []YES  Preferred Language for Healthcare:   [x]English       []other:      Pain level: 2 /10 today      SUBJECTIVE:  5/5 I am sore LB to front of thigh. MD wants MRI. See him next week for results.     OBJECTIVE: See eval        RESTRICTIONS/PRECAUTIONS: none    Exercises/Interventions:   ROM/stretches     SKTC [id number]    DKTC     Prone lying pillow  3-5 min with R fig 4    Prone on elbow 3-5 min    Standing backbends  2x10    Prayer stretch     Supine HS     Pelvic tilt [id number]    Hook lying rotation 2x10    Cat and camel     hooklying 90/90 3-10 min    Strengthening     Neutral spine instruction in supine 5 min start5/1   Neutral spine alt UE x10 start5/1   Neutral spine alt LE x10 start5/1   Quadruped alternate UE reaches []? Adjusted  4. Patient will return to light adls  functional activities without increased symptoms or restriction. []? Progressing: []? Met: []? Not Met: []? Adjusted  5. Patient will return to [address]  functional activities without increased symptoms or restriction. []? Progressing: []? Met: []? Not Met: []? Adjusted          Overall Progression Towards Functional goals/ Treatment Progress Update:  [] Patient is progressing as expected towards functional goals listed. [] Progression is slowed due to complexities/Impairments listed. [] Progression has been slowed due to co-morbidities. [x] Plan just implemented, too soon to assess goals progression <30days   [] Goals require adjustment due to lack of progress  [] Patient is not progressing as expected and requires additional follow up with physician  [] Other    Prognosis for POC: [x] Good [] Fair  [] Poor      Patient requires continued skilled intervention: [x] Yes  [] No    Treatment/Activity Tolerance:  [x] Patient able to complete treatment  [] Patient limited by fatigue  [x] Patient limited by pain     [] Patient limited by other medical complications  [x] Other: some improvement being noted by patient . Good awareness to neutral  spine. Need to continue to centralize sx    Patient education:Reviewed diagnosis, POC, HEP and its importance. Avoid sitting. Access Code: [email or web address]   URL: [employer]/   Date: 04/24/2020   Prepared by: Jan Castillo     Exercises   Hooklying Single Knee to Chest Stretch - 3 reps - 20 hold - 2x daily - 7x weekly   Supine Posterior Pelvic Tilt - 10 reps - 10 hold - 2x daily - 7x weekly   Hooklying Lumbar Rotation - 10 reps - 3 sets - 1x daily - 7x weekly   Supine with Legs Supported at 90/90 - 2-3x daily - 7x weekly     Access Code: [email or web address]   URL: [employer]/   Date: 04/28/2020 reviewed or performed ex with video call  Prepared by: Jan Castillo     Exercises   Hooklying Single Knee

## 2020-05-11 ENCOUNTER — OFFICE VISIT (OUTPATIENT)
Dept: PRIMARY CARE CLINIC | Age: 55
End: 2020-05-11

## 2020-05-11 ENCOUNTER — OFFICE VISIT (OUTPATIENT)
Dept: ORTHOPEDIC SURGERY | Age: 55
End: 2020-05-11
Payer: COMMERCIAL

## 2020-05-11 VITALS — WEIGHT: 166.01 LBS | BODY MASS INDEX: 29.41 KG/M2 | RESPIRATION RATE: 12 BRPM | HEIGHT: 63 IN

## 2020-05-11 VITALS — HEIGHT: 63 IN | TEMPERATURE: 97.5 F | BODY MASS INDEX: 29.41 KG/M2 | WEIGHT: 166.01 LBS

## 2020-05-11 PROCEDURE — G8427 DOCREV CUR MEDS BY ELIG CLIN: HCPCS | Performed by: PHYSICIAN ASSISTANT

## 2020-05-11 PROCEDURE — G8427 DOCREV CUR MEDS BY ELIG CLIN: HCPCS | Performed by: ORTHOPAEDIC SURGERY

## 2020-05-11 PROCEDURE — 1036F TOBACCO NON-USER: CPT | Performed by: ORTHOPAEDIC SURGERY

## 2020-05-11 PROCEDURE — 99214 OFFICE O/P EST MOD 30 MIN: CPT | Performed by: ORTHOPAEDIC SURGERY

## 2020-05-11 PROCEDURE — G8417 CALC BMI ABV UP PARAM F/U: HCPCS | Performed by: ORTHOPAEDIC SURGERY

## 2020-05-11 PROCEDURE — G8417 CALC BMI ABV UP PARAM F/U: HCPCS | Performed by: PHYSICIAN ASSISTANT

## 2020-05-11 PROCEDURE — 99214 OFFICE O/P EST MOD 30 MIN: CPT | Performed by: PHYSICIAN ASSISTANT

## 2020-05-11 PROCEDURE — 3017F COLORECTAL CA SCREEN DOC REV: CPT | Performed by: ORTHOPAEDIC SURGERY

## 2020-05-11 RX ORDER — MELOXICAM 15 MG/1
15 TABLET ORAL DAILY
Qty: 90 TABLET | Refills: 3 | Status: SHIPPED | OUTPATIENT
Start: 2020-05-11 | End: 2021-10-05

## 2020-05-11 NOTE — PROGRESS NOTES
5/11/20  History of Present Illness:  Ester Acevedo is a 47 y.o. female recheck evaluation lumbar radiculopathy    Location low back and right leg  Severity severe  Duration several months now  Associated sign/symptoms pain, numbness, tingling, trouble sleeping, trouble doing any activity    I have reviewed and discussed the below Pain assessment findings with the patient. Medical History  Patient's medications, allergies, past medical, surgical, social and family histories were reviewed and updated as appropriate.     Past Medical History:   Diagnosis Date    Arthritis     Hypercholesteremia      Family History   Problem Relation Age of Onset    High Cholesterol Mother     High Blood Pressure Mother     High Cholesterol Father     Heart Disease Father     Alzheimer's Disease Sister      Social History     Socioeconomic History    Marital status:      Spouse name: None    Number of children: None    Years of education: None    Highest education level: None   Occupational History    None   Social Needs    Financial resource strain: None    Food insecurity     Worry: None     Inability: None    Transportation needs     Medical: None     Non-medical: None   Tobacco Use    Smoking status: Never Smoker    Smokeless tobacco: Never Used   Substance and Sexual Activity    Alcohol use: Yes     Comment: social    Drug use: No    Sexual activity: Yes     Partners: Male   Lifestyle    Physical activity     Days per week: None     Minutes per session: None    Stress: None   Relationships    Social connections     Talks on phone: None     Gets together: None     Attends Worship service: None     Active member of club or organization: None     Attends meetings of clubs or organizations: None     Relationship status: None    Intimate partner violence     Fear of current or ex partner: None     Emotionally abused: None     Physically abused: None     Forced sexual activity: None   Other Topics has to stand difficult for her to sit    Palpation: Palpation reveals right-sided lumbar muscle spasm and discomfort with pain along the glutes into the anterior thigh    Range of Motion: Range of motion of her hip is full range of motion of lumbar spine severely limited secondary to pain    Strength: Quadricep, hamstring, EHL, hip flexor, internal and external rotation of the hip against resistance, all 5 over 5 equal bilaterally    Special Tests: Pedal pulses are +2/4 capillary refill is brisk sensation is decreased in the anterior thigh positive straight leg raise on the right negative on the left +2/4 and equal bilaterally for patella and Achilles  Deep tendon reflexes of the biceps, triceps, brachioradialis +2/4 equal bilaterally  Moderate muscle spasm and pain to the right side of her lumbar spine    Gait: Antalgic    Additional Comments:     Additional Examinations:  Right Upper Extremity:  Examination of the right upper extremity does not show any tenderness, deformity or injury. Range of motion is unremarkable. There is no gross instability. There are no rashes, ulcerations or lesions. Strength and tone are normal.  Left Upper Extremity: Examination of the left upper extremity does not show any tenderness, deformity or injury. Range of motion is unremarkable. There is no gross instability. There are no rashes, ulcerations or lesions. Strength and tone are normal.  Thoracic Spine: Examination of the thoracic spine does not show any tenderness, deformity or injury. Range of motion is unremarkable. There is no gross instability. There are no rashes, ulcerations or lesions.   Strength and tone are normal.      Diagnostic Testing:    Views MRI multiple views taken in the office today Body Part of our spine impression L1/2 disc herniation with right-sided nerve root impingement        Assessment: L1-2 disc herniation with right-sided nerve root impingement and persistent leg pain not alleviated by

## 2020-05-11 NOTE — PROGRESS NOTES
New Patient: SPINE    Referring Provider:  Danial Emmanuel DO    Chief Complaint   Patient presents with    Lower Back Pain     NP, LSP       HISTORY OF PRESENT ILLNESS:      · The patient is being sent at the request of Danial Emmanuel DO in consultation as a new spine patient for low back pain and right leg pain. The patient is a 47 y.o. female whom reports symptoms for 1 month. Symptoms progressed over the last  1 month. Patient reports there was not a significant event to cause the symptoms. Today discomfort is report at 4 out of 10, describing it as sharp, aching, stabbing. Symptoms are aggravated by: sitting. Patient has undergone recent treatment including, physical therapy. Patient reports previous cervical spine surgery. She does have a cervical fusion. · The patient presents today with lower back and right leg pain which radiates into the right hip and groin radiating into the front of the thigh to the knee. The patient has been seen most recently by urgent care and Dr. Sole Avalos. She was first seen at urgent care and was provided with a steroid injection which helped for approximately 2 days. She was first seen in Dr. May Allen office on 4/20/2020 and the patient was provided with a steroid pain injection, she was also prescribed Valium, and she began physical therapy. She was then seen on 5/4/2020 when her pain had not significantly improved. He provided her with tramadol and he had an MRI of the lumbar spine completed. She was seen then by his office earlier today and he referred her over to us. The patient describes that she did have some improvement with the tramadol and this has decreased her pain from an 8/10 in severity to a 4/10 in severity. She continues to have the pain however with any activity of daily living.     Pain Assessment  Location of Pain: Back(LSP)  Location Modifiers: Right, Posterior(Leg/Groin/Hip)  Severity of Pain: 4  Quality of Pain: Aching, Sharp, Other ulcerations or lesions. Strength and tone are normal. No atrophy or abnormal movements are noted. · LEFT UPPER EXTREMITY: Inspection/examination of the left upper extremity does not show any tenderness, deformity or injury. Range of motion is normal and pain-free. There is no gross instability. There are no rashes, ulcerations or lesions. Strength and tone are normal. No atrophy or abnormal movements are noted. LUMBAR/SACRAL EXAMINATION:  · Inspection: Local inspection shows no step-off or bruising. Lumbar alignment is normal. No instability is noted. · Palpation:   No evidence of tenderness at the midline. Lumbar paraspinal tenderness Mild L4/5 and L5/S1 tenderness  Bursal tenderness Right greater trochanteric tenderness mild. There is no paraspinal spasm. · Range of Motion: limited by 50% in all planes due to pain  · Strength:   Strength testing is 5/5 in all muscle groups tested. · Special Tests:   Straight leg raise positive right and negative and crossed SLR negative. Wagner's testing is positive right and negative left. FADIR's testing is negative bilaterally. Slump test negative. Bowstring test negative  · Skin: There are no rashes, ulcerations or lesions. · Reflexes: Reflexes are symmetrically 2+ at the patellar and ankle tendons. Clonus absent bilaterally at the feet. · Gait & station: antalgic on the right and no ataxia  · Additional Examinations:  · RIGHT LOWER EXTREMITY: Inspection/examination of the right lower extremity does not show any tenderness, deformity or injury. Range of motion is unremarkable. There is no gross instability. There are no rashes, ulcerations or lesions. Strength and tone are normal. No atrophy or abnormal movements are noted. · LEFT LOWER EXTREMITY:  Inspection/examination of the left lower extremity does not show any tenderness, deformity or injury. Range of motion is unremarkable. There is no gross instability. There are no rashes, ulcerations or lesions. injections. The indications for additional imaging/laboratory studies. The indications for (possible future) interventions. After considering the various options discussed, Carmencita Kruse elected to pursue a course of treatment that includes the followin. Medications: No further recommendations for new medications. The patient will continue to take the Tramadol that was provided to her by Dr. Aury Brady. 2. PT:  Encouraged to continue with Home exercise program. The patient will continue with PT at this time. 3. Further studies: No further studies. The MRI of the Lumbar spine was reviewed with the patient today at the office. 4. Interventional:  We discussed pursuing a Right L1 and L2 TF epidural steroid injection to address the pain. Radiologic imaging and symptoms confirm the pain etiology. Risks, benefits and alternatives of interventional options were discussed. These include and are not limited to bleeding, infection, spinal headache, nerve injury, increased pain and lack of pain relief. The patient verbalized understanding and would like to proceed. The patient will be scheduled accordingly. 5. Healthy Lifestyle Measures:  Patient education material reviewing the following was distributed to Carmencita Kruse  Anatomic drawings  Healthy lifestyle education  Osteoporosis prevention,   Back and neck pain educational information   Advanced imaging preparedness    Posture education   Proper lifting and carrying techniques,   Weight management  Quitting smoking and   Minor ways to treat back pain  For further information regarding the spine conditions and to review interventional treatments the patient was directed to [facility].    6.  Follow up:  4-6 weeks    Carmencita Kruse was instructed to call the office if her symptoms worsen or if new symptoms appear prior to the next scheduled visit.  She is specifically instructed to contact the office between now & her scheduled appointment if she has concerns related to her condition or if she needs assistance in scheduling the above tests. She is welcome to call for an appointment sooner if she has any additional concerns or questions. ANTON Ennis, VIMAL  Board Certified by the [employer] on Certification of Physician Assistants  Ronda Soriano 58  Partner of [facility] ([facility])       This dictation was performed with a verbal recognition program [facility]) and it was checked for errors. It is possible that there are still dictated errors within this office note. If so, please bring any errors to my attention for an addendum. All efforts were made to ensure that this office note is accurate.

## 2020-05-12 ENCOUNTER — HOSPITAL ENCOUNTER (OUTPATIENT)
Dept: PHYSICAL THERAPY | Age: 55
Setting detail: THERAPIES SERIES
Discharge: HOME | End: 2020-05-12
Payer: COMMERCIAL

## 2020-05-12 PROCEDURE — 97110 THERAPEUTIC EXERCISES: CPT | Performed by: PHYSICAL THERAPIST

## 2020-05-12 NOTE — FLOWSHEET NOTE
allow for proper functional mobility as indicated by patients Functional Deficits. []? Progressing: []? Met: []? Not Met: []? Adjusted  4. Patient will return to light adls  functional activities without increased symptoms or restriction. []? Progressing: []? Met: []? Not Met: []? Adjusted  5. Patient will return to [address]  functional activities without increased symptoms or restriction. []? Progressing: []? Met: []? Not Met: []? Adjusted          Overall Progression Towards Functional goals/ Treatment Progress Update:  [] Patient is progressing as expected towards functional goals listed. [] Progression is slowed due to complexities/Impairments listed. [] Progression has been slowed due to co-morbidities. [x] Plan just implemented, too soon to assess goals progression <30days   [] Goals require adjustment due to lack of progress  [] Patient is not progressing as expected and requires additional follow up with physician  [] Other    Prognosis for POC: [x] Good [] Fair  [] Poor      Patient requires continued skilled intervention: [x] Yes  [] No    Treatment/Activity Tolerance:  [x] Patient able to complete treatment  [] Patient limited by fatigue  [x] Patient limited by pain     [] Patient limited by other medical complications  [x] Other: reviewed ex and upcoming procedure in detail with video call    Patient education:Reviewed diagnosis, POC, HEP and its importance. Avoid sitting. Access Code: [email or web address]   URL: Intoo/   Date: 04/24/2020   Prepared by: Raghu Burns     Exercises   Hooklying Single Knee to Chest Stretch - 3 reps - 20 hold - 2x daily - 7x weekly   Supine Posterior Pelvic Tilt - 10 reps - 10 hold - 2x daily - 7x weekly   Hooklying Lumbar Rotation - 10 reps - 3 sets - 1x daily - 7x weekly   Supine with Legs Supported at 90/90 - 2-3x daily - 7x weekly     Access Code: [email or web address]   URL: Intoo/   Date: 04/28/2020 reviewed or performed ex with video to substitute for in-person clinic visit. Patient and provider were located at their individual homes. --Virgilio Coulter, PT on 5/12/2020 at 8:04 AM    An electronic signature was used to authenticate this note. Prognosis: [x] Good [] Fair  [] Poor    Patient Requires Follow-up: [x] Yes  [] No    PLAN: See eval 4/24/2020   [x] Continue per plan of care [] Alter current plan (see comments)  [] Plan of care initiated [] Hold pending MD visit [] Discharge    Electronically signed by: Virgilio Coulter PT,     *If patient does not return for further follow ups after this date. Please consider this as the patients discharge from physical therapy.

## 2020-05-14 ENCOUNTER — TELEPHONE (OUTPATIENT)
Dept: ORTHOPEDIC SURGERY | Age: 55
End: 2020-05-14

## 2020-05-14 LAB
SARS-COV-2: NOT DETECTED
SOURCE: NORMAL

## 2020-05-19 ENCOUNTER — HOSPITAL ENCOUNTER (OUTPATIENT)
Age: 55
Setting detail: OUTPATIENT SURGERY
Discharge: HOME | End: 2020-05-19
Attending: PHYSICAL MEDICINE & REHABILITATION | Admitting: PHYSICAL MEDICINE & REHABILITATION
Payer: COMMERCIAL

## 2020-05-19 VITALS
SYSTOLIC BLOOD PRESSURE: 149 MMHG | HEIGHT: 63 IN | TEMPERATURE: 98.2 F | WEIGHT: 170 LBS | RESPIRATION RATE: 16 BRPM | BODY MASS INDEX: 30.12 KG/M2 | HEART RATE: 73 BPM | OXYGEN SATURATION: 100 % | DIASTOLIC BLOOD PRESSURE: 102 MMHG

## 2020-05-19 PROCEDURE — 6360000002 HC RX W HCPCS: Performed by: PHYSICAL MEDICINE & REHABILITATION

## 2020-05-19 PROCEDURE — 7100000010 HC PHASE II RECOVERY - FIRST 15 MIN: Performed by: PHYSICAL MEDICINE & REHABILITATION

## 2020-05-19 PROCEDURE — 6360000004 HC RX CONTRAST MEDICATION: Performed by: PHYSICAL MEDICINE & REHABILITATION

## 2020-05-19 PROCEDURE — 2500000003 HC RX 250 WO HCPCS: Performed by: PHYSICAL MEDICINE & REHABILITATION

## 2020-05-19 PROCEDURE — 2709999900 HC NON-CHARGEABLE SUPPLY: Performed by: PHYSICAL MEDICINE & REHABILITATION

## 2020-05-19 PROCEDURE — 3600000002 HC SURGERY LEVEL 2 BASE: Performed by: PHYSICAL MEDICINE & REHABILITATION

## 2020-05-19 RX ORDER — BUPIVACAINE HYDROCHLORIDE 5 MG/ML
INJECTION, SOLUTION EPIDURAL; INTRACAUDAL
Status: DISCONTINUED
Start: 2020-05-19 | End: 2020-05-19 | Stop reason: HOSPADM

## 2020-05-19 RX ORDER — LIDOCAINE HYDROCHLORIDE 10 MG/ML
INJECTION, SOLUTION EPIDURAL; INFILTRATION; INTRACAUDAL; PERINEURAL PRN
Status: DISCONTINUED | OUTPATIENT
Start: 2020-05-19 | End: 2020-05-19 | Stop reason: ALTCHOICE

## 2020-05-19 RX ORDER — BETAMETHASONE SODIUM PHOSPHATE AND BETAMETHASONE ACETATE 3; 3 MG/ML; MG/ML
INJECTION, SUSPENSION INTRA-ARTICULAR; INTRALESIONAL; INTRAMUSCULAR; SOFT TISSUE
Status: DISCONTINUED
Start: 2020-05-19 | End: 2020-05-19 | Stop reason: HOSPADM

## 2020-05-19 RX ORDER — AMLODIPINE BESYLATE 5 MG/1
5 TABLET ORAL DAILY
COMMUNITY
End: 2022-05-26

## 2020-05-19 ASSESSMENT — PAIN SCALES - GENERAL: PAINLEVEL_OUTOF10: 0

## 2020-05-19 ASSESSMENT — PAIN - FUNCTIONAL ASSESSMENT
PAIN_FUNCTIONAL_ASSESSMENT: 0-10
PAIN_FUNCTIONAL_ASSESSMENT: PREVENTS OR INTERFERES SOME ACTIVE ACTIVITIES AND ADLS

## 2020-05-19 ASSESSMENT — PAIN DESCRIPTION - DESCRIPTORS: DESCRIPTORS: ACHING;STABBING

## 2020-05-19 NOTE — H&P
Class II - (soft palate, fauces & uvula are visible)      Sedation plan:   [x]  Local              []  Minimal                  []  General anesthesia    Patient's condition acceptable for planned procedure/sedation. Post Procedure Plan   Return to same level of care   ______________________     The risks and benefits as well as alternatives to the procedure have been discussed with the patient and or family. The patient and or next of kin understands and agrees to proceed.     Meir Mittal M.D.

## 2020-05-21 ENCOUNTER — TELEPHONE (OUTPATIENT)
Dept: ORTHOPEDIC SURGERY | Age: 55
End: 2020-05-21

## 2020-05-22 ENCOUNTER — HOSPITAL ENCOUNTER (OUTPATIENT)
Dept: PHYSICAL THERAPY | Age: 55
Setting detail: THERAPIES SERIES
Discharge: HOME | End: 2020-05-22
Payer: COMMERCIAL

## 2020-05-22 PROCEDURE — 97110 THERAPEUTIC EXERCISES: CPT | Performed by: PHYSICAL THERAPIST

## 2020-05-22 PROCEDURE — G0283 ELEC STIM OTHER THAN WOUND: HCPCS | Performed by: PHYSICAL THERAPIST

## 2020-05-22 NOTE — FLOWSHEET NOTE
start5/1   Quadruped alternate UE reaches     Quadruped alternate LE reaches     Quadruped alternate UE/LE reaches     Pinstripe heel raises     Sit ups      planks     Tband lat pulls     Tband rows         Manual Intervention             Prone PA      GISTM/STM      Lumbar Manip      SI Manip      Hip belt mobs      Hip LA distraction         Therapeutic Exercise and NMR EXR  [x] (71707) Provided verbal/tactile cueing for activities related to strengthening, flexibility, endurance, ROM  for improvements in proximal hip and core control with self care, mobility, lifting and ambulation.  [] (56073) Provided verbal/tactile cueing for activities related to improving balance, coordination, kinesthetic sense, posture, motor skill, proprioception  to assist with core control in self care, mobility, lifting, and ambulation.      Therapeutic Activities:    [] (15716 or 03391) Provided verbal/tactile cueing for activities related to improving balance, coordination, kinesthetic sense, posture, motor skill, proprioception and motor activation to allow for proper function  with self care and ADLs  [] (55511) Provided training and instruction to the patient for proper core and proximal hip recruitment and positioning with ambulation re-education     Home Exercise Program:    [x] (96269) Reviewed/Progressed HEP activities related to strengthening, flexibility, endurance, ROM of core, proximal hip and LE for functional self-care, mobility, lifting and ambulation   [] (30432) Reviewed/Progressed HEP activities related to improving balance, coordination, kinesthetic sense, posture, motor skill, proprioception of core, proximal hip and LE for self care, mobility, lifting, and ambulation      Manual Treatments:  PROM / STM / Oscillations-Mobs:  G-I, II, III, IV (PA's, Inf., Post.)  [] (58980) Provided manual therapy to mobilize proximal hip and LS spine soft tissue/joints for the purpose of modulating pain, promoting weekly   Supine with Legs Supported at 90/90 - 2-3x daily - 7x weekly  Helps with pain  Lying Prone with 2 Pillows - 1-2x daily - 7x weekly helps with pain  Prone on Elbows Stretch - 1-2x daily - 7x weekly helps with pain  Standing Back Extension - 10 reps - 3 sets - 2-3x daily - 7x weekly   Standing Neutral Spine at [address] - 1x daily - 7x weekly   Dead Bug Alternating Arm Extension - 5 reps - 5 sets - 2x daily - 7x weekly   Dead Bug - 5 reps - 5 sets - 2x daily - 7x weekly   Cat-Camel - 10 reps - 3 sets - 1x daily - 7x weekly perform as jacqueline  Quadruped Alternating Arm Lift - 10 reps - 1 sets - 1x daily - 7x weekly   Quadruped Alternating Leg Extensions - 10 reps - 1 sets - 1x daily - 7x weekly   Cat to Child's Pose with Posterior Pelvic Tilt - 10 reps - 10 hold - 1x daily - 7x weekly hold if painful       . Prognosis: [x] Good [] Fair  [] Poor    Patient Requires Follow-up: [x] Yes  [] No    PLAN: See eval 4/24/2020   [x] Continue per plan of care [] Alter current plan (see comments)  [] Plan of care initiated [] Hold pending MD visit [] Discharge    Electronically signed by: Jennifer Mendez PT,     *If patient does not return for further follow ups after this date. Please consider this as the patients discharge from physical therapy.

## 2020-05-27 ENCOUNTER — HOSPITAL ENCOUNTER (OUTPATIENT)
Dept: PHYSICAL THERAPY | Age: 55
Setting detail: THERAPIES SERIES
Discharge: HOME | End: 2020-05-27
Payer: COMMERCIAL

## 2020-05-27 PROCEDURE — 97140 MANUAL THERAPY 1/> REGIONS: CPT | Performed by: PHYSICAL THERAPIST

## 2020-05-27 PROCEDURE — 97110 THERAPEUTIC EXERCISES: CPT | Performed by: PHYSICAL THERAPIST

## 2020-05-27 NOTE — FLOWSHEET NOTE
tissue/joints for the purpose of modulating pain, promoting relaxation,  increasing ROM, reducing/eliminating soft tissue swelling/inflammation/restriction, improving soft tissue extensibility and allowing for proper ROM for normal function with self care, mobility, lifting and ambulation. Modalities:   4/24 Reviewed TENS proper use at home with patients home unit from past injury, alternate ice and heat    Charges:  Timed Code Treatment Minutes: 40   Total Treatment Minutes: 40       [] EVAL (LOW) 61513 (typically 20 minutes face-to-face)  [] EVAL (MOD) 21834 (typically 30 minutes face-to-face)  [] EVAL (HIGH) 74159 (typically 45 minutes face-to-face)  [] RE-EVAL     [x] LY(31061) x 2    [] IONTO  [] NMR (10702) x     [] VASO  [x] Manual (10184) x   1   [] Other:  [] TA x      [] Mech Traction (76976)  [] ES(attended) (85326)      [] ES (un) (76947):     Goals:   Patient stated goal: adls and sew without c/o    []? Progressing: []? Met: []? Not Met: []? Adjusted     Therapist goals for Patient:   Short Term Goals: To be achieved in: 2 weeks  1. Independent in HEP and progression per patient tolerance, in order to prevent re-injury. []? Progressing: []? Met: []? Not Met: []? Adjusted   2. Patient will have a decrease in pain to facilitate improvement in movement, function, and ADLs as indicated by Functional Deficits. []? Progressing: []? Met: []? Not Met: []? Adjusted     Long Term Goals: To be achieved in: 12 weeks  1. Disability index score of 20% or less for the [place] to assist with reaching prior level of function. []? Progressing: []? Met: []? Not Met: []? Adjusted  2. Patient will demonstrate increased AROM to WNL, good LS mobility, good hip ROM to allow for proper joint functioning as indicated by patients Functional Deficits.   []? Progressing: []? Met: []? Not Met: []? Adjusted  3.  Patient will demonstrate an increase in Strength to good proximal hip and core activation to allow for proper

## 2020-05-29 ENCOUNTER — APPOINTMENT (OUTPATIENT)
Dept: PHYSICAL THERAPY | Age: 55
End: 2020-05-29
Payer: COMMERCIAL

## 2020-06-02 ENCOUNTER — TELEPHONE (OUTPATIENT)
Dept: ORTHOPEDIC SURGERY | Age: 55
End: 2020-06-02

## 2020-06-02 ENCOUNTER — VIRTUAL VISIT (OUTPATIENT)
Dept: ORTHOPEDIC SURGERY | Age: 55
End: 2020-06-02
Payer: COMMERCIAL

## 2020-06-02 VITALS — WEIGHT: 169.97 LBS | RESPIRATION RATE: 12 BRPM | HEIGHT: 63 IN | BODY MASS INDEX: 30.12 KG/M2

## 2020-06-02 PROCEDURE — 99213 OFFICE O/P EST LOW 20 MIN: CPT | Performed by: PHYSICAL MEDICINE & REHABILITATION

## 2020-06-02 PROCEDURE — G8427 DOCREV CUR MEDS BY ELIG CLIN: HCPCS | Performed by: PHYSICAL MEDICINE & REHABILITATION

## 2020-06-02 PROCEDURE — 3017F COLORECTAL CA SCREEN DOC REV: CPT | Performed by: PHYSICAL MEDICINE & REHABILITATION

## 2020-06-02 NOTE — PROGRESS NOTES
INTRA- ARTICULAR INJECTION WITH C- ARM GUIDANCE    HYSTERECTOMY      LEG SURGERY  2007    right leg muscle release    PAIN MANAGEMENT PROCEDURE Right 5/19/2020    RIGHT LUMBAR ONE AND LUMBAR TWO TRANSFORAMINAL EPIDURAL STEROID INJECTION SITE CONFIRMED BY FLUOROSCOPY performed by Jyothi Carson MD at [place] [address]     Current Medications:     Current Outpatient Medications:     GABAPENTIN PO, Take by mouth, Disp: , Rfl:     amLODIPine (NORVASC) 5 MG tablet, Take 5 mg by mouth daily, Disp: , Rfl:     meloxicam (MOBIC) 15 MG tablet, Take 1 tablet by mouth daily, Disp: 90 tablet, Rfl: 3    traMADol-acetaminophen (ULTRACET) 37.5-325 MG per tablet, Take 1 tablet by mouth every 8 hours as needed for Pain., Disp: 21 tablet, Rfl: 0    diazepam (VALIUM) 5 MG tablet, 1 PO TID FOR MUSCLE SPASMS., Disp: 30 tablet, Rfl: 0    cetirizine (ZYRTEC) 10 MG tablet, Take 10 mg by mouth daily. , Disp: , Rfl:     estrogens, conjugated, (PREMARIN) 1.25 MG tablet, Take 0.9 mg by mouth daily. , Disp: , Rfl:     pravastatin (PRAVACHOL) 40 MG tablet, Take 40 mg by mouth daily. , Disp: , Rfl:     Calcium Carbonate-Vitamin D (CALCIUM + D PO), Take 1 tablet by mouth daily. , Disp: , Rfl:     Multiple Vitamin (THERA) TABS, Take 1 tablet by mouth daily. , Disp: , Rfl:   Allergies:  Codeine; Darvocet [propoxyphene n-acetaminophen]; Percocet [oxycodone-acetaminophen]; Keflex [cephalexin]; and Zocor [simvastatin]  Social History:    reports that she has never smoked. She has never used smokeless tobacco. She reports current alcohol use. She reports that she does not use drugs.   Family History:   Family History   Problem Relation Age of Onset    High Cholesterol Mother     High Blood Pressure Mother     High Cholesterol Father     Heart Disease Father     Alzheimer's Disease Sister        REVIEW OF SYSTEMS:   CONSTITUTIONAL: Denies unexplained weight loss, fevers, chills or fatigue  NEUROLOGICAL: Denies unsteady gait or injury. Range of motion is unremarkable. There is no gross instability. There are no rashes, ulcerations or lesions. Strength and tone are normal. No atrophy or abnormal movements are noted. Diagnostic Testing:    MR Lumbar spine shows    At L1-2, new right parasagittal disc herniation with craniad extension, measuring 7.5mm and    craniocaudad, 3mm in AP, and 10mm in medial to lateral dimensions, causing right lateral recess    stenosis and abutment of transiting right L2 nerve root.  Abutment of exiting right L1 nerve    root also suggested.           Results for orders placed or performed in visit on 20   Covid-19 Ambulatory   Result Value Ref Range    SARS-CoV-2 Not Detected Not Detected    Source NP swab      Impression:       1. HNP (herniated nucleus pulposus), lumbar    2. Lumbar radiculopathy    3. Spondylosis of lumbar region without myelopathy or radiculopathy        Plan:  Clinical Course: Above diagnoses are improving     I discussed the diagnosis and the treatment options with Kenan Cuba today. In Summary:  The various treatment options were outlined and discussed with Kenan Cuba including:  Conservative care options: physical therapy, ice, medications, bracing, and activity modification. The indications for therapeutic injections. The indications for additional imaging/laboratory studies. The indications for (possible future) interventions. After considering the various options discussed, Kenan Cuba elected to pursue a course of treatment that includes the followin. Medications:  Continue neurontin 300 mg po TID    2. PT:  Encouraged to continue with Home exercise program.    3. Further studies: She has seen Dr Nae Arellano and she is scheduled for back surgery in . Interventional:  She is trying PT and gabapentin to avoid surgery    5.  Follow up:  2-3 weeks      Kenan Cuba was instructed to call the office if her symptoms worsen or if new symptoms appear claim for reimbursement with your insurance company. The patient has accepted responsibility for any copays, coinsurance amounts or other amounts not covered by their insurance company. This visit was completed virtually using doxy. me.

## 2020-06-03 ENCOUNTER — HOSPITAL ENCOUNTER (OUTPATIENT)
Dept: PHYSICAL THERAPY | Age: 55
Setting detail: THERAPIES SERIES
Discharge: HOME | End: 2020-06-03
Payer: COMMERCIAL

## 2020-06-03 PROCEDURE — 97140 MANUAL THERAPY 1/> REGIONS: CPT | Performed by: PHYSICAL THERAPIST

## 2020-06-03 PROCEDURE — 97110 THERAPEUTIC EXERCISES: CPT | Performed by: PHYSICAL THERAPIST

## 2020-06-03 NOTE — FLOWSHEET NOTE
start5/1   Neutral spine alt LE x10 start5/1   Quadruped alternate UE reaches     Quadruped alternate LE reaches     Quadruped alternate UE/LE reaches     Ball squats 3x10 start6/3   Ball heel raises 3x10 start6/3   Sit ups      planks     Tband lat pulls     Tband rows         Manual Intervention             Prone PA      GISTM/STM      Lumbar Manip      SI Manip      Hip belt mobs      Hip LA distraction 8 min  6/3           Therapeutic Exercise and NMR EXR  [x] (22531) Provided verbal/tactile cueing for activities related to strengthening, flexibility, endurance, ROM  for improvements in proximal hip and core control with self care, mobility, lifting and ambulation.  [] (20864) Provided verbal/tactile cueing for activities related to improving balance, coordination, kinesthetic sense, posture, motor skill, proprioception  to assist with core control in self care, mobility, lifting, and ambulation.      Therapeutic Activities:    [] (47090 or 46526) Provided verbal/tactile cueing for activities related to improving balance, coordination, kinesthetic sense, posture, motor skill, proprioception and motor activation to allow for proper function  with self care and ADLs  [] (63800) Provided training and instruction to the patient for proper core and proximal hip recruitment and positioning with ambulation re-education     Home Exercise Program:    [x] (82282) Reviewed/Progressed HEP activities related to strengthening, flexibility, endurance, ROM of core, proximal hip and LE for functional self-care, mobility, lifting and ambulation   [] (48088) Reviewed/Progressed HEP activities related to improving balance, coordination, kinesthetic sense, posture, motor skill, proprioception of core, proximal hip and LE for self care, mobility, lifting, and ambulation      Manual Treatments:  PROM / STM / Oscillations-Mobs:  G-I, II, III, IV (PA's, Inf., Post.)  [] (12813) Provided manual therapy to mobilize proximal hip and LS spine soft tissue/joints for the purpose of modulating pain, promoting relaxation,  increasing ROM, reducing/eliminating soft tissue swelling/inflammation/restriction, improving soft tissue extensibility and allowing for proper ROM for normal function with self care, mobility, lifting and ambulation. Modalities:   4/24 Reviewed TENS proper use at home with patients home unit from past injury, alternate ice and heat    Charges:  Timed Code Treatment Minutes: 45   Total Treatment Minutes: 60       [] EVAL (LOW) 07944 (typically 20 minutes face-to-face)  [] EVAL (MOD) 62068 (typically 30 minutes face-to-face)  [] EVAL (HIGH) 32555 (typically 45 minutes face-to-face)  [] RE-EVAL     [x] SR(75610) x 2    [] IONTO  [] NMR (26864) x     [] VASO  [x] Manual (93229) x   1   [] Other:  [] TA x      [] Mech Traction (51740)  [] ES(attended) (20106)      [] ES (un) (25461):     Goals:   Patient stated goal: adls and sew without c/o    []? Progressing: []? Met: []? Not Met: []? Adjusted     Therapist goals for Patient:   Short Term Goals: To be achieved in: 2 weeks  1. Independent in HEP and progression per patient tolerance, in order to prevent re-injury. []? Progressing: []? Met: []? Not Met: []? Adjusted   2. Patient will have a decrease in pain to facilitate improvement in movement, function, and ADLs as indicated by Functional Deficits. []? Progressing: []? Met: []? Not Met: []? Adjusted     Long Term Goals: To be achieved in: 12 weeks  1. Disability index score of 20% or less for the [place] to assist with reaching prior level of function. []? Progressing: []? Met: []? Not Met: []? Adjusted  2. Patient will demonstrate increased AROM to WNL, good LS mobility, good hip ROM to allow for proper joint functioning as indicated by patients Functional Deficits.   []? Progressing: []? Met: []? Not Met: []? Adjusted  3.  Patient will demonstrate an increase in Strength to good proximal hip and core activation to allow for proper functional mobility as indicated by patients Functional Deficits. []? Progressing: []? Met: []? Not Met: []? Adjusted  4. Patient will return to light adls  functional activities without increased symptoms or restriction. []? Progressing: []? Met: []? Not Met: []? Adjusted  5. Patient will return to [address]  functional activities without increased symptoms or restriction. []? Progressing: []? Met: []? Not Met: []? Adjusted          Overall Progression Towards Functional goals/ Treatment Progress Update:  [] Patient is progressing as expected towards functional goals listed. [] Progression is slowed due to complexities/Impairments listed. [] Progression has been slowed due to co-morbidities. [x] Plan just implemented, too soon to assess goals progression <30days   [] Goals require adjustment due to lack of progress  [] Patient is not progressing as expected and requires additional follow up with physician  [] Other    Prognosis for POC: [x] Good [] Fair  [] Poor      Patient requires continued skilled intervention: [x] Yes  [] No    Treatment/Activity Tolerance:  [x] Patient able to complete  treatment  [] Patient limited by fatigue  [x] Patient limited by pain     [] Patient limited by other medical complications  [x] Other: pain more centralized with epidurals and meds. Pain more central back    Patient education:Reviewed diagnosis, POC, HEP and its importance. Avoid sitting. Access Code: [email or web address]   URL: [employer]/   Date: 04/24/2020   Prepared by: Isaías Mccallum     Exercises   Hooklying Single Knee to Chest Stretch - 3 reps - 20 hold - 2x daily - 7x weekly   Supine Posterior Pelvic Tilt - 10 reps - 10 hold - 2x daily - 7x weekly   Hooklying Lumbar Rotation - 10 reps - 3 sets - 1x daily - 7x weekly   Supine with Legs Supported at 90/90 - 2-3x daily - 7x weekly     Access Code: [email or web address]   URL: [employer]/   Date: 04/28/2020 reviewed or performed ex with

## 2020-06-11 ENCOUNTER — APPOINTMENT (OUTPATIENT)
Dept: PHYSICAL THERAPY | Age: 55
End: 2020-06-11
Payer: COMMERCIAL

## 2021-03-11 ENCOUNTER — HOSPITAL ENCOUNTER (OUTPATIENT)
Dept: PHYSICAL THERAPY | Age: 56
Setting detail: THERAPIES SERIES
Discharge: HOME | End: 2021-03-11
Payer: COMMERCIAL

## 2021-03-11 PROCEDURE — 97161 PT EVAL LOW COMPLEX 20 MIN: CPT

## 2021-03-11 PROCEDURE — 97112 NEUROMUSCULAR REEDUCATION: CPT

## 2021-03-11 PROCEDURE — 97110 THERAPEUTIC EXERCISES: CPT

## 2021-03-11 NOTE — PLAN OF CARE
The [facility] and [address]  Phone [phone]  Fax [phone]                                                         Physical Therapy Certification    Dear Referring Practitioner: Radha Pierce,    We had the pleasure of evaluating the following patient for physical therapy services at [address] [place] [address]. A summary of our findings can be found in the initial assessment below. This includes our plan of care. If you have any questions or concerns regarding these findings, please do not hesitate to contact me at the office phone number checked above. Thank you for the referral.       Physician Signature:_______________________________Date:__________________  By signing above (or electronic signature), therapists plan is approved by physician      Patient: Yasmeen Holder   : 1965   MRN: [id number]  Referring Physician: Referring Practitioner: Radha Pierce      Evaluation Date: 3/11/2021      Medical Diagnosis Information:  Diagnosis: K65.581J (ICD-10-CM) - Strain of left groin   Treatment Diagnosis: M25.552 Left hip pain                                         Insurance information: PT Insurance Information: [employer]     Precautions/ Contra-indications: HTN, High cholesterol     C-SSRS Triggered by Intake questionnaire (Past 2 wk assessment):   [x] No, Questionnaire did not trigger screening.   [] Yes, Patient intake triggered further evaluation      [] C-SSRS Screening completed  [] PCP notified via Plan of Care  [] Emergency services notified     Latex Allergy:  [x]NO      []YES  Preferred Language for Healthcare:   [x]English       []other:    SUBJECTIVE: Patient arrived to physical therapy with c/o L groin pain that has been present since 2020. States she moved a certain way and feel deep sharp pain in her L groin.  Symptoms mainly localized to groin area and described as constant burning sensation. When she sits for long periods of time she will feel lit radiate down her inner thigh. When symptoms are severe she will experience symptoms from posterior/ lateral glut to her lateral mid thigh. Occasionally she will experience tingling down to her L toes when she lays on her L side but alleviates when she moves sides. Symptoms were not improving so she saw referring MD and was prescribed Medrol Dosepack which provided mild relief but when she attempted stretching to the area her symptoms increased. She has not yet had any imaging or injections. Relevant Medical History: L hip labral repair 2015, L hip labral repair with bone spur removal 2017. Inguinal hernia surgery on R 2015. L1-L2 microdiscectomy 2020. Wears inserts for plantar fasciitis. See attached medical history.    Functional Disability Index: WOMAC 36% deficit    Pain Scale: 6-7/10  Easing factors: Rest  Provocative factors: Sit to stand, laying on side, rolling in bed, lifting her L LE, running after grand kids      Type: [x]Constant   []Intermittent  []Radiating [x]Localized []other:     Numbness/Tingling: Denies    Occupation/School: [employer] 2-3x a week    Living Status/Prior Level of Function: Independent with ADLs and IADLs,     OBJECTIVE:     ROM PROM AROM Comments    Left Right Left Right    Flexion 97 pain 116 91 99    Extension   7 8 symptoms reported L hip     Abduction 29 pain  38 27 36    ER 31 34 29 30    IR 45 pain 55 41 51              Flexibility Left Right Comments   Hamstrings -35 -25    ITB (Obers test) Mild restriction  Mild restriction    Hip flexor(Mikhail test) Mild restriction with pain WNL            Special  Test Left Right Comments   SLR Pain and limited motion WNL    FABERS Pos Neg    Scour test Pos Neg    Impingement test Pos Neg    Posterior shear Neg Neg     Slump Neg Neg    Facet Closure Neg Neg Standing and sitting      Strength Left Right Comments   Hip flexors 3 with pain  4    Hip extension 4+ 4+    Hip abduction 4+ 4+    Hip adduction 4 4    Hip ER 4 with pain 4+    Hip IR 4- with pain  4+    Quads 4+ 4+    Hamstrings 5 5      Joint mobility:    []Normal    [x]Hypo L hip into flexion and IR with reproduction in pain   []Hyper    Palpation: Tenderness R ASIS    Functional Mobility/Transfers: Independent    Posture: Mild hip flexion and ER noted on L compared to R when resting in supine    Bandages/Dressings/Incisions: N/A    Gait: Independent, Mild Trendelenburg on L    Orthopedic Special Tests:   Trunk  AROM assessed in standing and WNL  Lumbar joint mobility P-A assessed in prone with no increase in hip or LE radicular symptoms                       [x] Patient history, allergies, meds reviewed. Medical chart reviewed. See intake form. Review Of Systems (ROS):  [x]Performed Review of systems (Integumentary, CardioPulmonary, Neurological) by intake and observation. Intake form has been scanned into medical record. Patient has been instructed to contact their primary care physician regarding ROS issues if not already being addressed at this time.       Co-morbidities/Complexities (which will affect course of rehabilitation):   []None           Arthritic conditions   []Rheumatoid arthritis (M05.9)  []Osteoarthritis (M19.91)   Cardiovascular conditions   [x]Hypertension (I10)  []Hyperlipidemia (E78.5)  []Angina pectoris (I20)  []Atherosclerosis (I70)   Musculoskeletal conditions   []Disc pathology   []Congenital spine pathologies   [x]Prior surgical intervention  []Osteoporosis (M81.8)  []Osteopenia (M85.8)   Endocrine conditions   []Hypothyroid (E03.9)  []Hyperthyroid Gastrointestinal conditions   []Constipation (Q00.33)   Metabolic conditions   []Morbid obesity (E66.01)  []Diabetes type 1(E10.65) or 2 (E11.65)   []Neuropathy (G60.9)     Pulmonary conditions   []Asthma (J45)  []Coughing   []COPD (J44.9)   Psychological Disorders  []Anxiety (F41.9)  []Depression (F32.9)   []Other:   []Other:          Barriers to/and or personal factors that will affect rehab potential:              [x]Age  []Sex              []Motivation/Lack of Motivation                        []Co-Morbidities              []Cognitive Function, education/learning barriers              []Environmental, home barriers              []profession/work barriers  []past PT/medical experience  []other:  Justification: Pt's age, taking care of grandchildren multiple times a week and past history of 2 L hip surgeries are barriers for treatment    Falls Risk Assessment (30 days):   [x] Falls Risk assessed and no intervention required.   [] Falls Risk assessed and Patient requires intervention due to being higher risk   TUG score (>12s at risk):     [] Falls education provided, including       ASSESSMENT:   Functional Impairments:     []Noted lumbar/proximal hip/LE hypomobility   [x]Decreased LE functional ROM   [x]Decreased core/proximal hip strength and neuromuscular control   [x]Decreased LE functional strength   []Reduced balance/proprioceptive control   []other:      Functional Activity Limitations (from functional questionnaire and intake)   [x]Reduced ability to tolerate prolonged functional positions   []Reduced ability or difficulty with changes of positions or transfers between positions   [x]Reduced ability to maintain good posture and demonstrate good body mechanics with sitting, bending, and lifting   [x]Reduced ability to sleep   [] Reduced ability or tolerance with driving and/or computer work   [x]Reduced ability to perform lifting, carrying tasks   [x]Reduced ability to squat   [x]Reduced ability to forward bend   [x]Reduced ability to ambulate prolonged functional periods/distances/surfaces   [x]Reduced ability to ascend/descend stairs   [x]Reduced ability to run, hop or jump   []other:     Participation Restrictions   [x]Reduced participation in self care activities   [x]Reduced (LOW) 07684 (typically 20 minutes face-to-face)  [] EVAL (MOD) 69353 (typically 30 minutes face-to-face)  [] EVAL (HIGH) 25243 (typically 45 minutes face-to-face)  [] RE-EVAL       PLAN  Frequency/Duration:  2 days per week for 6 Weeks:  Interventions:  [x]  Therapeutic exercise including: strength training, ROM, for Lower extremity and core   [x]  NMR activation and proprioception for LE, Glutes and Core   [x]  Manual therapy as indicated for LE, Hip and spine to include: Dry Needling/IASTM, STM, PROM, Gr I-IV mobilizations, manipulation. [x] Modalities as needed that may include: thermal agents, E-stim, Biofeedback, US, iontophoresis as indicated  [x] Patient education on joint protection, postural re-education, activity modification, progression of HEP. HEP instruction:   Access Code: Keanu Vaca   URL: [employer]/   Date: 03/11/2021   Prepared by: Brooke Davidson     Exercises   Seated Hamstring Stretch - 5 reps - 1 sets - 10 hold - 2-3x daily - 7x weekly   Standing Hip Flexor Stretch - 5 reps - 1 sets - 10 hold - 2-3x daily - 7x weekly   Side Lunge Adductor Stretch - 5 reps - 1 sets - 10 hold - 2-3x daily - 7x weekly   Prone Quad Stretch with Towel Roll and Strap - 5 reps - 1 sets - 10 hold - 2-3x daily - 7x weekly   Supine Bridge with Resistance Band - 10 reps - 3 sets - 2-3x daily - 7x weekly   Clamshell with Resistance - 10 reps - 3 sets - 2-3x daily - 7x weekly       GOALS:   Patient stated goal:  Eliminate pain in order to return to exercise  [] Progressing: [] Met: [] Not Met: [] Adjusted    Therapist goals for Patient:   Short Term Goals: To be achieved in: 3 weeks  1. Independent in HEP and progression per patient tolerance, in order to prevent re-injury. [] Progressing: [] Met: [] Not Met: [] Adjusted   2. Patient will have a decrease in pain to facilitate improvement in movement, function, and ADLs as indicated by Functional Deficits.     [] Progressing: [] Met: [] Not Met: [] Adjusted    Long Term Goals: To be achieved in: 6 weeks  1. Disability index score of 10% or less for the [facility] to assist with reaching prior level of function. [] Progressing: [] Met: [] Not Met: [] Adjusted  2. Patient will demonstrate increased L hip AROM that is equal to Rto allow for proper joint functioning as indicated by patients Functional Deficits. [] Progressing: [] Met: [] Not Met: [] Adjusted  3. Patient will demonstrate an increase in L hip strength that is equal to R to allow for proper functional mobility as indicated by patients Functional Deficits. [] Progressing: [] Met: [] Not Met: [] Adjusted  4. Patient will return to all functional activities without increased symptoms or restriction. [] Progressing: [] Met: [] Not Met: [] Adjusted  5. Patient will have increase strength and ROM in order to  10 lbs from floor with proper sequencing and no c/o pain allowing her to better take care of her grand children  [] Progressing: [] Met: [] Not Met: [] Adjusted      Electronically signed by:  Socorro Eagle PT    Note: If patient does not return for scheduled/ recommended follow up visits, this note will serve as a discharge from care along with most recent update on progress.

## 2021-03-11 NOTE — FLOWSHEET NOTE
The [facility] and [facility], [address] [place] [address] [address], [address]49 [facility]  Phone: [phone]   Fax:     [phone]    Physical Therapy Daily Treatment Note  Date:  3/11/2021    Patient Name:  Jennifer Read    :  1965  MRN: [id number]  Restrictions/Precautions:    Medical/Treatment Diagnosis Information:  · Diagnosis: S76.212A (ICD-10-CM) - Strain of left groin  · Treatment Diagnosis: M25.552 Left hip pain  Insurance/Certification information:  PT Insurance Information: [facility]  Physician Information:  Referring Practitioner: Marques Mora  Has the plan of care been signed (Y/N):        []  Yes  [x]  No     Date of Patient follow up with Physician: PRN      Is this a Progress Report:     []  Yes  [x]  No        If Yes:  Date Range for reporting period:  Beginning 3/11/21  Ending    Progress report will be due (10 Rx or 30 days whichever is less): 62       Recertification will be due (POC Duration  / 90 days whichever is less): 21         Visit # Insurance Allowable Auth Required   1 [facility]  UNL []  Yes []  No        Functional Scale: WOMAC 36%   Date assessed:  3/11/21       Latex Allergy:  [x]NO      []YES  Preferred Language for Healthcare:   [x]English       []other:      Pain level:  6-7/10     SUBJECTIVE:  See eval    OBJECTIVE: See eval   Observation:    Test measurements:      RESTRICTIONS/PRECAUTIONS: HTN.  2 previous L hip labral repairs    Exercises/Interventions:     ROM/STRETCHES     Seated hamstring  10\"hx5 L 311   HF stretch 10\"hx5 L 3/11 standing, cues for TA activation   Adductor stretch 10\"hx5 L 3/11 standing, cues to avoid hip flexion   Prone quad stretch 10\"hx5 L 3/11 towel roll with stret                       PREs     Bridges Blue TB 1x10 bilat 3/11   Clams Blue TB 1x10 L 3/11 side lying                                           Postural and muscle re-ed Educated on use for TA and gluts to decrease stress placed on anterior/ medial aspect of L hip 3/11        Manual interventions              Therapeutic Exercise and NMR EXR  [x] (17861) Provided verbal/tactile cueing for activities related to strengthening, flexibility, endurance, ROM for improvements in LE, proximal hip, and core control with self care, mobility, lifting, ambulation. [x] (64822) Provided verbal/tactile cueing for activities related to improving balance, coordination, kinesthetic sense, posture, motor skill, proprioception  to assist with LE, proximal hip, and core control in self care, mobility, lifting, ambulation and eccentric single leg control.      NMR and Therapeutic Activities:    [] (38400 or 58803) Provided verbal/tactile cueing for activities related to improving balance, coordination, kinesthetic sense, posture, motor skill, proprioception and motor activation to allow for proper function of core, proximal hip and LE with self care and ADLs  [] (57876) Gait Re-education- Provided training and instruction to the patient for proper LE, core and proximal hip recruitment and positioning and eccentric body weight control with ambulation re-education including up and down stairs     Home Exercise Program:    [x] (32631) Reviewed/Progressed HEP activities related to strengthening, flexibility, endurance, ROM of core, proximal hip and LE for functional self-care, mobility, lifting and ambulation/stair navigation   [] (50056)Reviewed/Progressed HEP activities related to improving balance, coordination, kinesthetic sense, posture, motor skill, proprioception of core, proximal hip and LE for self care, mobility, lifting, and ambulation/stair navigation      Manual Treatments:  PROM / STM / Oscillations-Mobs:  G-I, II, III, IV (PA's, Inf., Post.)  [] (34207) Provided manual therapy to mobilize LE, proximal hip and/or LS spine soft tissue/joints for the purpose of modulating pain, promoting relaxation,  increasing ROM, reducing/eliminating soft tissue swelling/inflammation/restriction, improving soft tissue extensibility and allowing for proper ROM for normal function with self care, mobility, lifting and ambulation. Modalities:      Charges:  Timed Code Treatment Minutes: 23'   Total Treatment Minutes: 8:35-9:32  62'       [x] EVAL (LOW) 50570 (typically 20 minutes face-to-face)  [] EVAL (MOD) 29265 (typically 30 minutes face-to-face)  [] EVAL (HIGH) 08046 (typically 45 minutes face-to-face)  [] RE-EVAL     [x] HS(99416) x   1  [] IONTO  [x] NMR (65751) x   1  [] VASO  [] Manual (71492) x      [] Other:  [] TA x      [] Mech Traction (03269)  [] ES(attended) (21416)      [] ES (un) (56120):     GOALS:   Patient stated goal:  Eliminate pain in order to return to exercise  []? Progressing: []? Met: []? Not Met: []? Adjusted     Therapist goals for Patient:   Short Term Goals: To be achieved in: 3 weeks  1. Independent in HEP and progression per patient tolerance, in order to prevent re-injury. []? Progressing: []? Met: []? Not Met: []? Adjusted   2. Patient will have a decrease in pain to facilitate improvement in movement, function, and ADLs as indicated by Functional Deficits. []? Progressing: []? Met: []? Not Met: []? Adjusted     Long Term Goals: To be achieved in: 6 weeks  1. Disability index score of 10% or less for the [facility] to assist with reaching prior level of function. []? Progressing: []? Met: []? Not Met: []? Adjusted  2. Patient will demonstrate increased L hip AROM that is equal to Rto allow for proper joint functioning as indicated by patients Functional Deficits. []? Progressing: []? Met: []? Not Met: []? Adjusted  3. Patient will demonstrate an increase in L hip strength that is equal to R to allow for proper functional mobility as indicated by patients Functional Deficits. []? Progressing: []? Met: []? Not Met: []? Adjusted  4. Patient will return to all functional activities without increased symptoms or restriction.    []? Progressing: []? Met: []? Not Met: []? Adjusted  5. Patient will have increase strength and ROM in order to  10 lbs from floor with proper sequencing and no c/o pain allowing her to better take care of her grand children  []? Progressing: []? Met: []? Not Met: []? Adjusted      Overall Progression Towards Functional goals/ Treatment Progress Update:  [] Patient is progressing as expected towards functional goals listed. [] Progression is slowed due to complexities/Impairments listed. [] Progression has been slowed due to co-morbidities. [x] Plan just implemented, too soon to assess goals progression <30days   [] Goals require adjustment due to lack of progress  [] Patient is not progressing as expected and requires additional follow up with physician  [] Other    Prognosis for POC: [x] Good [] Fair  [] Poor      Patient requires continued skilled intervention: [x] Yes  [] No    Treatment/Activity Tolerance:  [x] Patient able to complete treatment  [] Patient limited by fatigue  [] Patient limited by pain     [] Patient limited by other medical complications  [] Other: 3/11 Responded well to decrease hold time with stretches and reports decrease in symptoms and feeling loose by end of treatment. Patient Education:              3/11 Educated on precautions, use of ice and importance of HEP. If symptoms do not improve recommend she f/u with MD for potential imaging. PLAN: See eval  [x] Continue per plan of care [] Alter current plan (see comments above)  [x] Plan of care initiated [] Hold pending MD visit [] Discharge  3/11 Pt to complete PT for 2 weeks before leaving for a trip to [place]. If symptoms do not improve she will f/u with MD.     Electronically signed by:  Gerson Perez PT    Note: If patient does not return for scheduled/ recommended follow up visits, this note will serve as a discharge from care along with most recent update on progress.

## 2021-03-17 ENCOUNTER — HOSPITAL ENCOUNTER (OUTPATIENT)
Dept: PHYSICAL THERAPY | Age: 56
Setting detail: THERAPIES SERIES
Discharge: HOME | End: 2021-03-17
Payer: COMMERCIAL

## 2021-03-17 PROCEDURE — 97110 THERAPEUTIC EXERCISES: CPT

## 2021-03-17 PROCEDURE — 97112 NEUROMUSCULAR REEDUCATION: CPT

## 2021-03-17 NOTE — FLOWSHEET NOTE
Prone quad stretch 20\"hx5 L ^3/17 towel roll with stret                       PREs     TA isometric  10\"hx10  3/17   Bridges [place] TB 2x10 bilat ^3/17   Clams [place] TB 10\"h x10 L ^3/17                                            Postural and muscle re-ed  3/11        Manual interventions     LAD 10\"hx5 L 3/17   Joint mobilization Attempted grade 3 lateral and inferior on L but stopped d/t increase in symptoms reported deep in L hip 3/17       Therapeutic Exercise and NMR EXR  [x] (30591) Provided verbal/tactile cueing for activities related to strengthening, flexibility, endurance, ROM for improvements in LE, proximal hip, and core control with self care, mobility, lifting, ambulation. [x] (50478) Provided verbal/tactile cueing for activities related to improving balance, coordination, kinesthetic sense, posture, motor skill, proprioception  to assist with LE, proximal hip, and core control in self care, mobility, lifting, ambulation and eccentric single leg control.      NMR and Therapeutic Activities:    [] (72788 or 01747) Provided verbal/tactile cueing for activities related to improving balance, coordination, kinesthetic sense, posture, motor skill, proprioception and motor activation to allow for proper function of core, proximal hip and LE with self care and ADLs  [] (22988) Gait Re-education- Provided training and instruction to the patient for proper LE, core and proximal hip recruitment and positioning and eccentric body weight control with ambulation re-education including up and down stairs     Home Exercise Program:    [x] (12800) Reviewed/Progressed HEP activities related to strengthening, flexibility, endurance, ROM of core, proximal hip and LE for functional self-care, mobility, lifting and ambulation/stair navigation   [] (42468)Reviewed/Progressed HEP activities related to improving balance, coordination, kinesthetic sense, posture, motor skill, proprioception of core, proximal hip and LE for self care, mobility, lifting, and ambulation/stair navigation      Manual Treatments:  PROM / STM / Oscillations-Mobs:  G-I, II, III, IV (PA's, Inf., Post.)  [] (89545) Provided manual therapy to mobilize LE, proximal hip and/or LS spine soft tissue/joints for the purpose of modulating pain, promoting relaxation,  increasing ROM, reducing/eliminating soft tissue swelling/inflammation/restriction, improving soft tissue extensibility and allowing for proper ROM for normal function with self care, mobility, lifting and ambulation. Modalities:      Charges:  Timed Code Treatment Minutes: 28'   Total Treatment Minutes: 4:25-4:58  35'       [] EVAL (LOW) 455 1011 (typically 20 minutes face-to-face)  [] EVAL (MOD) 14118 (typically 30 minutes face-to-face)  [] EVAL (HIGH) 99768 (typically 45 minutes face-to-face)  [] RE-EVAL     [x] NA(93380) x   1  [] IONTO  [x] NMR (09775) x   1  [] VASO  [] Manual (00874) x      [] Other:  [] TA x      [] Mech Traction (47951)  [] ES(attended) (45873)      [] ES (un) (63998):     GOALS:   Patient stated goal:  Eliminate pain in order to return to exercise  []? Progressing: []? Met: []? Not Met: []? Adjusted     Therapist goals for Patient:   Short Term Goals: To be achieved in: 3 weeks  1. Independent in HEP and progression per patient tolerance, in order to prevent re-injury. []? Progressing: []? Met: []? Not Met: []? Adjusted   2. Patient will have a decrease in pain to facilitate improvement in movement, function, and ADLs as indicated by Functional Deficits. []? Progressing: []? Met: []? Not Met: []? Adjusted     Long Term Goals: To be achieved in: 6 weeks  1. Disability index score of 10% or less for the [facility] to assist with reaching prior level of function. []? Progressing: []? Met: []? Not Met: []? Adjusted  2. Patient will demonstrate increased L hip AROM that is equal to Rto allow for proper joint functioning as indicated by patients Functional Deficits. []?  Progressing: []? Met: []? Not Met: []? Adjusted  3. Patient will demonstrate an increase in L hip strength that is equal to R to allow for proper functional mobility as indicated by patients Functional Deficits. []? Progressing: []? Met: []? Not Met: []? Adjusted  4. Patient will return to all functional activities without increased symptoms or restriction. []? Progressing: []? Met: []? Not Met: []? Adjusted  5. Patient will have increase strength and ROM in order to  10 lbs from floor with proper sequencing and no c/o pain allowing her to better take care of her grand children  []? Progressing: []? Met: []? Not Met: []? Adjusted      Overall Progression Towards Functional goals/ Treatment Progress Update:  [] Patient is progressing as expected towards functional goals listed. [] Progression is slowed due to complexities/Impairments listed. [] Progression has been slowed due to co-morbidities. [x] Plan just implemented, too soon to assess goals progression <30days   [] Goals require adjustment due to lack of progress  [] Patient is not progressing as expected and requires additional follow up with physician  [] Other    Prognosis for POC: [x] Good [] Fair  [] Poor      Patient requires continued skilled intervention: [x] Yes  [] No    Treatment/Activity Tolerance:  [x] Patient able to complete treatment  [] Patient limited by fatigue  [] Patient limited by pain     [] Patient limited by other medical complications  [] Other:   3/17: Pt presents with continued L hip pain described as deep and burning that will radiate down her L thigh. Attempted to increase hold time during stretches which increased symptoms. Pt also reported increase in symptoms when attempting hip mobilization. Tolerated glut strengthening exercises with no adverse effects. Patient Education:              3/11 Educated on precautions, use of ice and importance of HEP.  If symptoms do not improve recommend she f/u with MD for potential imaging. [facility]: [place]         PLAN: See eval  [x] Continue per plan of care [] Alter current plan (see comments above)  [x] Plan of care initiated [] Hold pending MD visit [] Discharge  3/17 D/t consistency and severity of L hip symptoms as well as past history of 2 previous surgeries it is advised she f/u with referring MD for potential imaging. Electronically signed by:  Bj Griffiths PT    Note: If patient does not return for scheduled/ recommended follow up visits, this note will serve as a discharge from care along with most recent update on progress.

## 2021-03-19 ENCOUNTER — HOSPITAL ENCOUNTER (OUTPATIENT)
Dept: PHYSICAL THERAPY | Age: 56
Setting detail: THERAPIES SERIES
Discharge: HOME | End: 2021-03-19
Payer: COMMERCIAL

## 2021-03-23 ENCOUNTER — APPOINTMENT (OUTPATIENT)
Dept: PHYSICAL THERAPY | Age: 56
End: 2021-03-23
Payer: COMMERCIAL

## 2021-04-02 ENCOUNTER — APPOINTMENT (OUTPATIENT)
Dept: PHYSICAL THERAPY | Age: 56
End: 2021-04-02
Payer: COMMERCIAL

## 2021-04-14 ENCOUNTER — HOSPITAL ENCOUNTER (OUTPATIENT)
Dept: PHYSICAL THERAPY | Age: 56
Setting detail: THERAPIES SERIES
Discharge: HOME | End: 2021-04-14
Payer: COMMERCIAL

## 2021-04-14 PROCEDURE — 97530 THERAPEUTIC ACTIVITIES: CPT

## 2021-04-14 PROCEDURE — 97112 NEUROMUSCULAR REEDUCATION: CPT

## 2021-04-14 PROCEDURE — 97110 THERAPEUTIC EXERCISES: CPT

## 2021-04-16 ENCOUNTER — HOSPITAL ENCOUNTER (OUTPATIENT)
Dept: PHYSICAL THERAPY | Age: 56
Setting detail: THERAPIES SERIES
Discharge: HOME | End: 2021-04-16
Payer: COMMERCIAL

## 2021-04-16 PROCEDURE — 97110 THERAPEUTIC EXERCISES: CPT

## 2021-04-16 PROCEDURE — 97140 MANUAL THERAPY 1/> REGIONS: CPT

## 2021-04-16 PROCEDURE — 97112 NEUROMUSCULAR REEDUCATION: CPT

## 2021-04-16 NOTE — FLOWSHEET NOTE
Hip flexor(Mikhail test) Mild restriction with pain WNL                  Special  Test Left Right Comments   SLR Pain and limited motion WNL     FABERS Pos Neg     Scour test Pos Neg     Impingement test Pos Neg     Posterior shear Neg Neg      Slump Neg Neg     Facet Closure Neg Neg Standing and sitting       Strength Left Right Comments   Hip flexors 4- with pain  4     Hip extension 4+ 4+     Hip abduction 4+ 4+     Hip adduction 4+ 4     Hip ER 4 with pain 4+     Hip IR 4 with pain  4+     Quads 4+ 5     Hamstrings 5 5        Joint mobility:               []?Normal                       [x]? Hypo L hip into flexion and IR with reproduction in pain              []?Hyper     Palpation: Tenderness L ASIS, L proximal hip flexors     Functional Mobility/Transfers: Independent     Posture: Mild hip flexion and ER noted on L compared to R when resting in supine     Bandages/Dressings/Incisions: N/A     Gait: Independent, Mild Trendelenburg on L     Orthopedic Special Tests: See above      RESTRICTIONS/PRECAUTIONS: HTN.  2 previous L hip labral repairs    Exercises/Interventions:     ROM/STRETCHES     HF stretch 30\"hx3 L 4/14 half kneel with airex pad for L knee   Prone quad stretch 30\"hx3 L 4/14 towel roll with stretch   Hip ER/IR AROM 3x10 ea L 4/14 prone with towel roll under thigh   Hamstring stretch Cont with HEP 4/14             PREs     Prone BS with knee flex 10\"hx10 bilat 4/16 pillow under stomach    Prone hip extension 1# 3x10 L ^4/16 pillow under abdomen   Jeanine [place] TB 3x10 4/16   Clams [place] TB 3x10 L 4/16 side lying                                       Functional movement   4/14             Manual interventions     Joint mobilization P-A femur on glenoid 20\"hx5 L 4/16 pt prone   PROM L hip IR 20\"hx5  4/16 prone with towel roll under thigh   STM  L proximal hip flexors with sustained pressure and combination of rollerx5' 4/15   LAD 30\"hx5 L 4/16 low load, long duration            Therapeutic Exercise and NMR EXR  [x] (31933) Provided verbal/tactile cueing for activities related to strengthening, flexibility, endurance, ROM for improvements in LE, proximal hip, and core control with self care, mobility, lifting, ambulation. [x] (45008) Provided verbal/tactile cueing for activities related to improving balance, coordination, kinesthetic sense, posture, motor skill, proprioception  to assist with LE, proximal hip, and core control in self care, mobility, lifting, ambulation and eccentric single leg control.      NMR and Therapeutic Activities:    [] (27960 or 01142) Provided verbal/tactile cueing for activities related to improving balance, coordination, kinesthetic sense, posture, motor skill, proprioception and motor activation to allow for proper function of core, proximal hip and LE with self care and ADLs  [] (30922) Gait Re-education- Provided training and instruction to the patient for proper LE, core and proximal hip recruitment and positioning and eccentric body weight control with ambulation re-education including up and down stairs     Home Exercise Program:    [x] (48161) Reviewed/Progressed HEP activities related to strengthening, flexibility, endurance, ROM of core, proximal hip and LE for functional self-care, mobility, lifting and ambulation/stair navigation   [] (79616)Reviewed/Progressed HEP activities related to improving balance, coordination, kinesthetic sense, posture, motor skill, proprioception of core, proximal hip and LE for self care, mobility, lifting, and ambulation/stair navigation      Manual Treatments:  PROM / STM / Oscillations-Mobs:  G-I, II, III, IV (PA's, Inf., Post.)  [] (90002) Provided manual therapy to mobilize LE, proximal hip and/or LS spine soft tissue/joints for the purpose of modulating pain, promoting relaxation,  increasing ROM, reducing/eliminating soft tissue swelling/inflammation/restriction, improving soft tissue extensibility and allowing for proper ROM for normal function with self care, mobility, lifting and ambulation. Modalities:  CP x15' lumbar spine in long sitting position 4/16    Charges:  Timed Code Treatment Minutes: 38   Total Treatment Minutes: 10:37-11:30  53'       [] EVAL (LOW) 10994 (typically 20 minutes face-to-face)  [] EVAL (MOD) 78057 (typically 30 minutes face-to-face)  [] EVAL (HIGH) 97975 (typically 45 minutes face-to-face)  [] RE-EVAL     [x] PJ(52877) x   1  [] IONTO  [x] NMR (38494) x   1  [] VASO  [x] Manual (31013) x  1    [] Other:  [] TA x      [] Mech Traction (21948)  [] ES(attended) (34298)      [] ES (un) (25823):     GOALS: Adjusted on 4/14/21 d/t new prescription from MD  Patient stated goal:  Eliminate pain in order to return to exercise  []? Progressing: []? Met: []? Not Met: []? Adjusted     Therapist goals for Patient:   Short Term Goals: To be achieved in: 4 weeks  1. Independent in HEP and progression per patient tolerance, in order to prevent re-injury. []? Progressing: []? Met: []? Not Met: []? Adjusted   2. Patient will have a decrease in pain to facilitate improvement in movement, function, and ADLs as indicated by Functional Deficits. []? Progressing: []? Met: []? Not Met: []? Adjusted     Long Term Goals: To be achieved in: 6 weeks  1. Disability index score of 10% or less for the [facility] to assist with reaching prior level of function. []? Progressing: []? Met: []? Not Met: []? Adjusted  2. Patient will demonstrate increased L hip AROM that is equal to R to allow for proper joint functioning as indicated by patients Functional Deficits. []? Progressing: []? Met: []? Not Met: []? Adjusted  3. Patient will demonstrate an increase in L hip strength that is equal to R to allow for proper functional mobility as indicated by patients Functional Deficits. []? Progressing: []? Met: []? Not Met: []? Adjusted  4. Patient will return to all functional activities without increased symptoms or restriction. []?  Progressing: []? Met: []? Not Met: []? Adjusted  5. Patient will have increase strength and ROM in order to  10 lbs from floor with proper sequencing and no c/o pain allowing her to better take care of her grand children  []? Progressing: []? Met: []? Not Met: []? Adjusted      Overall Progression Towards Functional goals/ Treatment Progress Update:  [] Patient is progressing as expected towards functional goals listed. [] Progression is slowed due to complexities/Impairments listed. [] Progression has been slowed due to co-morbidities. [] Plan just implemented, too soon to assess goals progression <30days   [] Goals require adjustment due to lack of progress  [] Patient is not progressing as expected and requires additional follow up with physician  [x] Other Pt presents with mild increase in L hip ROM and strength possibly d/t injection she received. She continues to have pain with flexion and IR ROM as well as tenderness and strength deficits. Based on symptoms she is appropriate for continued PT to increase glut strength and decrease tissue tightness. Prognosis for POC: [x] Good [] Fair  [] Poor      Patient requires continued skilled intervention: [x] Yes  [] No    Treatment/Activity Tolerance:  [x] Patient able to complete treatment  [] Patient limited by fatigue  [] Patient limited by pain     [] Patient limited by other medical complications  [] Other:   4/16 Improvement with L hip PROM into IR after manual therapy. Tightness noted at L proximal hip flexors that improved with manual STM. Fatigued by end of treatment with mild soreness in her thigh. Patient Education:              4/16 Updated HEP   4/14 Educated on precautions, use of ice and importance of HEP. Educated on activity modification when taking care of grandchildren and avoid excessive hip flexion, adduction and internal rotation.      [facility]   Access Code: [facility] [place]          PLAN: See yesi  [] Continue per plan of care [x] Alter current

## 2021-04-20 ENCOUNTER — HOSPITAL ENCOUNTER (OUTPATIENT)
Dept: PHYSICAL THERAPY | Age: 56
Setting detail: THERAPIES SERIES
Discharge: HOME | End: 2021-04-20
Payer: COMMERCIAL

## 2021-04-20 PROCEDURE — 97110 THERAPEUTIC EXERCISES: CPT

## 2021-04-20 PROCEDURE — 97112 NEUROMUSCULAR REEDUCATION: CPT

## 2021-04-20 PROCEDURE — 97140 MANUAL THERAPY 1/> REGIONS: CPT

## 2021-04-20 NOTE — FLOWSHEET NOTE
The [address] [facility] and 500 [facility], [address], [address] [facility]  Phone: [phone]   Fax:     [phone]    Physical Therapy Daily Treatment Note  Date:  2021    Patient Name:  Connor Antonio    :  1965  MRN: [id number]  Restrictions/Precautions:    Medical/Treatment Diagnosis Information:  · Diagnosis: R11.934O (ICD-10-CM) - Strain of left groin  · Treatment Diagnosis: M25.552 Left hip pain  Insurance/Certification information:  PT Insurance Information: [employer]  Physician Information:  Referring Practitioner: Benny Boss. Rhona Dakins, MD  Has the plan of care been signed (Y/N):        []  Yes  [x]  No     Date of Patient follow up with Physician: PRN      Is this a Progress Report:     []  Yes  [x]  No        If Yes:  Date Range for reporting period:  Beginning 3/11/21  Ending 21    Progress report will be due (10 Rx or 30 days whichever is less):        Recertification will be due (POC Duration  / 90 days whichever is less): 21         Visit # Insurance Allowable Auth Required    [address] []  Yes []  No        Functional Scale:   WOMAC 58% deficit  Date assessed: 21       Latex Allergy:  [x]NO      []YES  Preferred Language for Healthcare:   [x]English       []other:      Pain level:  10        SUBJECTIVE:   Pt states she had increase in symptoms to 6/10 on 21 with no known cause. Baby sat today and experienced discomfort with sitting on floor in long sitting position.  Pt states symptoms not as bad but noticing L hip pain at night more often     OBJECTIVE:   L hip IR PROM 45 deg measured prone on    Assessed below on 21  ROM PROM AROM Comments     Left Right Left Right     Flexion 100 with pain  116 92 99     Extension     10 8 symptoms reported L hip      Abduction 31 pain  38 29 36     ER 41 40 39 38     IR 40 60 36 55                      Flexibility Left Right Comments   Hamstrings -28 -25     ITB (Obers test) Mild restriction  Mild restriction     Hip flexor(Mikhail test) Mild restriction with pain WNL                  Special  Test Left Right Comments   SLR Pain and limited motion WNL     FABERS Pos Neg     Scour test Pos Neg     Impingement test Pos Neg     Posterior shear Neg Neg      Slump Neg Neg     Facet Closure Neg Neg Standing and sitting       Strength Left Right Comments   Hip flexors 4- with pain  4     Hip extension 4+ 4+     Hip abduction 4+ 4+     Hip adduction 4+ 4     Hip ER 4 with pain 4+     Hip IR 4 with pain  4+     Quads 4+ 5     Hamstrings 5 5        Joint mobility:               []?Normal                       [x]? Hypo L hip into flexion and IR with reproduction in pain              []?Hyper     Palpation: Tenderness L ASIS, L proximal hip flexors     Functional Mobility/Transfers: Independent     Posture: Mild hip flexion and ER noted on L compared to R when resting in supine     Bandages/Dressings/Incisions: N/A     Gait: Independent, Mild Trendelenburg on L     Orthopedic Special Tests: See above      RESTRICTIONS/PRECAUTIONS: HTN.  2 previous L hip labral repairs    Exercises/Interventions:     ROM/STRETCHES     HF stretch 30\"hx3 L ^4/20 standing with side bend   Prone quad stretch 30\"hx3 L 4/14 towel roll with stretch   Hip ER/IR AROM 5\"h 2x10 ea L 4/20 prone with towel roll under thigh   Hamstring stretch Cont with HEP 4/14             PREs     Prone BS with knee flex 10\"hx10 bilat 4/16 pillow under stomach    Prone hip extension 2# 3x10 L ^4/20 pillow under abdomen   Michael Beverage TB 5\"h 2x10 ^4/20   Clams Blue TB 3x10 L ^4/20 side lying    Mini squat 3x10 4/20 completed at table for UE support in order to maintain appropriate form   SLS with hip abd SLS on L 3x10 R hip abd 4/20, cues to maintain level hips                            Functional movement   4/14             Manual interventions     Joint mobilization -P-A femur on glenoid grade3 20\"hx5 L  -Lat grade 3 10\"hx5 ^4/20 pt prone   PROM L hip IR 20\"hx5  4/20 prone with towel roll under thigh   STM  L proximal hip flexors with sustained pressure and combination of rollerx5' 4/20   LAD 30\"hx5 L 4/20 low load, long duration            Therapeutic Exercise and NMR EXR  [x] (99692) Provided verbal/tactile cueing for activities related to strengthening, flexibility, endurance, ROM for improvements in LE, proximal hip, and core control with self care, mobility, lifting, ambulation. [x] (40199) Provided verbal/tactile cueing for activities related to improving balance, coordination, kinesthetic sense, posture, motor skill, proprioception  to assist with LE, proximal hip, and core control in self care, mobility, lifting, ambulation and eccentric single leg control.      NMR and Therapeutic Activities:    [] (42354 or 61110) Provided verbal/tactile cueing for activities related to improving balance, coordination, kinesthetic sense, posture, motor skill, proprioception and motor activation to allow for proper function of core, proximal hip and LE with self care and ADLs  [] (39847) Gait Re-education- Provided training and instruction to the patient for proper LE, core and proximal hip recruitment and positioning and eccentric body weight control with ambulation re-education including up and down stairs     Home Exercise Program:    [x] (66103) Reviewed/Progressed HEP activities related to strengthening, flexibility, endurance, ROM of core, proximal hip and LE for functional self-care, mobility, lifting and ambulation/stair navigation   [] (48561)Reviewed/Progressed HEP activities related to improving balance, coordination, kinesthetic sense, posture, motor skill, proprioception of core, proximal hip and LE for self care, mobility, lifting, and ambulation/stair navigation      Manual Treatments:  PROM / STM / Oscillations-Mobs:  G-I, II, III, IV (PA's, Inf., Post.)  [] (00767) Provided manual therapy to mobilize LE, proximal hip and/or LS spine soft tissue/joints for the purpose of modulating pain, promoting relaxation,  increasing ROM, reducing/eliminating soft tissue swelling/inflammation/restriction, improving soft tissue extensibility and allowing for proper ROM for normal function with self care, mobility, lifting and ambulation. Modalities:  CP x15' lumbar spine in long sitting position 4/20    Charges:  Timed Code Treatment Minutes: 42'   Total Treatment Minutes: 4:50-5:52  58'       [] EVAL (LOW) 78888 (typically 20 minutes face-to-face)  [] EVAL (MOD) 72119 (typically 30 minutes face-to-face)  [] EVAL (HIGH) 15325 (typically 45 minutes face-to-face)  [] RE-EVAL     [x] OU(75313) x   1  [] IONTO  [x] NMR (80656) x   1  [] VASO  [x] Manual (32854) x  1    [] Other:  [] TA x      [] Mech Traction (94519)  [] ES(attended) (68754)      [] ES (un) (03461):     GOALS: Adjusted on 4/14/21 d/t new prescription from MD  Patient stated goal:  Eliminate pain in order to return to exercise  []? Progressing: []? Met: []? Not Met: []? Adjusted     Therapist goals for Patient:   Short Term Goals: To be achieved in: 4 weeks  1. Independent in HEP and progression per patient tolerance, in order to prevent re-injury. []? Progressing: []? Met: []? Not Met: []? Adjusted   2. Patient will have a decrease in pain to facilitate improvement in movement, function, and ADLs as indicated by Functional Deficits. []? Progressing: []? Met: []? Not Met: []? Adjusted     Long Term Goals: To be achieved in: 6 weeks  1. Disability index score of 10% or less for the [facility] to assist with reaching prior level of function. []? Progressing: []? Met: []? Not Met: []? Adjusted  2. Patient will demonstrate increased L hip AROM that is equal to R to allow for proper joint functioning as indicated by patients Functional Deficits. []? Progressing: []? Met: []? Not Met: []? Adjusted  3.  Patient will demonstrate an increase in L hip strength that is equal to R to allow for proper functional mobility as indicated by patients Functional Deficits. []? Progressing: []? Met: []? Not Met: []? Adjusted  4. Patient will return to all functional activities without increased symptoms or restriction. []? Progressing: []? Met: []? Not Met: []? Adjusted  5. Patient will have increase strength and ROM in order to  10 lbs from floor with proper sequencing and no c/o pain allowing her to better take care of her grand children  []? Progressing: []? Met: []? Not Met: []? Adjusted      Overall Progression Towards Functional goals/ Treatment Progress Update:  [] Patient is progressing as expected towards functional goals listed. [] Progression is slowed due to complexities/Impairments listed. [] Progression has been slowed due to co-morbidities. [] Plan just implemented, too soon to assess goals progression <30days   [] Goals require adjustment due to lack of progress  [] Patient is not progressing as expected and requires additional follow up with physician  [x] Other Pt presents with mild increase in L hip ROM and strength possibly d/t injection she received. She continues to have pain with flexion and IR ROM as well as tenderness and strength deficits. Based on symptoms she is appropriate for continued PT to increase glut strength and decrease tissue tightness. Prognosis for POC: [x] Good [] Fair  [] Poor      Patient requires continued skilled intervention: [x] Yes  [] No    Treatment/Activity Tolerance:  [x] Patient able to complete treatment  [] Patient limited by fatigue  [] Patient limited by pain     [] Patient limited by other medical complications  [] Other:   4/20: Completed hip flexor stretch in standing d/t c/o L knee pain. Improvement in L hip IR ROM noted during PROM stretches. Continues to have tightness at hip flexors but not as proximal as last week. Sore by end of treatment with mild agitation in L hip.         Patient Education:              4/16 Updated

## 2021-04-22 ENCOUNTER — HOSPITAL ENCOUNTER (OUTPATIENT)
Dept: PHYSICAL THERAPY | Age: 56
Setting detail: THERAPIES SERIES
Discharge: HOME | End: 2021-04-22
Payer: COMMERCIAL

## 2021-04-22 PROCEDURE — 97140 MANUAL THERAPY 1/> REGIONS: CPT

## 2021-04-22 PROCEDURE — 20560 NDL INSJ W/O NJX 1 OR 2 MUSC: CPT

## 2021-04-22 PROCEDURE — 97110 THERAPEUTIC EXERCISES: CPT

## 2021-04-22 NOTE — FLOWSHEET NOTE
The [address] [facility] and 500 [facility], [address], [address] [facility]  Phone: [phone]   Fax:     [phone]    Physical Therapy Daily Treatment Note  Date:  2021    Patient Name:  Nasima Caballero    :  1965  MRN: [id number]  Restrictions/Precautions:    Medical/Treatment Diagnosis Information:  · Diagnosis: P50.584I (ICD-10-CM) - Strain of left groin  · Treatment Diagnosis: M25.552 Left hip pain  Insurance/Certification information:  PT Insurance Information: [facility]  Physician Information:  Referring Practitioner: Temitope Mariano. Christiano Sorto MD  Has the plan of care been signed (Y/N):        []  Yes  [x]  No     Date of Patient follow up with Physician: PRN      Is this a Progress Report:     []  Yes  [x]  No        If Yes:  Date Range for reporting period:  Beginning 3/11/21  Ending 21    Progress report will be due (10 Rx or 30 days whichever is less): 40       Recertification will be due (POC Duration  / 90 days whichever is less): 21         Visit # Insurance Allowable Auth Required   6   [address] []  Yes []  No        Functional Scale:   WOMAC 58% deficit  Date assessed: 21       Latex Allergy:  [x]NO      []YES  Preferred Language for Healthcare:   [x]English       []other:      Pain level:  10        SUBJECTIVE:   Pt states yesterday her symptoms were off and on. She sat a lot this morning and symptoms have increased in the groin, down her thigh and in her glut.      OBJECTIVE:   L hip IR PROM 45 deg measured prone on    Assessed below on 21  ROM PROM AROM Comments     Left Right Left Right     Flexion 100 with pain  116 92 99     Extension     10 8 symptoms reported L hip      Abduction 31 pain  38 29 36     ER 41 40 39 38     IR 40 60 36 55                      Flexibility Left Right Comments   Hamstrings -28 -25     ITB (Obers test) Mild restriction  Mild restriction     Hip flexor(Mikhail test) Mild restriction with pain WNL                  Special  Test Left Right Comments   SLR Pain and limited motion WNL     FABERS Pos Neg     Scour test Pos Neg     Impingement test Pos Neg     Posterior shear Neg Neg      Slump Neg Neg     Facet Closure Neg Neg Standing and sitting       Strength Left Right Comments   Hip flexors 4- with pain  4     Hip extension 4+ 4+     Hip abduction 4+ 4+     Hip adduction 4+ 4     Hip ER 4 with pain 4+     Hip IR 4 with pain  4+     Quads 4+ 5     Hamstrings 5 5        Joint mobility:               []?Normal                       [x]? Hypo L hip into flexion and IR with reproduction in pain              []?Hyper     Palpation: Tenderness L ASIS, L proximal hip flexors     Functional Mobility/Transfers: Independent     Posture: Mild hip flexion and ER noted on L compared to R when resting in supine     Bandages/Dressings/Incisions: N/A     Gait: Independent, Mild Trendelenburg on L     Orthopedic Special Tests: See above      RESTRICTIONS/PRECAUTIONS: HTN.  2 previous L hip labral repairs    Exercises/Interventions:     ROM/STRETCHES     HF stretch 30\"hx3 L ^4/20 standing with side bend   Adductor stretch 30\"hx3 L 4/22 standing with UE support on table    Prone quad stretch 30\"hx3 L 4/14 towel roll with stretch   Hip ER/IR AROM 10\"h 1x10 ea L ^4/22 prone with towel roll under thigh   Hamstring stretch Cont with HEP 4/14             PREs     Prone BS with knee flex 10\"hx10 bilat 4/16 pillow under stomach    Prone hip extension 2# 3x10 L ^4/20 pillow under abdomen   Tawanda Scrivener TB 5\"h 3x10 ^4/22   Clams Blue TB 3x10 L ^4/20 side lying    Mini squat 4/22 withheld d/t flare up in symptoms   SLS with hip abd 4/22 withheld d/t flare up in symptoms                            Functional movement   4/14             Manual interventions     Joint mobilization -P-A femur on glenoid grade3 20\"hx5 L  -Lat grade 3 20\"hx5 ^4/22 pt prone   PROM  4/22 withheld d/t decrease guarding noted and completed stretching above    STM  L proximal hip flexors and mid substance quad with rollerx5' ^4/22   LAD 30\"hx5 L 4/20 low load, long duration            Therapeutic Exercise and NMR EXR  [x] (36587) Provided verbal/tactile cueing for activities related to strengthening, flexibility, endurance, ROM for improvements in LE, proximal hip, and core control with self care, mobility, lifting, ambulation. [x] (10221) Provided verbal/tactile cueing for activities related to improving balance, coordination, kinesthetic sense, posture, motor skill, proprioception  to assist with LE, proximal hip, and core control in self care, mobility, lifting, ambulation and eccentric single leg control.      NMR and Therapeutic Activities:    [] (98005 or 95277) Provided verbal/tactile cueing for activities related to improving balance, coordination, kinesthetic sense, posture, motor skill, proprioception and motor activation to allow for proper function of core, proximal hip and LE with self care and ADLs  [] (66060) Gait Re-education- Provided training and instruction to the patient for proper LE, core and proximal hip recruitment and positioning and eccentric body weight control with ambulation re-education including up and down stairs     Home Exercise Program:    [x] (46343) Reviewed/Progressed HEP activities related to strengthening, flexibility, endurance, ROM of core, proximal hip and LE for functional self-care, mobility, lifting and ambulation/stair navigation   [] (64763)Reviewed/Progressed HEP activities related to improving balance, coordination, kinesthetic sense, posture, motor skill, proprioception of core, proximal hip and LE for self care, mobility, lifting, and ambulation/stair navigation      Manual Treatments:  PROM / STM / Oscillations-Mobs:  G-I, II, III, IV (PA's, Inf., Post.)  [] (82505) Provided manual therapy to mobilize LE, proximal hip and/or LS spine soft tissue/joints for the purpose of modulating pain, promoting prescription from MD  Patient stated goal:  Eliminate pain in order to return to exercise  []? Progressing: []? Met: []? Not Met: []? Adjusted     Therapist goals for Patient:   Short Term Goals: To be achieved in: 4 weeks  1. Independent in HEP and progression per patient tolerance, in order to prevent re-injury. []? Progressing: []? Met: []? Not Met: []? Adjusted   2. Patient will have a decrease in pain to facilitate improvement in movement, function, and ADLs as indicated by Functional Deficits. []? Progressing: []? Met: []? Not Met: []? Adjusted     Long Term Goals: To be achieved in: 6 weeks  1. Disability index score of 10% or less for the [facility] to assist with reaching prior level of function. []? Progressing: []? Met: []? Not Met: []? Adjusted  2. Patient will demonstrate increased L hip AROM that is equal to R to allow for proper joint functioning as indicated by patients Functional Deficits. []? Progressing: []? Met: []? Not Met: []? Adjusted  3. Patient will demonstrate an increase in L hip strength that is equal to R to allow for proper functional mobility as indicated by patients Functional Deficits. []? Progressing: []? Met: []? Not Met: []? Adjusted  4. Patient will return to all functional activities without increased symptoms or restriction. []? Progressing: []? Met: []? Not Met: []? Adjusted  5. Patient will have increase strength and ROM in order to  10 lbs from floor with proper sequencing and no c/o pain allowing her to better take care of her grand children  []? Progressing: []? Met: []? Not Met: []? Adjusted      Overall Progression Towards Functional goals/ Treatment Progress Update:  [] Patient is progressing as expected towards functional goals listed. [] Progression is slowed due to complexities/Impairments listed. [] Progression has been slowed due to co-morbidities.   [] Plan just implemented, too soon to assess goals progression <30days   [] Goals require adjustment due to lack of progress  [] Patient is not progressing as expected and requires additional follow up with physician  [x] Other Pt presents with mild increase in L hip ROM and strength possibly d/t injection she received. She continues to have pain with flexion and IR ROM as well as tenderness and strength deficits. Based on symptoms she is appropriate for continued PT to increase glut strength and decrease tissue tightness. Prognosis for POC: [x] Good [] Fair  [] Poor      Patient requires continued skilled intervention: [x] Yes  [] No    Treatment/Activity Tolerance:  [x] Patient able to complete treatment  [] Patient limited by fatigue  [] Patient limited by pain     [] Patient limited by other medical complications  [] Other:   4/22 Pt reported having increase in pain surrounding L hip and therefore withheld exercises in weight bearing. D/t symptoms attempted dry needling and tightness noted at proximal hip flexors on L that responded well with mild decrease in tension. Responded well to adductor stretch allowing for decrease symptoms down inner thigh. Fatigued by end of treatment but no increase in symptoms reported       Patient Education:              4/22 Added adductor stretch to HEP and withheld exercises in weight bearing. 4/16 Updated HEP   4/14 Educated on precautions, use of ice and importance of HEP. Educated on activity modification when taking care of grandchildren and avoid excessive hip flexion, adduction and internal rotation. ROOOMERS   Access Code: [place]          PLAN: See yesi  [] Continue per plan of care [x] Alter current plan (see comments below)  [x] Plan of care initiated [] Hold pending MD visit [] Discharge  4/22 Monitor symptoms and continue with manual therapy to decrease symptoms and improve ROM.  Progress with strengthening as tolerated  4/14 Pt to attempt PT 2x a week for 4-6 weeks to increase L hip mobility and strength as well as decrease tissue tightness and tenderness in order to improve function and prevent surgical intervention. Electronically signed by:  Mario Landis PT    Note: If patient does not return for scheduled/ recommended follow up visits, this note will serve as a discharge from care along with most recent update on progress.

## 2021-04-28 ENCOUNTER — HOSPITAL ENCOUNTER (OUTPATIENT)
Dept: PHYSICAL THERAPY | Age: 56
Setting detail: THERAPIES SERIES
Discharge: HOME | End: 2021-04-28
Payer: COMMERCIAL

## 2021-04-28 PROCEDURE — 20560 NDL INSJ W/O NJX 1 OR 2 MUSC: CPT

## 2021-04-28 PROCEDURE — 97140 MANUAL THERAPY 1/> REGIONS: CPT

## 2021-04-28 PROCEDURE — 97110 THERAPEUTIC EXERCISES: CPT

## 2021-04-28 NOTE — FLOWSHEET NOTE
The [facility] and [facility] [employer], [address], [address], [place]  Phone: [phone]   Fax:     [phone]    Physical Therapy Daily Treatment Note  Date:  2021    Patient Name:  Breann Santamaria    :  1965  MRN: [id number]  Restrictions/Precautions:    Medical/Treatment Diagnosis Information:  · Diagnosis: H60.383M (ICD-10-CM) - Strain of left groin  · Treatment Diagnosis: M25.552 Left hip pain  Insurance/Certification information:  PT Insurance Information: [facility]  Physician Information:  Referring Practitioner: Vipul Javier. Ольга Lombardo MD  Has the plan of care been signed (Y/N):        []  Yes  [x]  No     Date of Patient follow up with Physician: PRN      Is this a Progress Report:     []  Yes  [x]  No        If Yes:  Date Range for reporting period:  Beginning 3/11/21  Ending 21    Progress report will be due (10 Rx or 30 days whichever is less):        Recertification will be due (POC Duration  / 90 days whichever is less): 21         Visit # Insurance Allowable Auth Required   7   [facility]  UNL []  Yes []  No        Functional Scale:   WOMAC 58% deficit  Date assessed: 21       Latex Allergy:  [x]NO      []YES  Preferred Language for Healthcare:   [x]English       []other:      Pain level:  10        SUBJECTIVE:   Pt states she felt better yesterday morning and symptoms got worse as the day went on. This morning she felt like her L LE was going to give out on her at her hip and feels super weak. Pain is getting worse as the day has gone on. Had some good days from last visit but still has pain and weakness. PT reports she had a massage on 21 which helped with symptoms around L hip.      OBJECTIVE:   L hip IR PROM 45 deg measured prone on    Assessed below on 21  ROM PROM AROM Comments     Left Right Left Right     Flexion 100 with pain  116 92 99     Extension     10 8 symptoms reported L hip    Abduction 31 pain  38 29 36     ER 41 40 39 38     IR 40 60 36 55                      Flexibility Left Right Comments   Hamstrings -28 -25     ITB (Obers test) Mild restriction  Mild restriction     Hip flexor(Mikhail test) Mild restriction with pain WNL                  Special  Test Left Right Comments   SLR Pain and limited motion WNL     FABERS Pos Neg     Scour test Pos Neg     Impingement test Pos Neg     Posterior shear Neg Neg      Slump Neg Neg     Facet Closure Neg Neg Standing and sitting       Strength Left Right Comments   Hip flexors 4- with pain  4     Hip extension 4+ 4+     Hip abduction 4+ 4+     Hip adduction 4+ 4     Hip ER 4 with pain 4+     Hip IR 4 with pain  4+     Quads 4+ 5     Hamstrings 5 5        Joint mobility:               []?Normal                       [x]? Hypo L hip into flexion and IR with reproduction in pain              []?Hyper     Palpation: Tenderness L ASIS, L proximal hip flexors     Functional Mobility/Transfers: Independent     Posture: Mild hip flexion and ER noted on L compared to R when resting in supine     Bandages/Dressings/Incisions: N/A     Gait: Independent, Mild Trendelenburg on L     Orthopedic Special Tests: See above      RESTRICTIONS/PRECAUTIONS: HTN.  2 previous L hip labral repairs    Exercises/Interventions:     ROM/STRETCHES     HF stretch 30\"hx3 L ^4/20 standing with side bend   Adductor stretch 30\"hx3 L 4/22 standing with UE support on table    Prone quad stretch 30\"hx3 L 4/14 towel roll with stretch   Hip ER/IR AROM 10\"h 1x10 ea L ^4/22 prone with towel roll under thigh   Hamstring stretch Cont with HEP 4/14             PREs     Prone BS with knee flex  4/16 pillow under stomach    Prone hip extension 3# 3x10 L ^4/28 pillow under abdomen   Prone hip ER 10\"hx10 bilat 4/28 BS between heels   Ethel Maribellotte TB 5\"h 3x10 ^4/22   Clams Black TB 3x10 L ^4/28 side lying    Mini squat 4/22 withheld d/t flare up in symptoms   SLS with hip abd SLS on L 3x10 R hip abd4/28 reintroduced to [email or web address] on hip stabilizers                            Functional movement   4/14             Manual interventions     Joint mobilization -P-A femur on glenoid grade3 20\"hx5 L  -Lat grade 3 20\"hx5 ^4/28 pt prone   PROM  4/22 withheld d/t decrease guarding noted and completed stretching above    STM  L proximal hip flexors and mid substance quad with rollerx5' ^4/28   LAD 30\"hx3 L 4/28 stopped after 3 reps d/t radiating symptoms in L hip            Therapeutic Exercise and NMR EXR  [x] (10380) Provided verbal/tactile cueing for activities related to strengthening, flexibility, endurance, ROM for improvements in LE, proximal hip, and core control with self care, mobility, lifting, ambulation. [x] (67682) Provided verbal/tactile cueing for activities related to improving balance, coordination, kinesthetic sense, posture, motor skill, proprioception  to assist with LE, proximal hip, and core control in self care, mobility, lifting, ambulation and eccentric single leg control.      NMR and Therapeutic Activities:    [] (47835 or 49559) Provided verbal/tactile cueing for activities related to improving balance, coordination, kinesthetic sense, posture, motor skill, proprioception and motor activation to allow for proper function of core, proximal hip and LE with self care and ADLs  [] (58880) Gait Re-education- Provided training and instruction to the patient for proper LE, core and proximal hip recruitment and positioning and eccentric body weight control with ambulation re-education including up and down stairs     Home Exercise Program:    [x] (42878) Reviewed/Progressed HEP activities related to strengthening, flexibility, endurance, ROM of core, proximal hip and LE for functional self-care, mobility, lifting and ambulation/stair navigation   [] (56955)Reviewed/Progressed HEP activities related to improving balance, coordination, kinesthetic sense, posture, motor skill, proprioception of core, (HIGH) 85646 (typically 45 minutes face-to-face)  [] RE-EVAL     [x] UI(32942) x   1  [] IONTO  [] NMR (03277) x     [] VASO  [x] Manual (44169) x  1    [x] Other: Dnx1  [] TA x      [] Mech Traction (05392)  [] ES(attended) (95820)      [] ES (un) (20178):     GOALS: Adjusted on 4/14/21 d/t new prescription from MD  Patient stated goal:  Eliminate pain in order to return to exercise  []? Progressing: []? Met: []? Not Met: []? Adjusted     Therapist goals for Patient:   Short Term Goals: To be achieved in: 4 weeks  1. Independent in HEP and progression per patient tolerance, in order to prevent re-injury. []? Progressing: []? Met: []? Not Met: []? Adjusted   2. Patient will have a decrease in pain to facilitate improvement in movement, function, and ADLs as indicated by Functional Deficits. []? Progressing: []? Met: []? Not Met: []? Adjusted     Long Term Goals: To be achieved in: 6 weeks  1. Disability index score of 10% or less for the [facility] to assist with reaching prior level of function. []? Progressing: []? Met: []? Not Met: []? Adjusted  2. Patient will demonstrate increased L hip AROM that is equal to R to allow for proper joint functioning as indicated by patients Functional Deficits. []? Progressing: []? Met: []? Not Met: []? Adjusted  3. Patient will demonstrate an increase in L hip strength that is equal to R to allow for proper functional mobility as indicated by patients Functional Deficits. []? Progressing: []? Met: []? Not Met: []? Adjusted  4. Patient will return to all functional activities without increased symptoms or restriction. []? Progressing: []? Met: []? Not Met: []? Adjusted  5. Patient will have increase strength and ROM in order to  10 lbs from floor with proper sequencing and no c/o pain allowing her to better take care of her grand children  []? Progressing: []? Met: []? Not Met: []?  Adjusted      Overall Progression Towards Functional goals/ Treatment Progress Update:  [] Patient is progressing as expected towards functional goals listed. [] Progression is slowed due to complexities/Impairments listed. [] Progression has been slowed due to co-morbidities. [] Plan just implemented, too soon to assess goals progression <30days   [] Goals require adjustment due to lack of progress  [] Patient is not progressing as expected and requires additional follow up with physician  [x] Other Pt presents with mild increase in L hip ROM and strength possibly d/t injection she received. She continues to have pain with flexion and IR ROM as well as tenderness and strength deficits. Based on symptoms she is appropriate for continued PT to increase glut strength and decrease tissue tightness. Prognosis for POC: [x] Good [] Fair  [] Poor      Patient requires continued skilled intervention: [x] Yes  [] No    Treatment/Activity Tolerance:  [x] Patient able to complete treatment  [] Patient limited by fatigue  [] Patient limited by pain     [] Patient limited by other medical complications  [] Other:   4/28 Pt continues to have tightness at proximal hip flexors that responded well to Dry needling and manual therapy. Increase guarding at atnerior/ medial hip with LAD and lateral joint mobilization. Tolerated increase in intensity of strengthening exercises with no adverse effects. Patient Education:              4/22 Added adductor stretch to HEP and withheld exercises in weight bearing. 4/16 Updated HEP   4/14 Educated on precautions, use of ice and importance of HEP. Educated on activity modification when taking care of grandchildren and avoid excessive hip flexion, adduction and internal rotation.      [employer]   Access Code: [place]          PLAN: See eval  [] Continue per plan of care [x] Alter current plan (see comments below)  [x] Plan of care initiated [] Hold pending MD visit [] Discharge  4/28 Monitor symptoms and continue with manual therapy to decrease symptoms and improve ROM. Progress with strengthening as tolerated  4/14 Pt to attempt PT 2x a week for 4-6 weeks to increase L hip mobility and strength as well as decrease tissue tightness and tenderness in order to improve function and prevent surgical intervention. Electronically signed by:  Edgar Rizvi PT    Note: If patient does not return for scheduled/ recommended follow up visits, this note will serve as a discharge from care along with most recent update on progress.

## 2021-04-30 ENCOUNTER — HOSPITAL ENCOUNTER (OUTPATIENT)
Dept: PHYSICAL THERAPY | Age: 56
Setting detail: THERAPIES SERIES
Discharge: HOME | End: 2021-04-30
Payer: COMMERCIAL

## 2021-04-30 PROCEDURE — 97140 MANUAL THERAPY 1/> REGIONS: CPT

## 2021-04-30 PROCEDURE — 97110 THERAPEUTIC EXERCISES: CPT

## 2021-04-30 NOTE — FLOWSHEET NOTE
The [address] [facility] and [facility], [address], [address] [facility]  Phone: [phone]   Fax:     [phone]    Physical Therapy Daily Treatment Note  Date:  2021    Patient Name:  Cipriano Maki    :  1965  MRN: [id number]  Restrictions/Precautions:    Medical/Treatment Diagnosis Information:  · Diagnosis: X11.553X (ICD-10-CM) - Strain of left groin  · Treatment Diagnosis: M25.552 Left hip pain  Insurance/Certification information:  PT Insurance Information: [facility]  Physician Information:  Referring Practitioner: Eva West. Sherron Guzman MD  Has the plan of care been signed (Y/N):        []  Yes  [x]  No     Date of Patient follow up with Physician: PRN      Is this a Progress Report:     []  Yes  [x]  No        If Yes:  Date Range for reporting period:  Beginning 3/11/21  Ending 21    Progress report will be due (10 Rx or 30 days whichever is less):        Recertification will be due (POC Duration  / 90 days whichever is less): 21         Visit # Insurance Allowable Auth Required   7   [facility]  UNL []  Yes []  No        Functional Scale:   WOMAC 58% deficit  Date assessed: 21       Latex Allergy:  [x]NO      []YES  Preferred Language for Healthcare:   [x]English       []other:      Pain level:  4-5/10        SUBJECTIVE:   Pt states L hip is feeling about the same with symptoms coming and going. Pain continues to not be constant and is sleeping better. Pt states she can get in/out of car without pain.       OBJECTIVE:   L hip IR PROM 45 deg measured prone on    Assessed below on 21  ROM PROM AROM Comments     Left Right Left Right     Flexion 100 with pain  116 92 99     Extension     10 8 symptoms reported L hip      Abduction 31 pain  38 29 36     ER 41 40 39 38     IR 40 60 36 55                      Flexibility Left Right Comments   Hamstrings -28 -25     ITB (Obers test) Mild restriction  Mild restriction     Hip flexor(Mikhail test) Mild restriction with pain WNL                  Special  Test Left Right Comments   SLR Pain and limited motion WNL     FABERS Pos Neg     Scour test Pos Neg     Impingement test Pos Neg     Posterior shear Neg Neg      Slump Neg Neg     Facet Closure Neg Neg Standing and sitting       Strength Left Right Comments   Hip flexors 4- with pain  4     Hip extension 4+ 4+     Hip abduction 4+ 4+     Hip adduction 4+ 4     Hip ER 4 with pain 4+     Hip IR 4 with pain  4+     Quads 4+ 5     Hamstrings 5 5        Joint mobility:               []?Normal                       [x]? Hypo L hip into flexion and IR with reproduction in pain              []?Hyper     Palpation: Tenderness L ASIS, L proximal hip flexors     Functional Mobility/Transfers: Independent     Posture: Mild hip flexion and ER noted on L compared to R when resting in supine     Bandages/Dressings/Incisions: N/A     Gait: Independent, Mild Trendelenburg on L     Orthopedic Special Tests: See above      RESTRICTIONS/PRECAUTIONS: HTN.  2 previous L hip labral repairs    Exercises/Interventions:     ROM/STRETCHES     HF stretch 30\"hx3 L ^4/20 standing with side bend   Adductor stretch 30\"hx3 L 4/22 standing with UE support on table    Prone quad stretch 30\"hx3 L 4/14 towel roll with stretch   Hip ER/IR AROM  4/30 cont with HEP    Hamstring stretch Cont with HEP 4/14             PREs     Prone BS with knee flex  4/16 pillow under stomach    Prone hip extension 3# 3x12 L ^4/30 pillow under abdomen   Prone hip ER 10\"hx10 bilat 4/28 BS between heels   Hip abd 0# 3x10 L 4/30 side lying   Hamstring curl 3# 3x10 L 4/30 bent over table   Lianet Golds TB 5\"h 3x10 ^4/22   Clams Gray TB 3x10 L ^4/30 side lying    Mini squat 4/22 withheld d/t flare up in symptoms   SLS with hip abd 4/30 withheld d/t symptoms at end of treatment                            Functional movement   4/14             Manual interventions     Joint mobilization -P-A femur on glenoid grade3 20\"hx5 L   ^4/28 pt prone   PROM  4/22 withheld d/t decrease guarding noted and completed stretching above    STM  L proximal hip flexors and mid substance quad as well as glut and ITB with rollerx5' ^4/30   LAD  4/30 withheld d/t no stretch at hip felt             Therapeutic Exercise and NMR EXR  [x] (35954) Provided verbal/tactile cueing for activities related to strengthening, flexibility, endurance, ROM for improvements in LE, proximal hip, and core control with self care, mobility, lifting, ambulation. [x] (58049) Provided verbal/tactile cueing for activities related to improving balance, coordination, kinesthetic sense, posture, motor skill, proprioception  to assist with LE, proximal hip, and core control in self care, mobility, lifting, ambulation and eccentric single leg control.      NMR and Therapeutic Activities:    [] (09545 or 30489) Provided verbal/tactile cueing for activities related to improving balance, coordination, kinesthetic sense, posture, motor skill, proprioception and motor activation to allow for proper function of core, proximal hip and LE with self care and ADLs  [] (05234) Gait Re-education- Provided training and instruction to the patient for proper LE, core and proximal hip recruitment and positioning and eccentric body weight control with ambulation re-education including up and down stairs     Home Exercise Program:    [x] (49139) Reviewed/Progressed HEP activities related to strengthening, flexibility, endurance, ROM of core, proximal hip and LE for functional self-care, mobility, lifting and ambulation/stair navigation   [] (83528)Reviewed/Progressed HEP activities related to improving balance, coordination, kinesthetic sense, posture, motor skill, proprioception of core, proximal hip and LE for self care, mobility, lifting, and ambulation/stair navigation      Manual Treatments:  PROM / STM / Oscillations-Mobs:  G-I, II, III, IV (PA's, Progressing: []? Met: []? Not Met: []? Adjusted  3. Patient will demonstrate an increase in L hip strength that is equal to R to allow for proper functional mobility as indicated by patients Functional Deficits. []? Progressing: []? Met: []? Not Met: []? Adjusted  4. Patient will return to all functional activities without increased symptoms or restriction. []? Progressing: []? Met: []? Not Met: []? Adjusted  5. Patient will have increase strength and ROM in order to  10 lbs from floor with proper sequencing and no c/o pain allowing her to better take care of her grand children  []? Progressing: []? Met: []? Not Met: []? Adjusted      Overall Progression Towards Functional goals/ Treatment Progress Update:  [] Patient is progressing as expected towards functional goals listed. [] Progression is slowed due to complexities/Impairments listed. [] Progression has been slowed due to co-morbidities. [] Plan just implemented, too soon to assess goals progression <30days   [] Goals require adjustment due to lack of progress  [] Patient is not progressing as expected and requires additional follow up with physician  [x] Other Pt presents with mild increase in L hip ROM and strength possibly d/t injection she received. She continues to have pain with flexion and IR ROM as well as tenderness and strength deficits. Based on symptoms she is appropriate for continued PT to increase glut strength and decrease tissue tightness. Prognosis for POC: [x] Good [] Fair  [] Poor      Patient requires continued skilled intervention: [x] Yes  [] No    Treatment/Activity Tolerance:  [x] Patient able to complete treatment  [] Patient limited by fatigue  [] Patient limited by pain     [] Patient limited by other medical complications  [] Other:   4/30 Decrease muscle knots noted at L proximal hip flexors and withheld dry needling but continued to have muscle tension and responded well to STM.  Attempted to increase glut strength to take pressure off anterior/ medial aspect of hip. Fatigued with exercises but did report slight increase in deep medial hip pain at joint. Symptoms decreased with icing. Based on symptoms continued to be challenged with increasing strengthening exercises. Patient Education:              4/30 Updated HEP   4/22 Added adductor stretch to HEP and withheld exercises in weight bearing. 4/16 Updated HEP   4/14 Educated on precautions, use of ice and importance of HEP. Educated on activity modification when taking care of grandchildren and avoid excessive hip flexion, adduction and internal rotation. [employer]   Access Code: [place]          PLAN: See eval  [] Continue per plan of care [x] Alter current plan (see comments below)  [x] Plan of care initiated [] Hold pending MD visit [] Discharge  4/30 Monitor symptoms and orogress with strengthening as tolerated  4/14 Pt to attempt PT 2x a week for 4-6 weeks to increase L hip mobility and strength as well as decrease tissue tightness and tenderness in order to improve function and prevent surgical intervention. Electronically signed by:  Lisa Washington PT    Note: If patient does not return for scheduled/ recommended follow up visits, this note will serve as a discharge from care along with most recent update on progress.

## 2021-05-04 ENCOUNTER — HOSPITAL ENCOUNTER (OUTPATIENT)
Dept: PHYSICAL THERAPY | Age: 56
Setting detail: THERAPIES SERIES
Discharge: HOME | End: 2021-05-04
Payer: COMMERCIAL

## 2021-05-04 PROCEDURE — 97140 MANUAL THERAPY 1/> REGIONS: CPT

## 2021-05-04 PROCEDURE — 97110 THERAPEUTIC EXERCISES: CPT

## 2021-05-04 NOTE — FLOWSHEET NOTE
ER 41 40 39 38     IR 40 60 36 55                      Flexibility Left Right Comments   Hamstrings -28 -25     ITB (Obers test) Mild restriction  Mild restriction     Hip flexor(Mikhail test) Mild restriction with pain WNL                  Special  Test Left Right Comments   SLR Pain and limited motion WNL     FABERS Pos Neg     Scour test Pos Neg     Impingement test Pos Neg     Posterior shear Neg Neg      Slump Neg Neg     Facet Closure Neg Neg Standing and sitting       Strength Left Right Comments   Hip flexors 4- with pain  4     Hip extension 4+ 4+     Hip abduction 4+ 4+     Hip adduction 4+ 4     Hip ER 4 with pain 4+     Hip IR 4 with pain  4+     Quads 4+ 5     Hamstrings 5 5        Joint mobility:               []?Normal                       [x]? Hypo L hip into flexion and IR with reproduction in pain              []?Hyper     Palpation: Tenderness L ASIS, L proximal hip flexors     Functional Mobility/Transfers: Independent     Posture: Mild hip flexion and ER noted on L compared to R when resting in supine     Bandages/Dressings/Incisions: N/A     Gait: Independent, Mild Trendelenburg on L     Orthopedic Special Tests: See above      RESTRICTIONS/PRECAUTIONS: HTN.  2 previous L hip labral repairs    Exercises/Interventions:     ROM/STRETCHES     HF stretch 5/4 completed while waiting to start treatment   Adductor stretch 5/4 completed while waiting to start treatment   Prone quad stretch 30\"hx3 L 4/14 towel roll with stretch   Hip ER/IR AROM  4/30 cont with HEP    Hamstring stretch Cont with HEP 4/14             PREs     Prone BS with knee flex  4/16 pillow under stomach    Prone hip extension 3# 3x15 L ^5/4 pillow under abdomen   Prone hip ER 10\"hx10 bilat 4/28 BS between heels   Hip abd 3# 3x10 L ^5/4 increased weight d/t completing with 3# at home, side lying   Hamstring curl 4# 3x10 L ^5/4 standing at table   Cathie SL 3x8 ^5/4    Clams Gray TB 3x10 L ^5/4 ball at heels, side lying   Mini squat 4/22 withheld d/t flare up in symptoms   SLS with hip abd 4/30 withheld d/t symptoms at end of treatment   SLB 30\"hx3 L 5/4 completed on airex                       Functional movement   4/14             Manual interventions     Joint mobilization -P-A femur on glenoid grade3 20\"hx5 L   ^5/4 pt prone   PROM  4/22 withheld d/t decrease guarding noted and completed stretching above    STM  L proximal hip flexors, quadriceps full muscle and hip adductorsx6' ^5/4   LAD  4/30 withheld d/t no stretch at hip felt             Therapeutic Exercise and NMR EXR  [x] (37680) Provided verbal/tactile cueing for activities related to strengthening, flexibility, endurance, ROM for improvements in LE, proximal hip, and core control with self care, mobility, lifting, ambulation. [x] (79483) Provided verbal/tactile cueing for activities related to improving balance, coordination, kinesthetic sense, posture, motor skill, proprioception  to assist with LE, proximal hip, and core control in self care, mobility, lifting, ambulation and eccentric single leg control.      NMR and Therapeutic Activities:    [] (56636 or 73790) Provided verbal/tactile cueing for activities related to improving balance, coordination, kinesthetic sense, posture, motor skill, proprioception and motor activation to allow for proper function of core, proximal hip and LE with self care and ADLs  [] (97592) Gait Re-education- Provided training and instruction to the patient for proper LE, core and proximal hip recruitment and positioning and eccentric body weight control with ambulation re-education including up and down stairs     Home Exercise Program:    [x] (23798) Reviewed/Progressed HEP activities related to strengthening, flexibility, endurance, ROM of core, proximal hip and LE for functional self-care, mobility, lifting and ambulation/stair navigation   [] (00328)Reviewed/Progressed HEP activities related to improving balance, coordination, kinesthetic sense, posture, motor skill, proprioception of core, proximal hip and LE for self care, mobility, lifting, and ambulation/stair navigation      Manual Treatments:  PROM / STM / Oscillations-Mobs:  G-I, II, III, IV (PA's, Inf., Post.)  [] (86431) Provided manual therapy to mobilize LE, proximal hip and/or LS spine soft tissue/joints for the purpose of modulating pain, promoting relaxation,  increasing ROM, reducing/eliminating soft tissue swelling/inflammation/restriction, improving soft tissue extensibility and allowing for proper ROM for normal function with self care, mobility, lifting and ambulation. Modalities:           Performed by Vida Echeverria PT, DPT   10 min  date 4/27/21      CP x15' L hip 5/4    Charges:  Timed Code Treatment Minutes: 45'   Total Treatment Minutes: 4:15-4:08  48'       [] EVAL (LOW) 30900 (typically 20 minutes face-to-face)  [] EVAL (MOD) 16491 (typically 30 minutes face-to-face)  [] EVAL (HIGH) 39937 (typically 45 minutes face-to-face)  [] RE-EVAL     [x] KM(55134) x   2  [] IONTO  [] NMR (97540) x     [] VASO  [x] Manual (55207) x  1    [] Other: Dnx1  [] TA x      [] Mech Traction (17811)  [] ES(attended) (46388)      [] ES (un) (29718):     GOALS: Adjusted on 4/14/21 d/t new prescription from MD  Patient stated goal:  Eliminate pain in order to return to exercise  []? Progressing: []? Met: []? Not Met: []? Adjusted     Therapist goals for Patient:   Short Term Goals: To be achieved in: 4 weeks  1. Independent in HEP and progression per patient tolerance, in order to prevent re-injury. []? Progressing: []? Met: []? Not Met: []? Adjusted   2. Patient will have a decrease in pain to facilitate improvement in movement, function, and ADLs as indicated by Functional Deficits. []? Progressing: []? Met: []? Not Met: []? Adjusted     Long Term Goals: To be achieved in: 6 weeks  1. Disability index score of 10% or less for the [facility] to assist with reaching prior level of function. medical complications  [] Other:   5/4 Tightness noted at mid substance of quad as well as hip adductors but no muscle knots and responded well to [facility] allowing for decrease in tightness. Fatigued with increase in intensity of strengthening exercises but no adverse effects noted. No c/o hip pain with SLB on airex. Patient Education:              5/4 Updated HEP with weight, SLB and SL bridges. 4/30 Updated HEP   4/22 Added adductor stretch to HEP and withheld exercises in weight bearing. 4/16 Updated HEP   4/14 Educated on precautions, use of ice and importance of HEP. Educated on activity modification when taking care of grandchildren and avoid excessive hip flexion, adduction and internal rotation. [employer]   Access Code: [place]          PLAN: See eval  [] Continue per plan of care [x] Alter current plan (see comments below)  [x] Plan of care initiated [] Hold pending MD visit [] Discharge  5/4 Monitor symptoms and progress with strengthening as tolerated  4/14 Pt to attempt PT 2x a week for 4-6 weeks to increase L hip mobility and strength as well as decrease tissue tightness and tenderness in order to improve function and prevent surgical intervention. Electronically signed by:  Mj Bynum PT    Note: If patient does not return for scheduled/ recommended follow up visits, this note will serve as a discharge from care along with most recent update on progress.

## 2021-05-06 ENCOUNTER — HOSPITAL ENCOUNTER (OUTPATIENT)
Dept: PHYSICAL THERAPY | Age: 56
Setting detail: THERAPIES SERIES
Discharge: HOME | End: 2021-05-06
Payer: COMMERCIAL

## 2021-05-06 PROCEDURE — 97140 MANUAL THERAPY 1/> REGIONS: CPT

## 2021-05-06 PROCEDURE — 97110 THERAPEUTIC EXERCISES: CPT

## 2021-05-06 NOTE — FLOWSHEET NOTE
The [facility] and [facility], [facility] [address], [address] [facility]  Phone: [phone]   Fax:     [phone]    Physical Therapy Daily Treatment Note  Date:  2021    Patient Name:  Ammy Mendes    :  1965  MRN: [id number]  Restrictions/Precautions:    Medical/Treatment Diagnosis Information:  · Diagnosis: D13.453C (ICD-10-CM) - Strain of left groin  · Treatment Diagnosis: M25.552 Left hip pain  Insurance/Certification information:  PT Insurance Information: [facility]  Physician Information:  Referring Practitioner: Roopa Mercado. Chintan Carnes MD  Has the plan of care been signed (Y/N):        []  Yes  [x]  No     Date of Patient follow up with Physician: PRN      Is this a Progress Report:     []  Yes  [x]  No        If Yes:  Date Range for reporting period:  Beginning 3/11/21  Ending 21    Progress report will be due (10 Rx or 30 days whichever is less):        Recertification will be due (POC Duration  / 90 days whichever is less): 21         Visit # Insurance Allowable Auth Required   9   [facility]  UNL []  Yes []  No        Functional Scale:   WOMAC 58% deficit  Date assessed: 21       Latex Allergy:  [x]NO      []YES  Preferred Language for Healthcare:   [x]English       []other:      Pain level:  4/10  5/6/21      SUBJECTIVE:   Pt states yesterday was a good day and attributes to not sitting a lot. Pt states she was on her feet a lot yesterday and sat this morning causing pain in groin and wrapping around L hip. States she felt better after manual therapy from last visit. Pt reports the past week she noticed tingling in her private area on the L side when sitting when her symptoms were very flared but but would not last longer then 5 minutes.  Reports she has experienced this in the past.     OBJECTIVE:   L hip IR PROM 45 deg measured prone on    Assessed below on 21  ROM PROM AROM Comments     Left Right Left Right     Flexion 100 with pain  116 92 99     Extension     10 8 symptoms reported L hip      Abduction 31 pain  38 29 36     ER 41 40 39 38     IR 40 60 36 55                      Flexibility Left Right Comments   Hamstrings -28 -25     ITB (Obers test) Mild restriction  Mild restriction     Hip flexor(Mikhail test) Mild restriction with pain WNL                  Special  Test Left Right Comments   SLR Pain and limited motion WNL     FABERS Pos Neg     Scour test Pos Neg     Impingement test Pos Neg     Posterior shear Neg Neg      Slump Neg Neg     Facet Closure Neg Neg Standing and sitting       Strength Left Right Comments   Hip flexors 4- with pain  4     Hip extension 4+ 4+     Hip abduction 4+ 4+     Hip adduction 4+ 4     Hip ER 4 with pain 4+     Hip IR 4 with pain  4+     Quads 4+ 5     Hamstrings 5 5        Joint mobility:               []?Normal                       [x]? Hypo L hip into flexion and IR with reproduction in pain              []?Hyper     Palpation: Tenderness L ASIS, L proximal hip flexors     Functional Mobility/Transfers: Independent     Posture: Mild hip flexion and ER noted on L compared to R when resting in supine     Bandages/Dressings/Incisions: N/A     Gait: Independent, Mild Trendelenburg on L     Orthopedic Special Tests: See above      RESTRICTIONS/PRECAUTIONS: HTN.  2 previous L hip labral repairs    Exercises/Interventions:     ROM/STRETCHES     HF stretch 30\"hx3 L 5/6   Adductor stretch 30\"hx3 L 5/6   Prone quad stretch 30\"hx3 L 4/14 towel roll with stretch   Hip ER/IR AROM  4/30 cont with HEP    Hamstring stretch Cont with HEP 4/14             PREs     Prone BS with knee flex  4/16 pillow under stomach    Prone hip extension 4# 3x10 L ^5/6 pillow under abdomen   Prone hip ER 10\"hx10 bilat 4/28 BS between heels   Hip abd 4# 3x10 L ^5/6 side lying   Hamstring curl 4# 3x15 L ^5/6 standing at table   Bridges SL 3x10 ^5/6   Clams Gray TB 3x10 L ^5/4 ball at heels, side lying   Mini squat 4/22 withheld d/t flare up in symptoms   SLS with hip abd 4/30 withheld d/t symptoms at end of treatment   SLB 30\"hx4 L ^5/6 completed on airex   Standing marching 3x10 alt LE 5/6 cues to limit hip flexion                   Functional movement   4/14             Manual interventions     Joint mobilization -P-A femur on glenoid grade3 20\"hx5 L   ^5/6 pt prone   PROM  4/22 withheld d/t decrease guarding noted and completed stretching above    STM  L proximal hip flexors, quadriceps full muscle and hip adductorsx6' ^5/6   LAD  4/30 withheld d/t no stretch at hip felt             Therapeutic Exercise and NMR EXR  [x] (90040) Provided verbal/tactile cueing for activities related to strengthening, flexibility, endurance, ROM for improvements in LE, proximal hip, and core control with self care, mobility, lifting, ambulation. [x] (34893) Provided verbal/tactile cueing for activities related to improving balance, coordination, kinesthetic sense, posture, motor skill, proprioception  to assist with LE, proximal hip, and core control in self care, mobility, lifting, ambulation and eccentric single leg control.      NMR and Therapeutic Activities:    [] (55741 or 53354) Provided verbal/tactile cueing for activities related to improving balance, coordination, kinesthetic sense, posture, motor skill, proprioception and motor activation to allow for proper function of core, proximal hip and LE with self care and ADLs  [] (93631) Gait Re-education- Provided training and instruction to the patient for proper LE, core and proximal hip recruitment and positioning and eccentric body weight control with ambulation re-education including up and down stairs     Home Exercise Program:    [x] (91073) Reviewed/Progressed HEP activities related to strengthening, flexibility, endurance, ROM of core, proximal hip and LE for functional self-care, mobility, lifting and ambulation/stair navigation   [] (46620)Reviewed/Progressed HEP activities related to improving balance, coordination, kinesthetic sense, posture, motor skill, proprioception of core, proximal hip and LE for self care, mobility, lifting, and ambulation/stair navigation      Manual Treatments:  PROM / STM / Oscillations-Mobs:  G-I, II, III, IV (PA's, Inf., Post.)  [] (79413) Provided manual therapy to mobilize LE, proximal hip and/or LS spine soft tissue/joints for the purpose of modulating pain, promoting relaxation,  increasing ROM, reducing/eliminating soft tissue swelling/inflammation/restriction, improving soft tissue extensibility and allowing for proper ROM for normal function with self care, mobility, lifting and ambulation. Modalities:           Performed by Emiliano Barragan PT, DPT   10 min  date 4/27/21      CP x15' L hip 5/6    Charges:  Timed Code Treatment Minutes: 44'   Total Treatment Minutes: 10:12-11:06  54'       [] EVAL (LOW) 66635 (typically 20 minutes face-to-face)  [] EVAL (MOD) 33617 (typically 30 minutes face-to-face)  [] EVAL (HIGH) 94809 (typically 45 minutes face-to-face)  [] RE-EVAL     [x] ZH(75544) x   2  [] IONTO  [] NMR (15922) x     [] VASO  [x] Manual (69263) x  1    [] Other: Dnx1  [] TA x      [] Mech Traction (37460)  [] ES(attended) (89196)      [] ES (un) (08470):     GOALS: Adjusted on 4/14/21 d/t new prescription from MD  Patient stated goal:  Eliminate pain in order to return to exercise  []? Progressing: []? Met: []? Not Met: []? Adjusted     Therapist goals for Patient:   Short Term Goals: To be achieved in: 4 weeks  1. Independent in HEP and progression per patient tolerance, in order to prevent re-injury. []? Progressing: []? Met: []? Not Met: []? Adjusted   2. Patient will have a decrease in pain to facilitate improvement in movement, function, and ADLs as indicated by Functional Deficits. []? Progressing: []? Met: []? Not Met: []? Adjusted     Long Term Goals: To be achieved in: 6 weeks  1.  Disability index score of 10% or less for the [facility] to assist with reaching prior level of function. []? Progressing: []? Met: []? Not Met: []? Adjusted  2. Patient will demonstrate increased L hip AROM that is equal to R to allow for proper joint functioning as indicated by patients Functional Deficits. []? Progressing: []? Met: []? Not Met: []? Adjusted  3. Patient will demonstrate an increase in L hip strength that is equal to R to allow for proper functional mobility as indicated by patients Functional Deficits. []? Progressing: []? Met: []? Not Met: []? Adjusted  4. Patient will return to all functional activities without increased symptoms or restriction. []? Progressing: []? Met: []? Not Met: []? Adjusted  5. Patient will have increase strength and ROM in order to  10 lbs from floor with proper sequencing and no c/o pain allowing her to better take care of her grand children  []? Progressing: []? Met: []? Not Met: []? Adjusted      Overall Progression Towards Functional goals/ Treatment Progress Update:  [] Patient is progressing as expected towards functional goals listed. [] Progression is slowed due to complexities/Impairments listed. [] Progression has been slowed due to co-morbidities. [] Plan just implemented, too soon to assess goals progression <30days   [] Goals require adjustment due to lack of progress  [] Patient is not progressing as expected and requires additional follow up with physician  [x] Other Pt presents with mild increase in L hip ROM and strength possibly d/t injection she received. She continues to have pain with flexion and IR ROM as well as tenderness and strength deficits. Based on symptoms she is appropriate for continued PT to increase glut strength and decrease tissue tightness.      Prognosis for POC: [x] Good [] Fair  [] Poor      Patient requires continued skilled intervention: [x] Yes  [] No    Treatment/Activity Tolerance:  [x] Patient able to complete treatment  [] Patient limited by fatigue  [] Patient limited by pain     [] Patient limited by other medical complications  [] Other:   5/6: Fatigued with increase in intensity of treatment and required cues to limit hip flexion on L with marching to decrease hip joint irritation. Continues to have muscle tightness but no knots noted and responding well to STM. Patient Education:              5/6 Pt educated on monitoring symptoms and informing PT or MD if sensation she was describing increases with severity or consistency. 5/4 Updated HEP with weight, SLB and SL bridges. 4/30 Updated HEP   4/22 Added adductor stretch to HEP and withheld exercises in weight bearing. 4/16 Updated HEP   4/14 Educated on precautions, use of ice and importance of HEP. Educated on activity modification when taking care of grandchildren and avoid excessive hip flexion, adduction and internal rotation. [employer]   Access Code: [place]          PLAN: See eval  [] Continue per plan of care [x] Alter current plan (see comments below)  [x] Plan of care initiated [] Hold pending MD visit [] Discharge  5/6 Monitor symptoms and progress with strengthening as tolerated  4/14 Pt to attempt PT 2x a week for 4-6 weeks to increase L hip mobility and strength as well as decrease tissue tightness and tenderness in order to improve function and prevent surgical intervention. Electronically signed by:  Edgar Rizvi PT    Note: If patient does not return for scheduled/ recommended follow up visits, this note will serve as a discharge from care along with most recent update on progress.

## 2021-05-10 ENCOUNTER — HOSPITAL ENCOUNTER (OUTPATIENT)
Dept: PHYSICAL THERAPY | Age: 56
Setting detail: THERAPIES SERIES
Discharge: HOME | End: 2021-05-10
Payer: COMMERCIAL

## 2021-05-10 PROCEDURE — 97110 THERAPEUTIC EXERCISES: CPT

## 2021-05-10 PROCEDURE — 97140 MANUAL THERAPY 1/> REGIONS: CPT

## 2021-05-10 NOTE — FLOWSHEET NOTE
The [facility] and [address] [facility], [facility] [address], [address] [facility]  Phone: [phone]   Fax:     [phone]    Physical Therapy Daily Treatment Note  Date:  5/10/2021    Patient Name:  Cipriano Maki    :  1965  MRN: [id number]  Restrictions/Precautions:    Medical/Treatment Diagnosis Information:  · Diagnosis: V59.866T (ICD-10-CM) - Strain of left groin  · Treatment Diagnosis: M25.552 Left hip pain  Insurance/Certification information:  PT Insurance Information: [facility]  Physician Information:  Referring Practitioner: Eva West. Sherron Guzman MD  Has the plan of care been signed (Y/N):        []  Yes  [x]  No     Date of Patient follow up with Physician: PRN      Is this a Progress Report:     []  Yes  [x]  No        If Yes:  Date Range for reporting period:  Beginning 3/11/21  Ending 21    Progress report will be due (10 Rx or 30 days whichever is less): 07       Recertification will be due (POC Duration  / 90 days whichever is less): 21         Visit # Insurance Allowable Auth Required   10  5/10 [facility]  UNL []  Yes []  No        Functional Scale:   WOMAC 58% deficit  Date assessed: 21       Latex Allergy:  [x]NO      []YES  Preferred Language for Healthcare:   [x]English       []other:      Pain level:  1/10  5/10/21      SUBJECTIVE:  5/10 Pt reports having 2-3 days with little to no pain. Symptoms are still present in groin but not as intense as it has been.      OBJECTIVE:   L hip IR PROM 45 deg measured prone on    Assessed below on 21  ROM PROM AROM Comments     Left Right Left Right     Flexion 100 with pain  116 92 99     Extension     10 8 symptoms reported L hip      Abduction 31 pain  38 29 36     ER 41 40 39 38     IR 40 60 36 55                      Flexibility Left Right Comments   Hamstrings -28 -25     ITB (Obers test) Mild restriction  Mild restriction     Hip flexor(Mikhail test) Mild restriction with pain WNL                  Special  Test Left Right Comments   SLR Pain and limited motion WNL     FABERS Pos Neg     Scour test Pos Neg     Impingement test Pos Neg     Posterior shear Neg Neg      Slump Neg Neg     Facet Closure Neg Neg Standing and sitting       Strength Left Right Comments   Hip flexors 4- with pain  4     Hip extension 4+ 4+     Hip abduction 4+ 4+     Hip adduction 4+ 4     Hip ER 4 with pain 4+     Hip IR 4 with pain  4+     Quads 4+ 5     Hamstrings 5 5        Joint mobility:               []?Normal                       [x]? Hypo L hip into flexion and IR with reproduction in pain              []?Hyper     Palpation: Tenderness L ASIS, L proximal hip flexors     Functional Mobility/Transfers: Independent     Posture: Mild hip flexion and ER noted on L compared to R when resting in supine     Bandages/Dressings/Incisions: N/A     Gait: Independent, Mild Trendelenburg on L     Orthopedic Special Tests: See above      RESTRICTIONS/PRECAUTIONS: HTN.  2 previous L hip labral repairs    Exercises/Interventions:     ROM/STRETCHES     HF stretch 5/10 completed in waiting room prior to treatment   Adductor stretch 5/10 completed in waiting room prior to treatment   Prone quad stretch 30\"hx3 L 4/14 towel roll with stretch   Hip ER/IR AROM  4/30 cont with HEP    Hamstring stretch Cont with HEP 4/14             PREs     Prone BS with knee flex  4/16 pillow under stomach    Prone hip extension 4# 3x12 L ^5/10 pillow under abdomen   Prone hip ER 1 5/10 cont with HEP, progressed with Hip ER strengthening below   Hip abd 4# 3x12 L ^5/10 side lying   Hamstring curl 5# 3x10 L ^5/10 standing at table   Bridges SL 3x10 ^5/6   Clams Gray TB 3x10 L ^5/4 ball at heels, side lying   Mini squat 4/22 withheld d/t flare up in symptoms   SLS with hip abd 4/30 withheld d/t symptoms at end of treatment   SLB 30\"hx5 L ^5/10 completed on airex   Standing marching  5/10 progressed with step ups   Step ups Forward 3x10 L  Lateral 3x10 L 5/10 4\" step    Hip ER  Red TB 3x10 L 5/10 seated EOT, hip ER ROM form IR to netrual, did not push into ER with resistance        Functional movement   4/14             Manual interventions     Joint mobilization -P-A femur on glenoid grade3 20\"hx5 L   ^5/10 pt prone   PROM  4/22 withheld d/t decrease guarding noted and completed stretching above    STM  L proximal hip flexors, quadriceps full muscle and hip adductorsx6' ^5/10   LAD  4/30 withheld d/t no stretch at hip felt             Therapeutic Exercise and NMR EXR  [x] (82630) Provided verbal/tactile cueing for activities related to strengthening, flexibility, endurance, ROM for improvements in LE, proximal hip, and core control with self care, mobility, lifting, ambulation. [x] (25644) Provided verbal/tactile cueing for activities related to improving balance, coordination, kinesthetic sense, posture, motor skill, proprioception  to assist with LE, proximal hip, and core control in self care, mobility, lifting, ambulation and eccentric single leg control.      NMR and Therapeutic Activities:    [] (49310 or 89079) Provided verbal/tactile cueing for activities related to improving balance, coordination, kinesthetic sense, posture, motor skill, proprioception and motor activation to allow for proper function of core, proximal hip and LE with self care and ADLs  [] (42942) Gait Re-education- Provided training and instruction to the patient for proper LE, core and proximal hip recruitment and positioning and eccentric body weight control with ambulation re-education including up and down stairs     Home Exercise Program:    [x] (75070) Reviewed/Progressed HEP activities related to strengthening, flexibility, endurance, ROM of core, proximal hip and LE for functional self-care, mobility, lifting and ambulation/stair navigation   [] (16287)Reviewed/Progressed HEP activities related to improving balance, coordination, kinesthetic sense, posture, motor skill, proprioception of core, proximal hip and LE for self care, mobility, lifting, and ambulation/stair navigation      Manual Treatments:  PROM / STM / Oscillations-Mobs:  G-I, II, III, IV (PA's, Inf., Post.)  [] (74277) Provided manual therapy to mobilize LE, proximal hip and/or LS spine soft tissue/joints for the purpose of modulating pain, promoting relaxation,  increasing ROM, reducing/eliminating soft tissue swelling/inflammation/restriction, improving soft tissue extensibility and allowing for proper ROM for normal function with self care, mobility, lifting and ambulation. Modalities:           Performed by Shaji Haro PT, DPT   10 min  date 4/27/21      CP x15' L hip 5/10    Charges:  Timed Code Treatment Minutes: 37'   Total Treatment Minutes: 4:00-5:05  72'       [] EVAL (LOW) 11707 (typically 20 minutes face-to-face)  [] EVAL (MOD) 45633 (typically 30 minutes face-to-face)  [] EVAL (HIGH) 87597 (typically 45 minutes face-to-face)  [] RE-EVAL     [x] PD(91468) x   2  [] IONTO  [] NMR (19756) x     [] VASO  [x] Manual (00143) x  1    [] Other: Dnx1  [] TA x      [] Mech Traction (89590)  [] ES(attended) (06800)      [] ES (un) (84529):     GOALS: Adjusted on 4/14/21 d/t new prescription from MD  Patient stated goal:  Eliminate pain in order to return to exercise  []? Progressing: []? Met: []? Not Met: []? Adjusted     Therapist goals for Patient:   Short Term Goals: To be achieved in: 4 weeks  1. Independent in HEP and progression per patient tolerance, in order to prevent re-injury. []? Progressing: []? Met: []? Not Met: []? Adjusted   2. Patient will have a decrease in pain to facilitate improvement in movement, function, and ADLs as indicated by Functional Deficits. []? Progressing: []? Met: []? Not Met: []? Adjusted     Long Term Goals: To be achieved in: 6 weeks  1. Disability index score of 10% or less for the [facility] to assist with reaching prior level of function. []? Progressing: []?  Met: []? Not Met: []? Adjusted  2. Patient will demonstrate increased L hip AROM that is equal to R to allow for proper joint functioning as indicated by patients Functional Deficits. []? Progressing: []? Met: []? Not Met: []? Adjusted  3. Patient will demonstrate an increase in L hip strength that is equal to R to allow for proper functional mobility as indicated by patients Functional Deficits. []? Progressing: []? Met: []? Not Met: []? Adjusted  4. Patient will return to all functional activities without increased symptoms or restriction. []? Progressing: []? Met: []? Not Met: []? Adjusted  5. Patient will have increase strength and ROM in order to  10 lbs from floor with proper sequencing and no c/o pain allowing her to better take care of her grand children  []? Progressing: []? Met: []? Not Met: []? Adjusted      Overall Progression Towards Functional goals/ Treatment Progress Update:  [] Patient is progressing as expected towards functional goals listed. [] Progression is slowed due to complexities/Impairments listed. [] Progression has been slowed due to co-morbidities. [] Plan just implemented, too soon to assess goals progression <30days   [] Goals require adjustment due to lack of progress  [] Patient is not progressing as expected and requires additional follow up with physician  [x] Other Pt presents with mild increase in L hip ROM and strength possibly d/t injection she received. She continues to have pain with flexion and IR ROM as well as tenderness and strength deficits. Based on symptoms she is appropriate for continued PT to increase glut strength and decrease tissue tightness.      Prognosis for POC: [x] Good [] Fair  [] Poor      Patient requires continued skilled intervention: [x] Yes  [] No    Treatment/Activity Tolerance:  [x] Patient able to complete treatment  [] Patient limited by fatigue  [] Patient limited by pain     [] Patient limited by other medical complications  [] Other:   5/10 Tightness noted at L proximal adductors and hip flexors that responded well to manual therapy. Fatigued with increase in intensity of treatment but overall responding well to treatment with decrease in severity of symptoms. Patient Education:              5/6 Pt educated on monitoring symptoms and informing PT or MD if sensation she was describing increases with severity or consistency. 5/4 Updated HEP with weight, SLB and SL bridges. 4/30 Updated HEP   4/22 Added adductor stretch to HEP and withheld exercises in weight bearing. 4/16 Updated HEP   4/14 Educated on precautions, use of ice and importance of HEP. Educated on activity modification when taking care of grandchildren and avoid excessive hip flexion, adduction and internal rotation. [employer]   Access Code: [place]          PLAN: See eval  [] Continue per plan of care [x] Alter current plan (see comments below)  [x] Plan of care initiated [] Hold pending MD visit [] Discharge  5/10 Complete progress note NPV  4/14 Pt to attempt PT 2x a week for 4-6 weeks to increase L hip mobility and strength as well as decrease tissue tightness and tenderness in order to improve function and prevent surgical intervention. Electronically signed by:  Lorena Avila, PT    Note: If patient does not return for scheduled/ recommended follow up visits, this note will serve as a discharge from care along with most recent update on progress.

## 2021-05-12 ENCOUNTER — HOSPITAL ENCOUNTER (OUTPATIENT)
Dept: PHYSICAL THERAPY | Age: 56
Setting detail: THERAPIES SERIES
Discharge: HOME | End: 2021-05-12
Payer: COMMERCIAL

## 2021-05-12 PROCEDURE — 20560 NDL INSJ W/O NJX 1 OR 2 MUSC: CPT

## 2021-05-12 PROCEDURE — 97110 THERAPEUTIC EXERCISES: CPT

## 2021-05-12 NOTE — PROGRESS NOTES
form IR to netrual, did not push into ER with resistance        Functional movement   4/14             Manual interventions     Joint mobilization    ^5/10 pt prone   PROM  4/22 withheld d/t decrease guarding noted and completed stretching above    STM  L proximal hip flexors, quadriceps full muscle and hip adductorsx4' ^5/12, decreased time d/t adding dry needling   LAD  4/30 withheld d/t no stretch at hip felt             Therapeutic Exercise and NMR EXR  [x] (24003) Provided verbal/tactile cueing for activities related to strengthening, flexibility, endurance, ROM for improvements in LE, proximal hip, and core control with self care, mobility, lifting, ambulation. [x] (38749) Provided verbal/tactile cueing for activities related to improving balance, coordination, kinesthetic sense, posture, motor skill, proprioception  to assist with LE, proximal hip, and core control in self care, mobility, lifting, ambulation and eccentric single leg control.      NMR and Therapeutic Activities:    [] (88836 or 98807) Provided verbal/tactile cueing for activities related to improving balance, coordination, kinesthetic sense, posture, motor skill, proprioception and motor activation to allow for proper function of core, proximal hip and LE with self care and ADLs  [] (47003) Gait Re-education- Provided training and instruction to the patient for proper LE, core and proximal hip recruitment and positioning and eccentric body weight control with ambulation re-education including up and down stairs     Home Exercise Program:    [x] (40208) Reviewed/Progressed HEP activities related to strengthening, flexibility, endurance, ROM of core, proximal hip and LE for functional self-care, mobility, lifting and ambulation/stair navigation   [] (18353)Reviewed/Progressed HEP activities related to improving balance, coordination, kinesthetic sense, posture, motor skill, proprioception of core, proximal hip and LE for self care, mobility, lifting, and ambulation/stair navigation      Manual Treatments:  PROM / STM / Oscillations-Mobs:  G-I, II, III, IV (PA's, Inf., Post.)  [] (56393) Provided manual therapy to mobilize LE, proximal hip and/or LS spine soft tissue/joints for the purpose of modulating pain, promoting relaxation,  increasing ROM, reducing/eliminating soft tissue swelling/inflammation/restriction, improving soft tissue extensibility and allowing for proper ROM for normal function with self care, mobility, lifting and ambulation. Modalities:    PT reviewed precautions, contraindications, and indications with pt in addition to application of dry needling within established plan of care and expected outcomes; pt verbalized understanding with all questions answered. Pt had signed consent form for dry needling and PT obtained written consent with updated plan of care from MD before beginning. Dry needling manual therapy: consisted on the placement of 5 needles in the following muscles L hip adductors. 60 mm needles were inserted to produce intramuscular mobilization. These techniques were used to restore functional range of motion, reduce muscle spasm and induce healing in the corresponding musculature. (58658)  Clean Technique was utilized today while applying Dry needling treatment. The treatment sites where cleaned with 70% solution of  isopropyl alcohol . The PT washed their hands and utilized treatment gloves along with hand  prior to inserting the needles. All needles where removed and discarded in the appropriate sharps container.         Performed by Lyssa Roman PT, DPT   10 min  date 5/12/21      CP x15' L hip 5/12    Charges:  Timed Code Treatment Minutes: 42'   Total Treatment Minutes: 10:42-11:49  79'       [] EVAL (LOW) 43435 (typically 20 minutes face-to-face)  [] EVAL (MOD) 15490 (typically 30 minutes face-to-face)  [] EVAL (HIGH) 37565 (typically 45 minutes face-to-face)  [] RE-EVAL     [x] KW(43425) x 2  [] IONTO  [] NMR (58967) x     [] VASO  [] Manual (13347) x      [x] Other: Dnx1  [] TA x      [] Mech Traction (83133)  [] ES(attended) (41408)      [] ES (un) (66481):     GOALS: Adjusted on 4/14/21 d/t new prescription from MD  Patient stated goal:  Eliminate pain in order to return to exercise  [x]? Progressing: []? Met: []? Not Met: []? Adjusted     Therapist goals for Patient:   Short Term Goals: To be achieved in: 4 weeks  1. Independent in HEP and progression per patient tolerance, in order to prevent re-injury. [x]? Progressing: []? Met: []? Not Met: []? Adjusted   2. Patient will have a decrease in pain to facilitate improvement in movement, function, and ADLs as indicated by Functional Deficits. [x]? Progressing: []? Met: []? Not Met: []? Adjusted     Long Term Goals: To be achieved in: 6 weeks  1. Disability index score of 10% or less for the [facility] to assist with reaching prior level of function. [x]? Progressing: []? Met: []? Not Met: []? Adjusted  2. Patient will demonstrate increased L hip AROM that is equal to R to allow for proper joint functioning as indicated by patients Functional Deficits. [x]? Progressing: []? Met: []? Not Met: []? Adjusted  3. Patient will demonstrate an increase in L hip strength that is equal to R to allow for proper functional mobility as indicated by patients Functional Deficits. [x]? Progressing: []? Met: []? Not Met: []? Adjusted  4. Patient will return to all functional activities without increased symptoms or restriction. [x]? Progressing: []? Met: []? Not Met: []? Adjusted  5. Patient will have increase strength and ROM in order to  10 lbs from floor with proper sequencing and no c/o pain allowing her to better take care of her grand children  [x]? Progressing: []? Met: []? Not Met: []?  Adjusted      Overall Progression Towards Functional goals/ Treatment Progress Update:  [x] Patient is progressing as expected towards functional goals listed with increase in L hip ROM and strength allowing for decrease in severity of symptoms and increase in function. 5/12/21  [] Progression is slowed due to complexities/Impairments listed. [] Progression has been slowed due to co-morbidities. [] Plan just implemented, too soon to assess goals progression <30days   [] Goals require adjustment due to lack of progress  [] Patient is not progressing as expected and requires additional follow up with physician  [] Other     Prognosis for POC: [x] Good [] Fair  [] Poor      Patient requires continued skilled intervention: [x] Yes  [] No    Treatment/Activity Tolerance:  [x] Patient able to complete treatment  [] Patient limited by fatigue  [] Patient limited by pain     [] Patient limited by other medical complications  [] Other:   5/12 Continued tightness noted at L proximal hip adductors and therefore attempted dry needling to the area which she responded well to. Fatigued with strengthening but no adverse effects noted or reported    Patient Education:              5/6 Pt educated on monitoring symptoms and informing PT or MD if sensation she was describing increases with severity or consistency. 5/4 Updated HEP with weight, SLB and SL bridges. 4/30 Updated HEP   4/22 Added adductor stretch to HEP and withheld exercises in weight bearing. 4/16 Updated HEP   4/14 Educated on precautions, use of ice and importance of HEP. Educated on activity modification when taking care of grandchildren and avoid excessive hip flexion, adduction and internal rotation. [employer]   Access Code: [place]          PLAN: See eval  [] Continue per plan of care [x] Alter current plan (see comments below)  [x] Plan of care initiated [] Hold pending MD visit [] Discharge  5/12 Pt to continue with PT 2x a week for another 4 weeks to further increase L hip ROM and strength to improve overall function and decrease pain.    4/14 Pt to attempt PT 2x a week for 4-6 weeks to increase L hip mobility and strength as well as decrease tissue tightness and tenderness in order to improve function and prevent surgical intervention. Electronically signed by:  Jenni Zelaya PT    Note: If patient does not return for scheduled/ recommended follow up visits, this note will serve as a discharge from care along with most recent update on progress.

## 2021-05-25 ENCOUNTER — HOSPITAL ENCOUNTER (OUTPATIENT)
Dept: PHYSICAL THERAPY | Age: 56
Setting detail: THERAPIES SERIES
Discharge: HOME | End: 2021-05-25
Payer: COMMERCIAL

## 2021-05-25 PROCEDURE — 97110 THERAPEUTIC EXERCISES: CPT

## 2021-05-25 PROCEDURE — 97112 NEUROMUSCULAR REEDUCATION: CPT

## 2021-05-25 NOTE — FLOWSHEET NOTE
The 1100 [facility] and 500 [facility], [address], [address] [facility]  Phone: [phone]   Fax:     [phone]    Physical Therapy Daily Treatment Note  Date:  2021    Patient Name:  Maria M Cordoba    :  1965  MRN: [id number]  Restrictions/Precautions:    Medical/Treatment Diagnosis Information:  · Diagnosis: B02.189D (ICD-10-CM) - Strain of left groin  · Treatment Diagnosis: M25.552 Left hip pain  Insurance/Certification information:  PT Insurance Information: [facility]  Physician Information:  Referring Practitioner: Aurora Moreno. Kristen Bartlett MD  Has the plan of care been signed (Y/N):        []  Yes  [x]  No     Date of Patient follow up with Physician: PRN      Is this a Progress Report:     []  Yes  [x]  No        If Yes:  Date Range for reporting period:  Beginning 3/11/21  Ending 21    Progress report will be due (10 Rx or 30 days whichever is less): 27       Recertification will be due (POC Duration  / 90 days whichever is less): 21         Visit # Insurance Allowable Auth Required   12   [address] []  Yes []  No        Functional Scale:   WOMAC 23% deficit  Date assessed: 21  WOMAC 58% deficit  Date assessed: 21       Latex Allergy:  [x]NO      []YES  Preferred Language for Healthcare:   [x]English       []other:      Pain level:  0/10  21      SUBJECTIVE:   Pt has not been in Pt d/t traveling and reports overall she is doing much better. Was able to walk and negotiate stairs without significant increase in symptoms.       OBJECTIVE:   Palpation: Mild muscle tension L proximal adductors and hip flexors but no muscle knots and tenderness reported 21  Assessed below on 21  ROM PROM AROM Comments     Left Right Left Right     Flexion 108 with pain  116 92 99     Extension     10 8 symptoms reported L hip      Abduction 35 pain  38 29  36     ER 41 40 39 38     IR 51 60 42 55                    Flexibility Left Right Comments   Hamstrings -22 -25     ITB (Obers test) WNL  WNL     Hip flexor(Mikhail test) WNL but pull noted on L compared to R WNL                     Strength Left Right Comments   Hip flexors 4 with pain  4+     Hip extension 4+ 4+     Hip abduction 4+ 4+     Hip adduction 4+ 4     Hip ER 4+ 4+     Hip IR 4  4+     Quads 4+ 5     Hamstrings 5 5        Joint mobility:               []?Normal                       [x]? Hypo L hip into flexion               []?Hyper     Palpation: Tenderness L proximal hip flexors and adductors     Functional Mobility/Transfers: Independent     Posture: Mild hip flexion and ER noted on L compared to R when resting in supine     Bandages/Dressings/Incisions: N/A     Gait: Independent, Mild Trendelenburg on L     Orthopedic Special Tests: See above      RESTRICTIONS/PRECAUTIONS: HTN.  2 previous L hip labral repairs    Exercises/Interventions:     ROM/STRETCHES     HF stretch 30\"hx3 L 5/10 completed in waiting room prior to treatment   Adductor stretch 30\"hx3 L 5/10 completed in waiting room prior to treatment   Prone quad stretch 30\"hx3 L 4/14 towel roll with stretch   Hip ER/IR AROM  4/30 cont with HEP    Hamstring stretch Cont with HEP 4/14             PREs     Prone BS with knee flex  4/16 pillow under stomach    Prone hip extension 5# 3x15 L ^5/25 pillow under abdomen   Prone hip ER 1 5/10 cont with HEP, progressed with Hip ER strengthening below   Hip abd 5# 3x15 L ^5/25 side lying   Hamstring curl 5# 3x15 L ^5/25 standing at table   Bridges SB roll ins 3x10 bilat  ^5/25   Clams Gray TB 5\"h 2x10 L ^5/25 side lying   Mini squat 4/22 withheld d/t flare up in symptoms   SLS with hip abd SLS on L 2x10 R hip abd5/25 stopped after 2nd set d/t fatigue and inability to maintain level hips   SLB 30\"hx3 L ^5/25 airex, eyes closed   Standing marching  5/10 progressed with step ups   Step ups Forward 3x10 L with drive of R LE  Lateral 3x10 L ^5/25 8\" step    Hip ER Red TB 3x10 L 5/10 seated EOT, hip ER ROM form IR to netrual, did not push into ER with resistance        Functional movement   4/14             Manual interventions     Joint mobilization    ^5/10 pt prone   PROM  4/22 withheld d/t decrease guarding noted and completed stretching above    STM   5/25 Withheld d/t decrease tension noted   LAD  4/30 withheld d/t no stretch at hip felt             Therapeutic Exercise and NMR EXR  [x] (55282) Provided verbal/tactile cueing for activities related to strengthening, flexibility, endurance, ROM for improvements in LE, proximal hip, and core control with self care, mobility, lifting, ambulation. [x] (18035) Provided verbal/tactile cueing for activities related to improving balance, coordination, kinesthetic sense, posture, motor skill, proprioception  to assist with LE, proximal hip, and core control in self care, mobility, lifting, ambulation and eccentric single leg control.      NMR and Therapeutic Activities:    [] (96113 or 47634) Provided verbal/tactile cueing for activities related to improving balance, coordination, kinesthetic sense, posture, motor skill, proprioception and motor activation to allow for proper function of core, proximal hip and LE with self care and ADLs  [] (01682) Gait Re-education- Provided training and instruction to the patient for proper LE, core and proximal hip recruitment and positioning and eccentric body weight control with ambulation re-education including up and down stairs     Home Exercise Program:    [x] (34117) Reviewed/Progressed HEP activities related to strengthening, flexibility, endurance, ROM of core, proximal hip and LE for functional self-care, mobility, lifting and ambulation/stair navigation   [] (43218)Reviewed/Progressed HEP activities related to improving balance, coordination, kinesthetic sense, posture, motor skill, proprioception of core, proximal hip and LE for self care, mobility, lifting, and ambulation/stair navigation      Manual Treatments:  PROM / STM / Oscillations-Mobs:  G-I, II, III, IV (PA's, Inf., Post.)  [] (57022) Provided manual therapy to mobilize LE, proximal hip and/or LS spine soft tissue/joints for the purpose of modulating pain, promoting relaxation,  increasing ROM, reducing/eliminating soft tissue swelling/inflammation/restriction, improving soft tissue extensibility and allowing for proper ROM for normal function with self care, mobility, lifting and ambulation. Modalities:          CP x15' L hip 5/12    Charges:  Timed Code Treatment Minutes: 40'   Total Treatment Minutes: 4:07-5:05  62'       [] EVAL (LOW) 60119 (typically 20 minutes face-to-face)  [] EVAL (MOD) 53545 (typically 30 minutes face-to-face)  [] EVAL (HIGH) 13702 (typically 45 minutes face-to-face)  [] RE-EVAL     [x] IX(50408) x   2  [] IONTO  [x] NMR (39480) x  1   [] VASO  [] Manual (69083) x      [] Other: Dn  [] TA x      [] Mech Traction (60253)  [] ES(attended) (25778)      [] ES (un) (92626):     GOALS: Adjusted on 4/14/21 d/t new prescription from MD  Patient stated goal:  Eliminate pain in order to return to exercise  [x]? Progressing: []? Met: []? Not Met: []? Adjusted     Therapist goals for Patient:   Short Term Goals: To be achieved in: 4 weeks  1. Independent in HEP and progression per patient tolerance, in order to prevent re-injury. [x]? Progressing: []? Met: []? Not Met: []? Adjusted   2. Patient will have a decrease in pain to facilitate improvement in movement, function, and ADLs as indicated by Functional Deficits. [x]? Progressing: []? Met: []? Not Met: []? Adjusted     Long Term Goals: To be achieved in: 6 weeks  1. Disability index score of 10% or less for the [facility] to assist with reaching prior level of function. [x]? Progressing: []? Met: []? Not Met: []? Adjusted  2.  Patient will demonstrate increased L hip AROM that is equal to R to allow for proper joint functioning as indicated by patients Functional Deficits. [x]? Progressing: []? Met: []? Not Met: []? Adjusted  3. Patient will demonstrate an increase in L hip strength that is equal to R to allow for proper functional mobility as indicated by patients Functional Deficits. [x]? Progressing: []? Met: []? Not Met: []? Adjusted  4. Patient will return to all functional activities without increased symptoms or restriction. [x]? Progressing: []? Met: []? Not Met: []? Adjusted  5. Patient will have increase strength and ROM in order to  10 lbs from floor with proper sequencing and no c/o pain allowing her to better take care of her grand children  [x]? Progressing: []? Met: []? Not Met: []? Adjusted      Overall Progression Towards Functional goals/ Treatment Progress Update:  [x] Patient is progressing as expected towards functional goals listed with increase in L hip ROM and strength allowing for decrease in severity of symptoms and increase in function. 5/12/21  [] Progression is slowed due to complexities/Impairments listed. [] Progression has been slowed due to co-morbidities. [] Plan just implemented, too soon to assess goals progression <30days   [] Goals require adjustment due to lack of progress  [] Patient is not progressing as expected and requires additional follow up with physician  [] Other     Prognosis for POC: [x] Good [] Fair  [] Poor      Patient requires continued skilled intervention: [x] Yes  [] No    Treatment/Activity Tolerance:  [x] Patient able to complete treatment  [] Patient limited by fatigue  [] Patient limited by pain     [] Patient limited by other medical complications  [] Other:   5/25 Decrease tension noted at L proximal hip flexors and adductors compared to last visit. Fatigued with increase in intensity of treatment especially standing hip abduction d/t inability to maintain level hips.  Overall progressing well but needs to continue to work on endurance and strength to further increase function and

## 2021-05-27 ENCOUNTER — HOSPITAL ENCOUNTER (OUTPATIENT)
Dept: PHYSICAL THERAPY | Age: 56
Setting detail: THERAPIES SERIES
Discharge: HOME | End: 2021-05-27
Payer: COMMERCIAL

## 2021-05-27 PROCEDURE — 97112 NEUROMUSCULAR REEDUCATION: CPT

## 2021-05-27 PROCEDURE — 97110 THERAPEUTIC EXERCISES: CPT

## 2021-05-27 NOTE — FLOWSHEET NOTE
The 1100 [facility] and 500 [facility], [address], [address] [facility]  Phone: [phone]   Fax:     [phone]    Physical Therapy Daily Treatment Note  Date:  2021    Patient Name:  Sharla Peck    :  1965  MRN: [id number]  Restrictions/Precautions:    Medical/Treatment Diagnosis Information:  · Diagnosis: G44.450W (ICD-10-CM) - Strain of left groin  · Treatment Diagnosis: M25.552 Left hip pain  Insurance/Certification information:  PT Insurance Information: [facility]  Physician Information:  Referring Practitioner: Eliceo Murcia. Audrey Avery MD  Has the plan of care been signed (Y/N):        []  Yes  [x]  No     Date of Patient follow up with Physician: PRN      Is this a Progress Report:     []  Yes  [x]  No        If Yes:  Date Range for reporting period:  Beginning 3/11/21  Ending 21    Progress report will be due (10 Rx or 30 days whichever is less):        Recertification will be due (POC Duration  / 90 days whichever is less): 21         Visit # Insurance Allowable Auth Required   13   [address] []  Yes []  No        Functional Scale:   WOMAC 23% deficit  Date assessed: 21  WOMAC 58% deficit  Date assessed: 21       Latex Allergy:  [x]NO      []YES  Preferred Language for Healthcare:   [x]English       []other:      Pain level:  0/10  21      SUBJECTIVE:   Pt reports she was a little sore after last visit but no groin pain. Overall doing well.      OBJECTIVE:   Palpation: Mild muscle tension L proximal adductors and hip flexors but no muscle knots and tenderness reported 21  Assessed below on 21  ROM PROM AROM Comments     Left Right Left Right     Flexion 108 with pain  116 92 99     Extension     10 8 symptoms reported L hip      Abduction 35 pain  38 29  36     ER 41 40 39 38     IR 51 60 42 55                      Flexibility Left Right Comments   Hamstrings -22 -25     ITB (Obers test) WNL  WNL     Hip flexor(Mikhail test) WNL but pull noted on L compared to R WNL                     Strength Left Right Comments   Hip flexors 4 with pain  4+     Hip extension 4+ 4+     Hip abduction 4+ 4+     Hip adduction 4+ 4     Hip ER 4+ 4+     Hip IR 4  4+     Quads 4+ 5     Hamstrings 5 5        Joint mobility:               []?Normal                       [x]? Hypo L hip into flexion               []?Hyper     Palpation: Tenderness L proximal hip flexors and adductors     Functional Mobility/Transfers: Independent     Posture: Mild hip flexion and ER noted on L compared to R when resting in supine     Bandages/Dressings/Incisions: N/A     Gait: Independent, Mild Trendelenburg on L     Orthopedic Special Tests: See above      RESTRICTIONS/PRECAUTIONS: HTN. 2 previous L hip labral repairs    Exercises/Interventions:     ROM/STRETCHES     HF stretch 5/10 completed in waiting room prior to treatment   Adductor stretch 5/10 completed in waiting room prior to treatment   Prone quad stretch 30\"hx3 L 4/14 towel roll with stretch   Hip ER/IR AROM  4/30 cont with HEP    Hamstring stretch Cont with HEP 4/14             PREs     Prone BS with knee flex  4/16 pillow under stomach    Prone hip extension 5# 3x15 L ^5/25 pillow under abdomen   Prone hip ER 1 5/10 cont with HEP, progressed with Hip ER strengthening below   Hip abd 5# 3x15 L ^5/25 side lying   Hamstring curl 5# 3x15 L ^5/25 standing at table   Bridges SL 3x10 L  5/27 transitioned back to SL instead of with ball roll in d/t fear of knee aggravation   Clams Mallory TB 5\"h 3x10 L ^5/27 side lying   Mini squat 4/22 withheld d/t flare up in symptoms   SLS with hip abd SLS on L 1x10 R hip abd5/27 pt attempted to complete independently after step ups.  Had her stop d/t wanting to attempt hip hike   SLB 30\"hx3 L ^5/25 airex, eyes closed   Standing marching  5/10 progressed with step ups   Step ups Forward 3x10 L with drive of R LE  Lateral 3x10 L ^5/25 8\" step    Hip hike 2x10 L lifting, and ambulation/stair navigation      Manual Treatments:  PROM / STM / Oscillations-Mobs:  G-I, II, III, IV (PA's, Inf., Post.)  [] (57949) Provided manual therapy to mobilize LE, proximal hip and/or LS spine soft tissue/joints for the purpose of modulating pain, promoting relaxation,  increasing ROM, reducing/eliminating soft tissue swelling/inflammation/restriction, improving soft tissue extensibility and allowing for proper ROM for normal function with self care, mobility, lifting and ambulation. Modalities:          CP x15' L hip 5/27    Charges:  Timed Code Treatment Minutes: 45'   Total Treatment Minutes: 10:00-10:55  55'       [] EVAL (LOW) 81307 (typically 20 minutes face-to-face)  [] EVAL (MOD) 72037 (typically 30 minutes face-to-face)  [] EVAL (HIGH) 22404 (typically 45 minutes face-to-face)  [] RE-EVAL     [x] BX(39948) x   2  [] IONTO  [x] NMR (50472) x  1   [] VASO  [] Manual (02968) x      [] Other: Dn  [] TA x      [] Mech Traction (02360)  [] ES(attended) (83615)      [] ES (un) (56961):     GOALS: Adjusted on 4/14/21 d/t new prescription from MD  Patient stated goal:  Eliminate pain in order to return to exercise  [x]? Progressing: []? Met: []? Not Met: []? Adjusted     Therapist goals for Patient:   Short Term Goals: To be achieved in: 4 weeks  1. Independent in HEP and progression per patient tolerance, in order to prevent re-injury. [x]? Progressing: []? Met: []? Not Met: []? Adjusted   2. Patient will have a decrease in pain to facilitate improvement in movement, function, and ADLs as indicated by Functional Deficits. [x]? Progressing: []? Met: []? Not Met: []? Adjusted     Long Term Goals: To be achieved in: 6 weeks  1. Disability index score of 10% or less for the [facility] to assist with reaching prior level of function. [x]? Progressing: []? Met: []? Not Met: []? Adjusted  2.  Patient will demonstrate increased L hip AROM that is equal to R to allow for proper joint functioning as indicated by patients Functional Deficits. [x]? Progressing: []? Met: []? Not Met: []? Adjusted  3. Patient will demonstrate an increase in L hip strength that is equal to R to allow for proper functional mobility as indicated by patients Functional Deficits. [x]? Progressing: []? Met: []? Not Met: []? Adjusted  4. Patient will return to all functional activities without increased symptoms or restriction. [x]? Progressing: []? Met: []? Not Met: []? Adjusted  5. Patient will have increase strength and ROM in order to  10 lbs from floor with proper sequencing and no c/o pain allowing her to better take care of her grand children  [x]? Progressing: []? Met: []? Not Met: []? Adjusted      Overall Progression Towards Functional goals/ Treatment Progress Update:  [x] Patient is progressing as expected towards functional goals listed with increase in L hip ROM and strength allowing for decrease in severity of symptoms and increase in function. 5/12/21  [] Progression is slowed due to complexities/Impairments listed. [] Progression has been slowed due to co-morbidities. [] Plan just implemented, too soon to assess goals progression <30days   [] Goals require adjustment due to lack of progress  [] Patient is not progressing as expected and requires additional follow up with physician  [] Other     Prognosis for POC: [x] Good [] Fair  [] Poor      Patient requires continued skilled intervention: [x] Yes  [] No    Treatment/Activity Tolerance:  [x] Patient able to complete treatment  [] Patient limited by fatigue  [] Patient limited by pain     [] Patient limited by other medical complications  [] Other:   5/27 Continues to have decrease in muscle tension allowing for decrease symptoms. Responding well to treatment with appropriate fatigue and no adverse effects. Pt required cues to slow down with exercises and take necessary breaks to avoid excessive fatigue or increase in symptoms.      Patient Education:              1  Updated HEP based on tolerance to treatment   5/6 Pt educated on monitoring symptoms and informing PT or MD if sensation she was describing increases with severity or consistency. 5/4 Updated HEP with weight, SLB and SL bridges. 4/30 Updated HEP   4/22 Added adductor stretch to HEP and withheld exercises in weight bearing. 4/16 Updated HEP   4/14 Educated on precautions, use of ice and importance of HEP. Educated on activity modification when taking care of grandchildren and avoid excessive hip flexion, adduction and internal rotation. [employer]   Access Code: [place]          PLAN: See eval  [] Continue per plan of care [x] Alter current plan (see comments below)  [x] Plan of care initiated [] Hold pending MD visit [] Discharge  5/27 Monitor symptoms continue to progress strength as tolerated. 5/12 Pt to continue with PT 2x a week for another 4 weeks to further increase L hip ROM and strength to improve overall function and decrease pain. 4/14 Pt to attempt PT 2x a week for 4-6 weeks to increase L hip mobility and strength as well as decrease tissue tightness and tenderness in order to improve function and prevent surgical intervention. Electronically signed by:  Kamilah Lui PT    Note: If patient does not return for scheduled/ recommended follow up visits, this note will serve as a discharge from care along with most recent update on progress.

## 2021-06-01 ENCOUNTER — HOSPITAL ENCOUNTER (OUTPATIENT)
Dept: PHYSICAL THERAPY | Age: 56
Setting detail: THERAPIES SERIES
Discharge: HOME | End: 2021-06-01
Payer: COMMERCIAL

## 2021-06-01 PROCEDURE — 97110 THERAPEUTIC EXERCISES: CPT

## 2021-06-01 PROCEDURE — 97112 NEUROMUSCULAR REEDUCATION: CPT

## 2021-06-01 NOTE — FLOWSHEET NOTE
The [facility] and 500 [facility], [address], [address] [facility]  Phone: [phone]   Fax:     [phone]    Physical Therapy Daily Treatment Note  Date:  2021    Patient Name:  Maria M Cordoba    :  1965  MRN: [id number]  Restrictions/Precautions:    Medical/Treatment Diagnosis Information:  · Diagnosis: L83.282R (ICD-10-CM) - Strain of left groin  · Treatment Diagnosis: M25.552 Left hip pain  Insurance/Certification information:  PT Insurance Information: [facility]  Physician Information:  Referring Practitioner: Aurora Moreno. Kristen Bartlett MD  Has the plan of care been signed (Y/N):        []  Yes  [x]  No     Date of Patient follow up with Physician: PRN      Is this a Progress Report:     []  Yes  [x]  No        If Yes:  Date Range for reporting period:  Beginning 3/11/21  Ending 21    Progress report will be due (10 Rx or 30 days whichever is less): 45       Recertification will be due (POC Duration  / 90 days whichever is less): 21         Visit # Insurance Allowable Auth Required   14   [address] []  Yes []  No        Functional Scale:   WOMAC 23% deficit  Date assessed: 21  WOMAC 58% deficit  Date assessed: 21       Latex Allergy:  [x]NO      []YES  Preferred Language for Healthcare:   [x]English       []other:      Pain level:  2/10  21      SUBJECTIVE:   Pt states she sat for over 10 minutes today and had some discomfort at lateral hip down her quad but symptoms are not severe. The longer she sat the worse it got. Reports last night she was sitting in figure 4 position of L hip and might have contributed to symptoms. OBJECTIVE:   Palpation: Mild tension and tenderness at mid substance L quadriceps but no muscle knots noted.    Assessed below on 21  ROM PROM AROM Comments     Left Right Left Right     Flexion 108 with pain  116 92 99     Extension     10 8 symptoms reported L hip      Abduction 35 pain 38 29  36     ER 41 40 39 38     IR 51 60 42 55                      Flexibility Left Right Comments   Hamstrings -22 -25     ITB (Obers test) WNL  WNL     Hip flexor(Mikhail test) WNL but pull noted on L compared to R WNL                     Strength Left Right Comments   Hip flexors 4 with pain  4+     Hip extension 4+ 4+     Hip abduction 4+ 4+     Hip adduction 4+ 4     Hip ER 4+ 4+     Hip IR 4  4+     Quads 4+ 5     Hamstrings 5 5        Joint mobility:               []?Normal                       [x]? Hypo L hip into flexion               []?Hyper     Palpation: Tenderness L proximal hip flexors and adductors     Functional Mobility/Transfers: Independent     Posture: Mild hip flexion and ER noted on L compared to R when resting in supine     Bandages/Dressings/Incisions: N/A     Gait: Independent, Mild Trendelenburg on L     Orthopedic Special Tests: See above      RESTRICTIONS/PRECAUTIONS: HTN. 2 previous L hip labral repairs    Exercises/Interventions:     ROM/STRETCHES     HF stretch 30\"hx3 L 6/1   Adductor stretch 30\"hx3 L 6/1   Prone quad stretch 30\"hx5 L ^6/1 towel roll with stretch   Piriformis stretch 30\"hx3 L 6/1 figure 4 on L    Hip ER/IR AROM  4/30 cont with HEP    Hamstring stretch Cont with HEP 4/14   Reverse lung with glider 3x10 L foot on glider 6/1        PREs     Prone BS with knee flex  4/16 pillow under stomach    Prone hip extension 5# 3x15 L ^5/25 pillow under abdomen   Prone hip ER 1 5/10 cont with HEP, progressed with Hip ER strengthening below   Hip abd 5# 3x15 L ^5/25 side lying   Hamstring curl 6# 3x10 L ^6/1 standing at table   Bridges SL 3x10 L  5/27 transitioned back to SL instead of with ball roll in d/t fear of knee aggravation   Clams Mallory TB 5\"h 3x10 L ^5/27 side lying   Mini squat 4/22 withheld d/t flare up in symptoms   SLS with hip abd 5/27 pt attempted to complete independently after step ups.  Had her stop d/t wanting to attempt hip hike   SLB 30\"hx4 L ^6/1 airex, eyes balance, coordination, kinesthetic sense, posture, motor skill, proprioception of core, proximal hip and LE for self care, mobility, lifting, and ambulation/stair navigation      Manual Treatments:  PROM / STM / Oscillations-Mobs:  G-I, II, III, IV (PA's, Inf., Post.)  [] (45981) Provided manual therapy to mobilize LE, proximal hip and/or LS spine soft tissue/joints for the purpose of modulating pain, promoting relaxation,  increasing ROM, reducing/eliminating soft tissue swelling/inflammation/restriction, improving soft tissue extensibility and allowing for proper ROM for normal function with self care, mobility, lifting and ambulation. Modalities:          CP x15' L hip 6/1    Charges:  Timed Code Treatment Minutes: 39'   Total Treatment Minutes: 9:26-10:30  59'       [] EVAL (LOW) 24381 (typically 20 minutes face-to-face)  [] EVAL (MOD) 00634 (typically 30 minutes face-to-face)  [] EVAL (HIGH) 22443 (typically 45 minutes face-to-face)  [] RE-EVAL     [x] JG(90703) x   2  [] IONTO  [x] NMR (85283) x  1   [] VASO  [] Manual (03846) x      [] Other: Dn  [] TA x      [] Mech Traction (10376)  [] ES(attended) (92832)      [] ES (un) (39590):     GOALS: Adjusted on 4/14/21 d/t new prescription from MD  Patient stated goal:  Eliminate pain in order to return to exercise  [x]? Progressing: []? Met: []? Not Met: []? Adjusted     Therapist goals for Patient:   Short Term Goals: To be achieved in: 4 weeks  1. Independent in HEP and progression per patient tolerance, in order to prevent re-injury. [x]? Progressing: []? Met: []? Not Met: []? Adjusted   2. Patient will have a decrease in pain to facilitate improvement in movement, function, and ADLs as indicated by Functional Deficits. [x]? Progressing: []? Met: []? Not Met: []? Adjusted     Long Term Goals: To be achieved in: 6 weeks  1. Disability index score of 10% or less for the [facility] to assist with reaching prior level of function. [x]? Progressing: []?  Met: []? Not Met: []? Adjusted  2. Patient will demonstrate increased L hip AROM that is equal to R to allow for proper joint functioning as indicated by patients Functional Deficits. [x]? Progressing: []? Met: []? Not Met: []? Adjusted  3. Patient will demonstrate an increase in L hip strength that is equal to R to allow for proper functional mobility as indicated by patients Functional Deficits. [x]? Progressing: []? Met: []? Not Met: []? Adjusted  4. Patient will return to all functional activities without increased symptoms or restriction. [x]? Progressing: []? Met: []? Not Met: []? Adjusted  5. Patient will have increase strength and ROM in order to  10 lbs from floor with proper sequencing and no c/o pain allowing her to better take care of her grand children  [x]? Progressing: []? Met: []? Not Met: []? Adjusted      Overall Progression Towards Functional goals/ Treatment Progress Update:  [x] Patient is progressing as expected towards functional goals listed with increase in L hip ROM and strength allowing for decrease in severity of symptoms and increase in function. 5/12/21  [] Progression is slowed due to complexities/Impairments listed. [] Progression has been slowed due to co-morbidities. [] Plan just implemented, too soon to assess goals progression <30days   [] Goals require adjustment due to lack of progress  [] Patient is not progressing as expected and requires additional follow up with physician  [] Other     Prognosis for POC: [x] Good [] Fair  [] Poor      Patient requires continued skilled intervention: [x] Yes  [] No    Treatment/Activity Tolerance:  [x] Patient able to complete treatment  [] Patient limited by fatigue  [] Patient limited by pain     [] Patient limited by other medical complications  [] Other:   6/1 Muscle tightness noted at start of treatment possibly related to posture in sitting.  Symptoms improved with stretching and d/t improvement in symptoms and no muscle

## 2021-06-03 ENCOUNTER — HOSPITAL ENCOUNTER (OUTPATIENT)
Dept: PHYSICAL THERAPY | Age: 56
Setting detail: THERAPIES SERIES
Discharge: HOME | End: 2021-06-03
Payer: COMMERCIAL

## 2021-06-03 PROCEDURE — 97110 THERAPEUTIC EXERCISES: CPT

## 2021-06-03 PROCEDURE — 97140 MANUAL THERAPY 1/> REGIONS: CPT

## 2021-06-03 PROCEDURE — 20560 NDL INSJ W/O NJX 1 OR 2 MUSC: CPT

## 2021-06-03 NOTE — FLOWSHEET NOTE
The 1100 [facility] and 500 [facility], [address], [address] [facility]  Phone: [phone]   Fax:     [phone]    Physical Therapy Daily Treatment Note  Date:  6/3/2021    Patient Name:  Bulmaro Martinez    :  1965  MRN: [id number]  Restrictions/Precautions:    Medical/Treatment Diagnosis Information:  · Diagnosis: S57.522C (ICD-10-CM) - Strain of left groin  · Treatment Diagnosis: M25.552 Left hip pain  Insurance/Certification information:  PT Insurance Information: [facility]  Physician Information:  Referring Practitioner: Sharri Mcguire. Frank Velasquez MD  Has the plan of care been signed (Y/N):        []  Yes  [x]  No     Date of Patient follow up with Physician: PRN      Is this a Progress Report:     []  Yes  [x]  No        If Yes:  Date Range for reporting period:  Beginning 3/11/21  Ending 21    Progress report will be due (10 Rx or 30 days whichever is less): 06       Recertification will be due (POC Duration  / 90 days whichever is less): 21         Visit # Insurance Allowable Auth Required   15  6/3 [address] []  Yes []  No        Functional Scale:   WOMAC 23% deficit  Date assessed: 21  WOMAC 58% deficit  Date assessed: 21       Latex Allergy:  [x]NO      []YES  Preferred Language for Healthcare:   [x]English       []other:      Pain level:  4/10  6/3/21      SUBJECTIVE:  6/3 Pt states she was sore after last visit and yesterday and today she noticed increase in discomfort at L inner and anterior thigh      OBJECTIVE:   Palpation: Mild tension and tenderness at mid substance L quadriceps but no muscle knots noted.    Assessed below on 21  ROM PROM AROM Comments     Left Right Left Right     Flexion 108 with pain  116 92 99     Extension     10 8 symptoms reported L hip      Abduction 35 pain  38 29  36     ER 41 40 39 38     IR 51 60 42 55                      Flexibility Left Right Comments   Hamstrings -     ITB (Obers test) WNL  WNL     Hip flexor(Mikhail test) WNL but pull noted on L compared to R WNL                     Strength Left Right Comments   Hip flexors 4 with pain  4+     Hip extension 4+ 4+     Hip abduction 4+ 4+     Hip adduction 4+ 4     Hip ER 4+ 4+     Hip IR 4  4+     Quads 4+ 5     Hamstrings 5 5        Joint mobility:               []?Normal                       [x]? Hypo L hip into flexion               []?Hyper     Palpation: Tenderness L proximal hip flexors and adductors     Functional Mobility/Transfers: Independent     Posture: Mild hip flexion and ER noted on L compared to R when resting in supine     Bandages/Dressings/Incisions: N/A     Gait: Independent, Mild Trendelenburg on L     Orthopedic Special Tests: See above      RESTRICTIONS/PRECAUTIONS: HTN. 2 previous L hip labral repairs    Exercises/Interventions:     ROM/STRETCHES     HF stretch 30\"hx3 L 6/1   Adductor stretch 30\"hx3 L 6/1   Prone quad stretch 30\"hx5 L ^6/1 towel roll with stretch   Piriformis stretch 6/3 withheld d/t symptoms   Hip ER/IR AROM  4/30 cont with HEP    Hamstring stretch Cont with HEP 4/14   Reverse lung with glider 3x10 L foot on glider 6/1        PREs     Prone BS with knee flex  4/16 pillow under stomach    Prone hip extension 6# 3x15 L ^6/3   Prone hip ER 1 5/10 cont with HEP, progressed with Hip ER strengthening below   Hip abd 6# 3x15 L ^6/3 side lying   Hamstring curl 6# 3x10 L ^6/1 standing at table   Bridges SL 3x10 L  5/27 transitioned back to SL instead of with ball roll in d/t fear of knee aggravation   Clams Mallory TB 5\"h 3x10 L ^5/27 side lying   Mini squat 4/22 withheld d/t flare up in symptoms   SLS with hip abd 5/27 pt attempted to complete independently after step ups.  Had her stop d/t wanting to attempt hip hike   SLB 6/3 withheld d/t increase in symptoms   Standing marching  5/10 progressed with step ups   Step ups 6/3 withheld d/t increase in symptoms   Hip hike 6/3 withheld d/t increase in symptoms Hip ER   6/1 withheld d/t fatigue and symptoms at start of treatment   Leg press 100# 3x10 bilat 6/1    Functional movement   4/14             Manual interventions     Joint mobilization    ^5/10 pt prone   PROM  4/22 withheld d/t decrease guarding noted and completed stretching above    STM  L proximal hip flexors, quadriceps full muscle and hip adductors x8' 6/3 reintroduced d/t increase in tension. Used roller and hands for tissue mobilization    LAD  4/30 withheld d/t no stretch at hip felt             Therapeutic Exercise and NMR EXR  [x] (89930) Provided verbal/tactile cueing for activities related to strengthening, flexibility, endurance, ROM for improvements in LE, proximal hip, and core control with self care, mobility, lifting, ambulation. [x] (06904) Provided verbal/tactile cueing for activities related to improving balance, coordination, kinesthetic sense, posture, motor skill, proprioception  to assist with LE, proximal hip, and core control in self care, mobility, lifting, ambulation and eccentric single leg control.      NMR and Therapeutic Activities:    [] (80810 or 03317) Provided verbal/tactile cueing for activities related to improving balance, coordination, kinesthetic sense, posture, motor skill, proprioception and motor activation to allow for proper function of core, proximal hip and LE with self care and ADLs  [] (85474) Gait Re-education- Provided training and instruction to the patient for proper LE, core and proximal hip recruitment and positioning and eccentric body weight control with ambulation re-education including up and down stairs     Home Exercise Program:    [x] (97100) Reviewed/Progressed HEP activities related to strengthening, flexibility, endurance, ROM of core, proximal hip and LE for functional self-care, mobility, lifting and ambulation/stair navigation   [] (97113)Reviewed/Progressed HEP activities related to improving balance, coordination, kinesthetic sense, posture, motor skill, proprioception of core, proximal hip and LE for self care, mobility, lifting, and ambulation/stair navigation      Manual Treatments:  PROM / STM / Oscillations-Mobs:  G-I, II, III, IV (PA's, Inf., Post.)  [] (09394) Provided manual therapy to mobilize LE, proximal hip and/or LS spine soft tissue/joints for the purpose of modulating pain, promoting relaxation,  increasing ROM, reducing/eliminating soft tissue swelling/inflammation/restriction, improving soft tissue extensibility and allowing for proper ROM for normal function with self care, mobility, lifting and ambulation. Modalities:    PT reviewed precautions, contraindications, and indications with pt in addition to application of dry needling within established plan of care and expected outcomes; pt verbalized understanding with all questions answered. Pt had signed consent form for dry needling and PT obtained written consent with updated plan of care from MD before beginning. Dry needling manual therapy: consisted on the placement of 5 needles in the following muscles L hip adductors. 60 mm needles were inserted to produce intramuscular mobilization. These techniques were used to restore functional range of motion, reduce muscle spasm and induce healing in the corresponding musculature. (40861)  Clean Technique was utilized today while applying Dry needling treatment. The treatment sites where cleaned with 70% solution of  isopropyl alcohol . The PT washed their hands and utilized treatment gloves along with hand  prior to inserting the needles. All needles where removed and discarded in the appropriate sharps container.         Performed by Rui Dean PT, DPT   10 min  date 6/3/21      MHP x10' L anterior thigh d/t increase in muscle tension 6/3    Charges:  Timed Code Treatment Minutes: 42'   Total Treatment Minutes: 9:28-10:20  46'       [] EVAL (LOW) 968 3531 (typically 20 minutes face-to-face)  [] EVAL (MOD) 57031 (typically 30 minutes face-to-face)  [] EVAL (HIGH) 59341 (typically 45 minutes face-to-face)  [] RE-EVAL     [x] LV(30993) x   1  [] IONTO  [] NMR (72496) x     [] VASO  [x] Manual (84711) x 1     [x] Other: Dn  [] TA x      [] Mech Traction (06499)  [] ES(attended) (40495)      [] ES (un) (94028):     GOALS: Adjusted on 4/14/21 d/t new prescription from MD  Patient stated goal:  Eliminate pain in order to return to exercise  [x]? Progressing: []? Met: []? Not Met: []? Adjusted     Therapist goals for Patient:   Short Term Goals: To be achieved in: 4 weeks  1. Independent in HEP and progression per patient tolerance, in order to prevent re-injury. [x]? Progressing: []? Met: []? Not Met: []? Adjusted   2. Patient will have a decrease in pain to facilitate improvement in movement, function, and ADLs as indicated by Functional Deficits. [x]? Progressing: []? Met: []? Not Met: []? Adjusted     Long Term Goals: To be achieved in: 6 weeks  1. Disability index score of 10% or less for the [facility] to assist with reaching prior level of function. [x]? Progressing: []? Met: []? Not Met: []? Adjusted  2. Patient will demonstrate increased L hip AROM that is equal to R to allow for proper joint functioning as indicated by patients Functional Deficits. [x]? Progressing: []? Met: []? Not Met: []? Adjusted  3. Patient will demonstrate an increase in L hip strength that is equal to R to allow for proper functional mobility as indicated by patients Functional Deficits. [x]? Progressing: []? Met: []? Not Met: []? Adjusted  4. Patient will return to all functional activities without increased symptoms or restriction. [x]? Progressing: []? Met: []? Not Met: []? Adjusted  5. Patient will have increase strength and ROM in order to  10 lbs from floor with proper sequencing and no c/o pain allowing her to better take care of her grand children  [x]? Progressing: []? Met: []? Not Met: []?  Adjusted      Overall Progression Towards Functional goals/ Treatment Progress Update:  [x] Patient is progressing as expected towards functional goals listed with increase in L hip ROM and strength allowing for decrease in severity of symptoms and increase in function. 5/12/21  [] Progression is slowed due to complexities/Impairments listed. [] Progression has been slowed due to co-morbidities. [] Plan just implemented, too soon to assess goals progression <30days   [] Goals require adjustment due to lack of progress  [] Patient is not progressing as expected and requires additional follow up with physician  [] Other     Prognosis for POC: [x] Good [] Fair  [] Poor      Patient requires continued skilled intervention: [x] Yes  [] No    Treatment/Activity Tolerance:  [x] Patient able to complete treatment  [] Patient limited by fatigue  [] Patient limited by pain     [] Patient limited by other medical complications  [] Other:   6/3 Increase muscle tension noted at L proximal adductors and hip flexors. Withheld exercises in weight bearing to avoid increase in symptoms. Responded well to dry needling and STM with no increase in symptoms noted or reported. Reported mild relief in symptoms at end of treatment    Patient Education:              6/1 Updated Piriformis stretch onto HEP and reviewed proper posture in sitting.   5/27  Updated HEP based on tolerance to treatment   5/6 Pt educated on monitoring symptoms and informing PT or MD if sensation she was describing increases with severity or consistency. 5/4 Updated HEP with weight, SLB and SL bridges. 4/30 Updated HEP   4/22 Added adductor stretch to HEP and withheld exercises in weight bearing. 4/16 Updated HEP   4/14 Educated on precautions, use of ice and importance of HEP. Educated on activity modification when taking care of grandchildren and avoid excessive hip flexion, adduction and internal rotation.      [employer]   Access Code: [place]          PLAN: See yesi  []

## 2021-06-08 ENCOUNTER — HOSPITAL ENCOUNTER (OUTPATIENT)
Dept: PHYSICAL THERAPY | Age: 56
Setting detail: THERAPIES SERIES
Discharge: HOME | End: 2021-06-08
Payer: COMMERCIAL

## 2021-06-08 PROCEDURE — 97110 THERAPEUTIC EXERCISES: CPT

## 2021-06-08 PROCEDURE — 20560 NDL INSJ W/O NJX 1 OR 2 MUSC: CPT

## 2021-06-08 NOTE — FLOWSHEET NOTE
The [facility] and [address] [facility], [address], [address] [facility]  Phone: [phone]   Fax:     [phone]    Physical Therapy Daily Treatment Note  Date:  2021    Patient Name:  Monty Perez    :  1965  MRN: [id number]  Restrictions/Precautions:    Medical/Treatment Diagnosis Information:  · Diagnosis: B92.235T (ICD-10-CM) - Strain of left groin  · Treatment Diagnosis: M25.552 Left hip pain  Insurance/Certification information:  PT Insurance Information: [facility]  Physician Information:  Referring Practitioner: Adrian Saunders. Jay Oconnell MD  Has the plan of care been signed (Y/N):        []  Yes  [x]  No     Date of Patient follow up with Physician: PRN      Is this a Progress Report:     []  Yes  [x]  No        If Yes:  Date Range for reporting period:  Beginning 3/11/21  Ending 21    Progress report will be due (10 Rx or 30 days whichever is less): 32       Recertification will be due (POC Duration  / 90 days whichever is less): 21         Visit # Insurance Allowable Auth Required   16   [address] []  Yes []  No        Functional Scale:   WOMAC 23% deficit  Date assessed: 21  WOMAC 58% deficit  Date assessed: 21       Latex Allergy:  [x]NO      []YES  Preferred Language for Healthcare:   [x]English       []other:      Pain level:  2-3/10  21      SUBJECTIVE:   Pt reports symptoms are slightly better then they were last week but still has some burning at lateral hip into her quad. Also continues to have discomfort in glut. OBJECTIVE:   Palpation: Mild tension and tenderness at mid substance L quadriceps but no muscle knots noted.    Assessed below on 21  ROM PROM AROM Comments     Left Right Left Right     Flexion 108 with pain  116 92 99     Extension     10 8 symptoms reported L hip      Abduction 35 pain  38 29  36     ER 41 40 39 38     IR 51 60 42 55                      Flexibility Left Right Comments   Hamstrings -22 -25     ITB (Obers test) WNL  WNL     Hip flexor(Mikhail test) WNL but pull noted on L compared to R WNL                     Strength Left Right Comments   Hip flexors 4 with pain  4+     Hip extension 4+ 4+     Hip abduction 4+ 4+     Hip adduction 4+ 4     Hip ER 4+ 4+     Hip IR 4  4+     Quads 4+ 5     Hamstrings 5 5        Joint mobility:               []?Normal                       [x]? Hypo L hip into flexion               []?Hyper     Palpation: Tenderness L proximal hip flexors and adductors     Functional Mobility/Transfers: Independent     Posture: Mild hip flexion and ER noted on L compared to R when resting in supine     Bandages/Dressings/Incisions: N/A     Gait: Independent, Mild Trendelenburg on L     Orthopedic Special Tests: See above      RESTRICTIONS/PRECAUTIONS: HTN. 2 previous L hip labral repairs    Exercises/Interventions:     ROM/STRETCHES     Bike x5' 6/8 upright   HF stretch 6/8 completed prior to treatment   Adductor stretch 6/8 completed prior to treatment   Prone quad stretch 30\"hx5 L ^6/1 towel roll with stretch   Piriformis stretch 6/3 withheld d/t symptoms   Hip ER/IR AROM  4/30 cont with HEP    Hamstring stretch Cont with HEP 4/14   Reverse lung with glider 3x10 L foot on glider 6/1        PREs     Prone BS with knee flex  4/16 pillow under stomach    Prone hip extension 6.5# 3x15 L ^6/8   Prone hip ER 1 5/10 cont with HEP, progressed with Hip ER strengthening below   Hip abd 6.5# 3x10 L ^6/8 side lying   Hamstring curl 6.5# 3x15 L ^6/8 standing at table   Bridges SL 3x10 L  5/27 transitioned back to SL instead of with ball roll in d/t fear of knee aggravation   Clams Mallory TB 5\"h 3x10 L ^5/27 side lying   Mini squat 4/22 withheld d/t flare up in symptoms   SLS with hip abd 5/27 pt attempted to complete independently after step ups.  Had her stop d/t wanting to attempt hip hike   SLB 6/3 withheld d/t increase in symptoms   Standing marching  5/10 progressed 73685 (typically 20 minutes face-to-face)  [] EVAL (MOD) 72348 (typically 30 minutes face-to-face)  [] EVAL (HIGH) 05912 (typically 45 minutes face-to-face)  [] RE-EVAL     [x] SH(63898) x   2  [] IONTO  [] NMR (59111) x     [] VASO  [] Manual (58672) x      [x] Other: Dn  [] TA x      [] Mech Traction (62384)  [] ES(attended) (51308)      [] ES (un) (27193):     GOALS: Adjusted on 4/14/21 d/t new prescription from MD  Patient stated goal:  Eliminate pain in order to return to exercise  [x]? Progressing: []? Met: []? Not Met: []? Adjusted     Therapist goals for Patient:   Short Term Goals: To be achieved in: 4 weeks  1. Independent in HEP and progression per patient tolerance, in order to prevent re-injury. [x]? Progressing: []? Met: []? Not Met: []? Adjusted   2. Patient will have a decrease in pain to facilitate improvement in movement, function, and ADLs as indicated by Functional Deficits. [x]? Progressing: []? Met: []? Not Met: []? Adjusted     Long Term Goals: To be achieved in: 6 weeks  1. Disability index score of 10% or less for the [facility] to assist with reaching prior level of function. [x]? Progressing: []? Met: []? Not Met: []? Adjusted  2. Patient will demonstrate increased L hip AROM that is equal to R to allow for proper joint functioning as indicated by patients Functional Deficits. [x]? Progressing: []? Met: []? Not Met: []? Adjusted  3. Patient will demonstrate an increase in L hip strength that is equal to R to allow for proper functional mobility as indicated by patients Functional Deficits. [x]? Progressing: []? Met: []? Not Met: []? Adjusted  4. Patient will return to all functional activities without increased symptoms or restriction. [x]? Progressing: []? Met: []? Not Met: []? Adjusted  5. Patient will have increase strength and ROM in order to  10 lbs from floor with proper sequencing and no c/o pain allowing her to better take care of her grand children  [x]? Progressing: []? Met: []? Not Met: []? Adjusted      Overall Progression Towards Functional goals/ Treatment Progress Update:  [x] Patient is progressing as expected towards functional goals listed with increase in L hip ROM and strength allowing for decrease in severity of symptoms and increase in function. 5/12/21  [] Progression is slowed due to complexities/Impairments listed. [] Progression has been slowed due to co-morbidities. [] Plan just implemented, too soon to assess goals progression <30days   [] Goals require adjustment due to lack of progress  [] Patient is not progressing as expected and requires additional follow up with physician  [] Other     Prognosis for POC: [x] Good [] Fair  [] Poor      Patient requires continued skilled intervention: [x] Yes  [] No    Treatment/Activity Tolerance:  [x] Patient able to complete treatment  [] Patient limited by fatigue  [] Patient limited by pain     [] Patient limited by other medical complications  [] Other:   6/8 Pt continues to have tightness at proximal hip flexors as well as hip abductor muscles that responded well to dry needling. Fatigued with increase in intensity of treatment but no adverse effects noted. Tolerated addition of airdyne bike without increase in symptoms. Patient Education:              6/1 Updated Piriformis stretch onto HEP and reviewed proper posture in sitting.   5/27  Updated HEP based on tolerance to treatment   5/6 Pt educated on monitoring symptoms and informing PT or MD if sensation she was describing increases with severity or consistency. 5/4 Updated HEP with weight, SLB and SL bridges. 4/30 Updated HEP   4/22 Added adductor stretch to HEP and withheld exercises in weight bearing. 4/16 Updated HEP   4/14 Educated on precautions, use of ice and importance of HEP. Educated on activity modification when taking care of grandchildren and avoid excessive hip flexion, adduction and internal rotation.      Kim Mayer Access Code: [place]          PLAN: See eval  [] Continue per plan of care [x] Alter current plan (see comments below)  [x] Plan of care initiated [] Hold pending MD visit [] Discharge  6/8 Complete progress note NPV.   5/12 Pt to continue with PT 2x a week for another 4 weeks to further increase L hip ROM and strength to improve overall function and decrease pain. 4/14 Pt to attempt PT 2x a week for 4-6 weeks to increase L hip mobility and strength as well as decrease tissue tightness and tenderness in order to improve function and prevent surgical intervention. Electronically signed by:  Mj Bynum PT    Note: If patient does not return for scheduled/ recommended follow up visits, this note will serve as a discharge from care along with most recent update on progress.

## 2021-06-10 ENCOUNTER — HOSPITAL ENCOUNTER (OUTPATIENT)
Dept: PHYSICAL THERAPY | Age: 56
Setting detail: THERAPIES SERIES
Discharge: HOME | End: 2021-06-10
Payer: COMMERCIAL

## 2021-06-10 PROCEDURE — 97140 MANUAL THERAPY 1/> REGIONS: CPT

## 2021-06-10 PROCEDURE — 20561 NDL INSJ W/O NJX 3+ MUSC: CPT

## 2021-06-10 PROCEDURE — 97110 THERAPEUTIC EXERCISES: CPT

## 2021-06-10 NOTE — PROGRESS NOTES
The [address] [place] [address],[address], [address] [facility] 33[address] [facility], [address] [place] [address]  Phone: [phone]   Fax:     [phone]   Physical Therapy Re-Certification Plan of Care    Dear Dr. Saeed Sen. Isac Taylor,    We had the pleasure of treating the following patient for physical therapy services at [address] [place] [address]. A summary of our findings can be found in the updated assessment below. This includes our plan of care. If you have any questions or concerns regarding these findings, please do not hesitate to contact me at the office phone number checked above. Thank you for the referral.     Physician Signature:________________________________Date:__________________  By signing above (or electronic signature), therapists plan is approved by physician      Overall Response to Treatment:   []Patient is responding well to treatment and improvement is noted with regards    []Patient should continue to improve in reasonable time if they continue HEP   []Patient has plateaued and is no longer responding to skilled PT intervention    []Patient is getting worse and would benefit from return to referring MD   []Patient unable to adhere to initial POC   [x]Other: Patient overall presents with decrease in severity and consistently of symptoms however she still has days of increase in muscle tightness and hip pain. Based on continued symptoms and overall improvement in symptoms it is advised she continue with PT until she has f/u with MD in 3 weks.      Physical Therapy Daily Treatment Note  Date:  6/10/2021    Patient Name:  Blaze Garcia    :  1965  MRN: [id number]  Restrictions/Precautions:    Medical/Treatment Diagnosis Information:  · Diagnosis: N57.841J (ICD-10-CM) - Strain of left groin  · Treatment Diagnosis: M25.552 Left hip pain  Insurance/Certification information:  PT Insurance Information: [facility]  Physician Information:  Referring to R when resting in supine     Bandages/Dressings/Incisions: N/A     Gait: Independent, Mild Trendelenburg on L     Orthopedic Special Tests: See above      RESTRICTIONS/PRECAUTIONS: HTN. 2 previous L hip labral repairs    Exercises/Interventions:     ROM/STRETCHES     Bike x5' 6/8 upright   HF stretch 30\"hx3 L 6/10 completed for warm up prior to objective measures   Adductor stretch 30\"hx3 L 6/10 completed for warm up prior to objective measures   Prone quad stretch 30\"hx5 L ^6/1 towel roll with stretch   Piriformis stretch 30\"hx3 L ^6/10 L knee across chest with L foot resting on bent R knee    Hip ER/IR AROM  4/30 cont with HEP    Hamstring stretch Cont with HEP 4/14   Reverse lung with glider  6/10 withheld d/t symptoms        PREs     Prone BS with knee flex 4/16 pillow under stomach    Prone hip extension ^6/8   Prone hip ER 5/10 cont with HEP, progressed with Hip ER strengthening below   Hip abd ^6/8 side lying   Hamstring curl ^6/8 standing at table   [facility] 5/27 transitioned back to SL instead of with ball roll in d/t fear of knee aggravation   Clams ^5/27 side lying   Mini squat 4/22 withheld d/t flare up in symptoms   SLS with hip abd 5/27 pt attempted to complete independently after step ups.  Had her stop d/t wanting to attempt hip hike   SLB 6/3 withheld d/t increase in symptoms   Standing marching 5/10 progressed with step ups   Step ups 6/3 withheld d/t increase in symptoms   Hip hike 6/8 reintroudced based on decrease in symptoms    Hip ER   6/1 withheld d/t fatigue and symptoms at start of treatment   Leg press  6/1    Functional movement   4/14             Manual interventions     Joint mobilization    ^5/10 pt prone   PROM  4/22 withheld d/t decrease guarding noted and completed stretching above    STM  L proximal hip flexors, quadriceps full muscle and hip adductors as well as piriformis x8' 6/10 reintroduced d/t symptoms    LAD 20\"hx5 L 6/10        6/10 withheld strengthening exercises exercise  [x]? Progressing: []? Met: []? Not Met: []? Adjusted     Therapist goals for Patient:   Short Term Goals: To be achieved in: 4 weeks  1. Independent in HEP and progression per patient tolerance, in order to prevent re-injury. [x]? Progressing: []? Met: []? Not Met: []? Adjusted   2. Patient will have a decrease in pain to facilitate improvement in movement, function, and ADLs as indicated by Functional Deficits. [x]? Progressing: []? Met: []? Not Met: []? Adjusted     Long Term Goals: To be achieved in: 6 weeks  1. Disability index score of 10% or less for the [facility] to assist with reaching prior level of function. [x]? Progressing: []? Met: []? Not Met: []? Adjusted  2. Patient will demonstrate increased L hip AROM that is equal to R to allow for proper joint functioning as indicated by patients Functional Deficits. [x]? Progressing: []? Met: []? Not Met: []? Adjusted  3. Patient will demonstrate an increase in L hip strength that is equal to R to allow for proper functional mobility as indicated by patients Functional Deficits. [x]? Progressing: []? Met: []? Not Met: []? Adjusted  4. Patient will return to all functional activities without increased symptoms or restriction. [x]? Progressing: []? Met: []? Not Met: []? Adjusted  5. Patient will have increase strength and ROM in order to  10 lbs from floor with proper sequencing and no c/o pain allowing her to better take care of her grand children  [x]? Progressing: []? Met: []? Not Met: []? Adjusted      Overall Progression Towards Functional goals/ Treatment Progress Update:  [] Patient is progressing as expected towards functional goals listed   [x] Progression is slowed due to chronic nature and complexity of symptoms however she does display overall improvement and would benefit from continued PT to further decrease symptoms and improve function. 6/10/21  [] Progression has been slowed due to co-morbidities.   [] Plan just implemented, too soon to assess goals progression <30days   [] Goals require adjustment due to lack of progress  [] Patient is not progressing as expected and requires additional follow up with physician  [] Other     Prognosis for POC: [x] Good [] Fair  [] Poor      Patient requires continued skilled intervention: [x] Yes  [] No    Treatment/Activity Tolerance:  [x] Patient able to complete treatment  [] Patient limited by fatigue  [] Patient limited by pain     [] Patient limited by other medical complications  [] Other:   6/10 Pt presented with increase in tightness at L proximal adductors, hip flexors and piriformis. Attempted to decrease tension with dry needling and STM which provided mild relief. Withtheld strengthening exercises based on symptoms. Patient Education:              6/1 Updated Piriformis stretch onto HEP and reviewed proper posture in sitting.   5/27  Updated HEP based on tolerance to treatment   5/6 Pt educated on monitoring symptoms and informing PT or MD if sensation she was describing increases with severity or consistency. 5/4 Updated HEP with weight, SLB and SL bridges. 4/30 Updated HEP   4/22 Added adductor stretch to HEP and withheld exercises in weight bearing. 4/16 Updated HEP   4/14 Educated on precautions, use of ice and importance of HEP. Educated on activity modification when taking care of grandchildren and avoid excessive hip flexion, adduction and internal rotation.      Synchronized   Access Code: [place]          PLAN: See eval  [] Continue per plan of care [x] Alter current plan (see comments below)  [x] Plan of care initiated [] Hold pending MD visit [] Discharge  6/10 Continue with PT to further decrease symptoms and increase function with strengthening and balance exercises as tolerated for 2x a week until she has f/u appointment with MD.   Electronically signed by:  Tello Andino, PT    Note: If patient does not return for scheduled/ recommended follow up visits, this note will serve as a discharge from care along with most recent update on progress.

## 2021-06-16 ENCOUNTER — HOSPITAL ENCOUNTER (OUTPATIENT)
Dept: PHYSICAL THERAPY | Age: 56
Setting detail: THERAPIES SERIES
Discharge: HOME | End: 2021-06-16
Payer: COMMERCIAL

## 2021-06-16 PROCEDURE — 97112 NEUROMUSCULAR REEDUCATION: CPT

## 2021-06-16 PROCEDURE — 97110 THERAPEUTIC EXERCISES: CPT

## 2021-06-16 PROCEDURE — 20560 NDL INSJ W/O NJX 1 OR 2 MUSC: CPT

## 2021-06-16 NOTE — FLOWSHEET NOTE
The [facility] and 500 [facility], [address], [address] [facility]  Phone: [phone]   Fax:     [phone]    Physical Therapy Daily Treatment Note  Date:  2021    Patient Name:  Andre Montalvo    :  1965  MRN: [id number]  Restrictions/Precautions:    Medical/Treatment Diagnosis Information:  · Diagnosis: I53.985I (ICD-10-CM) - Strain of left groin  · Treatment Diagnosis: M25.552 Left hip pain  Insurance/Certification information:  PT Insurance Information: [facility]  Physician Information:  Referring Practitioner: Miller Peterson. Vicky Puente MD  Has the plan of care been signed (Y/N):        []  Yes  [x]  No     Date of Patient follow up with Physician: PRN      Is this a Progress Report:     []  Yes  [x]  No        If Yes:  Date Range for reporting period:  Beginning 3/11/21  Ending 6/10/21    Progress report will be due (10 Rx or 30 days whichever is less):        Recertification will be due (POC Duration  / 90 days whichever is less): 21         Visit # Insurance Allowable Auth Required   18   [address] []  Yes []  No        Functional Scale:   WOMAC 25% deficit  Date assessed: 6/10/21  WOMAC 23% deficit  Date assessed: 21  WOMAC 58% deficit  Date assessed: 21       Latex Allergy:  [x]NO      []YES  Preferred Language for Healthcare:   [x]English       []other:      Pain level:  4/10  6/16/21      SUBJECTIVE:   Symptoms still present but not as severe. When burning in the groin subsides she feels increase in discomfort wrapping around from front to back .         OBJECTIVE:   Assessed below on 6/10/21  ROM PROM AROM Comments     Left Right Left Right     Flexion 106 with pain  116 85 99     Extension     10 12 pain      Abduction 35 pain  38 32 36     ER 51 40 48 38     IR 48 60 45 55                      Flexibility Left Right Comments   Hamstrings -7 -7     ITB (Obers test) WNL  WNL     Hip flexor(Mikhail test) WNL WNL                   Strength Left Right Comments   Hip flexors 4 with pain  4+     Hip extension 4+ 4+     Hip abduction 5 4+     Hip adduction 4+ 4     Hip ER 4+ 4+     Hip IR 4  4+     Quads 4+ 5     Hamstrings 5 5        Joint mobility:               []?Normal                       [x]? Hypo L hip into flexion               []?Hyper     Palpation: Tenderness L proximal hip flexors and adductors, greater trochanter and ITB     Functional Mobility/Transfers: Independent     Posture: Mild hip flexion and ER noted on L compared to R when resting in supine     Bandages/Dressings/Incisions: N/A     Gait: Independent, Mild Trendelenburg on L     Orthopedic Special Tests: See above      RESTRICTIONS/PRECAUTIONS: HTN.  2 previous L hip labral repairs    Exercises/Interventions:     ROM/STRETCHES     Bike Stopped after 2.5' d/t flare up in hips 6/16   HF stretch 30\"hx3 L 6/10 completed for warm up prior to objective measures   Adductor stretch 30\"hx3 L 6/10 completed for warm up prior to objective measures   Prone quad stretch 30\"hx5 L ^6/1 towel roll with stretch   Piriformis stretch  ^6/10 L knee across chest with L foot resting on bent R knee    Hip ER/IR AROM  4/30 cont with HEP    Hamstring stretch Cont with HEP 4/14   Reverse lung with glider  6/10 withheld d/t symptoms        PREs     Prone BS with knee flex 4/16 pillow under stomach    Prone hip extension 6# 3x10 L6/16 reintroduce in prone with pillow under stomach but did not increase weights in order to avoid increase in symptoms   Prone hip ER 5/10 cont with HEP, progressed with Hip ER strengthening below   Hip abd 6# 3x10 L^6/16 side lying, reintroduced but did not increase weights in order to avoid increase in symptoms   Hamstring curl 7.5# 3x15 L ^6/16 standing at table   Bridges SL 3x10 L  6/16 reintroduced with focus on glut activation    Clams Gray TB 3x10 L ^6/16 side lying, ball at feet   Mini squat 4/22 withheld d/t flare up in symptoms   SLS with hip abd treatment gloves along with hand  prior to inserting the needles. All needles where removed and discarded in the appropriate sharps container. Performed by Herbert Olivo PT, DPT   8 min  date 6/15/21      CP L hip x15' 6/16/21    Charges:  Timed Code Treatment Minutes: 36'   Total Treatment Minutes: 9:30-10:30  60'       [] EVAL (LOW) 40335 (typically 20 minutes face-to-face)  [] EVAL (MOD) 39988 (typically 30 minutes face-to-face)  [] EVAL (HIGH) 60717 (typically 45 minutes face-to-face)  [] RE-EVAL     [x] GC(46365) x   1  [] IONTO  [x] NMR (28841) x  1   [] VASO  [] Manual (66385) x       [x] Other: Dn  [] TA x      [] Mech Traction (80309)  [] ES(attended) (51654)      [] ES (un) (99374):     GOALS: Adjusted on 4/14/21 d/t new prescription from MD  Patient stated goal:  Eliminate pain in order to return to exercise  [x]? Progressing: []? Met: []? Not Met: []? Adjusted     Therapist goals for Patient:   Short Term Goals: To be achieved in: 4 weeks  1. Independent in HEP and progression per patient tolerance, in order to prevent re-injury. [x]? Progressing: []? Met: []? Not Met: []? Adjusted   2. Patient will have a decrease in pain to facilitate improvement in movement, function, and ADLs as indicated by Functional Deficits. [x]? Progressing: []? Met: []? Not Met: []? Adjusted     Long Term Goals: To be achieved in: 6 weeks  1. Disability index score of 10% or less for the [facility] to assist with reaching prior level of function. [x]? Progressing: []? Met: []? Not Met: []? Adjusted  2. Patient will demonstrate increased L hip AROM that is equal to R to allow for proper joint functioning as indicated by patients Functional Deficits. [x]? Progressing: []? Met: []? Not Met: []? Adjusted  3. Patient will demonstrate an increase in L hip strength that is equal to R to allow for proper functional mobility as indicated by patients Functional Deficits. [x]? Progressing: []? Met: []?  Not Met: []? Adjusted  4. Patient will return to all functional activities without increased symptoms or restriction. [x]? Progressing: []? Met: []? Not Met: []? Adjusted  5. Patient will have increase strength and ROM in order to  10 lbs from floor with proper sequencing and no c/o pain allowing her to better take care of her grand children  [x]? Progressing: []? Met: []? Not Met: []? Adjusted      Overall Progression Towards Functional goals/ Treatment Progress Update:  [] Patient is progressing as expected towards functional goals listed   [x] Progression is slowed due to chronic nature and complexity of symptoms however she does display overall improvement and would benefit from continued PT to further decrease symptoms and improve function. 6/10/21  [] Progression has been slowed due to co-morbidities. [] Plan just implemented, too soon to assess goals progression <30days   [] Goals require adjustment due to lack of progress  [] Patient is not progressing as expected and requires additional follow up with physician  [] Other     Prognosis for POC: [x] Good [] Fair  [] Poor      Patient requires continued skilled intervention: [x] Yes  [] No    Treatment/Activity Tolerance:  [x] Patient able to complete treatment  [] Patient limited by fatigue  [] Patient limited by pain     [] Patient limited by other medical complications  [] Other:   6/16 Decrease tension noted at proximal adductors however continued to have tension at L proximal hip flexors and mid substance to distal quad that responded well to dry needling. Fatigued with balance and strengthening exercises with no significant increase in symptoms but she did have soreness.      Patient Education:              6/16 Updated HEP   6/1 Updated Piriformis stretch onto HEP and reviewed proper posture in sitting.   5/27  Updated HEP based on tolerance to treatment   5/6 Pt educated on monitoring symptoms and informing PT or MD if sensation she was describing increases with severity or consistency. 5/4 Updated HEP with weight, SLB and SL bridges. 4/30 Updated HEP   4/22 Added adductor stretch to HEP and withheld exercises in weight bearing. 4/16 Updated HEP   4/14 Educated on precautions, use of ice and importance of HEP. Educated on activity modification when taking care of grandchildren and avoid excessive hip flexion, adduction and internal rotation. [place]   Access Code: [place]          PLAN: See eval  [] Continue per plan of care [x] Alter current plan (see comments below)  [x] Plan of care initiated [] Hold pending MD visit [] Discharge  6/10 Continue with PT to further decrease symptoms and increase function with strengthening and balance exercises as tolerated for 2x a week until she has f/u appointment with MD.   Electronically signed by:  Jenni Zelaya PT    Note: If patient does not return for scheduled/ recommended follow up visits, this note will serve as a discharge from care along with most recent update on progress.

## 2021-06-18 ENCOUNTER — HOSPITAL ENCOUNTER (OUTPATIENT)
Dept: PHYSICAL THERAPY | Age: 56
Setting detail: THERAPIES SERIES
Discharge: HOME | End: 2021-06-18
Payer: COMMERCIAL

## 2021-06-18 PROCEDURE — 20561 NDL INSJ W/O NJX 3+ MUSC: CPT

## 2021-06-18 PROCEDURE — 97110 THERAPEUTIC EXERCISES: CPT

## 2021-06-18 NOTE — FLOWSHEET NOTE
The [address] [facility] and 500 [facility], [address] [place] [address] [address], [address] [facility]  Phone: [phone]   Fax:     [phone]    Physical Therapy Daily Treatment Note  Date:  2021    Patient Name:  Gerry Urrutia    :  1965  MRN: [id number]  Restrictions/Precautions:    Medical/Treatment Diagnosis Information:  · Diagnosis: V97.598X (ICD-10-CM) - Strain of left groin  · Treatment Diagnosis: M25.552 Left hip pain  Insurance/Certification information:  PT Insurance Information: [facility]  Physician Information:  Referring Practitioner: Eugenia Silva.  Anastasia Kahn MD  Has the plan of care been signed (Y/N):        []  Yes  [x]  No     Date of Patient follow up with Physician: PRN      Is this a Progress Report:     []  Yes  [x]  No        If Yes:  Date Range for reporting period:  Beginning 3/11/21  Ending 6/10/21    Progress report will be due (10 Rx or 30 days whichever is less): 52       Recertification will be due (POC Duration  / 90 days whichever is less): 21         Visit # Insurance Allowable Auth Required   18   [address] []  Yes []  No        Functional Scale:   WOMAC 25% deficit  Date assessed: 6/10/21  WOMAC 23% deficit  Date assessed: 21  WOMAC 58% deficit  Date assessed: 21       Latex Allergy:  [x]NO      []YES  Preferred Language for Healthcare:   [x]English       []other:      Pain level:  4/10  6/16/21      SUBJECTIVE:   Pt states she has noticed mild increase in tightness at inner thigh on L describing pulling with movement of L        OBJECTIVE:   Assessed below on 6/10/21  ROM PROM AROM Comments     Left Right Left Right     Flexion 106 with pain  116 85 99     Extension     10 12 pain      Abduction 35 pain  38 32 36     ER 51 40 48 38     IR 48 60 45 55                      Flexibility Left Right Comments   Hamstrings -7 -7     ITB (Obers test) WNL  WNL     Hip flexor(Mikhail test) WNL WNL                     Strength Left Right Comments   Hip flexors 4 with pain  4+     Hip extension 4+ 4+     Hip abduction 5 4+     Hip adduction 4+ 4     Hip ER 4+ 4+     Hip IR 4  4+     Quads 4+ 5     Hamstrings 5 5        Joint mobility:               []?Normal                       [x]? Hypo L hip into flexion               []?Hyper     Palpation: Tenderness L proximal hip flexors and adductors, greater trochanter and ITB     Functional Mobility/Transfers: Independent     Posture: Mild hip flexion and ER noted on L compared to R when resting in supine     Bandages/Dressings/Incisions: N/A     Gait: Independent, Mild Trendelenburg on L     Orthopedic Special Tests: See above      RESTRICTIONS/PRECAUTIONS: HTN. 2 previous L hip labral repairs    Exercises/Interventions:     ROM/STRETCHES     Bike  6/16   HF stretch 30\"hx3 L 6/18 completed after  Dry needling   Adductor stretch 30\"hx3 L 6/18 completed after Dry needling   Prone quad stretch 30\"hx5 L ^6/1 towel roll with stretch   Piriformis stretch  ^6/10 L knee across chest with L foot resting on bent R knee    Hip ER/IR AROM  4/30 cont with HEP    Hamstring stretch Cont with HEP 4/14   Reverse lung with glider  6/10 withheld d/t symptoms        PREs     Prone BS with knee flex 4/16 pillow under stomach    Prone hip extension 6# 2x10 L6/18 stopped after 2nd set d/t medial anterior hip discomfort that occurred with and without weight   Prone hip ER 5/10 cont with HEP, progressed with Hip ER strengthening below   Hip abd 6# 3x10 L^6/16 side lying, reintroduced but did not increase weights in order to avoid increase in symptoms   Hamstring curl 7.5# 3x15 L ^6/16 standing at [facility] 3x10 bilat  6/18 adjusted exercise d/t medial/ anterior hip discomfort    Clams Mallory TB 3x10 L ^6/18    Mini squat 4/22 withheld d/t flare up in symptoms   SLS with hip abd 5/27 pt attempted to complete independently after step ups.  Had her stop d/t wanting to attempt hip hike   SLB ^6/16 no airex in order to focus on proper muscle activation and decrease risk of flare up in symptoms   Standing marching 5/10 progressed with step ups   Step ups 6/3 withheld d/t increase in symptoms   Hip hike 6/8 reintroudced based on decrease in symptoms    Hip ER   6/1 withheld d/t fatigue and symptoms at start of treatment   Leg press Attempted with 100# but withheld d/t anterior medial hip pain 6/18   Functional movement   4/14   Reverse lunge with slideer 6/16 controlled depth to 45 deg knee flexion to minimize symptoms but trying to activate ccentric quad         Manual interventions     Joint mobilization    ^5/10 pt prone   PROM  4/22 withheld d/t decrease guarding noted and completed stretching above    STM  6/15 decrease tension overall and responded well to dry needling   LAD 6/10            Therapeutic Exercise and NMR EXR  [x] (28741) Provided verbal/tactile cueing for activities related to strengthening, flexibility, endurance, ROM for improvements in LE, proximal hip, and core control with self care, mobility, lifting, ambulation. [x] (63651) Provided verbal/tactile cueing for activities related to improving balance, coordination, kinesthetic sense, posture, motor skill, proprioception  to assist with LE, proximal hip, and core control in self care, mobility, lifting, ambulation and eccentric single leg control.      NMR and Therapeutic Activities:    [] (57861 or 59586) Provided verbal/tactile cueing for activities related to improving balance, coordination, kinesthetic sense, posture, motor skill, proprioception and motor activation to allow for proper function of core, proximal hip and LE with self care and ADLs  [] (60621) Gait Re-education- Provided training and instruction to the patient for proper LE, core and proximal hip recruitment and positioning and eccentric body weight control with ambulation re-education including up and down stairs     Home Exercise Program:    [x] (65550) Reviewed/Progressed HEP activities related to strengthening, flexibility, endurance, ROM of core, proximal hip and LE for functional self-care, mobility, lifting and ambulation/stair navigation   [] (64514)Reviewed/Progressed HEP activities related to improving balance, coordination, kinesthetic sense, posture, motor skill, proprioception of core, proximal hip and LE for self care, mobility, lifting, and ambulation/stair navigation      Manual Treatments:  PROM / STM / Oscillations-Mobs:  G-I, II, III, IV (PA's, Inf., Post.)  [] (27811) Provided manual therapy to mobilize LE, proximal hip and/or LS spine soft tissue/joints for the purpose of modulating pain, promoting relaxation,  increasing ROM, reducing/eliminating soft tissue swelling/inflammation/restriction, improving soft tissue extensibility and allowing for proper ROM for normal function with self care, mobility, lifting and ambulation. Modalities:    PT reviewed precautions, contraindications, and indications with pt in addition to application of dry needling within established plan of care and expected outcomes; pt verbalized understanding with all questions answered. Pt had signed consent form for dry needling and PT obtained written consent with updated plan of care from MD before beginning. Dry needling manual therapy: consisted on the placement of 9 needles in the following muscles L hip proximal hip flexors, as well as distal to mid substance of quad and adductors. 60 mm needles were inserted to produce intramuscular mobilization. These techniques were used to restore functional range of motion, reduce muscle spasm and induce healing in the corresponding musculature. (56517)  Clean Technique was utilized today while applying Dry needling treatment. The treatment sites where cleaned with 70% solution of  isopropyl alcohol . The PT washed their hands and utilized treatment gloves along with hand  prior to inserting the needles.   All needles where removed restriction. [x]? Progressing: []? Met: []? Not Met: []? Adjusted  5. Patient will have increase strength and ROM in order to  10 lbs from floor with proper sequencing and no c/o pain allowing her to better take care of her grand children  [x]? Progressing: []? Met: []? Not Met: []? Adjusted      Overall Progression Towards Functional goals/ Treatment Progress Update:  [] Patient is progressing as expected towards functional goals listed   [x] Progression is slowed due to chronic nature and complexity of symptoms however she does display overall improvement and would benefit from continued PT to further decrease symptoms and improve function. 6/10/21  [] Progression has been slowed due to co-morbidities. [] Plan just implemented, too soon to assess goals progression <30days   [] Goals require adjustment due to lack of progress  [] Patient is not progressing as expected and requires additional follow up with physician  [] Other     Prognosis for POC: [x] Good [] Fair  [] Poor      Patient requires continued skilled intervention: [x] Yes  [] No    Treatment/Activity Tolerance:  [x] Patient able to complete treatment  [] Patient limited by fatigue  [] Patient limited by pain     [] Patient limited by other medical complications  [] Other:   6/18 Pt presented with tightness at L proximal adductors and hip flexors as well as mid substance quadriceps that responded well to dry needling. Even with decrease in muscle tension after dry needling she continued to have medial/anterior hip pain with prone hip extension, leg press and SLS activities. Based on reproduction in joint symptoms withheld certain strengthening exercises in order to decrease risk of flare up in symptoms.     Patient Education:              6/16 Updated HEP   6/1 Updated Piriformis stretch onto HEP and reviewed proper posture in sitting.   5/27  Updated HEP based on tolerance to treatment   5/6 Pt educated on monitoring symptoms and informing PT

## 2021-06-22 ENCOUNTER — HOSPITAL ENCOUNTER (OUTPATIENT)
Dept: PHYSICAL THERAPY | Age: 56
Setting detail: THERAPIES SERIES
Discharge: HOME | End: 2021-06-22
Payer: COMMERCIAL

## 2021-06-22 PROCEDURE — 97110 THERAPEUTIC EXERCISES: CPT

## 2021-06-22 PROCEDURE — 20560 NDL INSJ W/O NJX 1 OR 2 MUSC: CPT

## 2021-06-22 NOTE — FLOWSHEET NOTE
^6/16 no airex in order to focus on proper muscle activation and decrease risk of flare up in symptoms   Standing marching 5/10 progressed with step ups   Step ups 6/3 withheld d/t increase in symptoms   Hip hike 6/8 reintroudced based on decrease in symptoms    Hip ER   6/1 withheld d/t fatigue and symptoms at start of treatment   Leg press  6/18   Functional movement   4/14   Reverse lunge with slideer 6/16 controlled depth to 45 deg knee flexion to minimize symptoms but trying to activate ccentric quad         Manual interventions     Joint mobilization    ^5/10 pt prone   PROM  4/22 withheld d/t decrease guarding noted and completed stretching above    STM  6/15 decrease tension overall and responded well to dry needling   LAD 6/10            Therapeutic Exercise and NMR EXR  [x] (77973) Provided verbal/tactile cueing for activities related to strengthening, flexibility, endurance, ROM for improvements in LE, proximal hip, and core control with self care, mobility, lifting, ambulation. [x] (24380) Provided verbal/tactile cueing for activities related to improving balance, coordination, kinesthetic sense, posture, motor skill, proprioception  to assist with LE, proximal hip, and core control in self care, mobility, lifting, ambulation and eccentric single leg control.      NMR and Therapeutic Activities:    [] (67529 or 15013) Provided verbal/tactile cueing for activities related to improving balance, coordination, kinesthetic sense, posture, motor skill, proprioception and motor activation to allow for proper function of core, proximal hip and LE with self care and ADLs  [] (75209) Gait Re-education- Provided training and instruction to the patient for proper LE, core and proximal hip recruitment and positioning and eccentric body weight control with ambulation re-education including up and down stairs     Home Exercise Program:    [x] (93182) Reviewed/Progressed HEP activities related to strengthening, Shellie Babcock PT, DPT   8 min  date 6/22/21      CP L hip x10' 6/22/21    Charges:  Timed Code Treatment Minutes: 35'   Total Treatment Minutes: 9:20-10:05  45'       [] EVAL (LOW) 59058 (typically 20 minutes face-to-face)  [] EVAL (MOD) 55663 (typically 30 minutes face-to-face)  [] EVAL (HIGH) 53707 (typically 45 minutes face-to-face)  [] RE-EVAL     [x] AI(74430) x   1  [] IONTO  [] NMR (85204) x     [] VASO  [] Manual (88864) x       [x] Other: Dn  [] TA x      [] Mech Traction (91617)  [] ES(attended) (15116)      [] ES (un) (56217):     GOALS: Adjusted on 4/14/21 d/t new prescription from MD  Patient stated goal:  Eliminate pain in order to return to exercise  [x]? Progressing: []? Met: []? Not Met: []? Adjusted     Therapist goals for Patient:   Short Term Goals: To be achieved in: 4 weeks  1. Independent in HEP and progression per patient tolerance, in order to prevent re-injury. [x]? Progressing: []? Met: []? Not Met: []? Adjusted   2. Patient will have a decrease in pain to facilitate improvement in movement, function, and ADLs as indicated by Functional Deficits. [x]? Progressing: []? Met: []? Not Met: []? Adjusted     Long Term Goals: To be achieved in: 6 weeks  1. Disability index score of 10% or less for the [facility] to assist with reaching prior level of function. [x]? Progressing: []? Met: []? Not Met: []? Adjusted  2. Patient will demonstrate increased L hip AROM that is equal to R to allow for proper joint functioning as indicated by patients Functional Deficits. [x]? Progressing: []? Met: []? Not Met: []? Adjusted  3. Patient will demonstrate an increase in L hip strength that is equal to R to allow for proper functional mobility as indicated by patients Functional Deficits. [x]? Progressing: []? Met: []? Not Met: []? Adjusted  4. Patient will return to all functional activities without increased symptoms or restriction. [x]? Progressing: []? Met: []? Not Met: []? Adjusted  5.  Patient will have increase strength and ROM in order to  10 lbs from floor with proper sequencing and no c/o pain allowing her to better take care of her grand children  [x]? Progressing: []? Met: []? Not Met: []? Adjusted      Overall Progression Towards Functional goals/ Treatment Progress Update:  [] Patient is progressing as expected towards functional goals listed   [x] Progression is slowed due to chronic nature and complexity of symptoms however she does display overall improvement and would benefit from continued PT to further decrease symptoms and improve function. 6/10/21  [] Progression has been slowed due to co-morbidities. [] Plan just implemented, too soon to assess goals progression <30days   [] Goals require adjustment due to lack of progress  [] Patient is not progressing as expected and requires additional follow up with physician  [] Other     Prognosis for POC: [x] Good [] Fair  [] Poor      Patient requires continued skilled intervention: [x] Yes  [] No    Treatment/Activity Tolerance:  [x] Patient able to complete treatment  [] Patient limited by fatigue  [] Patient limited by pain     [] Patient limited by other medical complications  [] Other:   6/22 Pt presented with tightness at L proximal adductors that responded well to dry needling. She continues to have pain at medial/ anterior hip with AROM and attempt of hip strengthening. Transitioned to isometrics based on symptos and tolerated treatment well with no adverse effects. Patient Education:              6/22 Updated HEP with isometrics in order to maintain muscle activaiton and avoid increase in severity of symptoms. 6/16 Updated HEP   6/1 Updated Piriformis stretch onto HEP and reviewed proper posture in sitting.   5/27  Updated HEP based on tolerance to treatment   5/6 Pt educated on monitoring symptoms and informing PT or MD if sensation she was describing increases with severity or consistency.    5/4 Updated HEP with weight, SLB and SL bridges. 4/30 Updated HEP   4/22 Added adductor stretch to HEP and withheld exercises in weight bearing. 4/16 Updated HEP   4/14 Educated on precautions, use of ice and importance of HEP. Educated on activity modification when taking care of grandchildren and avoid excessive hip flexion, adduction and internal rotation. [employer]   Access Code: [place]          PLAN: See eval  [] Continue per plan of care [x] Alter current plan (see comments below)  [x] Plan of care initiated [] Hold pending MD visit [] Discharge  6/22: Monitor symptoms and continue with dry needling as tolerated before f/u with MD next week. 6/10 Continue with PT to further decrease symptoms and increase function with strengthening and balance exercises as tolerated for 2x a week until she has f/u appointment with MD.   Electronically signed by:  Lamonte Slade PT    Note: If patient does not return for scheduled/ recommended follow up visits, this note will serve as a discharge from care along with most recent update on progress.

## 2021-06-24 ENCOUNTER — HOSPITAL ENCOUNTER (OUTPATIENT)
Dept: PHYSICAL THERAPY | Age: 56
Setting detail: THERAPIES SERIES
Discharge: HOME | End: 2021-06-24
Payer: COMMERCIAL

## 2021-06-24 PROCEDURE — 97110 THERAPEUTIC EXERCISES: CPT

## 2021-06-24 PROCEDURE — 20560 NDL INSJ W/O NJX 1 OR 2 MUSC: CPT

## 2021-06-24 NOTE — FLOWSHEET NOTE
The [facility] and [facility], [address], [address] [facility]  Phone: [phone]   Fax:     [phone]    Physical Therapy Daily Treatment Note  Date:  2021    Patient Name:  Kalyan Wood    :  1965  MRN: [id number]  Restrictions/Precautions:    Medical/Treatment Diagnosis Information:  · Diagnosis: U44.886Z (ICD-10-CM) - Strain of left groin  · Treatment Diagnosis: M25.552 Left hip pain  Insurance/Certification information:  PT Insurance Information: [facility]  Physician Information:  Referring Practitioner: Adwoa Austin. Lorne Ceja MD  Has the plan of care been signed (Y/N):        []  Yes  [x]  No     Date of Patient follow up with Physician: PRN      Is this a Progress Report:     []  Yes  [x]  No        If Yes:  Date Range for reporting period:  Beginning 3/11/21  Ending 6/10/21    Progress report will be due (10 Rx or 30 days whichever is less): 0/3/33       Recertification will be due (POC Duration  / 90 days whichever is less): 21         Visit # Insurance Allowable Auth Required    [address] []  Yes []  No        Functional Scale:   WOMAC 25% deficit  Date assessed: 6/10/21  WOMAC 23% deficit  Date assessed: 21  WOMAC 58% deficit  Date assessed: 21       Latex Allergy:  [x]NO      []YES  Preferred Language for Healthcare:   [x]English       []other:      Pain level:  4/10  6/24/21      SUBJECTIVE:   Pt reports no changes in symptoms from last visit. She continues to have deep pain at medial/ anterior aspect of L hip with burning down inner thigh.        OBJECTIVE:   Updated below on 21  ROM PROM AROM Comments     Left Right Left Right     Flexion 107 with pain  116 95 99     Extension     10 pain 12      Abduction 35 pain  38 33 36     ER 51 40 48 38     IR 48 60 45 55                      Flexibility Left Right Comments   Hamstrings -7 -7     ITB (Obers test) WNL  WNL     Hip flexor(Mikhail test) WNL WNL                     Strength Left Right Comments   Hip flexors 4 with pain  4+     Hip extension 4+ 5     Hip abduction 4 5     Hip adduction 4 4+     Hip ER 4 mild pain  4+     Hip IR 4+  5     Quads 4+ 5     Hamstrings 5 5        Joint mobility:               []?Normal                       [x]? Hypo L hip into flexion               []?Hyper     Palpation: Mild enderness L proximal hip flexors     Functional Mobility/Transfers: Independent     Posture: Mild hip flexion and ER noted on L compared to R when resting in supine     Bandages/Dressings/Incisions: N/A     Gait: Independent, Mild Trendelenburg on L     Orthopedic Special Tests: See above      RESTRICTIONS/PRECAUTIONS: HTN. 2 previous L hip labral repairs    Exercises/Interventions:     ROM/STRETCHES     Bike  6/16   HF stretch 30\"hx3 L 6/24 completed at start of treatment   Adductor stretch 30\"hx3 L 6/24 completed at start of treatment   Prone quad stretch 30\"hx5 L ^6/1 towel roll with stretch   Piriformis stretch  ^6/10 L knee across chest with L foot resting on bent R knee    Hip ER/IR AROM  4/30 cont with HEP    Hamstring stretch Cont with HEP 4/14   Reverse lung with glider  6/10 withheld d/t symptoms        PREs     Prone BS with knee flex 4/16 pillow under stomach    Prone hip extension 6/22 withheld d/t hip joint symptoms   Prone hip extension isometric 10\"hx10 bilat 6/22   Prone hip ER 10\"hx10 bilat 6/22 Reintroduced based on continued joint line symptoms   Hip abd isometric 10\"hx10 bilat6/22 completed in hook lying   Hip abd 6/22 withheld d/t hip joint symptoms   Hamstring curl 7.5# 3x15 L ^6/16 standing at [facility] [employer] 3x10 bilat  6/18 adjusted exercise d/t medial/ anterior hip discomfort    Clams 6/22 complete hip abd isometrics d/t joint symptoms with active motion     Mini squat 4/22 withheld d/t flare up in symptoms   SLS with hip abd 5/27 pt attempted to complete independently after step ups.  Had her stop d/t wanting to attempt hip hike   SLB ^6/16 no airex in order to focus on proper muscle activation and decrease risk of flare up in symptoms   Standing marching 5/10 progressed with step ups   Step ups 6/3 withheld d/t increase in symptoms   Hip hike 6/8 reintroudced based on decrease in symptoms    Hip ER   6/1 withheld d/t fatigue and symptoms at start of treatment   Leg press  6/18   Functional movement   4/14   Reverse lunge with slideer 6/16 controlled depth to 45 deg knee flexion to minimize symptoms but trying to activate ccentric quad         Manual interventions     Joint mobilization    ^5/10 pt prone   PROM  4/22 withheld d/t decrease guarding noted and completed stretching above    STM  6/15 decrease tension overall and responded well to dry needling   LAD 6/10            Therapeutic Exercise and NMR EXR  [x] (53407) Provided verbal/tactile cueing for activities related to strengthening, flexibility, endurance, ROM for improvements in LE, proximal hip, and core control with self care, mobility, lifting, ambulation. [x] (07157) Provided verbal/tactile cueing for activities related to improving balance, coordination, kinesthetic sense, posture, motor skill, proprioception  to assist with LE, proximal hip, and core control in self care, mobility, lifting, ambulation and eccentric single leg control.      NMR and Therapeutic Activities:    [] (19880 or 97074) Provided verbal/tactile cueing for activities related to improving balance, coordination, kinesthetic sense, posture, motor skill, proprioception and motor activation to allow for proper function of core, proximal hip and LE with self care and ADLs  [] (64720) Gait Re-education- Provided training and instruction to the patient for proper LE, core and proximal hip recruitment and positioning and eccentric body weight control with ambulation re-education including up and down stairs     Home Exercise Program:    [x] (15977) Reviewed/Progressed HEP activities related to Pt educated on monitoring symptoms and informing PT or MD if sensation she was describing increases with severity or consistency. 5/4 Updated HEP with weight, SLB and SL bridges. 4/30 Updated HEP   4/22 Added adductor stretch to HEP and withheld exercises in weight bearing. 4/16 Updated HEP   4/14 Educated on precautions, use of ice and importance of HEP. Educated on activity modification when taking care of grandchildren and avoid excessive hip flexion, adduction and internal rotation. [employer]   Access Code: [place]          PLAN: See eval  [] Continue per plan of care [x] Alter current plan (see comments below)  [x] Plan of care initiated [x] Hold pending MD visit [] Discharge  6/24 Pt to f/u with MD next week and based on continued symptoms she will be held from PT and continue with HEP independently to avoid increase in muscle tightness. She will f/u with PT based on MD visit. Electronically signed by:  Frantz Rodarte PT    Note: If patient does not return for scheduled/ recommended follow up visits, this note will serve as a discharge from care along with most recent update on progress.

## 2021-07-29 ENCOUNTER — HOSPITAL ENCOUNTER (OUTPATIENT)
Dept: PHYSICAL THERAPY | Age: 56
Setting detail: THERAPIES SERIES
Discharge: HOME | End: 2021-07-29
Payer: COMMERCIAL

## 2021-07-29 PROCEDURE — 97110 THERAPEUTIC EXERCISES: CPT

## 2021-07-29 PROCEDURE — 97112 NEUROMUSCULAR REEDUCATION: CPT

## 2021-07-29 NOTE — PROGRESS NOTES
The [address] [place] [address],[address], 800 [facility] 3360 [facility], [address] [place] [address]  Phone: [phone]   Fax:     [phone]   Physical Therapy Re-Certification Plan of Care    Dear Dr. Siri Mock. Jeovanny Gray,    We had the pleasure of treating the following patient for physical therapy services at [address] [place] [address]. A summary of our findings can be found in the updated assessment below. This includes our plan of care. If you have any questions or concerns regarding these findings, please do not hesitate to contact me at the office phone number checked above. Thank you for the referral.     Physician Signature:________________________________Date:__________________  By signing above (or electronic signature), therapists plan is approved by physician      Overall Response to Treatment:   []Patient is responding well to treatment and improvement is noted with regards  to goals   []Patient should continue to improve in reasonable time if they continue HEP   []Patient has plateaued and is no longer responding to skilled PT intervention    []Patient is getting worse and would benefit from return to referring MD   []Patient unable to adhere to initial POC   [x]Other: Patient is returning to physical therapy per MD recommendation after receiving PRP injection to L hip on 21. Physical Therapy Progress/Daily Treatment Note  Date:  2021    Patient Name:  Frankie Pace    :  1965  MRN: [id number]  Restrictions/Precautions:    Medical/Treatment Diagnosis Information:  · Diagnosis: P77.914P (ICD-10-CM) - Strain of left groin  · Treatment Diagnosis: M25.552 Left hip pain  Insurance/Certification information:  PT Insurance Information: [facility]  Physician Information:  Referring Practitioner: Siri Mock.  Jeovanny Gray MD  Has the plan of care been signed (Y/N):        []  Yes  [x]  No     Date of Patient follow up with Physician: PRN      Is this a Progress Report:     [x]  Yes  []  No        If Yes:  Date Range for reporting period:  Beginning 3/11/21  Ending 7/29/21    Progress report will be due (10 Rx or 30 days whichever is less): 9/74/83      Recertification will be due (POC Duration  / 90 days whichever is less): 9/23/21        Visit # Insurance Allowable Auth Required   22  7/29 [address] []  Yes []  No        Functional Scale:   WOMAC 44% deficit  Date assessed: 7/29/21  WOMAC 25% deficit  Date assessed: 6/10/21  WOMAC 23% deficit  Date assessed: 5/12/21  WOMAC 58% deficit  Date assessed: 4/14/21       Latex Allergy:  [x]NO      []YES  Preferred Language for Healthcare:   [x]English       []other:      Pain level:  2/10  7/29/21      SUBJECTIVE:  7/29 Pt received 1 PRP injection on 7/29/21 and since then has noticed less stiffness with movement after prolonged sitting and not experiencing as much referred pain down her L thigh. Will continue to experience increase in symptoms with household chores such as mopping and cleaning bathroom. Pt states she also has some nights where it is more difficult to sleep. Symptoms continue to bounce back and forth based on activity level       OBJECTIVE:   Updated below on 7/29/21  ROM PROM AROM Comments     Left Right Left Right     Flexion 110 with pain  116 95 pain 99     Extension     10 pain 12      Abduction 35 pain  38 33 36     ER 52 40 48 38     IR 45 60 40 55                      Flexibility Left Right Comments   Hamstrings -5 -5     ITB (Obers test) WNL  WNL     Hip flexor(Mikhail test) WNL WNL                  Special  Test Left Right Comments   FABERS Pos Neg     Scour test Pos Neg     Impingement test Pos Neg            Strength Left Right Comments   Hip flexors 3+ 4 SLR   Hip extension 4+ 5     Hip abduction 4+ 5     Hip adduction 4 4+     Hip ER 4+ mild pain  4+     Hip IR 4- pain 5     Quads 4+ 5     Hamstrings 5 5        Joint mobility:               []?Normal                       [x]? Hypo L hip into flexion               []?Hyper     Palpation: Tenderness L ASIS     Functional Mobility/Transfers: Independent     Posture: WNL in standing      Bandages/Dressings/Incisions: N/A     Gait: Independent, Mild Trendelenburg on L     Orthopedic Special Tests: SLB eyes open >10\" bilat with no increase in pain       RESTRICTIONS/PRECAUTIONS: HTN. 2 previous L hip labral repairs    Exercises/Interventions:     ROM/STRETCHES     Bike 5' warm up 7/29 completed on airdyne bike to prevent hip flexion past 90 deg    HF stretch 30\"hx3 L 6/24 completed at start of treatment   Adductor stretch 30\"hx3 L 6/24 completed at start of treatment   Prone quad stretch 30\"hx3 L ^6/1 towel roll with stretch   Hip ER/IR AROM 3x10 L 7/29 prone with 1/2 roll under thigh        PREs     Prone BS with knee flex 10\"hx10 bilat7/29   Prone hip extension 0# 1x10 L7/29 bent knee, stopped after 1 set d/t anterior hip pain   Prone hip ER 10\"hx10 bilat 6/22 Reintroduced based on continued joint line symptoms   Hip abd isometric 10\"hx10 bilat7/29 hook lying with blue loops   Bridges Mallory loop 3x10 bilat  6/18 adjusted exercise d/t medial/ anterior hip discomfort                                           Manual interventions     Joint mobilization     PROM     STM     LAD             Therapeutic Exercise and NMR EXR  [x] (29017) Provided verbal/tactile cueing for activities related to strengthening, flexibility, endurance, ROM for improvements in LE, proximal hip, and core control with self care, mobility, lifting, ambulation. [x] (44571) Provided verbal/tactile cueing for activities related to improving balance, coordination, kinesthetic sense, posture, motor skill, proprioception  to assist with LE, proximal hip, and core control in self care, mobility, lifting, ambulation and eccentric single leg control.      NMR and Therapeutic Activities:    [] (63284 or ) Provided verbal/tactile cueing for activities related to improving balance, coordination, kinesthetic sense, posture, motor skill, proprioception and motor activation to allow for proper function of core, proximal hip and LE with self care and ADLs  [] (48937) Gait Re-education- Provided training and instruction to the patient for proper LE, core and proximal hip recruitment and positioning and eccentric body weight control with ambulation re-education including up and down stairs     Home Exercise Program:    [x] (74361) Reviewed/Progressed HEP activities related to strengthening, flexibility, endurance, ROM of core, proximal hip and LE for functional self-care, mobility, lifting and ambulation/stair navigation   [] (77138)Reviewed/Progressed HEP activities related to improving balance, coordination, kinesthetic sense, posture, motor skill, proprioception of core, proximal hip and LE for self care, mobility, lifting, and ambulation/stair navigation      Manual Treatments:  PROM / STM / Oscillations-Mobs:  G-I, II, III, IV (PA's, Inf., Post.)  [] (96167) Provided manual therapy to mobilize LE, proximal hip and/or LS spine soft tissue/joints for the purpose of modulating pain, promoting relaxation,  increasing ROM, reducing/eliminating soft tissue swelling/inflammation/restriction, improving soft tissue extensibility and allowing for proper ROM for normal function with self care, mobility, lifting and ambulation.      Modalities:            Charges:  Timed Code Treatment Minutes: 36'   Total Treatment Minutes: 9:19-10:12  48'       [] EVAL (LOW) 50910 (typically 20 minutes face-to-face)  [] EVAL (MOD) 77399 (typically 30 minutes face-to-face)  [] EVAL (HIGH) 67284 (typically 45 minutes face-to-face)  [] RE-EVAL     [x] WT(23758) x   2  [] IONTO  [x] NMR (64433) x 1     [] VASO  [] Manual (11666) x       [] Other: Dn  [] TA x      [] Mech Traction (62152)  [] ES(attended) (86324)      [] ES (un) (01599):     GOALS: Adjusted on 7/29/21 d/t new prescription from MD following PRP injection  Patient stated goal:  Eliminate pain in order to return to exercise  [x]? Progressing: []? Met: []? Not Met: []? Adjusted     Therapist goals for Patient:   Short Term Goals: To be achieved in: 4 weeks  1. Independent in HEP and progression per patient tolerance, in order to prevent re-injury. [x]? Progressing: []? Met: []? Not Met: []? Adjusted   2. Patient will have a decrease in pain to facilitate improvement in movement, function, and ADLs as indicated by Functional Deficits. [x]? Progressing: []? Met: []? Not Met: []? Adjusted     Long Term Goals: To be achieved in: 8 weeks  1. Disability index score of 10% or less for the [facility] to assist with reaching prior level of function. [x]? Progressing: []? Met: []? Not Met: []? Adjusted  2. Patient will demonstrate increased L hip AROM that is equal to R to allow for proper joint functioning as indicated by patients Functional Deficits. [x]? Progressing: []? Met: []? Not Met: []? Adjusted  3. Patient will demonstrate an increase in L hip strength that is equal to R to allow for proper functional mobility as indicated by patients Functional Deficits. [x]? Progressing: []? Met: []? Not Met: []? Adjusted  4. Patient will return to all functional activities without increased symptoms or restriction. [x]? Progressing: []? Met: []? Not Met: []? Adjusted  5. Patient will have increase strength and ROM in order to  10 lbs from floor with proper sequencing and no c/o pain allowing her to better take care of her grand children  [x]? Progressing: []? Met: []? Not Met: []? Adjusted      Overall Progression Towards Functional goals/ Treatment Progress Update:  [] Patient is progressing as expected towards functional goals listed   [] Progression is slowed   [] Progression has been slowed due to co-morbidities.   [] Plan just implemented, too soon to assess goals progression <30days   [] Goals require adjustment due to lack of progress  [] Patient is not progressing as expected and requires additional follow up with physician  [x] Patient returns to physical therapy after PRP injections demonstrating continued strength and ROM deficits and is therefore appropriate for continuation of PT to further increase hip stability and mobility in order to improve overall function. 7/29/21    Prognosis for POC: [x] Good [] Fair  [] Poor      Patient requires continued skilled intervention: [x] Yes  [] No    Treatment/Activity Tolerance:  [x] Patient able to complete treatment  [] Patient limited by fatigue  [] Patient limited by pain     [] Patient limited by other medical complications  [] Other:   7/29: Pt sore with return to PT and reintroduction of exercises. Limited with prone hip extension d/t discomfort reported at anterior L hip. Responded well to stretches and bike without any issues. Patient Education:              7/29 Updated HEP based on tolerance to treatment   6/22 Updated HEP with isometrics in order to maintain muscle activaiton and avoid increase in severity of symptoms. 6/16 Updated HEP   6/1 Updated Piriformis stretch onto HEP and reviewed proper posture in sitting.   5/27  Updated HEP based on tolerance to treatment   5/6 Pt educated on monitoring symptoms and informing PT or MD if sensation she was describing increases with severity or consistency. 5/4 Updated HEP with weight, SLB and SL bridges. 4/30 Updated HEP   4/22 Added adductor stretch to HEP and withheld exercises in weight bearing. 4/16 Updated HEP   4/14 Educated on precautions, use of ice and importance of HEP. Educated on activity modification when taking care of grandchildren and avoid excessive hip flexion, adduction and internal rotation.      [employer]   Access Code: [place]      PLAN: See eval  [x] Continue per plan of care [] Alter current plan (see comments below)  [x] Plan of care initiated [] Hold pending MD visit [] Discharge  7/29 Pt appropriate for resumption of PT 1-2x a week for 8 weeks to further increase hip strength and motion. Electronically signed by:  Brennan Hernandez PT    Note: If patient does not return for scheduled/ recommended follow up visits, this note will serve as a discharge from care along with most recent update on progress.

## 2021-08-02 ENCOUNTER — HOSPITAL ENCOUNTER (OUTPATIENT)
Dept: PHYSICAL THERAPY | Age: 56
Setting detail: THERAPIES SERIES
Discharge: HOME | End: 2021-08-02
Payer: COMMERCIAL

## 2021-08-02 PROCEDURE — 97112 NEUROMUSCULAR REEDUCATION: CPT

## 2021-08-02 PROCEDURE — 97110 THERAPEUTIC EXERCISES: CPT

## 2021-08-02 PROCEDURE — 20560 NDL INSJ W/O NJX 1 OR 2 MUSC: CPT

## 2021-08-02 NOTE — FLOWSHEET NOTE
The [facility] and [facility], [address] [place] [address] Drive 3360 [facility], [address] [place] [address]  Phone: [phone]   Fax:     [phone]    Physical Therapy Progress/Daily Treatment Note  Date:  2021    Patient Name:  Sunil Copeland    :  1965  MRN: [id number]  Restrictions/Precautions:    Medical/Treatment Diagnosis Information:  · Diagnosis: F73.268V (ICD-10-CM) - Strain of left groin  · Treatment Diagnosis: M25.552 Left hip pain  Insurance/Certification information:  PT Insurance Information: [facility]  Physician Information:  Referring Practitioner: Blanca Buck. Kevin Lau MD  Has the plan of care been signed (Y/N):        []  Yes  [x]  No     Date of Patient follow up with Physician: PRN      Is this a Progress Report:     []  Yes  [x]  No        If Yes:  Date Range for reporting period:  Beginning 3/11/21  Ending 21    Progress report will be due (10 Rx or 30 days whichever is less):       Recertification will be due (POC Duration  / 90 days whichever is less): 21        Visit # Insurance Allowable Auth Required   23  [facility] []  Yes []  No        Functional Scale:   WOMAC 44% deficit  Date assessed: 21  WOMAC 25% deficit  Date assessed: 6/10/21  WOMAC 23% deficit  Date assessed: 21  WOMAC 58% deficit  Date assessed: 21       Latex Allergy:  [x]NO      []YES  Preferred Language for Healthcare:   [x]English       []other:      Pain level:  4/10  8/2/21      SUBJECTIVE:   Pt states she experienced significant increase in symptoms the day after last visit that has persisted the past couple days causing her to have to take a pain pill last night in order to sleep. Reports she was walking a lot over the weekend and has not walked that much since having PRP injections. Returning to [facility] next week.          OBJECTIVE:   Updated below on 21  ROM PROM AROM Comments     Left Right Left Right     Flexion 110 with pain  116 95 pain 99     Extension     10 pain 12      Abduction 35 pain  38 33 36     ER 52 40 48 38     IR 45 60 40 55                      Flexibility Left Right Comments   Hamstrings -5 -5     ITB (Obers test) WNL  WNL     Hip flexor(Mikhail test) WNL WNL                  Special  Test Left Right Comments   FABERS Pos Neg     Scour test Pos Neg     Impingement test Pos Neg            Strength Left Right Comments   Hip flexors 3+ 4 SLR   Hip extension 4+ 5     Hip abduction 4+ 5     Hip adduction 4 4+     Hip ER 4+ mild pain  4+     Hip IR 4- pain 5     Quads 4+ 5     Hamstrings 5 5        Joint mobility:               []?Normal                       [x]? Hypo L hip into flexion               []?Hyper     Palpation: Tenderness L ASIS     Functional Mobility/Transfers: Independent     Posture: WNL in standing      Bandages/Dressings/Incisions: N/A     Gait: Independent, Mild Trendelenburg on L     Orthopedic Special Tests: SLB eyes open >10\" bilat with no increase in pain       RESTRICTIONS/PRECAUTIONS: HTN. 2 previous L hip labral repairs    Exercises/Interventions:     ROM/STRETCHES     Bike 5' warm up 7/29 completed on airdyne bike to prevent hip flexion past 90 deg    HF stretch 30\"hx3 L 6/24 completed at start of treatment   Adductor stretch 30\"hx3 L 6/24 completed at start of treatment   SB roll ins 2x10 8/2 completed in pain free ROM 45<>80 deg hip ROM.  Stopped after 2 sets d/t soreness   SB LTR 2x10 bilat 8/2   Prone quad stretch 30\"hx3 L ^6/1 towel roll with stretch   Hip ER/IR ROM 2x15 L 8/2 completed passively d/t increase in severity of symptmos prone with 1/2 roll under thigh        PREs     Quad sets 10\"hx10 bilat8/2 supine   Prone glut sets 10\"hx10 bilat8/2   Prone BS with knee flex 10\"hx10 bilat7/29 8/2 d/t symptoms progressed to prone glut sets   6/22 Reintroduced based on continued joint line symptoms   Hip abd isometric 10\"hx10 bilat7/29 hook lying with blue loops   8/2 withheld d/t symptoms   Posterior pelvic tilt 10\"hx10 8/2 hook lying                                      Manual interventions     Joint mobilization     PROM     STM  Roller to L quadriceps x3'8/2 introduced d/t increase tension in muscle   LAD             Therapeutic Exercise and NMR EXR  [x] (43814) Provided verbal/tactile cueing for activities related to strengthening, flexibility, endurance, ROM for improvements in LE, proximal hip, and core control with self care, mobility, lifting, ambulation. [x] (48555) Provided verbal/tactile cueing for activities related to improving balance, coordination, kinesthetic sense, posture, motor skill, proprioception  to assist with LE, proximal hip, and core control in self care, mobility, lifting, ambulation and eccentric single leg control.      NMR and Therapeutic Activities:    [] (37789 or 45961) Provided verbal/tactile cueing for activities related to improving balance, coordination, kinesthetic sense, posture, motor skill, proprioception and motor activation to allow for proper function of core, proximal hip and LE with self care and ADLs  [] (49100) Gait Re-education- Provided training and instruction to the patient for proper LE, core and proximal hip recruitment and positioning and eccentric body weight control with ambulation re-education including up and down stairs     Home Exercise Program:    [x] (05250) Reviewed/Progressed HEP activities related to strengthening, flexibility, endurance, ROM of core, proximal hip and LE for functional self-care, mobility, lifting and ambulation/stair navigation   [] (74695)Reviewed/Progressed HEP activities related to improving balance, coordination, kinesthetic sense, posture, motor skill, proprioception of core, proximal hip and LE for self care, mobility, lifting, and ambulation/stair navigation      Manual Treatments:  PROM / STM / Oscillations-Mobs:  G-I, II, III, IV (PA's, Inf., Post.)  [] (52201) Provided manual therapy to mobilize LE, proximal hip and/or LS spine soft tissue/joints for the purpose of modulating pain, promoting relaxation,  increasing ROM, reducing/eliminating soft tissue swelling/inflammation/restriction, improving soft tissue extensibility and allowing for proper ROM for normal function with self care, mobility, lifting and ambulation. Modalities:    PT reviewed precautions, contraindications, and indications with pt in addition to application of dry needling within established plan of care and expected outcomes; pt verbalized understanding with all questions answered. Pt had signed consent form for dry needling and PT obtained written consent with updated plan of care from MD before beginning. Dry needling manual therapy: consisted on the placement of 5 needles in proximal hip flexors. 60 mm needles were inserted to produce intramuscular mobilization. These techniques were used to restore functional range of motion, reduce muscle spasm and induce healing in the corresponding musculature. (87202)  Clean Technique was utilized today while applying Dry needling treatment. The treatment sites where cleaned with 70% solution of  isopropyl alcohol . The PT washed their hands and utilized treatment gloves along with hand  prior to inserting the needles. All needles where removed and discarded in the appropriate sharps container.         Performed by Elvin Schroeder PT, DPT   8 min  date 8/2/21        Charges:  Timed Code Treatment Minutes: 36'   Total Treatment Minutes: 7:33-8:20  47'       [] EVAL (LOW) 58719 (typically 20 minutes face-to-face)  [] EVAL (MOD) 26787 (typically 30 minutes face-to-face)  [] EVAL (HIGH) 76152 (typically 45 minutes face-to-face)  [] RE-EVAL     [x] CN(33809) x   1  [] IONTO  [x] NMR (77815) x 1     [] VASO  [] Manual (44230) x       [x] Other: Dn  [] TA x      [] Mech Traction (14850)  [] ES(attended) (25267)      [] ES (un) (28289):     GOALS: Adjusted on 7/29/21 d/t new prescription from MD following PRP injection  Patient stated goal:  Eliminate pain in order to return to exercise  [x]? Progressing: []? Met: []? Not Met: []? Adjusted     Therapist goals for Patient:   Short Term Goals: To be achieved in: 4 weeks  1. Independent in HEP and progression per patient tolerance, in order to prevent re-injury. [x]? Progressing: []? Met: []? Not Met: []? Adjusted   2. Patient will have a decrease in pain to facilitate improvement in movement, function, and ADLs as indicated by Functional Deficits. [x]? Progressing: []? Met: []? Not Met: []? Adjusted     Long Term Goals: To be achieved in: 8 weeks  1. Disability index score of 10% or less for the [facility] to assist with reaching prior level of function. [x]? Progressing: []? Met: []? Not Met: []? Adjusted  2. Patient will demonstrate increased L hip AROM that is equal to R to allow for proper joint functioning as indicated by patients Functional Deficits. [x]? Progressing: []? Met: []? Not Met: []? Adjusted  3. Patient will demonstrate an increase in L hip strength that is equal to R to allow for proper functional mobility as indicated by patients Functional Deficits. [x]? Progressing: []? Met: []? Not Met: []? Adjusted  4. Patient will return to all functional activities without increased symptoms or restriction. [x]? Progressing: []? Met: []? Not Met: []? Adjusted  5. Patient will have increase strength and ROM in order to  10 lbs from floor with proper sequencing and no c/o pain allowing her to better take care of her grand children  [x]? Progressing: []? Met: []? Not Met: []? Adjusted      Overall Progression Towards Functional goals/ Treatment Progress Update:  [] Patient is progressing as expected towards functional goals listed   [] Progression is slowed   [] Progression has been slowed due to co-morbidities.   [] Plan just implemented, too soon to assess goals progression <30days   [] Goals require adjustment due to lack of progress  [] Patient is not progressing as expected and requires additional follow up with physician  [x] Patient returns to physical therapy after PRP injections demonstrating continued strength and ROM deficits and is therefore appropriate for continuation of PT to further increase hip stability and mobility in order to improve overall function. 7/29/21    Prognosis for POC: [x] Good [] Fair  [] Poor      Patient requires continued skilled intervention: [x] Yes  [] No    Treatment/Activity Tolerance:  [x] Patient able to complete treatment  [] Patient limited by fatigue  [] Patient limited by pain     [] Patient limited by other medical complications  [] Other:   8/2 Increase in symptoms possibly related to reintroduction to PT as well as walking more then she has over the weekend. Focused on decreasing severity of symptoms and responded well to dry needling and STM with mild decrease in severity of symptoms. Responded well to isometrics with no adverse effects. Patient Education:              8/2 Updated HEP based on symptoms   7/29 Updated HEP based on tolerance to treatment   6/22 Updated HEP with isometrics in order to maintain muscle activaiton and avoid increase in severity of symptoms. 6/16 Updated HEP   6/1 Updated Piriformis stretch onto HEP and reviewed proper posture in sitting.   5/27  Updated HEP based on tolerance to treatment   5/6 Pt educated on monitoring symptoms and informing PT or MD if sensation she was describing increases with severity or consistency. 5/4 Updated HEP with weight, SLB and SL bridges. 4/30 Updated HEP   4/22 Added adductor stretch to HEP and withheld exercises in weight bearing. 4/16 Updated HEP   4/14 Educated on precautions, use of ice and importance of HEP. Educated on activity modification when taking care of grandchildren and avoid excessive hip flexion, adduction and internal rotation.      [employer]   Access Code: [place]      PLAN: See yesi  [x] Continue per plan of care [] Silvana Chavis

## 2021-08-11 ENCOUNTER — APPOINTMENT (OUTPATIENT)
Dept: PHYSICAL THERAPY | Age: 56
End: 2021-08-11
Payer: COMMERCIAL

## 2021-08-11 ENCOUNTER — HOSPITAL ENCOUNTER (OUTPATIENT)
Dept: PHYSICAL THERAPY | Age: 56
Setting detail: THERAPIES SERIES
Discharge: HOME | End: 2021-08-11
Payer: COMMERCIAL

## 2021-08-11 PROCEDURE — 97112 NEUROMUSCULAR REEDUCATION: CPT

## 2021-08-11 PROCEDURE — 97110 THERAPEUTIC EXERCISES: CPT

## 2021-08-11 PROCEDURE — 20561 NDL INSJ W/O NJX 3+ MUSC: CPT

## 2021-08-11 NOTE — FLOWSHEET NOTE
The [address] [facility], [address], [address] [facility]  Phone: [phone]   Fax:     [phone]    Physical Therapy Progress/Daily Treatment Note  Date:  2021    Patient Name:  Amelia Bridges    :  1965  MRN: [id number]  Restrictions/Precautions:    Medical/Treatment Diagnosis Information:  · Diagnosis: Z65.359T (ICD-10-CM) - Strain of left groin  · Treatment Diagnosis: M25.552 Left hip pain  Insurance/Certification information:  PT Insurance Information: [facility]  Physician Information:  Referring Practitioner: Sherrie Arreguin. Ralph Martines MD  Has the plan of care been signed (Y/N):        []  Yes  [x]  No     Date of Patient follow up with Physician: PRN      Is this a Progress Report:     []  Yes  [x]  No        If Yes:  Date Range for reporting period:  Beginning 3/11/21  Ending 21    Progress report will be due (10 Rx or 30 days whichever is less):       Recertification will be due (POC Duration  / 90 days whichever is less): 21        Visit # Insurance Allowable Auth Required   24   [address] []  Yes []  No        Functional Scale:   WOMAC 44% deficit  Date assessed: 21  WOMAC 25% deficit  Date assessed: 6/10/21  WOMAC 23% deficit  Date assessed: 21  WOMAC 58% deficit  Date assessed: 21       Latex Allergy:  [x]NO      []YES  Preferred Language for Healthcare:   [x]English       []other:      Pain level:  3/10  8/11/21      SUBJECTIVE:   Pt states symptoms in L hip are about the same but noticing more muscle spasms around L hip and into her L knee. Reports riding bikes for 7 minutes on  and walked less then a mile yesterday evening and not sure if that contributed to spasms.         OBJECTIVE:   Updated below on 21  ROM PROM AROM Comments     Left Right Left Right     Flexion 110 with pain  116 95 pain 99     Extension     10 pain 12      Abduction 35 pain  38 33 36     ER 52 40 48 38   IR 45 60 40 55                      Flexibility Left Right Comments   Hamstrings -5 -5     ITB (Obers test) WNL  WNL     Hip flexor(Mikhail test) WNL WNL                  Special  Test Left Right Comments   FABERS Pos Neg     Scour test Pos Neg     Impingement test Pos Neg            Strength Left Right Comments   Hip flexors 3+ 4 SLR   Hip extension 4+ 5     Hip abduction 4+ 5     Hip adduction 4 4+     Hip ER 4+ mild pain  4+     Hip IR 4- pain 5     Quads 4+ 5     Hamstrings 5 5        Joint mobility:               []?Normal                       [x]? Hypo L hip into flexion               []?Hyper     Palpation: Tenderness L ASIS     Functional Mobility/Transfers: Independent     Posture: WNL in standing      Bandages/Dressings/Incisions: N/A     Gait: Independent, Mild Trendelenburg on L     Orthopedic Special Tests: SLB eyes open >10\" bilat with no increase in pain       RESTRICTIONS/PRECAUTIONS: HTN.  2 previous L hip labral repairs    Exercises/Interventions:     ROM/STRETCHES     Bike 5' warm up 7/29 completed on airdyne bike to prevent hip flexion past 90 deg    HF stretch 30\"hx3 L 6/24 completed at start of treatment   Adductor stretch 30\"hx3 L 6/24 completed at start of treatment   SB roll ins 3x10 ^8/11 completed to 90 deg flex   SB LTR  8/2   Prone quad stretch 30\"hx3 L ^6/1 towel roll with stretch   Hip ER/IR ROM 2x15 L 8/2 completed passively d/t increase in severity of symptmos prone with 1/2 roll under thigh   Prone bilat knee flexion 3x10 8/11 completed to actively stretch quad and hip flexors   Prone press up 10\"hx10  8/11 on forearms, completed for hip flexor stretch         PREs     Quad sets 10\"hx10 bilat8/2 supine   Prone glut sets 10\"hx10 bilat8/2   Prone BS with knee flex 10\"hx10 bilat7/29 8/2 d/t symptoms progressed to prone glut sets   6/22 Reintroduced based on continued joint line symptoms   7/29 hook lying with blue loops   8/2 withheld d/t symptoms   Posterior pelvic tilt 10\"hx10 8/2 hook lying   TA overhead 2# med ball 3x108/11 supine                                  Manual interventions     Joint mobilization     PROM     STM  8/2 introduced d/t increase tension in muscle   LAD             Therapeutic Exercise and NMR EXR  [x] (12669) Provided verbal/tactile cueing for activities related to strengthening, flexibility, endurance, ROM for improvements in LE, proximal hip, and core control with self care, mobility, lifting, ambulation. [x] (15564) Provided verbal/tactile cueing for activities related to improving balance, coordination, kinesthetic sense, posture, motor skill, proprioception  to assist with LE, proximal hip, and core control in self care, mobility, lifting, ambulation and eccentric single leg control.      NMR and Therapeutic Activities:    [] (24158 or 97350) Provided verbal/tactile cueing for activities related to improving balance, coordination, kinesthetic sense, posture, motor skill, proprioception and motor activation to allow for proper function of core, proximal hip and LE with self care and ADLs  [] (40818) Gait Re-education- Provided training and instruction to the patient for proper LE, core and proximal hip recruitment and positioning and eccentric body weight control with ambulation re-education including up and down stairs     Home Exercise Program:    [x] (42888) Reviewed/Progressed HEP activities related to strengthening, flexibility, endurance, ROM of core, proximal hip and LE for functional self-care, mobility, lifting and ambulation/stair navigation   [] (48893)Reviewed/Progressed HEP activities related to improving balance, coordination, kinesthetic sense, posture, motor skill, proprioception of core, proximal hip and LE for self care, mobility, lifting, and ambulation/stair navigation      Manual Treatments:  PROM / STM / Oscillations-Mobs:  G-I, II, III, IV (PA's, Inf., Post.)  [] (70231) Provided manual therapy to mobilize LE, proximal hip and/or LS spine soft tissue/joints for the purpose of modulating pain, promoting relaxation,  increasing ROM, reducing/eliminating soft tissue swelling/inflammation/restriction, improving soft tissue extensibility and allowing for proper ROM for normal function with self care, mobility, lifting and ambulation. Modalities:    PT reviewed precautions, contraindications, and indications with pt in addition to application of dry needling within established plan of care and expected outcomes; pt verbalized understanding with all questions answered. Pt had signed consent form for dry needling and PT obtained written consent with updated plan of care from MD before beginning. Dry needling manual therapy: consisted on the placement of 8 needles in proximal hip flexors, adductors and mid substance rectus femoris. 60 mm needles were inserted to produce intramuscular mobilization. These techniques were used to restore functional range of motion, reduce muscle spasm and induce healing in the corresponding musculature. (19749)  Clean Technique was utilized today while applying Dry needling treatment. The treatment sites where cleaned with 70% solution of  isopropyl alcohol . The PT washed their hands and utilized treatment gloves along with hand  prior to inserting the needles. All needles where removed and discarded in the appropriate sharps container.         Performed by Mara Avery PT, DPT   10 min  date 8/11/21        Charges:  Timed Code Treatment Minutes: 55'   Total Treatment Minutes: 7:02-8:08  66'       [] EVAL (LOW) 99042 (typically 20 minutes face-to-face)  [] EVAL (MOD) 69255 (typically 30 minutes face-to-face)  [] EVAL (HIGH) 10482 (typically 45 minutes face-to-face)  [] RE-EVAL     [x] DJ(08849) x   1  [] IONTO  [x] NMR (05071) x 1     [] VASO  [] Manual (55459) x       [x] Other: Dn  [] TA x      [] Mech Traction (46897)  [] ES(attended) (42840)      [] ES (un) (50606):     GOALS: Adjusted on 7/29/21 d/t due to lack of progress  [] Patient is not progressing as expected and requires additional follow up with physician  [x] Patient returns to physical therapy after PRP injections demonstrating continued strength and ROM deficits and is therefore appropriate for continuation of PT to further increase hip stability and mobility in order to improve overall function. 7/29/21    Prognosis for POC: [x] Good [] Fair  [] Poor      Patient requires continued skilled intervention: [x] Yes  [] No    Treatment/Activity Tolerance:  [x] Patient able to complete treatment  [] Patient limited by fatigue  [] Patient limited by pain     [] Patient limited by other medical complications  [] Other:   8/11 Increase tightness noted around L hip and mid thigh possibly d/t increase in activity level. Responded well to dry needling with decrease in muscle tension. Tolerated exercises well that were intended to actively stretch the area without increasing symptoms. Patient Education:              8/11 Reviewed activity modification especially with baby sitting. Updated HEP based on tolerance to treatment   8/2 Updated HEP based on symptoms   7/29 Updated HEP based on tolerance to treatment   6/22 Updated HEP with isometrics in order to maintain muscle activaiton and avoid increase in severity of symptoms. 6/16 Updated HEP   6/1 Updated Piriformis stretch onto HEP and reviewed proper posture in sitting.   5/27  Updated HEP based on tolerance to treatment   5/6 Pt educated on monitoring symptoms and informing PT or MD if sensation she was describing increases with severity or consistency. 5/4 Updated HEP with weight, SLB and SL bridges. 4/30 Updated HEP   4/22 Added adductor stretch to HEP and withheld exercises in weight bearing. 4/16 Updated HEP   4/14 Educated on precautions, use of ice and importance of HEP.  Educated on activity modification when taking care of grandchildren and avoid excessive hip flexion, adduction and internal rotation. "[employer],"   Access Code: [place]      PLAN: See eval  [x] Continue per plan of care [] Alter current plan (see comments below)  [x] Plan of care initiated [] Hold pending MD visit [] Discharge  8/11 Pt to monitor activity level in standing in order to decrease severity of symptoms. Progress as tolerated with hip strengthening. 7/29 Pt appropriate for resumption of PT 1-2x a week for 8 weeks to further increase hip strength and motion. Electronically signed by:  Crystal Lemus, PT    Note: If patient does not return for scheduled/ recommended follow up visits, this note will serve as a discharge from care along with most recent update on progress.

## 2021-08-16 ENCOUNTER — HOSPITAL ENCOUNTER (OUTPATIENT)
Dept: PHYSICAL THERAPY | Age: 56
Setting detail: THERAPIES SERIES
Discharge: HOME | End: 2021-08-16
Payer: COMMERCIAL

## 2021-08-16 PROCEDURE — 20560 NDL INSJ W/O NJX 1 OR 2 MUSC: CPT

## 2021-08-16 PROCEDURE — 97112 NEUROMUSCULAR REEDUCATION: CPT

## 2021-08-16 PROCEDURE — 97110 THERAPEUTIC EXERCISES: CPT

## 2021-08-16 NOTE — FLOWSHEET NOTE
The [address] [facility] and [address] [facility], [address], [address] [facility]  Phone: [phone]   Fax:     [phone]    Physical Therapy Progress/Daily Treatment Note  Date:  2021    Patient Name:  Anne Vernon    :  1965  MRN: [id number]  Restrictions/Precautions:    Medical/Treatment Diagnosis Information:  · Diagnosis: D36.079E (ICD-10-CM) - Strain of left groin  · Treatment Diagnosis: M25.552 Left hip pain  Insurance/Certification information:  PT Insurance Information: [facility]  Physician Information:  Referring Practitioner: Christie Foster. Guy Platt MD  Has the plan of care been signed (Y/N):        []  Yes  [x]  No     Date of Patient follow up with Physician: PRN      Is this a Progress Report:     []  Yes  [x]  No        If Yes:  Date Range for reporting period:  Beginning 3/11/21  Ending 21    Progress report will be due (10 Rx or 30 days whichever is less):       Recertification will be due (POC Duration  / 90 days whichever is less): 21        Visit # Insurance Allowable Auth Required    [address] []  Yes []  No        Functional Scale:   WOMAC 44% deficit  Date assessed: 21  WOMAC 25% deficit  Date assessed: 6/10/21  WOMAC 23% deficit  Date assessed: 21  WOMAC 58% deficit  Date assessed: 21       Latex Allergy:  [x]NO      []YES  Preferred Language for Healthcare:   [x]English       []other:      Pain level:  2/10  8/16/21      SUBJECTIVE:    Pt states L hip is feeling about the same and continues to have spasms in entire L LE when she sits after being on her feet and active. She was very active over the weekend and was able to walk more but had spasms in the evening.      OBJECTIVE:   Updated below on 21  ROM PROM AROM Comments     Left Right Left Right     Flexion 110 with pain  116 95 pain 99     Extension     10 pain 12      Abduction 35 pain  38 33 36     ER 52 40 48 38     IR 45 60 40 55                    Flexibility Left Right Comments   Hamstrings -5 -5     ITB (Obers test) WNL  WNL     Hip flexor(Mikhail test) WNL WNL                  Special  Test Left Right Comments   FABERS Pos Neg     Scour test Pos Neg     Impingement test Pos Neg            Strength Left Right Comments   Hip flexors 3+ 4 SLR   Hip extension 4+ 5     Hip abduction 4+ 5     Hip adduction 4 4+     Hip ER 4+ mild pain  4+     Hip IR 4- pain 5     Quads 4+ 5     Hamstrings 5 5        Joint mobility:               []?Normal                       [x]? Hypo L hip into flexion               []?Hyper     Palpation: Tenderness L ASIS     Functional Mobility/Transfers: Independent     Posture: WNL in standing      Bandages/Dressings/Incisions: N/A     Gait: Independent, Mild Trendelenburg on L     Orthopedic Special Tests: SLB eyes open >10\" bilat with no increase in pain       RESTRICTIONS/PRECAUTIONS: HTN.  2 previous L hip labral repairs    Exercises/Interventions:     ROM/STRETCHES     Bike  7/29 completed on airdyne bike to prevent hip flexion past 90 deg    HF stretch 30\"hx3 L 6/24 completed at start of treatment   Adductor stretch 30\"hx3 L 6/24 completed at start of treatment   SB roll ins 3x10 ^8/11 completed to 90 deg flex   SB LTR  8/2   Prone quad stretch 30\"hx3 L ^6/1 towel roll with stretch   Hip ER/IR ROM 10\"h x10 L ^8/126 completed passively 1/2 roll under thigh   Prone bilat knee flexion 3x10 8/11 completed to actively stretch quad and hip flexors   Prone press up 20\"hx5  ^8/16 on forearms, completed for hip flexor stretch         PREs     Quad sets 8/2 supine   Prone glut sets 10\"hx10 bilat8/2   Prone BS with knee flex 10\"hx10 bilat7/29 8/2 d/t symptoms progressed to prone glut sets   6/22 Reintroduced based on continued joint line symptoms   7/29 hook lying with blue loops   8/2 withheld d/t symptoms   Posterior pelvic tilt 10\"hx10 8/2 hook lying   TA overhead 4# med ball 3x10^8/16 supine   Hamstring curl 0# 3x10 [] ES (un) (62440):     GOALS: Adjusted on 7/29/21 d/t new prescription from MD following PRP injection  Patient stated goal:  Eliminate pain in order to return to exercise  [x]? Progressing: []? Met: []? Not Met: []? Adjusted     Therapist goals for Patient:   Short Term Goals: To be achieved in: 4 weeks  1. Independent in HEP and progression per patient tolerance, in order to prevent re-injury. [x]? Progressing: []? Met: []? Not Met: []? Adjusted   2. Patient will have a decrease in pain to facilitate improvement in movement, function, and ADLs as indicated by Functional Deficits. [x]? Progressing: []? Met: []? Not Met: []? Adjusted     Long Term Goals: To be achieved in: 8 weeks  1. Disability index score of 10% or less for the [facility] to assist with reaching prior level of function. [x]? Progressing: []? Met: []? Not Met: []? Adjusted  2. Patient will demonstrate increased L hip AROM that is equal to R to allow for proper joint functioning as indicated by patients Functional Deficits. [x]? Progressing: []? Met: []? Not Met: []? Adjusted  3. Patient will demonstrate an increase in L hip strength that is equal to R to allow for proper functional mobility as indicated by patients Functional Deficits. [x]? Progressing: []? Met: []? Not Met: []? Adjusted  4. Patient will return to all functional activities without increased symptoms or restriction. [x]? Progressing: []? Met: []? Not Met: []? Adjusted  5. Patient will have increase strength and ROM in order to  10 lbs from floor with proper sequencing and no c/o pain allowing her to better take care of her grand children  [x]? Progressing: []? Met: []? Not Met: []? Adjusted      Overall Progression Towards Functional goals/ Treatment Progress Update:  [] Patient is progressing as expected towards functional goals listed   [] Progression is slowed   [] Progression has been slowed due to co-morbidities.   [] Plan just implemented, too soon to assess goals progression <30days   [] Goals require adjustment due to lack of progress  [] Patient is not progressing as expected and requires additional follow up with physician  [x] Patient returns to physical therapy after PRP injections demonstrating continued strength and ROM deficits and is therefore appropriate for continuation of PT to further increase hip stability and mobility in order to improve overall function. 7/29/21    Prognosis for POC: [x] Good [] Fair  [] Poor      Patient requires continued skilled intervention: [x] Yes  [] No    Treatment/Activity Tolerance:  [x] Patient able to complete treatment  [] Patient limited by fatigue  [] Patient limited by pain     [] Patient limited by other medical complications  [] Other:   8/16 Pt continues to have tightness at L proximal hip flexors and mid substance quad that responded well to dry needling and STM. D/t increased activity level outside of PT and continued tightness have been limited with attempting progression of strengthening. Continuing to decrease muscle tension with stretches, dry needling and isometrics. Patient Education:              8/11 Reviewed activity modification especially with baby sitting. Updated HEP based on tolerance to treatment   8/2 Updated HEP based on symptoms   7/29 Updated HEP based on tolerance to treatment   6/22 Updated HEP with isometrics in order to maintain muscle activaiton and avoid increase in severity of symptoms. 6/16 Updated HEP   6/1 Updated Piriformis stretch onto HEP and reviewed proper posture in sitting.   5/27  Updated HEP based on tolerance to treatment   5/6 Pt educated on monitoring symptoms and informing PT or MD if sensation she was describing increases with severity or consistency. 5/4 Updated HEP with weight, SLB and SL bridges. 4/30 Updated HEP   4/22 Added adductor stretch to HEP and withheld exercises in weight bearing.    4/16 Updated HEP   4/14 Educated on precautions, use of ice and importance of HEP. Educated on activity modification when taking care of grandchildren and avoid excessive hip flexion, adduction and internal rotation. [place]   Access Code: [place]      PLAN: See eval  [x] Continue per plan of care [] Alter current plan (see comments below)  [x] Plan of care initiated [] Hold pending MD visit [] Discharge  8/16 Pt to monitor activity level in standing in order to decrease severity of symptoms. Progress as tolerated with hip strengthening. 7/29 Pt appropriate for resumption of PT 1-2x a week for 8 weeks to further increase hip strength and motion. Electronically signed by:  Alexandra Vila PT    Note: If patient does not return for scheduled/ recommended follow up visits, this note will serve as a discharge from care along with most recent update on progress.

## 2021-08-18 ENCOUNTER — HOSPITAL ENCOUNTER (OUTPATIENT)
Dept: PHYSICAL THERAPY | Age: 56
Setting detail: THERAPIES SERIES
Discharge: HOME | End: 2021-08-18
Payer: COMMERCIAL

## 2021-08-18 PROCEDURE — 97112 NEUROMUSCULAR REEDUCATION: CPT

## 2021-08-18 PROCEDURE — 20560 NDL INSJ W/O NJX 1 OR 2 MUSC: CPT

## 2021-08-18 PROCEDURE — 97110 THERAPEUTIC EXERCISES: CPT

## 2021-08-18 NOTE — FLOWSHEET NOTE
The [address] [facility] and 500 [facility], [address], [address] [facility]  Phone: [phone]   Fax:     [phone]    Physical Therapy Progress/Daily Treatment Note  Date:  2021    Patient Name:  Louise Babb    :  1965  MRN: [id number]  Restrictions/Precautions:    Medical/Treatment Diagnosis Information:  · Diagnosis: R38.394N (ICD-10-CM) - Strain of left groin  · Treatment Diagnosis: M25.552 Left hip pain  Insurance/Certification information:  PT Insurance Information: [facility]  Physician Information:  Referring Practitioner: Loyd Calvo. Juan Allred MD  Has the plan of care been signed (Y/N):        []  Yes  [x]  No     Date of Patient follow up with Physician: PRN      Is this a Progress Report:     []  Yes  [x]  No        If Yes:  Date Range for reporting period:  Beginning 3/11/21  Ending 21    Progress report will be due (10 Rx or 30 days whichever is less):       Recertification will be due (POC Duration  / 90 days whichever is less): 21        Visit # Insurance Allowable Auth Required   26   [address] []  Yes []  No        Functional Scale:   WOMAC 44% deficit  Date assessed: 21  WOMAC 25% deficit  Date assessed: 6/10/21  WOMAC 23% deficit  Date assessed: 21  WOMAC 58% deficit  Date assessed: 21       Latex Allergy:  [x]NO      []YES  Preferred Language for Healthcare:   [x]English       []other:      Pain level:  2/10  8/18/21      SUBJECTIVE:   Pt states she has not had muscle spasms in the past two days and no real change in her activity level noted. Still has some anterior hip tightness but muscles are not as tight at night like they were.      OBJECTIVE:   Updated below on 21  ROM PROM AROM Comments     Left Right Left Right     Flexion 110 with pain  116 95 pain 99     Extension     10 pain 12      Abduction 35 pain  38 33 36     ER 52 40 48 38     IR 45 60 40 55                      Flexibility Left Right Comments   Hamstrings -5 -5     ITB (Obers test) WNL  WNL     Hip flexor(Mikhail test) WNL WNL                  Special  Test Left Right Comments   FABERS Pos Neg     Scour test Pos Neg     Impingement test Pos Neg            Strength Left Right Comments   Hip flexors 3+ 4 SLR   Hip extension 4+ 5     Hip abduction 4+ 5     Hip adduction 4 4+     Hip ER 4+ mild pain  4+     Hip IR 4- pain 5     Quads 4+ 5     Hamstrings 5 5        Joint mobility:               []?Normal                       [x]? Hypo L hip into flexion               []?Hyper     Palpation: Tenderness L ASIS     Functional Mobility/Transfers: Independent     Posture: WNL in standing      Bandages/Dressings/Incisions: N/A     Gait: Independent, Mild Trendelenburg on L     Orthopedic Special Tests: SLB eyes open >10\" bilat with no increase in pain       RESTRICTIONS/PRECAUTIONS: HTN.  2 previous L hip labral repairs    Exercises/Interventions:     ROM/STRETCHES     Bike  7/29 completed on airdyne bike to prevent hip flexion past 90 deg    HF stretch 30\"hx3 L 6/24 completed at start of treatment   Adductor stretch 30\"hx3 L 6/24 completed at start of treatment   ^8/11 completed to 90 deg flex   SB LTR  8/2   Prone quad stretch 30\"hx3 L ^6/1 towel roll with stretch   Hip ER/IR ROM 10\"h x10 L ^8/126 completed passively 1/2 roll under thigh   Prone bilat knee flexion 3x10 8/11 completed to actively stretch quad and hip flexors   Prone press up 20\"hx5  ^8/16 on forearms, completed for hip flexor stretch         PREs     Quad sets 8/2 supine   Prone glut sets 10\"hx10 bilat8/2   Prone BS with knee flex 10\"hx10 bilat7/29 8/2 d/t symptoms progressed to prone glut sets   6/22 Reintroduced based on continued joint line symptoms   7/29 hook lying with blue loops   Bridges 3x10 bilat  8/18 reintroduced with decrease in height and emphasis on proper PPT   Posterior pelvic tilt 10\"hx10 8/2 hook lying   TA overhead 4# med ball 3x10^8/16 supine   Hamstring curl 0# 3x10 L8/16   Heel raises 3x10 bilat8/16   Hip ext 0# 3x10 L8/18 completed in standing with bilateral UE support                      Manual interventions     Joint mobilization     PROM     STM  8/18 withheld d/t overall decrease in tension especially after dry needling   LAD             Therapeutic Exercise and NMR EXR  [x] (00816) Provided verbal/tactile cueing for activities related to strengthening, flexibility, endurance, ROM for improvements in LE, proximal hip, and core control with self care, mobility, lifting, ambulation. [x] (54125) Provided verbal/tactile cueing for activities related to improving balance, coordination, kinesthetic sense, posture, motor skill, proprioception  to assist with LE, proximal hip, and core control in self care, mobility, lifting, ambulation and eccentric single leg control.      NMR and Therapeutic Activities:    [] (73628 or 74721) Provided verbal/tactile cueing for activities related to improving balance, coordination, kinesthetic sense, posture, motor skill, proprioception and motor activation to allow for proper function of core, proximal hip and LE with self care and ADLs  [] (59657) Gait Re-education- Provided training and instruction to the patient for proper LE, core and proximal hip recruitment and positioning and eccentric body weight control with ambulation re-education including up and down stairs     Home Exercise Program:    [x] (13327) Reviewed/Progressed HEP activities related to strengthening, flexibility, endurance, ROM of core, proximal hip and LE for functional self-care, mobility, lifting and ambulation/stair navigation   [] (48443)Reviewed/Progressed HEP activities related to improving balance, coordination, kinesthetic sense, posture, motor skill, proprioception of core, proximal hip and LE for self care, mobility, lifting, and ambulation/stair navigation      Manual Treatments:  PROM / STM / Oscillations-Mobs:  G-I, II, III, IV (PA's, Inf., Post.)  [] (61308) Provided manual therapy to mobilize LE, proximal hip and/or LS spine soft tissue/joints for the purpose of modulating pain, promoting relaxation,  increasing ROM, reducing/eliminating soft tissue swelling/inflammation/restriction, improving soft tissue extensibility and allowing for proper ROM for normal function with self care, mobility, lifting and ambulation. Modalities:    PT reviewed precautions, contraindications, and indications with pt in addition to application of dry needling within established plan of care and expected outcomes; pt verbalized understanding with all questions answered. Pt had signed consent form for dry needling and PT obtained written consent with updated plan of care from MD before beginning. Dry needling manual therapy: consisted on the placement of 6 needles in proximal hip flexors and mid substance vasuts lateralis. 60 mm needles were inserted to produce intramuscular mobilization. These techniques were used to restore functional range of motion, reduce muscle spasm and induce healing in the corresponding musculature. (58711)  Clean Technique was utilized today while applying Dry needling treatment. The treatment sites where cleaned with 70% solution of  isopropyl alcohol . The PT washed their hands and utilized treatment gloves along with hand  prior to inserting the needles. All needles where removed and discarded in the appropriate sharps container.         Performed by Kwabena Chambers PT, DPT   10 min  date 8/18/21        Charges:  Timed Code Treatment Minutes: 45'   Total Treatment Minutes: 9:00-9:40  40'       [] EVAL (LOW) 56286 (typically 20 minutes face-to-face)  [] EVAL (MOD) 67081 (typically 30 minutes face-to-face)  [] EVAL (HIGH) 19140 (typically 45 minutes face-to-face)  [] RE-EVAL     [x] GX(47283) x   1  [] IONTO  [x] NMR (03461) x 1     [] VASO  [] Manual (56937) x       [x] Other: Dn  [] TA x      [] Mech Traction (10079)  [] ES(attended) (82606)      [] ES (un) (13720):     GOALS: Adjusted on 7/29/21 d/t new prescription from MD following PRP injection  Patient stated goal:  Eliminate pain in order to return to exercise  [x]? Progressing: []? Met: []? Not Met: []? Adjusted     Therapist goals for Patient:   Short Term Goals: To be achieved in: 4 weeks  1. Independent in HEP and progression per patient tolerance, in order to prevent re-injury. [x]? Progressing: []? Met: []? Not Met: []? Adjusted   2. Patient will have a decrease in pain to facilitate improvement in movement, function, and ADLs as indicated by Functional Deficits. [x]? Progressing: []? Met: []? Not Met: []? Adjusted     Long Term Goals: To be achieved in: 8 weeks  1. Disability index score of 10% or less for the [facility] to assist with reaching prior level of function. [x]? Progressing: []? Met: []? Not Met: []? Adjusted  2. Patient will demonstrate increased L hip AROM that is equal to R to allow for proper joint functioning as indicated by patients Functional Deficits. [x]? Progressing: []? Met: []? Not Met: []? Adjusted  3. Patient will demonstrate an increase in L hip strength that is equal to R to allow for proper functional mobility as indicated by patients Functional Deficits. [x]? Progressing: []? Met: []? Not Met: []? Adjusted  4. Patient will return to all functional activities without increased symptoms or restriction. [x]? Progressing: []? Met: []? Not Met: []? Adjusted  5. Patient will have increase strength and ROM in order to  10 lbs from floor with proper sequencing and no c/o pain allowing her to better take care of her grand children  [x]? Progressing: []? Met: []? Not Met: []? Adjusted      Overall Progression Towards Functional goals/ Treatment Progress Update:  [] Patient is progressing as expected towards functional goals listed   [] Progression is slowed   [] Progression has been slowed due to co-morbidities.   [] Plan just avoid excessive hip flexion, adduction and internal rotation. [employer]   Access Code: [place]      PLAN: See eval  [x] Continue per plan of care [] Alter current plan (see comments below)  [x] Plan of care initiated [] Hold pending MD visit [] Discharge  8/18 Pt to monitor activity level in standing in order to decrease severity of symptoms. Progress slowly with with hip muscle activation and strengthening in order to avoid increase in symptoms. 7/29 Pt appropriate for resumption of PT 1-2x a week for 8 weeks to further increase hip strength and motion. Electronically signed by:  Lisha Suárez PT    Note: If patient does not return for scheduled/ recommended follow up visits, this note will serve as a discharge from care along with most recent update on progress.

## 2021-08-24 ENCOUNTER — HOSPITAL ENCOUNTER (OUTPATIENT)
Dept: PHYSICAL THERAPY | Age: 56
Setting detail: THERAPIES SERIES
Discharge: HOME | End: 2021-08-24
Payer: COMMERCIAL

## 2021-08-24 PROCEDURE — 97110 THERAPEUTIC EXERCISES: CPT

## 2021-08-24 PROCEDURE — 20560 NDL INSJ W/O NJX 1 OR 2 MUSC: CPT

## 2021-08-24 PROCEDURE — 97112 NEUROMUSCULAR REEDUCATION: CPT

## 2021-08-24 NOTE — FLOWSHEET NOTE
The [address] [facility] and 500 [facility], [address], [address] [facility]  Phone: [phone]   Fax:     [phone]    Physical Therapy Progress/Daily Treatment Note  Date:  2021    Patient Name:  Juli Aparicio    :  1965  MRN: [id number]  Restrictions/Precautions:    Medical/Treatment Diagnosis Information:  · Diagnosis: E23.906G (ICD-10-CM) - Strain of left groin  · Treatment Diagnosis: M25.552 Left hip pain  Insurance/Certification information:  PT Insurance Information: [facility]  Physician Information:  Referring Practitioner: Laura Bear. An Lindquist MD  Has the plan of care been signed (Y/N):        []  Yes  [x]  No     Date of Patient follow up with Physician: PRN      Is this a Progress Report:     []  Yes  [x]  No        If Yes:  Date Range for reporting period:  Beginning 3/11/21  Ending 21    Progress report will be due (10 Rx or 30 days whichever is less):       Recertification will be due (POC Duration  / 90 days whichever is less): 21        Visit # Insurance Allowable Auth Required   27   [address] []  Yes []  No        Functional Scale:   WOMAC 44% deficit  Date assessed: 21  WOMAC 25% deficit  Date assessed: 6/10/21  WOMAC 23% deficit  Date assessed: 21  WOMAC 58% deficit  Date assessed: 21       Latex Allergy:  [x]NO      []YES  Preferred Language for Healthcare:   [x]English       []other:      Pain level:  3-4/10  21      SUBJECTIVE:   Pt states on  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 Symptoms are slowly decreasing but continues to have global hip pain. Experienced some muscle spasms but not as severe.  Also has noticed catching again in hip on 2-3 separate occassions since Friday    OBJECTIVE:   Updated below on 21  ROM PROM AROM Comments     Left Right Left Right     Flexion 110 with pain  116 95 pain 99   Extension     10 pain 12      Abduction 35 pain  38 33 36     ER 52 40 48 38     IR 45 60 40 55                      Flexibility Left Right Comments   Hamstrings -5 -5     ITB (Obers test) WNL  WNL     Hip flexor(Mikhail test) WNL WNL                  Special  Test Left Right Comments   FABERS Pos Neg     Scour test Pos Neg     Impingement test Pos Neg            Strength Left Right Comments   Hip flexors 3+ 4 SLR   Hip extension 4+ 5     Hip abduction 4+ 5     Hip adduction 4 4+     Hip ER 4+ mild pain  4+     Hip IR 4- pain 5     Quads 4+ 5     Hamstrings 5 5        Joint mobility:               []?Normal                       [x]? Hypo L hip into flexion               []?Hyper     Palpation: Tenderness L ASIS     Functional Mobility/Transfers: Independent     Posture: WNL in standing      Bandages/Dressings/Incisions: N/A     Gait: Independent, Mild Trendelenburg on L     Orthopedic Special Tests: SLB eyes open >10\" bilat with no increase in pain       RESTRICTIONS/PRECAUTIONS: HTN.  2 previous L hip labral repairs    Exercises/Interventions:     ROM/STRETCHES     Bike  7/29 completed on airdyne bike to prevent hip flexion past 90 deg    HF stretch 30\"hx3 L 6/24 completed at start of treatment   Adductor stretch 30\"hx3 L 6/24 completed at start of treatment   ^8/11 completed to 90 deg flex   SB LTR  8/2   Prone quad stretch 30\"hx3 L ^6/1 towel roll with stretch   Hip ER/IR ROM 10\"h x10 L ^8/126 completed passively 1/2 roll under thigh   Prone bilat knee flexion 3x10 8/11 completed to actively stretch quad and hip flexors   Prone press up 20\"hx5  ^8/16 on forearms, completed for hip flexor stretch         PREs     Quad sets 8/2 supine   Prone glut sets 10\"hx10 bilat8/2   Prone BS with knee flex 10\"hx10 bilat7/29 8/2 d/t symptoms progressed to prone glut sets   6/22 Reintroduced based on continued joint line symptoms   7/29 hook lying with blue loops   Bridges 3x10 bilat  8/18 reintroduced with decrease in height and emphasis on proper PPT   Posterior pelvic tilt 10\"hx10 8/2 hook lying   TA overhead 4# med ball 10\"hx10^8/24 supine   Hamstring curl 1# 3x10 L^8/24   Heel raises 3x10 bilat8/16   Hip ext 1# 3x10 L^8/24   LAQ 0# 3x10 L 8/24                 Manual interventions     Joint mobilization     PROM     STM  8/18 withheld d/t overall decrease in tension especially after dry needling   LAD             Therapeutic Exercise and NMR EXR  [x] (09480) Provided verbal/tactile cueing for activities related to strengthening, flexibility, endurance, ROM for improvements in LE, proximal hip, and core control with self care, mobility, lifting, ambulation. [x] (62335) Provided verbal/tactile cueing for activities related to improving balance, coordination, kinesthetic sense, posture, motor skill, proprioception  to assist with LE, proximal hip, and core control in self care, mobility, lifting, ambulation and eccentric single leg control.      NMR and Therapeutic Activities:    [] (80242 or 53434) Provided verbal/tactile cueing for activities related to improving balance, coordination, kinesthetic sense, posture, motor skill, proprioception and motor activation to allow for proper function of core, proximal hip and LE with self care and ADLs  [] (38479) Gait Re-education- Provided training and instruction to the patient for proper LE, core and proximal hip recruitment and positioning and eccentric body weight control with ambulation re-education including up and down stairs     Home Exercise Program:    [x] (01020) Reviewed/Progressed HEP activities related to strengthening, flexibility, endurance, ROM of core, proximal hip and LE for functional self-care, mobility, lifting and ambulation/stair navigation   [] (55391)Reviewed/Progressed HEP activities related to improving balance, coordination, kinesthetic sense, posture, motor skill, proprioception of core, proximal hip and LE for self care, mobility, lifting, and ambulation/stair navigation      Manual Treatments:  PROM / STM / Oscillations-Mobs:  G-I, II, III, IV (PA's, Inf., Post.)  [] (33718) Provided manual therapy to mobilize LE, proximal hip and/or LS spine soft tissue/joints for the purpose of modulating pain, promoting relaxation,  increasing ROM, reducing/eliminating soft tissue swelling/inflammation/restriction, improving soft tissue extensibility and allowing for proper ROM for normal function with self care, mobility, lifting and ambulation. Modalities:    PT reviewed precautions, contraindications, and indications with pt in addition to application of dry needling within established plan of care and expected outcomes; pt verbalized understanding with all questions answered. Pt had signed consent form for dry needling and PT obtained written consent with updated plan of care from MD before beginning. Dry needling manual therapy: consisted on the placement of 5 needles in proximal hip flexors and mid substance vasuts lateralis. 60 mm needles were inserted to produce intramuscular mobilization. These techniques were used to restore functional range of motion, reduce muscle spasm and induce healing in the corresponding musculature. (25143)  Clean Technique was utilized today while applying Dry needling treatment. The treatment sites where cleaned with 70% solution of  isopropyl alcohol . The PT washed their hands and utilized treatment gloves along with hand  prior to inserting the needles. All needles where removed and discarded in the appropriate sharps container.         Performed by Angie Thomas PT, DPT   8 min  date 8/24/21        Charges:  Timed Code Treatment Minutes: 40'   Total Treatment Minutes: 8:43-9:31  48'       [] EVAL (LOW) 455 1011 (typically 20 minutes face-to-face)  [] EVAL (MOD) 18517 (typically 30 minutes face-to-face)  [] EVAL (HIGH) 17823 (typically 45 minutes face-to-face)  [] RE-EVAL     [x] UG(92570) x   1  [] IONTO  [x] NMR (05740) x 1 [] VASO  [] Manual (91666) x       [x] Other: Dn  [] TA x      [] Mech Traction (82210)  [] ES(attended) (83782)      [] ES (un) (67992):     GOALS: Adjusted on 7/29/21 d/t new prescription from MD following PRP injection  Patient stated goal:  Eliminate pain in order to return to exercise  [x]? Progressing: []? Met: []? Not Met: []? Adjusted     Therapist goals for Patient:   Short Term Goals: To be achieved in: 4 weeks  1. Independent in HEP and progression per patient tolerance, in order to prevent re-injury. [x]? Progressing: []? Met: []? Not Met: []? Adjusted   2. Patient will have a decrease in pain to facilitate improvement in movement, function, and ADLs as indicated by Functional Deficits. [x]? Progressing: []? Met: []? Not Met: []? Adjusted     Long Term Goals: To be achieved in: 8 weeks  1. Disability index score of 10% or less for the [facility] to assist with reaching prior level of function. [x]? Progressing: []? Met: []? Not Met: []? Adjusted  2. Patient will demonstrate increased L hip AROM that is equal to R to allow for proper joint functioning as indicated by patients Functional Deficits. [x]? Progressing: []? Met: []? Not Met: []? Adjusted  3. Patient will demonstrate an increase in L hip strength that is equal to R to allow for proper functional mobility as indicated by patients Functional Deficits. [x]? Progressing: []? Met: []? Not Met: []? Adjusted  4. Patient will return to all functional activities without increased symptoms or restriction. [x]? Progressing: []? Met: []? Not Met: []? Adjusted  5. Patient will have increase strength and ROM in order to  10 lbs from floor with proper sequencing and no c/o pain allowing her to better take care of her grand children  [x]? Progressing: []? Met: []? Not Met: []?  Adjusted      Overall Progression Towards Functional goals/ Treatment Progress Update:  [] Patient is progressing as expected towards functional goals listed   [] Progression is slowed   [] Progression has been slowed due to co-morbidities. [] Plan just implemented, too soon to assess goals progression <30days   [] Goals require adjustment due to lack of progress  [] Patient is not progressing as expected and requires additional follow up with physician  [x] Patient returns to physical therapy after PRP injections demonstrating continued strength and ROM deficits and is therefore appropriate for continuation of PT to further increase hip stability and mobility in order to improve overall function. 7/29/21    Prognosis for POC: [x] Good [] Fair  [] Poor      Patient requires continued skilled intervention: [x] Yes  [] No    Treatment/Activity Tolerance:  [x] Patient able to complete treatment  [] Patient limited by fatigue  [] Patient limited by pain     [] Patient limited by other medical complications  [] Other:   8/24 Mild tightness noted at proximal quadriceps/ hip flexor but not a significant increase in tightness even with increase in symptoms. Increase in symptoms continues to be associated with increase in activity level. Pt continues to demonstrate decrease strength/ endurance and fatigues quickly during treatment. Patient Education:              8/24 Time spent reviewing activity modification based on strength deficits and importance of taking breaks to avoid increase symptoms at hip joint. 8/11 Reviewed activity modification especially with baby sitting. Updated HEP based on tolerance to treatment   8/2 Updated HEP based on symptoms   7/29 Updated HEP based on tolerance to treatment   6/22 Updated HEP with isometrics in order to maintain muscle activaiton and avoid increase in severity of symptoms.    6/16 Updated HEP   6/1 Updated Piriformis stretch onto HEP and reviewed proper posture in sitting.   5/27  Updated HEP based on tolerance to treatment   5/6 Pt educated on monitoring symptoms and informing PT or MD if sensation she was describing increases with severity or consistency. 5/4 Updated HEP with weight, SLB and SL bridges. 4/30 Updated HEP   4/22 Added adductor stretch to HEP and withheld exercises in weight bearing. 4/16 Updated HEP   4/14 Educated on precautions, use of ice and importance of HEP. Educated on activity modification when taking care of grandchildren and avoid excessive hip flexion, adduction and internal rotation. [employer]   Access Code: [place]      PLAN: See eval  [x] Continue per plan of care [] Alter current plan (see comments below)  [x] Plan of care initiated [] Hold pending MD visit [] Discharge  8/24 Complete progress note NPV  7/29 Pt appropriate for resumption of PT 1-2x a week for 8 weeks to further increase hip strength and motion. Electronically signed by:  Mervin Washington PT    Note: If patient does not return for scheduled/ recommended follow up visits, this note will serve as a discharge from care along with most recent update on progress.

## 2021-08-26 ENCOUNTER — HOSPITAL ENCOUNTER (OUTPATIENT)
Dept: PHYSICAL THERAPY | Age: 56
Setting detail: THERAPIES SERIES
Discharge: HOME | End: 2021-08-26
Payer: COMMERCIAL

## 2021-08-26 PROCEDURE — 20560 NDL INSJ W/O NJX 1 OR 2 MUSC: CPT

## 2021-08-26 PROCEDURE — 97110 THERAPEUTIC EXERCISES: CPT

## 2021-08-26 PROCEDURE — 97112 NEUROMUSCULAR REEDUCATION: CPT

## 2021-08-26 NOTE — FLOWSHEET NOTE
The [facility] and 500 [facility], [address], [address] [place] [address]  Phone: [phone]   Fax:     [phone]    Physical Therapy Progress/Daily Treatment Note  Date:  2021    Patient Name:  Louise Babb    :  1965  MRN: [id number]  Restrictions/Precautions:    Medical/Treatment Diagnosis Information:  · Diagnosis: S51.019K (ICD-10-CM) - Strain of left groin  · Treatment Diagnosis: M25.552 Left hip pain  Insurance/Certification information:  PT Insurance Information: [facility]  Physician Information:  Referring Practitioner: Loyd Calvo. Juan Allred MD  Has the plan of care been signed (Y/N):        [x]  Yes  []  No     Date of Patient follow up with Physician: PRN      Is this a Progress Report:     [x]  Yes  []  No        If Yes:  Date Range for reporting period:  Beginning 3/11/21  Ending     Progress report will be due (10 Rx or 30 days whichever is less):       Recertification will be due (POC Duration  / 90 days whichever is less): 21        Visit # Insurance Allowable Auth Required   28   [address] []  Yes []  No        Functional Scale:   WOMAC 53% deficit  Date assessed: 21  WOMAC 44% deficit  Date assessed: 21  WOMAC 25% deficit  Date assessed: 6/10/21  WOMAC 23% deficit  Date assessed: 21  WOMAC 58% deficit  Date assessed: 21       Latex Allergy:  [x]NO      []YES  Preferred Language for Healthcare:   [x]English       []other:      Pain level:  2-3/10  21      SUBJECTIVE:   Pt states she is feeling better then she did earlier this week but still feels flared up since 21. Pt has been busy today with babysitting and started to feel muscles getting tighter.      OBJECTIVE:   Updated below on 21  ROM PROM AROM Comments     Left Right Left Right     Flexion 98 with pain  116 90 pain 99     Extension     10  12      Abduction 35 pain  38 33 36     ER 52 40 48 38     IR 45 60 40 55                    Flexibility Left Right Comments   Hamstrings 0 0     ITB (Obers test) WNL  WNL     Hip flexor(Mikhail test) WNL WNL                       Strength Left Right Comments   Hip flexors 3+ 4 SLR   Hip extension 4+ 5     Hip abduction 4+ 5     Hip adduction 4 4+     Hip ER 4+ mild pain  5     Hip IR 4- pain 5     Quads 5 5     Hamstrings 5 5        Joint mobility:               []?Normal                       [x]? Hypo L hip into flexion               []?Hyper     Palpation: Tenderness L ASIS, L proximal hip flexor and L adductors      Functional Mobility/Transfers: Independent     Posture: WNL in standing      Bandages/Dressings/Incisions: N/A     Gait: Independent, WNL        RESTRICTIONS/PRECAUTIONS: HTN.  2 previous L hip labral repairs    Exercises/Interventions:     ROM/STRETCHES     Bike  7/29 completed on airdyne bike to prevent hip flexion past 90 deg    HF stretch 30\"hx3 L 6/24 completed at start of treatment   Adductor stretch 30\"hx3 L 6/24 completed at start of treatment   ^8/11 completed to 90 deg flex   SB LTR  8/2   Prone quad stretch 30\"hx3 L ^6/1 towel roll with stretch   ^8/26 withheld d/t ROM WNL and no excessive guarding   Prone bilat knee flexion 3x10 8/11 completed to actively stretch quad and hip flexors   Prone press up 20\"hx5  ^8/16 on forearms, completed for hip flexor stretch         PREs     Quad sets 8/2 supine   Prone glut sets 10\"hx10 bilat8/2   Prone BS with knee flex 10\"hx10 bilat7/29 8/2 d/t symptoms progressed to prone glut sets   6/22 Reintroduced based on continued joint line symptoms   7/29 hook lying with blue loops   Bridges 3x10 bilat  8/18 reintroduced with decrease in height and emphasis on proper PPT   Posterior pelvic tilt 10\"hx10 8/2 hook lying   TA overhead 4# med ball 10\"hx10^8/24 supine   Hamstring curl 2# 3x10 L^8/26   Heel raises 3x10 bilat8/16   Hip ext 2# 3x10 L^8/26   LAQ 2# 3x10 L ^8/26 BS                 Manual interventions     Joint mobilization PROM     STM  8/18 withheld d/t overall decrease in tension especially after dry needling   LAD             Therapeutic Exercise and NMR EXR  [x] (15038) Provided verbal/tactile cueing for activities related to strengthening, flexibility, endurance, ROM for improvements in LE, proximal hip, and core control with self care, mobility, lifting, ambulation. [x] (64868) Provided verbal/tactile cueing for activities related to improving balance, coordination, kinesthetic sense, posture, motor skill, proprioception  to assist with LE, proximal hip, and core control in self care, mobility, lifting, ambulation and eccentric single leg control.      NMR and Therapeutic Activities:    [] (70905 or 01017) Provided verbal/tactile cueing for activities related to improving balance, coordination, kinesthetic sense, posture, motor skill, proprioception and motor activation to allow for proper function of core, proximal hip and LE with self care and ADLs  [] (43953) Gait Re-education- Provided training and instruction to the patient for proper LE, core and proximal hip recruitment and positioning and eccentric body weight control with ambulation re-education including up and down stairs     Home Exercise Program:    [x] (53235) Reviewed/Progressed HEP activities related to strengthening, flexibility, endurance, ROM of core, proximal hip and LE for functional self-care, mobility, lifting and ambulation/stair navigation   [] (10638)Reviewed/Progressed HEP activities related to improving balance, coordination, kinesthetic sense, posture, motor skill, proprioception of core, proximal hip and LE for self care, mobility, lifting, and ambulation/stair navigation      Manual Treatments:  PROM / STM / Oscillations-Mobs:  G-I, II, III, IV (PA's, Inf., Post.)  [] (76908) Provided manual therapy to mobilize LE, proximal hip and/or LS spine soft tissue/joints for the purpose of modulating pain, promoting relaxation,  increasing ROM, reducing/eliminating soft tissue swelling/inflammation/restriction, improving soft tissue extensibility and allowing for proper ROM for normal function with self care, mobility, lifting and ambulation. Modalities:    PT reviewed precautions, contraindications, and indications with pt in addition to application of dry needling within established plan of care and expected outcomes; pt verbalized understanding with all questions answered. Pt had signed consent form for dry needling and PT obtained written consent with updated plan of care from MD before beginning. Dry needling manual therapy: consisted on the placement of 6 needles in proximal hip flexors and hip adductors. 60 mm needles were inserted to produce intramuscular mobilization. These techniques were used to restore functional range of motion, reduce muscle spasm and induce healing in the corresponding musculature. (53402)  Clean Technique was utilized today while applying Dry needling treatment. The treatment sites where cleaned with 70% solution of  isopropyl alcohol . The PT washed their hands and utilized treatment gloves along with hand  prior to inserting the needles. All needles where removed and discarded in the appropriate sharps container.         Performed by Minnie Flores PT, DPT   8 min  date 8/26/21        Charges:  Timed Code Treatment Minutes: 39'   Total Treatment Minutes: 4:12-5:03  51'       [] EVAL (LOW) 73091 (typically 20 minutes face-to-face)  [] EVAL (MOD) 08906 (typically 30 minutes face-to-face)  [] EVAL (HIGH) 67673 (typically 45 minutes face-to-face)  [] RE-EVAL     [x] BM(94291) x   1  [] IONTO  [x] NMR (09615) x 1     [] VASO  [] Manual (16110) x       [x] Other: Dn  [] TA x      [] Mech Traction (31129)  [] ES(attended) (94898)      [] ES (un) (32971):     GOALS: Adjusted on 7/29/21 d/t new prescription from MD following PRP injection  Patient stated goal:  Eliminate pain in order to return to exercise  [x]? Progressing: []? Met: []? Not Met: []? Adjusted     Therapist goals for Patient:   Short Term Goals: To be achieved in: 4 weeks  1. Independent in HEP and progression per patient tolerance, in order to prevent re-injury. [x]? Progressing: []? Met: []? Not Met: []? Adjusted   2. Patient will have a decrease in pain to facilitate improvement in movement, function, and ADLs as indicated by Functional Deficits. [x]? Progressing: []? Met: []? Not Met: []? Adjusted     Long Term Goals: To be achieved in: 8 weeks  1. Disability index score of 10% or less for the [facility] to assist with reaching prior level of function. [x]? Progressing: []? Met: []? Not Met: []? Adjusted  2. Patient will demonstrate increased L hip AROM that is equal to R to allow for proper joint functioning as indicated by patients Functional Deficits. [x]? Progressing: []? Met: []? Not Met: []? Adjusted  3. Patient will demonstrate an increase in L hip strength that is equal to R to allow for proper functional mobility as indicated by patients Functional Deficits. [x]? Progressing: []? Met: []? Not Met: []? Adjusted  4. Patient will return to all functional activities without increased symptoms or restriction. [x]? Progressing: []? Met: []? Not Met: []? Adjusted  5. Patient will have increase strength and ROM in order to  10 lbs from floor with proper sequencing and no c/o pain allowing her to better take care of her grand children  [x]? Progressing: []? Met: []? Not Met: []? Adjusted      Overall Progression Towards Functional goals/ Treatment Progress Update:  [] Patient is progressing as expected towards functional goals listed   [x] Progression is slowed d/t flare up that occurred with increase activity level on 8/20/21 symptoms have started to improve but still has not returned to baseline.  Based on PRP injection and continued functional and objective deficits she would benefit from continued PT. 8/26/21  [] Progression has been slowed due to co-morbidities. [] Plan just implemented, too soon to assess goals progression <30days   [] Goals require adjustment due to lack of progress  [] Patient is not progressing as expected and requires additional follow up with physician  [] Patient returns to physical therapy after PRP injections demonstrating continued strength and ROM deficits and is therefore appropriate for continuation of PT to further increase hip stability and mobility in order to improve overall function. 7/29/21    Prognosis for POC: [x] Good [] Fair  [] Poor      Patient requires continued skilled intervention: [x] Yes  [] No    Treatment/Activity Tolerance:  [x] Patient able to complete treatment  [] Patient limited by fatigue  [] Patient limited by pain     [] Patient limited by other medical complications  [] Other:   8/26 Pt demonstrated significant decrease in L hip flexion AROM at start of treatment limited to 85 deg that improved by 5 deg after dry needling. Tolerated treatment with appropriate fatigue and no reported increase symptoms     Patient Education:              8/26 Continued to stress activity modification in order to further decrease symptoms at thips. 8/24 Time spent reviewing activity modification based on strength deficits and importance of taking breaks to avoid increase symptoms at hip joint. 8/11 Reviewed activity modification especially with baby sitting. Updated HEP based on tolerance to treatment   8/2 Updated HEP based on symptoms   7/29 Updated HEP based on tolerance to treatment   6/22 Updated HEP with isometrics in order to maintain muscle activaiton and avoid increase in severity of symptoms.    6/16 Updated HEP   6/1 Updated Piriformis stretch onto HEP and reviewed proper posture in sitting.   5/27  Updated HEP based on tolerance to treatment   5/6 Pt educated on monitoring symptoms and informing PT or MD if sensation she was describing increases with severity or consistency. 5/4 Updated HEP with weight, SLB and SL bridges. 4/30 Updated HEP   4/22 Added adductor stretch to HEP and withheld exercises in weight bearing. 4/16 Updated HEP   4/14 Educated on precautions, use of ice and importance of HEP. Educated on activity modification when taking care of grandchildren and avoid excessive hip flexion, adduction and internal rotation. [employer]   Access Code: [place]      PLAN: See eval  [x] Continue per plan of care [] Alter current plan (see comments below)  [x] Plan of care initiated [] Hold pending MD visit [] Discharge  8/26 Continue with PT 1-2x a week for 4 more weeks to further decrease symptoms and increase strength and mobility. Electronically signed by:  Lew Holm PT    Note: If patient does not return for scheduled/ recommended follow up visits, this note will serve as a discharge from care along with most recent update on progress.

## 2021-08-31 ENCOUNTER — HOSPITAL ENCOUNTER (OUTPATIENT)
Dept: PHYSICAL THERAPY | Age: 56
Setting detail: THERAPIES SERIES
Discharge: HOME | End: 2021-08-31
Payer: COMMERCIAL

## 2021-08-31 PROCEDURE — 97110 THERAPEUTIC EXERCISES: CPT

## 2021-08-31 PROCEDURE — 97112 NEUROMUSCULAR REEDUCATION: CPT

## 2021-08-31 PROCEDURE — 20560 NDL INSJ W/O NJX 1 OR 2 MUSC: CPT

## 2021-08-31 NOTE — FLOWSHEET NOTE
The 1100 [facility] and 500 [facility], [address] [place] [address] Drive [address], [address] [facility]  Phone: [phone]   Fax:     [phone]    Physical Therapy Progress/Daily Treatment Note  Date:  2021    Patient Name:  Oscar Orozco    :  1965  MRN: [id number]  Restrictions/Precautions:    Medical/Treatment Diagnosis Information:  · Diagnosis: J31.985D (ICD-10-CM) - Strain of left groin  · Treatment Diagnosis: M25.552 Left hip pain  Insurance/Certification information:  PT Insurance Information: [facility]  Physician Information:  Referring Practitioner: John Solis. Jose Francisco Vidales MD  Has the plan of care been signed (Y/N):        [x]  Yes  []  No     Date of Patient follow up with Physician: PRN      Is this a Progress Report:     []  Yes  [x]  No        If Yes:  Date Range for reporting period:  Beginning 3/11/21  Ending     Progress report will be due (10 Rx or 30 days whichever is less): 56      Recertification will be due (POC Duration  / 90 days whichever is less): 21        Visit # Insurance Allowable Auth Required    [address] []  Yes []  No        Functional Scale:   WOMAC 53% deficit  Date assessed: 21  WOMAC 44% deficit  Date assessed: 21  WOMAC 25% deficit  Date assessed: 6/10/21  WOMAC 23% deficit  Date assessed: 21  WOMAC 58% deficit  Date assessed: 21       Latex Allergy:  [x]NO      []YES  Preferred Language for Healthcare:   [x]English       []other:      Pain level:  3-4/10  21      SUBJECTIVE:  : Pt states she has been really good with watching her activity level and only cleaning one bathroom at a time. Did not sleep well last night and having slight increase in discomfort this morning.      OBJECTIVE:   Updated below on 21  ROM PROM AROM Comments     Left Right Left Right     Flexion 98 with pain  116 90 pain 99     Extension     10  12      Abduction 35 pain  38 33 36     ER 52 40 48 38     IR 45 60 40 55                      Flexibility Left Right Comments   Hamstrings 0 0     ITB (Obers test) WNL  WNL     Hip flexor(Mikhail test) WNL WNL                       Strength Left Right Comments   Hip flexors 3+ 4 SLR   Hip extension 4+ 5     Hip abduction 4+ 5     Hip adduction 4 4+     Hip ER 4+ mild pain  5     Hip IR 4- pain 5     Quads 5 5     Hamstrings 5 5        Joint mobility:               []?Normal                       [x]? Hypo L hip into flexion               []?Hyper     Palpation: Tenderness L ASIS, L proximal hip flexor and L adductors      Functional Mobility/Transfers: Independent     Posture: WNL in standing      Bandages/Dressings/Incisions: N/A     Gait: Independent, WNL        RESTRICTIONS/PRECAUTIONS: HTN.  2 previous L hip labral repairs    Exercises/Interventions:     ROM/STRETCHES     Bike  7/29 completed on airdyne bike to prevent hip flexion past 90 deg    HF stretch 30\"hx3 L 6/24 completed at start of treatment   Adductor stretch 30\"hx3 L 6/24 completed at start of treatment   ^8/11 completed to 90 deg flex   SB LTR  8/2   Prone quad stretch 30\"hx3 L ^6/1 towel roll with stretch   ^8/26 withheld d/t ROM WNL and no excessive guarding   Prone bilat knee flexion 3x10 8/11 completed to actively stretch quad and hip flexors   Prone press up 20\"hx5  ^8/16 on forearms, completed for hip flexor stretch         PREs     Quad sets 8/2 supine   Prone glut sets 10\"hx10 bilat8/2   Prone BS with knee flex 10\"hx10 bilat7/29 8/2 d/t symptoms progressed to prone glut sets   6/22 Reintroduced based on continued joint line symptoms   7/29 hook lying with blue loops   Bridges 3x10 bilat  8/18 reintroduced with decrease in height and emphasis on proper PPT   Posterior pelvic tilt 10\"hx10 8/2 hook lying   TA overhead 5# 10\"hx10^8/31 supine   Hamstring curl 2.5# 3x10 L^8/31   Heel raises 3x10 bilat8/16   Hip ext 2.5# 3x10 L^8/31   LAQ 2.5# 3x10 L ^8/31 BS, cues to maintain level thigh and avoid extension in sitting    Hip abd 0# 3x10 L 8/31 completed in standing            Manual interventions     Joint mobilization     PROM     STM  8/18 withheld d/t overall decrease in tension especially after dry needling   LAD             Therapeutic Exercise and NMR EXR  [x] (12065) Provided verbal/tactile cueing for activities related to strengthening, flexibility, endurance, ROM for improvements in LE, proximal hip, and core control with self care, mobility, lifting, ambulation. [x] (86724) Provided verbal/tactile cueing for activities related to improving balance, coordination, kinesthetic sense, posture, motor skill, proprioception  to assist with LE, proximal hip, and core control in self care, mobility, lifting, ambulation and eccentric single leg control.      NMR and Therapeutic Activities:    [] (00257 or 82639) Provided verbal/tactile cueing for activities related to improving balance, coordination, kinesthetic sense, posture, motor skill, proprioception and motor activation to allow for proper function of core, proximal hip and LE with self care and ADLs  [] (35490) Gait Re-education- Provided training and instruction to the patient for proper LE, core and proximal hip recruitment and positioning and eccentric body weight control with ambulation re-education including up and down stairs     Home Exercise Program:    [x] (41438) Reviewed/Progressed HEP activities related to strengthening, flexibility, endurance, ROM of core, proximal hip and LE for functional self-care, mobility, lifting and ambulation/stair navigation   [] (46691)Reviewed/Progressed HEP activities related to improving balance, coordination, kinesthetic sense, posture, motor skill, proprioception of core, proximal hip and LE for self care, mobility, lifting, and ambulation/stair navigation      Manual Treatments:  PROM / STM / Oscillations-Mobs:  G-I, II, III, IV (PA's, Inf., Post.)  [] (30928) Provided manual therapy to mobilize LE, proximal hip and/or from MD following PRP injection  Patient stated goal:  Eliminate pain in order to return to exercise  [x]? Progressing: []? Met: []? Not Met: []? Adjusted     Therapist goals for Patient:   Short Term Goals: To be achieved in: 4 weeks  1. Independent in HEP and progression per patient tolerance, in order to prevent re-injury. [x]? Progressing: []? Met: []? Not Met: []? Adjusted   2. Patient will have a decrease in pain to facilitate improvement in movement, function, and ADLs as indicated by Functional Deficits. [x]? Progressing: []? Met: []? Not Met: []? Adjusted     Long Term Goals: To be achieved in: 8 weeks  1. Disability index score of 10% or less for the [facility] to assist with reaching prior level of function. [x]? Progressing: []? Met: []? Not Met: []? Adjusted  2. Patient will demonstrate increased L hip AROM that is equal to R to allow for proper joint functioning as indicated by patients Functional Deficits. [x]? Progressing: []? Met: []? Not Met: []? Adjusted  3. Patient will demonstrate an increase in L hip strength that is equal to R to allow for proper functional mobility as indicated by patients Functional Deficits. [x]? Progressing: []? Met: []? Not Met: []? Adjusted  4. Patient will return to all functional activities without increased symptoms or restriction. [x]? Progressing: []? Met: []? Not Met: []? Adjusted  5. Patient will have increase strength and ROM in order to  10 lbs from floor with proper sequencing and no c/o pain allowing her to better take care of her grand children  [x]? Progressing: []? Met: []? Not Met: []? Adjusted      Overall Progression Towards Functional goals/ Treatment Progress Update:  [] Patient is progressing as expected towards functional goals listed   [x] Progression is slowed d/t flare up that occurred with increase activity level on 8/20/21 symptoms have started to improve but still has not returned to baseline.  Based on PRP injection and continued functional and objective deficits she would benefit from continued PT. 8/26/21  [] Progression has been slowed due to co-morbidities. [] Plan just implemented, too soon to assess goals progression <30days   [] Goals require adjustment due to lack of progress  [] Patient is not progressing as expected and requires additional follow up with physician  [] Patient returns to physical therapy after PRP injections demonstrating continued strength and ROM deficits and is therefore appropriate for continuation of PT to further increase hip stability and mobility in order to improve overall function. 7/29/21    Prognosis for POC: [x] Good [] Fair  [] Poor      Patient requires continued skilled intervention: [x] Yes  [] No    Treatment/Activity Tolerance:  [x] Patient able to complete treatment  [] Patient limited by fatigue  [] Patient limited by pain     [] Patient limited by other medical complications  [] Other:   8/31 Pt continues to have muscle tightness noted at proximal adductors and hip flexors but not as much guarding noted compared to last visit. Continues to have pain with rolling on table as well as sit to stand activities. Fatigued and challenged with strengthening exercises creating mild increase in soreness at medial hip. Responded well to standing hip abduction with no adverse effects. Patient Education:              8/26 Continued to stress activity modification in order to further decrease symptoms at thips. 8/24 Time spent reviewing activity modification based on strength deficits and importance of taking breaks to avoid increase symptoms at hip joint. 8/11 Reviewed activity modification especially with baby sitting. Updated HEP based on tolerance to treatment   8/2 Updated HEP based on symptoms   7/29 Updated HEP based on tolerance to treatment   6/22 Updated HEP with isometrics in order to maintain muscle activaiton and avoid increase in severity of symptoms.    6/16 Updated HEP   6/1 Updated Piriformis stretch onto HEP and reviewed proper posture in sitting.   5/27  Updated HEP based on tolerance to treatment   5/6 Pt educated on monitoring symptoms and informing PT or MD if sensation she was describing increases with severity or consistency. 5/4 Updated HEP with weight, SLB and SL bridges. 4/30 Updated HEP   4/22 Added adductor stretch to HEP and withheld exercises in weight bearing. 4/16 Updated HEP   4/14 Educated on precautions, use of ice and importance of HEP. Educated on activity modification when taking care of grandchildren and avoid excessive hip flexion, adduction and internal rotation. [email or web address]   Access Code: [place]      PLAN: See eval  [x] Continue per plan of care [] Alter current plan (see comments below)  [x] Plan of care initiated [] Hold pending MD visit [] Discharge  8/26 Continue with PT 1-2x a week for 4 more weeks to further decrease symptoms and increase strength and mobility. Electronically signed by:  Gauri Gibbs PT    Note: If patient does not return for scheduled/ recommended follow up visits, this note will serve as a discharge from care along with most recent update on progress.

## 2021-09-02 ENCOUNTER — HOSPITAL ENCOUNTER (OUTPATIENT)
Dept: PHYSICAL THERAPY | Age: 56
Setting detail: THERAPIES SERIES
Discharge: HOME | End: 2021-09-02
Payer: COMMERCIAL

## 2021-09-02 PROCEDURE — 20560 NDL INSJ W/O NJX 1 OR 2 MUSC: CPT

## 2021-09-02 PROCEDURE — 97110 THERAPEUTIC EXERCISES: CPT

## 2021-09-02 PROCEDURE — 97112 NEUROMUSCULAR REEDUCATION: CPT

## 2021-09-02 NOTE — FLOWSHEET NOTE
The [facility] and [facility], [address], [address] [facility]  Phone: [phone]   Fax:     [phone]    Physical Therapy Progress/Daily Treatment Note  Date:  2021    Patient Name:  Amelia Bridges    :  1965  MRN: [id number]  Restrictions/Precautions:    Medical/Treatment Diagnosis Information:  · Diagnosis: K86.154J (ICD-10-CM) - Strain of left groin  · Treatment Diagnosis: M25.552 Left hip pain  Insurance/Certification information:  PT Insurance Information: [facility]  Physician Information:  Referring Practitioner: Sherrie Arreguin. Ralph Martines MD  Has the plan of care been signed (Y/N):        [x]  Yes  []  No     Date of Patient follow up with Physician: PRN      Is this a Progress Report:     []  Yes  [x]  No        If Yes:  Date Range for reporting period:  Beginning 3/11/21  Ending     Progress report will be due (10 Rx or 30 days whichever is less):       Recertification will be due (POC Duration  / 90 days whichever is less): 21        Visit # Insurance Allowable Auth Required   30   [address] []  Yes []  No        Functional Scale:   WOMAC 53% deficit  Date assessed: 21  WOMAC 44% deficit  Date assessed: 21  WOMAC 25% deficit  Date assessed: 6/10/21  WOMAC 23% deficit  Date assessed: 21  WOMAC 58% deficit  Date assessed: 21       Latex Allergy:  [x]NO      []YES  Preferred Language for Healthcare:   [x]English       []other:      Pain level:  2/10  9/2/21      SUBJECTIVE:   Pt states overall she was doing well today but experienced some pain when she twisted to get out of the car. Still has pain at night with sleeping and rolling in bed.      OBJECTIVE:   Updated below on 21  ROM PROM AROM Comments     Left Right Left Right     Flexion 98 with pain  116 90 pain 99     Extension     10  12      Abduction 35 pain  38 33 36     ER 52 40 48 38     IR 45 60 40 55                    Flexibility Left Right Comments   Hamstrings 0 0     ITB (Obers test) WNL  WNL     Hip flexor(Mikhail test) WNL WNL                       Strength Left Right Comments   Hip flexors 3+ 4 SLR   Hip extension 4+ 5     Hip abduction 4+ 5     Hip adduction 4 4+     Hip ER 4+ mild pain  5     Hip IR 4- pain 5     Quads 5 5     Hamstrings 5 5        Joint mobility:               []?Normal                       [x]? Hypo L hip into flexion               []?Hyper     Palpation: Tenderness L ASIS, L proximal hip flexor and L adductors      Functional Mobility/Transfers: Independent     Posture: WNL in standing      Bandages/Dressings/Incisions: N/A     Gait: Independent, WNL        RESTRICTIONS/PRECAUTIONS: HTN.  2 previous L hip labral repairs    Exercises/Interventions:     ROM/STRETCHES     Bike  7/29 completed on airdyne bike to prevent hip flexion past 90 deg    HF stretch 30\"hx3 L 6/24 completed at start of treatment   Adductor stretch 30\"hx3 L 6/24 completed at start of treatment   ^8/11 completed to 90 deg flex   SB LTR  8/2   Prone quad stretch 30\"hx3 L ^6/1 towel roll with stretch   ^8/26 withheld d/t ROM WNL and no excessive guarding   Prone bilat knee flexion 3x10 8/11 completed to actively stretch quad and hip flexors   Prone press up 20\"hx5  ^8/16 on forearms, completed for hip flexor stretch         PREs     Quad sets 8/2 supine   Prone glut sets 10\"hx10 bilat8/2   Prone BS with knee flex 10\"hx10 bilat7/29 8/2 d/t symptoms progressed to prone glut sets   6/22 Reintroduced based on continued joint line symptoms   7/29 hook lying with blue loops   Bridges 3x10 bilat  8/18 reintroduced with decrease in height and emphasis on proper PPT   Posterior pelvic tilt 10\"hx10 8/2 hook lying   TA overhead 6# 10\"hx10^9/2 supine   Hamstring curl 3# 3x10 L^9/2   Heel raises 3x10 bilat^9/2 standing on airex   Hip ext 3# 3x10 L^9/2   LAQ 3# 3x10 L ^9/2 with BS   Hip abd 0# 3x10 L 8/31 completed in standing   Balance  Feet together eyes closed 30\"hx3 9/2        Manual interventions     Joint mobilization     PROM     STM  8/18 withheld d/t overall decrease in tension especially after dry needling   LAD             Therapeutic Exercise and NMR EXR  [x] (06803) Provided verbal/tactile cueing for activities related to strengthening, flexibility, endurance, ROM for improvements in LE, proximal hip, and core control with self care, mobility, lifting, ambulation. [x] (71333) Provided verbal/tactile cueing for activities related to improving balance, coordination, kinesthetic sense, posture, motor skill, proprioception  to assist with LE, proximal hip, and core control in self care, mobility, lifting, ambulation and eccentric single leg control.      NMR and Therapeutic Activities:    [] (20463 or 79575) Provided verbal/tactile cueing for activities related to improving balance, coordination, kinesthetic sense, posture, motor skill, proprioception and motor activation to allow for proper function of core, proximal hip and LE with self care and ADLs  [] (73772) Gait Re-education- Provided training and instruction to the patient for proper LE, core and proximal hip recruitment and positioning and eccentric body weight control with ambulation re-education including up and down stairs     Home Exercise Program:    [x] (79600) Reviewed/Progressed HEP activities related to strengthening, flexibility, endurance, ROM of core, proximal hip and LE for functional self-care, mobility, lifting and ambulation/stair navigation   [] (41970)Reviewed/Progressed HEP activities related to improving balance, coordination, kinesthetic sense, posture, motor skill, proprioception of core, proximal hip and LE for self care, mobility, lifting, and ambulation/stair navigation      Manual Treatments:  PROM / STM / Oscillations-Mobs:  G-I, II, III, IV (PA's, Inf., Post.)  [] (49776) Provided manual therapy to mobilize LE, proximal hip and/or LS spine soft tissue/joints for the purpose of modulating pain, promoting relaxation,  increasing ROM, reducing/eliminating soft tissue swelling/inflammation/restriction, improving soft tissue extensibility and allowing for proper ROM for normal function with self care, mobility, lifting and ambulation. Modalities:    PT reviewed precautions, contraindications, and indications with pt in addition to application of dry needling within established plan of care and expected outcomes; pt verbalized understanding with all questions answered. Pt had signed consent form for dry needling and PT obtained written consent with updated plan of care from MD before beginning. Dry needling manual therapy: consisted on the placement of 5 needles in proximal hip flexors, hip adductors and mid substance quadriceps. 60 mm needles were inserted to produce intramuscular mobilization. These techniques were used to restore functional range of motion, reduce muscle spasm and induce healing in the corresponding musculature. (20331)  Clean Technique was utilized today while applying Dry needling treatment. The treatment sites where cleaned with 70% solution of  isopropyl alcohol . The PT washed their hands and utilized treatment gloves along with hand  prior to inserting the needles. All needles where removed and discarded in the appropriate sharps container.         Performed by Marylen Ames PT, DPT   8 min  date 9/2/21        Charges:  Timed Code Treatment Minutes: 50'   Total Treatment Minutes: 3:55-4:48  53'       [] EVAL (LOW) 96130 (typically 20 minutes face-to-face)  [] EVAL (MOD) 37591 (typically 30 minutes face-to-face)  [] EVAL (HIGH) 22526 (typically 45 minutes face-to-face)  [] RE-EVAL     [x] ZS(19452) x   1  [] IONTO  [x] NMR (80829) x 1     [] VASO  [] Manual (29762) x       [x] Other: Dn  [] TA x      [] Mech Traction (16555)  [] ES(attended) (44299)      [] ES (un) (74493):     GOALS: Adjusted on 7/29/21 d/t new prescription from MD following PRP injection  Patient stated goal:  Eliminate pain in order to return to exercise  [x]? Progressing: []? Met: []? Not Met: []? Adjusted     Therapist goals for Patient:   Short Term Goals: To be achieved in: 4 weeks  1. Independent in HEP and progression per patient tolerance, in order to prevent re-injury. [x]? Progressing: []? Met: []? Not Met: []? Adjusted   2. Patient will have a decrease in pain to facilitate improvement in movement, function, and ADLs as indicated by Functional Deficits. [x]? Progressing: []? Met: []? Not Met: []? Adjusted     Long Term Goals: To be achieved in: 8 weeks  1. Disability index score of 10% or less for the [facility] to assist with reaching prior level of function. [x]? Progressing: []? Met: []? Not Met: []? Adjusted  2. Patient will demonstrate increased L hip AROM that is equal to R to allow for proper joint functioning as indicated by patients Functional Deficits. [x]? Progressing: []? Met: []? Not Met: []? Adjusted  3. Patient will demonstrate an increase in L hip strength that is equal to R to allow for proper functional mobility as indicated by patients Functional Deficits. [x]? Progressing: []? Met: []? Not Met: []? Adjusted  4. Patient will return to all functional activities without increased symptoms or restriction. [x]? Progressing: []? Met: []? Not Met: []? Adjusted  5. Patient will have increase strength and ROM in order to  10 lbs from floor with proper sequencing and no c/o pain allowing her to better take care of her grand children  [x]? Progressing: []? Met: []? Not Met: []? Adjusted      Overall Progression Towards Functional goals/ Treatment Progress Update:  [] Patient is progressing as expected towards functional goals listed   [x] Progression is slowed d/t flare up that occurred with increase activity level on 8/20/21 symptoms have started to improve but still has not returned to baseline.  Based on PRP injection and continued functional and objective deficits she would benefit from continued PT. 8/26/21  [] Progression has been slowed due to co-morbidities. [] Plan just implemented, too soon to assess goals progression <30days   [] Goals require adjustment due to lack of progress  [] Patient is not progressing as expected and requires additional follow up with physician  [] Patient returns to physical therapy after PRP injections demonstrating continued strength and ROM deficits and is therefore appropriate for continuation of PT to further increase hip stability and mobility in order to improve overall function. 7/29/21    Prognosis for POC: [x] Good [] Fair  [] Poor      Patient requires continued skilled intervention: [x] Yes  [] No    Treatment/Activity Tolerance:  [x] Patient able to complete treatment  [] Patient limited by fatigue  [] Patient limited by pain     [] Patient limited by other medical complications  [] Other:   9/2 Tightness in L hip muscles not as tense as they were earlier this week. Continues to respond well to dry needling. Responded well to mild increase in intensity of treatment with no adverse effects. Patient Education:              8/26 Continued to stress activity modification in order to further decrease symptoms at thips. 8/24 Time spent reviewing activity modification based on strength deficits and importance of taking breaks to avoid increase symptoms at hip joint. 8/11 Reviewed activity modification especially with baby sitting. Updated HEP based on tolerance to treatment   8/2 Updated HEP based on symptoms   7/29 Updated HEP based on tolerance to treatment   6/22 Updated HEP with isometrics in order to maintain muscle activaiton and avoid increase in severity of symptoms.    6/16 Updated HEP   6/1 Updated Piriformis stretch onto HEP and reviewed proper posture in sitting.   5/27  Updated HEP based on tolerance to treatment   5/6 Pt educated on monitoring symptoms and informing PT or MD if sensation she was describing increases with severity or consistency. 5/4 Updated HEP with weight, SLB and SL bridges. 4/30 Updated HEP   4/22 Added adductor stretch to HEP and withheld exercises in weight bearing. 4/16 Updated HEP   4/14 Educated on precautions, use of ice and importance of HEP. Educated on activity modification when taking care of grandchildren and avoid excessive hip flexion, adduction and internal rotation. [employer]   Access Code: [place]      PLAN: See eval  [x] Continue per plan of care [] Alter current plan (see comments below)  [x] Plan of care initiated [] Hold pending MD visit [] Discharge  8/26 Continue with PT 1-2x a week for 4 more weeks to further decrease symptoms and increase strength and mobility. Electronically signed by:  Santiago Powers PT    Note: If patient does not return for scheduled/ recommended follow up visits, this note will serve as a discharge from care along with most recent update on progress.

## 2021-09-07 ENCOUNTER — HOSPITAL ENCOUNTER (OUTPATIENT)
Dept: PHYSICAL THERAPY | Age: 56
Setting detail: THERAPIES SERIES
Discharge: HOME | End: 2021-09-07
Payer: COMMERCIAL

## 2021-09-10 ENCOUNTER — APPOINTMENT (OUTPATIENT)
Dept: PHYSICAL THERAPY | Age: 56
End: 2021-09-10
Payer: COMMERCIAL

## 2021-09-15 ENCOUNTER — HOSPITAL ENCOUNTER (OUTPATIENT)
Dept: PHYSICAL THERAPY | Age: 56
Setting detail: THERAPIES SERIES
Discharge: HOME | End: 2021-09-15
Payer: COMMERCIAL

## 2021-09-15 PROCEDURE — 97112 NEUROMUSCULAR REEDUCATION: CPT

## 2021-09-15 PROCEDURE — 97110 THERAPEUTIC EXERCISES: CPT

## 2021-09-15 PROCEDURE — 20560 NDL INSJ W/O NJX 1 OR 2 MUSC: CPT

## 2021-09-15 NOTE — FLOWSHEET NOTE
The [facility] and [facility], [address] [facility], [address] [facility]  Phone: [phone]   Fax:     [phone]    Physical Therapy Progress/Daily Treatment Note  Date:  9/15/2021    Patient Name:  Tete Schmitz    :  1965  MRN: [id number]  Restrictions/Precautions:    Medical/Treatment Diagnosis Information:  · Diagnosis: S75.907K (ICD-10-CM) - Strain of left groin  · Treatment Diagnosis: M25.552 Left hip pain  Insurance/Certification information:  PT Insurance Information: [facility]  Physician Information:  Referring Practitioner: Anne Valdez. Amy Jones MD  Has the plan of care been signed (Y/N):        [x]  Yes  []  No     Date of Patient follow up with Physician: PRN      Is this a Progress Report:     []  Yes  [x]  No        If Yes:  Date Range for reporting period:  Beginning 3/11/21  Ending     Progress report will be due (10 Rx or 30 days whichever is less):       Recertification will be due (POC Duration  / 90 days whichever is less): 21        Visit # Insurance Allowable Auth Required   31  9/15 [address] []  Yes []  No        Functional Scale:   WOMAC 53% deficit  Date assessed: 21  WOMAC 44% deficit  Date assessed: 21  WOMAC 25% deficit  Date assessed: 6/10/21  WOMAC 23% deficit  Date assessed: 21  WOMAC 58% deficit  Date assessed: 21       Latex Allergy:  [x]NO      []YES  Preferred Language for Healthcare:   [x]English       []other:      Pain level:  2-3/10  9/15/21      SUBJECTIVE:  9/15 Pt was sick last week and did not do much providing some relief in hip symptoms. Pt reports she mopped the kitchen floor on 21 causing increase in L hip symptoms. Symptoms subsided but still has pain at night when she rolls over a night.      OBJECTIVE:   Updated below on 21  ROM PROM AROM Comments     Left Right Left Right     Flexion 98 with pain  116 90 pain 99     Extension     10  12      Abduction 35 pain  38 33 36     ER 52 40 48 38     IR 45 60 40 55                      Flexibility Left Right Comments   Hamstrings 0 0     ITB (Obers test) WNL  WNL     Hip flexor(Mikhail test) WNL WNL                       Strength Left Right Comments   Hip flexors 3+ 4 SLR   Hip extension 4+ 5     Hip abduction 4+ 5     Hip adduction 4 4+     Hip ER 4+ mild pain  5     Hip IR 4- pain 5     Quads 5 5     Hamstrings 5 5        Joint mobility:               []?Normal                       [x]? Hypo L hip into flexion               []?Hyper     Palpation: Tenderness L ASIS, L proximal hip flexor and L adductors      Functional Mobility/Transfers: Independent     Posture: WNL in standing      Bandages/Dressings/Incisions: N/A     Gait: Independent, WNL        RESTRICTIONS/PRECAUTIONS: HTN.  2 previous L hip labral repairs    Exercises/Interventions:     ROM/STRETCHES     Bike  7/29 completed on airdyne bike to prevent hip flexion past 90 deg    HF stretch 30\"hx3 L 6/24 completed at start of treatment   Adductor stretch 30\"hx3 L 6/24 completed at start of treatment   ^8/11 completed to 90 deg flex   SB LTR  8/2   Prone quad stretch 30\"hx3 L ^6/1 towel roll with stretch   ^8/26 withheld d/t ROM WNL and no excessive guarding   Prone bilat knee flexion 3x10 8/11 completed to actively stretch quad and hip flexors   Prone press up 20\"hx5  ^8/16 on forearms, completed for hip flexor stretch         PREs     Quad sets 8/2 supine   Prone glut sets 10\"hx10 bilat8/2   Prone BS with knee flex 10\"hx10 bilat7/29 8/2 d/t symptoms progressed to prone glut sets   6/22 Reintroduced based on continued joint line symptoms   7/29 hook lying with blue loops   Bridges 3x10 bilat  8/18 reintroduced with decrease in height and emphasis on proper PPT   Posterior pelvic tilt 10\"hx10 8/2 hook lying   TA overhead 7# 10\"hx10^9/15 supine   Hamstring curl 3# 3x10 L^9/2   Heel raises 3x10 bilat^9/2 standing on airex   Hip ext 3# 3x10 L^9/2   LAQ 3# 3x10 L ^9/2 with BS   Hip abd 0# 3x10 L 8/31 completed in standing   Balance  Feet together eyes closed 30\"hx3 9/15 airex        Manual interventions     Joint mobilization     PROM     STM  8/18 withheld d/t overall decrease in tension especially after dry needling   LAD             Therapeutic Exercise and NMR EXR  [x] (80881) Provided verbal/tactile cueing for activities related to strengthening, flexibility, endurance, ROM for improvements in LE, proximal hip, and core control with self care, mobility, lifting, ambulation. [x] (14494) Provided verbal/tactile cueing for activities related to improving balance, coordination, kinesthetic sense, posture, motor skill, proprioception  to assist with LE, proximal hip, and core control in self care, mobility, lifting, ambulation and eccentric single leg control.      NMR and Therapeutic Activities:    [] (31168 or 56472) Provided verbal/tactile cueing for activities related to improving balance, coordination, kinesthetic sense, posture, motor skill, proprioception and motor activation to allow for proper function of core, proximal hip and LE with self care and ADLs  [] (69721) Gait Re-education- Provided training and instruction to the patient for proper LE, core and proximal hip recruitment and positioning and eccentric body weight control with ambulation re-education including up and down stairs     Home Exercise Program:    [x] (93830) Reviewed/Progressed HEP activities related to strengthening, flexibility, endurance, ROM of core, proximal hip and LE for functional self-care, mobility, lifting and ambulation/stair navigation   [] (57772)Reviewed/Progressed HEP activities related to improving balance, coordination, kinesthetic sense, posture, motor skill, proprioception of core, proximal hip and LE for self care, mobility, lifting, and ambulation/stair navigation      Manual Treatments:  PROM / STM / Oscillations-Mobs:  G-I, II, III, IV (PA's, Inf., Post.)  [] (02474) Provided manual therapy to mobilize LE, proximal hip and/or LS spine soft tissue/joints for the purpose of modulating pain, promoting relaxation,  increasing ROM, reducing/eliminating soft tissue swelling/inflammation/restriction, improving soft tissue extensibility and allowing for proper ROM for normal function with self care, mobility, lifting and ambulation. Modalities:    PT reviewed precautions, contraindications, and indications with pt in addition to application of dry needling within established plan of care and expected outcomes; pt verbalized understanding with all questions answered. Pt had signed consent form for dry needling and PT obtained written consent with updated plan of care from MD before beginning. Dry needling manual therapy: consisted on the placement of 5 needles in proximal hip flexors and hip adductors. 60 mm needles were inserted to produce intramuscular mobilization. These techniques were used to restore functional range of motion, reduce muscle spasm and induce healing in the corresponding musculature. (91090)  Clean Technique was utilized today while applying Dry needling treatment. The treatment sites where cleaned with 70% solution of  isopropyl alcohol . The PT washed their hands and utilized treatment gloves along with hand  prior to inserting the needles. All needles where removed and discarded in the appropriate sharps container.         Performed by David Niño PT, DPT   8 min  date 9/15/21        Charges:  Timed Code Treatment Minutes: 36'   Total Treatment Minutes: 7:42-8:28  46'       [] EVAL (LOW) 455 [id number] (typically 20 minutes face-to-face)  [] EVAL (MOD) 37308 (typically 30 minutes face-to-face)  [] EVAL (HIGH) 84780 (typically 45 minutes face-to-face)  [] RE-EVAL     [x] AFSANEH(71985) x   1  [] IONTO  [x] NMR (41626) x 1     [] VASO  [] Manual (33238) x       [x] Other: Dn  [] TA x      [] Mech Traction (23054)  [] ES(attended) (44691)      [] ES (un) (04299): GOALS: Adjusted on 7/29/21 d/t new prescription from MD following PRP injection  Patient stated goal:  Eliminate pain in order to return to exercise  [x]? Progressing: []? Met: []? Not Met: []? Adjusted     Therapist goals for Patient:   Short Term Goals: To be achieved in: 4 weeks  1. Independent in HEP and progression per patient tolerance, in order to prevent re-injury. [x]? Progressing: []? Met: []? Not Met: []? Adjusted   2. Patient will have a decrease in pain to facilitate improvement in movement, function, and ADLs as indicated by Functional Deficits. [x]? Progressing: []? Met: []? Not Met: []? Adjusted     Long Term Goals: To be achieved in: 8 weeks  1. Disability index score of 10% or less for the [facility] to assist with reaching prior level of function. [x]? Progressing: []? Met: []? Not Met: []? Adjusted  2. Patient will demonstrate increased L hip AROM that is equal to R to allow for proper joint functioning as indicated by patients Functional Deficits. [x]? Progressing: []? Met: []? Not Met: []? Adjusted  3. Patient will demonstrate an increase in L hip strength that is equal to R to allow for proper functional mobility as indicated by patients Functional Deficits. [x]? Progressing: []? Met: []? Not Met: []? Adjusted  4. Patient will return to all functional activities without increased symptoms or restriction. [x]? Progressing: []? Met: []? Not Met: []? Adjusted  5. Patient will have increase strength and ROM in order to  10 lbs from floor with proper sequencing and no c/o pain allowing her to better take care of her grand children  [x]? Progressing: []? Met: []? Not Met: []?  Adjusted      Overall Progression Towards Functional goals/ Treatment Progress Update:  [] Patient is progressing as expected towards functional goals listed   [x] Progression is slowed d/t flare up that occurred with increase activity level on 8/20/21 symptoms have started to improve but still has not returned to baseline. Based on PRP injection and continued functional and objective deficits she would benefit from continued PT. 8/26/21  [] Progression has been slowed due to co-morbidities. [] Plan just implemented, too soon to assess goals progression <30days   [] Goals require adjustment due to lack of progress  [] Patient is not progressing as expected and requires additional follow up with physician  [] Patient returns to physical therapy after PRP injections demonstrating continued strength and ROM deficits and is therefore appropriate for continuation of PT to further increase hip stability and mobility in order to improve overall function. 7/29/21    Prognosis for POC: [x] Good [] Fair  [] Poor      Patient requires continued skilled intervention: [x] Yes  [] No    Treatment/Activity Tolerance:  [x] Patient able to complete treatment  [] Patient limited by fatigue  [] Patient limited by pain     [] Patient limited by other medical complications  [] Other:   9/15 Pt experienced decrease in L hip symptoms d/t more time in non weight bearing after she was sick. Mild muscle tightness noted but overall not as significant as it has been for prior visits before getting sick. Limited progression d/t not completing HEP for past week. Responded well to treatment with no adverse effects. Patient Education:              8/26 Continued to stress activity modification in order to further decrease symptoms at thips. 8/24 Time spent reviewing activity modification based on strength deficits and importance of taking breaks to avoid increase symptoms at hip joint. 8/11 Reviewed activity modification especially with baby sitting. Updated HEP based on tolerance to treatment   8/2 Updated HEP based on symptoms   7/29 Updated HEP based on tolerance to treatment   6/22 Updated HEP with isometrics in order to maintain muscle activaiton and avoid increase in severity of symptoms.    6/16 Updated HEP   6/1 Updated Piriformis stretch onto HEP and reviewed proper posture in sitting.   5/27  Updated HEP based on tolerance to treatment   5/6 Pt educated on monitoring symptoms and informing PT or MD if sensation she was describing increases with severity or consistency. 5/4 Updated HEP with weight, SLB and SL bridges. 4/30 Updated HEP   4/22 Added adductor stretch to HEP and withheld exercises in weight bearing. 4/16 Updated HEP   4/14 Educated on precautions, use of ice and importance of HEP. Educated on activity modification when taking care of grandchildren and avoid excessive hip flexion, adduction and internal rotation. Node1   Access Code: [place]      PLAN: See eval  [x] Continue per plan of care [] Alter current plan (see comments below)  [x] Plan of care initiated [] Hold pending MD visit [] Discharge  9/15 Traveling the rest of this week and will return next week. Complete progress note next week. 8/26 Continue with PT 1-2x a week for 4 more weeks to further decrease symptoms and increase strength and mobility. Electronically signed by:  Lisha Suárez PT    Note: If patient does not return for scheduled/ recommended follow up visits, this note will serve as a discharge from care along with most recent update on progress.

## 2021-09-21 ENCOUNTER — HOSPITAL ENCOUNTER (OUTPATIENT)
Dept: PHYSICAL THERAPY | Age: 56
Setting detail: THERAPIES SERIES
Discharge: HOME | End: 2021-09-21
Payer: COMMERCIAL

## 2021-09-21 PROCEDURE — 97110 THERAPEUTIC EXERCISES: CPT

## 2021-09-21 PROCEDURE — 97112 NEUROMUSCULAR REEDUCATION: CPT

## 2021-09-21 PROCEDURE — 20560 NDL INSJ W/O NJX 1 OR 2 MUSC: CPT

## 2021-09-21 NOTE — FLOWSHEET NOTE
The [address] [facility] and [address] [facility], [facility] [address] [facility], [address] [place] [address]  Phone: [phone]   Fax:     [phone]    Physical Therapy Progress/Daily Treatment Note  Date:  2021    Patient Name:  Vaishali Patel    :  1965  MRN: [id number]  Restrictions/Precautions:    Medical/Treatment Diagnosis Information:  · Diagnosis: A96.058S (ICD-10-CM) - Strain of left groin  · Treatment Diagnosis: M25.552 Left hip pain  Insurance/Certification information:  PT Insurance Information: [facility]  Physician Information:  Referring Practitioner: Mehul Harrington. Danna Lui MD  Has the plan of care been signed (Y/N):        [x]  Yes  []  No     Date of Patient follow up with Physician: PRN      Is this a Progress Report:     []  Yes  [x]  No        If Yes:  Date Range for reporting period:  Beginning 3/11/21  Ending     Progress report will be due (10 Rx or 30 days whichever is less):       Recertification will be due (POC Duration  / 90 days whichever is less): 21        Visit # Insurance Allowable Auth Required   32   [facility]  UNL []  Yes []  No        Functional Scale:   WOMAC 53% deficit  Date assessed: 21  WOMAC 44% deficit  Date assessed: 21  WOMAC 25% deficit  Date assessed: 6/10/21  WOMAC 23% deficit  Date assessed: 21  WOMAC 58% deficit  Date assessed: 21       Latex Allergy:  [x]NO      []YES  Preferred Language for Healthcare:   [x]English       []other:      Pain level:  3/10  9/21/21      SUBJECTIVE:   Pt states she had increase in discomfort yesterday and only known cause is a coughing fit she had over the week. Symptoms got to the point that going up stairs was extremely difficult as well as sitting.  Today symptoms are a little better d/t symptoms not being constant    OBJECTIVE:   Updated below on 21  ROM PROM AROM Comments     Left Right Left Right     Flexion 98 with pain  116 90 pain 99     Extension     10  12      Abduction 35 pain  38 33 36     ER 52 40 48 38     IR 45 60 40 55                      Flexibility Left Right Comments   Hamstrings 0 0     ITB (Obers test) WNL  WNL     Hip flexor(Mikhail test) WNL WNL                       Strength Left Right Comments   Hip flexors 3+ 4 SLR   Hip extension 4+ 5     Hip abduction 4+ 5     Hip adduction 4 4+     Hip ER 4+ mild pain  5     Hip IR 4- pain 5     Quads 5 5     Hamstrings 5 5        Joint mobility:               []?Normal                       [x]? Hypo L hip into flexion               []?Hyper     Palpation: Tenderness L ASIS, L proximal hip flexor and L adductors      Functional Mobility/Transfers: Independent     Posture: WNL in standing      Bandages/Dressings/Incisions: N/A     Gait: Independent, WNL        RESTRICTIONS/PRECAUTIONS: HTN.  2 previous L hip labral repairs    Exercises/Interventions:     ROM/STRETCHES     Bike  7/29 completed on airdyne bike to prevent hip flexion past 90 deg    HF stretch 30\"hx3 L 6/24 completed at start of treatment   Adductor stretch 30\"hx3 L 6/24 completed at start of treatment   ^8/11 completed to 90 deg flex   SB LTR  8/2   Prone quad stretch 30\"hx3 L ^6/1 towel roll with stretch   ^8/26 withheld d/t ROM WNL and no excessive guarding   Prone bilat knee flexion 3x10 8/11 completed to actively stretch quad and hip flexors   Prone press up 30\"hx3  ^9/21 on forearms, completed for hip flexor stretch         PREs     Quad sets 8/2 supine   Prone glut sets 10\"hx10 bilat8/2   Prone BS with knee flex 10\"hx10 bilat7/29 8/2 d/t symptoms progressed to prone glut sets   6/22 Reintroduced based on continued joint line symptoms   7/29 hook lying with blue loops   Bridges 3x10 bilat  8/18 reintroduced with decrease in height and emphasis on proper PPT   Posterior pelvic tilt 10\"hx10 ^9/21 supine   TA overhead 7# 10\"hx10^9/15 supine   Hamstring curl 4# 3x10 L^9/21   Heel raises 3x10 bilat^9/2 standing on airex   Hip ext 4# 3x10 L^9/1 LAQ 4# 3x10 L ^9/21 with BS   Hip abd 0# 3x10 L 8/31 completed in standing   Balance  Feet together eyes closed 30\"hx3 9/15 airex        Manual interventions     Joint mobilization     PROM     STM  L iliopsoas released and hip flexors medial to L ASIS x4'9/21    LAD             Therapeutic Exercise and NMR EXR  [x] (24381) Provided verbal/tactile cueing for activities related to strengthening, flexibility, endurance, ROM for improvements in LE, proximal hip, and core control with self care, mobility, lifting, ambulation. [x] (17460) Provided verbal/tactile cueing for activities related to improving balance, coordination, kinesthetic sense, posture, motor skill, proprioception  to assist with LE, proximal hip, and core control in self care, mobility, lifting, ambulation and eccentric single leg control.      NMR and Therapeutic Activities:    [] (63669 or 67088) Provided verbal/tactile cueing for activities related to improving balance, coordination, kinesthetic sense, posture, motor skill, proprioception and motor activation to allow for proper function of core, proximal hip and LE with self care and ADLs  [] (47201) Gait Re-education- Provided training and instruction to the patient for proper LE, core and proximal hip recruitment and positioning and eccentric body weight control with ambulation re-education including up and down stairs     Home Exercise Program:    [x] (65396) Reviewed/Progressed HEP activities related to strengthening, flexibility, endurance, ROM of core, proximal hip and LE for functional self-care, mobility, lifting and ambulation/stair navigation   [] (83408)Reviewed/Progressed HEP activities related to improving balance, coordination, kinesthetic sense, posture, motor skill, proprioception of core, proximal hip and LE for self care, mobility, lifting, and ambulation/stair navigation      Manual Treatments:  PROM / STM / Oscillations-Mobs:  G-I, II, III, IV (PA's, Inf., Post.)  [] (29258) Provided manual therapy to mobilize LE, proximal hip and/or LS spine soft tissue/joints for the purpose of modulating pain, promoting relaxation,  increasing ROM, reducing/eliminating soft tissue swelling/inflammation/restriction, improving soft tissue extensibility and allowing for proper ROM for normal function with self care, mobility, lifting and ambulation. Modalities:    PT reviewed precautions, contraindications, and indications with pt in addition to application of dry needling within established plan of care and expected outcomes; pt verbalized understanding with all questions answered. Pt had signed consent form for dry needling and PT obtained written consent with updated plan of care from MD before beginning. Dry needling manual therapy: consisted on the placement of 6 needles in proximal hip flexors and hip adductors. 60 mm needles were inserted to produce intramuscular mobilization. These techniques were used to restore functional range of motion, reduce muscle spasm and induce healing in the corresponding musculature. (02939)  Clean Technique was utilized today while applying Dry needling treatment. The treatment sites where cleaned with 70% solution of  isopropyl alcohol . The PT washed their hands and utilized treatment gloves along with hand  prior to inserting the needles. All needles where removed and discarded in the appropriate sharps container.         Performed by Pippa Barber PT, DPT   8 min  date 9/21/21        Charges:  Timed Code Treatment Minutes: 44'   Total Treatment Minutes: 4:35-5:19  44'       [] EVAL (LOW) 64583 (typically 20 minutes face-to-face)  [] EVAL (MOD) 86632 (typically 30 minutes face-to-face)  [] EVAL (HIGH) 12160 (typically 45 minutes face-to-face)  [] RE-EVAL     [x] EL(21545) x   1  [] IONTO  [x] NMR (59871) x 1     [] VASO  [] Manual (07753) x       [x] Other: Dn  [] TA x      [] Mech Traction (38472)  [] ES(attended) (97617)      [] ES (un) (76474):     GOALS: Adjusted on 7/29/21 d/t new prescription from MD following PRP injection  Patient stated goal:  Eliminate pain in order to return to exercise  [x]? Progressing: []? Met: []? Not Met: []? Adjusted     Therapist goals for Patient:   Short Term Goals: To be achieved in: 4 weeks  1. Independent in HEP and progression per patient tolerance, in order to prevent re-injury. [x]? Progressing: []? Met: []? Not Met: []? Adjusted   2. Patient will have a decrease in pain to facilitate improvement in movement, function, and ADLs as indicated by Functional Deficits. [x]? Progressing: []? Met: []? Not Met: []? Adjusted     Long Term Goals: To be achieved in: 8 weeks  1. Disability index score of 10% or less for the [facility] to assist with reaching prior level of function. [x]? Progressing: []? Met: []? Not Met: []? Adjusted  2. Patient will demonstrate increased L hip AROM that is equal to R to allow for proper joint functioning as indicated by patients Functional Deficits. [x]? Progressing: []? Met: []? Not Met: []? Adjusted  3. Patient will demonstrate an increase in L hip strength that is equal to R to allow for proper functional mobility as indicated by patients Functional Deficits. [x]? Progressing: []? Met: []? Not Met: []? Adjusted  4. Patient will return to all functional activities without increased symptoms or restriction. [x]? Progressing: []? Met: []? Not Met: []? Adjusted  5. Patient will have increase strength and ROM in order to  10 lbs from floor with proper sequencing and no c/o pain allowing her to better take care of her grand children  [x]? Progressing: []? Met: []? Not Met: []?  Adjusted      Overall Progression Towards Functional goals/ Treatment Progress Update:  [] Patient is progressing as expected towards functional goals listed   [x] Progression is slowed d/t flare up that occurred with increase activity level on 8/20/21 symptoms have started to improve but still has not returned to baseline. Based on PRP injection and continued functional and objective deficits she would benefit from continued PT. 8/26/21  [] Progression has been slowed due to co-morbidities. [] Plan just implemented, too soon to assess goals progression <30days   [] Goals require adjustment due to lack of progress  [] Patient is not progressing as expected and requires additional follow up with physician  [] Patient returns to physical therapy after PRP injections demonstrating continued strength and ROM deficits and is therefore appropriate for continuation of PT to further increase hip stability and mobility in order to improve overall function. 7/29/21    Prognosis for POC: [x] Good [] Fair  [] Poor      Patient requires continued skilled intervention: [x] Yes  [] No    Treatment/Activity Tolerance:  [x] Patient able to complete treatment  [] Patient limited by fatigue  [] Patient limited by pain     [] Patient limited by other medical complications  [] Other:   9/21 Increase tightness noted at iliopsoas and proximal hip flexors and adductors causing increase pain at hip joint. Tolerated STM and dry needling without increase in symptoms but medial hip joint pain still noted. Tolerated strengthening exercises well with no adverse effects. Patient Education:              8/26 Continued to stress activity modification in order to further decrease symptoms at thips. 8/24 Time spent reviewing activity modification based on strength deficits and importance of taking breaks to avoid increase symptoms at hip joint. 8/11 Reviewed activity modification especially with baby sitting. Updated HEP based on tolerance to treatment   8/2 Updated HEP based on symptoms   7/29 Updated HEP based on tolerance to treatment   6/22 Updated HEP with isometrics in order to maintain muscle activaiton and avoid increase in severity of symptoms.    6/16 Updated HEP   6/1 Updated Piriformis stretch onto HEP and reviewed proper posture in sitting.   5/27  Updated HEP based on tolerance to treatment   5/6 Pt educated on monitoring symptoms and informing PT or MD if sensation she was describing increases with severity or consistency. 5/4 Updated HEP with weight, SLB and SL bridges. 4/30 Updated HEP   4/22 Added adductor stretch to HEP and withheld exercises in weight bearing. 4/16 Updated HEP   4/14 Educated on precautions, use of ice and importance of HEP. Educated on activity modification when taking care of grandchildren and avoid excessive hip flexion, adduction and internal rotation. [employer]   Access Code: [place]      PLAN: See eval  [x] Continue per plan of care [] Alter current plan (see comments below)  [x] Plan of care initiated [] Hold pending MD visit [] Discharge  9/21 Complete progress note NPV  8/26 Continue with PT 1-2x a week for 4 more weeks to further decrease symptoms and increase strength and mobility. Electronically signed by:  Abiola Johnson PT    Note: If patient does not return for scheduled/ recommended follow up visits, this note will serve as a discharge from care along with most recent update on progress.

## 2021-09-23 ENCOUNTER — HOSPITAL ENCOUNTER (OUTPATIENT)
Dept: PHYSICAL THERAPY | Age: 56
Setting detail: THERAPIES SERIES
Discharge: HOME | End: 2021-09-23
Payer: COMMERCIAL

## 2021-09-23 PROCEDURE — 97110 THERAPEUTIC EXERCISES: CPT

## 2021-09-23 PROCEDURE — 97112 NEUROMUSCULAR REEDUCATION: CPT

## 2021-09-23 PROCEDURE — 20560 NDL INSJ W/O NJX 1 OR 2 MUSC: CPT

## 2021-09-23 NOTE — PROGRESS NOTES
[facility], [address] [place] [address], [address] [place] [address]  Phone: [phone]   Fax:     [phone]   Physical Therapy Re-Certification Plan of Care    Dear Carla Timmons. Jossy Fagan MD,    We had the pleasure of treating the following patient for physical therapy services at [address] [place] [address]. A summary of our findings can be found in the updated assessment below. This includes our plan of care. If you have any questions or concerns regarding these findings, please do not hesitate to contact me at the office phone number checked above. Thank you for the referral.     Physician Signature:________________________________Date:__________________  By signing above (or electronic signature), therapists plan is approved by physician      Overall Response to Treatment:   [x]Patient is responding well to treatment and improvement is noted with regards to increase in hip ROM and strength allowing for decrease in pain and increase in function. She continues to have hip joint pain as well as strength end ROM deficits contributing to continued functional limitations and is therefore appropriate for continued PT.    []Patient should continue to improve in reasonable time if they continue HEP   []Patient has plateaued and is no longer responding to skilled PT intervention    []Patient is getting worse and would benefit from return to referring MD   []Patient unable to adhere to initial POC   []Other:         Physical Therapy Progress/Daily Treatment Note  Date:  2021    Patient Name:  Jessenia Conway    :  1965  MRN: [id number]  Restrictions/Precautions:    Medical/Treatment Diagnosis Information:  · Diagnosis: J72.195A (ICD-10-CM) - Strain of left groin  · Treatment Diagnosis: M25.552 Left hip pain  Insurance/Certification information:  PT Insurance Information: [facility]  Physician Information:  Referring Practitioner: Carla Timmons.  Isabel Shane Nasra Hay MD  Has the plan of care been signed (Y/N):        [x]  Yes  []  No     Date of Patient follow up with Physician: PRN      Is this a Progress Report:     [x]  Yes  []  No        If Yes:  Date Range for reporting period:  Beginning 3/11/21  Ending 9/23/21    Progress report will be due (10 Rx or 30 days whichever is less): 59/89/91      Recertification will be due (POC Duration  / 90 days whichever is less): 10/23/21        Visit # Insurance Allowable Auth Required   33  9/23 [address] []  Yes []  No        Functional Scale:   WOMAC 49% deficit  Date assessed: 9/23/21  WOMAC 53% deficit  Date assessed: 8/26/21  WOMAC 44% deficit  Date assessed: 7/29/21  WOMAC 25% deficit  Date assessed: 6/10/21  WOMAC 23% deficit  Date assessed: 5/12/21  WOMAC 58% deficit  Date assessed: 4/14/21       Latex Allergy:  [x]NO      []YES  Preferred Language for Healthcare:   [x]English       []other:      Pain level:  3/10  9/23/21      SUBJECTIVE:  9/23 Pt states she felt better the day after her last visit. Woke up early this morning with pain that got a little better but had to babysit causing more soreness especially with driving to therapy. OBJECTIVE:   Updated below on 9/23/21  ROM PROM AROM Comments     Left Right Left Right     Flexion 117 pain 130 100 pain 110     Extension     10  12      Abduction 41 pain 48 38 45     ER 50 40 48 38     IR 47 60 45 55                      Flexibility Left Right Comments   Hamstrings 0 0     ITB (Obers test) WNL  WNL     Hip flexor(Mikhail test) WNL WNL                       Strength Left Right Comments   Hip flexors 3+ pain 4 SLR   Hip extension 4+ 5     Hip abduction 4+ 5     Hip adduction 4+ 4+     Hip ER 4+ mild pain  5     Hip IR 4+ 5     Quads 5 5     Hamstrings 5 5        Joint mobility:               []?Normal                       [x]? Hypo L hip into flexion               []?Hyper     Palpation: Tenderness and muscle tightness noted at L proximal hip flexors and iliopsoas    Functional Mobility/Transfers: Independent     Posture: WNL in standing      Bandages/Dressings/Incisions: N/A     Gait: Independent, WNL        RESTRICTIONS/PRECAUTIONS: HTN. 2 previous L hip labral repairs    Exercises/Interventions:     ROM/STRETCHES     Bike  7/29 completed on airdyne bike to prevent hip flexion past 90 deg    HF stretch 30\"hx3 L 6/24 completed at start of treatment   Adductor stretch 30\"hx3 L 6/24 completed at start of treatment   ^8/11 completed to 90 deg flex   SB LTR  8/2   Prone quad stretch 30\"hx3 L ^6/1 towel roll with stretch   ^8/26 withheld d/t ROM WNL and no excessive guarding   Prone bilat knee flexion 3x10 8/11 completed to actively stretch quad and hip flexors   Prone press up 30\"hx3  ^9/21 on forearms, completed for hip flexor stretch         PREs     Quad sets 8/2 supine   Prone glut sets 10\"hx10 bilat8/2   Prone BS with knee flex 10\"hx10 bilat7/29 8/2 d/t symptoms progressed to prone glut sets   6/22 Reintroduced based on continued joint line symptoms   7/29 hook lying with blue loops   Bridges 3x10 bilat  8/18 reintroduced with decrease in height and emphasis on proper PPT   Posterior pelvic tilt 10\"hx10 ^9/21 supine   TA overhead 7# 10\"hx10^9/15 supine   Hamstring curl 5# 3x10 L^9/23   Heel raises 3x10 bilat^9/2 standing on airex   Hip ext 4# 3x10 L^9/1   LAQ 5# 3x10 L ^9/23 with BS   Hip abd 5# 3x10 L ^9/23 completed in standing   Balance  Feet together eyes closed 30\"hx3 9/15 airex        Manual interventions     Joint mobilization     PROM     9/23     LAD             Therapeutic Exercise and NMR EXR  [x] (62283) Provided verbal/tactile cueing for activities related to strengthening, flexibility, endurance, ROM for improvements in LE, proximal hip, and core control with self care, mobility, lifting, ambulation.   [x] (06337) Provided verbal/tactile cueing for activities related to improving balance, coordination, kinesthetic sense, posture, motor skill, proprioception  to assist with LE, proximal hip, and core control in self care, mobility, lifting, ambulation and eccentric single leg control. NMR and Therapeutic Activities:    [] (41955 or 32593) Provided verbal/tactile cueing for activities related to improving balance, coordination, kinesthetic sense, posture, motor skill, proprioception and motor activation to allow for proper function of core, proximal hip and LE with self care and ADLs  [] (20654) Gait Re-education- Provided training and instruction to the patient for proper LE, core and proximal hip recruitment and positioning and eccentric body weight control with ambulation re-education including up and down stairs     Home Exercise Program:    [x] (76455) Reviewed/Progressed HEP activities related to strengthening, flexibility, endurance, ROM of core, proximal hip and LE for functional self-care, mobility, lifting and ambulation/stair navigation   [] (70188)Reviewed/Progressed HEP activities related to improving balance, coordination, kinesthetic sense, posture, motor skill, proprioception of core, proximal hip and LE for self care, mobility, lifting, and ambulation/stair navigation      Manual Treatments:  PROM / STM / Oscillations-Mobs:  G-I, II, III, IV (PA's, Inf., Post.)  [] (50992) Provided manual therapy to mobilize LE, proximal hip and/or LS spine soft tissue/joints for the purpose of modulating pain, promoting relaxation,  increasing ROM, reducing/eliminating soft tissue swelling/inflammation/restriction, improving soft tissue extensibility and allowing for proper ROM for normal function with self care, mobility, lifting and ambulation. Modalities:    PT reviewed precautions, contraindications, and indications with pt in addition to application of dry needling within established plan of care and expected outcomes; pt verbalized understanding with all questions answered.   Pt had signed consent form for dry needling and PT obtained written consent with updated plan of care from MD before beginning. Dry needling manual therapy: consisted on the placement of 6 needles in proximal hip flexors and hip adductors. 60 mm needles were inserted to produce intramuscular mobilization. These techniques were used to restore functional range of motion, reduce muscle spasm and induce healing in the corresponding musculature. (38066)  Clean Technique was utilized today while applying Dry needling treatment. The treatment sites where cleaned with 70% solution of  isopropyl alcohol . The PT washed their hands and utilized treatment gloves along with hand  prior to inserting the needles. All needles where removed and discarded in the appropriate sharps container. Performed by Lillian Navarro PT, DPT   8 min  date 9/21/21        Charges:  Timed Code Treatment Minutes: 43'   Total Treatment Minutes: 4:10-5:02  52'       [] EVAL (LOW) 86711 (typically 20 minutes face-to-face)  [] EVAL (MOD) 84953 (typically 30 minutes face-to-face)  [] EVAL (HIGH) 87397 (typically 45 minutes face-to-face)  [] RE-EVAL     [x] LW(91534) x   1  [] IONTO  [x] NMR (33506) x 1     [] VASO  [] Manual (14233) x       [x] Other: Dn  [] TA x      [] Mech Traction (63225)  [] ES(attended) (58980)      [] ES (un) (11744):     GOALS: Adjusted on 7/29/21 d/t new prescription from MD following PRP injection  Patient stated goal:  Eliminate pain in order to return to exercise  [x]? Progressing: []? Met: []? Not Met: []? Adjusted     Therapist goals for Patient:   Short Term Goals: To be achieved in: 4 weeks  1. Independent in HEP and progression per patient tolerance, in order to prevent re-injury. [x]? Progressing: []? Met: []? Not Met: []? Adjusted   2. Patient will have a decrease in pain to facilitate improvement in movement, function, and ADLs as indicated by Functional Deficits. [x]? Progressing: []? Met: []? Not Met: []? Adjusted     Long Term Goals:  To be achieved in: 8 weeks  1. Disability index score of 10% or less for the [facility] to assist with reaching prior level of function. [x]? Progressing: []? Met: []? Not Met: []? Adjusted  2. Patient will demonstrate increased L hip AROM that is equal to R to allow for proper joint functioning as indicated by patients Functional Deficits. [x]? Progressing: []? Met: []? Not Met: []? Adjusted  3. Patient will demonstrate an increase in L hip strength that is equal to R to allow for proper functional mobility as indicated by patients Functional Deficits. [x]? Progressing: []? Met: []? Not Met: []? Adjusted  4. Patient will return to all functional activities without increased symptoms or restriction. [x]? Progressing: []? Met: []? Not Met: []? Adjusted  5. Patient will have increase strength and ROM in order to  10 lbs from floor with proper sequencing and no c/o pain allowing her to better take care of her grand children  [x]? Progressing: []? Met: []? Not Met: []? Adjusted      Overall Progression Towards Functional goals/ Treatment Progress Update:  [x] Patient is progressing with increase in L hip ROM and strength allowing for decrease in severity of symptoms and increase in function. She continues to have mobility and strength deficits as well as hip joint pain. Based on PRP injection and continued limitations she is appropriate for continued PT to further progress hip stability and mobility while monitoring symptoms. 9/23/21  [] Progression is slowed d/t flare up that occurred with increase activity level on 8/20/21 symptoms have started to improve but still has not returned to baseline. Based on PRP injection and continued functional and objective deficits she would benefit from continued PT. 8/26/21  [] Progression has been slowed due to co-morbidities.   [] Plan just implemented, too soon to assess goals progression <30days   [] Goals require adjustment due to lack of progress  [] Patient is not progressing as expected and requires additional follow up with physician  [] Patient returns to physical therapy after PRP injections demonstrating continued strength and ROM deficits and is therefore appropriate for continuation of PT to further increase hip stability and mobility in order to improve overall function. 7/29/21    Prognosis for POC: [x] Good [] Fair  [] Poor      Patient requires continued skilled intervention: [x] Yes  [] No    Treatment/Activity Tolerance:  [x] Patient able to complete treatment  [] Patient limited by fatigue  [] Patient limited by pain     [] Patient limited by other medical complications  [] Other:   9/23 Overall decrease tenderness noted at hip adductors muscles but continued to have hip flexor tightness that responded well to dry needling. Responded well to increase in intensity of strengthening exercises with mild soreness but no adverse effects noted. Patient Education:              8/26 Continued to stress activity modification in order to further decrease symptoms at thips. 8/24 Time spent reviewing activity modification based on strength deficits and importance of taking breaks to avoid increase symptoms at hip joint. 8/11 Reviewed activity modification especially with baby sitting. Updated HEP based on tolerance to treatment   8/2 Updated HEP based on symptoms   7/29 Updated HEP based on tolerance to treatment   6/22 Updated HEP with isometrics in order to maintain muscle activaiton and avoid increase in severity of symptoms. 6/16 Updated HEP   6/1 Updated Piriformis stretch onto HEP and reviewed proper posture in sitting.   5/27  Updated HEP based on tolerance to treatment   5/6 Pt educated on monitoring symptoms and informing PT or MD if sensation she was describing increases with severity or consistency. 5/4 Updated HEP with weight, SLB and SL bridges. 4/30 Updated HEP   4/22 Added adductor stretch to HEP and withheld exercises in weight bearing.    4/16 Updated HEP 4/14 Educated on precautions, use of ice and importance of HEP. Educated on activity modification when taking care of grandchildren and avoid excessive hip flexion, adduction and internal rotation. [place]   Access Code: [place]      PLAN: See eval  [x] Continue per plan of care [] Alter current plan (see comments below)  [x] Plan of care initiated [] Hold pending MD visit [] Discharge  9/23 Continue with PT 1-2x a week for 4 weeks to monitor symptoms and further progress L hip mobility and stability as tolerated. Electronically signed by:  Fuentes Herrera, PT    Note: If patient does not return for scheduled/ recommended follow up visits, this note will serve as a discharge from care along with most recent update on progress.

## 2021-09-30 ENCOUNTER — HOSPITAL ENCOUNTER (OUTPATIENT)
Dept: PHYSICAL THERAPY | Age: 56
Setting detail: THERAPIES SERIES
Discharge: HOME | End: 2021-09-30
Payer: COMMERCIAL

## 2021-09-30 PROCEDURE — 20560 NDL INSJ W/O NJX 1 OR 2 MUSC: CPT

## 2021-09-30 PROCEDURE — 97110 THERAPEUTIC EXERCISES: CPT

## 2021-09-30 PROCEDURE — 97112 NEUROMUSCULAR REEDUCATION: CPT

## 2021-09-30 NOTE — FLOWSHEET NOTE
The [address] [facility] and [address] [facility], [address] [facility], [address] [facility]  Phone: [phone]   Fax:     [phone]      Physical Therapy Progress/Daily Treatment Note  Date:  2021    Patient Name:  Florentin Pate    :  1965  MRN: [id number]  Restrictions/Precautions:    Medical/Treatment Diagnosis Information:  · Diagnosis: D09.969O (ICD-10-CM) - Strain of left groin  · Treatment Diagnosis: M25.552 Left hip pain  Insurance/Certification information:  PT Insurance Information: [facility]  Physician Information:  Referring Practitioner: Christopher Hopkins. Hector Alexander MD  Has the plan of care been signed (Y/N):        [x]  Yes  []  No     Date of Patient follow up with Physician: PRN      Is this a Progress Report:     []  Yes  [x]  No        If Yes:  Date Range for reporting period:  Beginning 3/11/21  Ending 21    Progress report will be due (10 Rx or 30 days whichever is less):       Recertification will be due (POC Duration  / 90 days whichever is less): 10/23/21        Visit # Insurance Allowable Auth Required   34   [address] []  Yes []  No        Functional Scale:   WOMAC 49% deficit  Date assessed: 21  WOMAC 53% deficit  Date assessed: 21  WOMAC 44% deficit  Date assessed: 21  WOMAC 25% deficit  Date assessed: 6/10/21  WOMAC 23% deficit  Date assessed: 21  WOMAC 58% deficit  Date assessed: 21       Latex Allergy:  [x]NO      []YES  Preferred Language for Healthcare:   [x]English       []other:      Pain level:  3-4/10  21      SUBJECTIVE:   Pt states she is more uncomfortable today then she had been in the last week. States she flew back [place] yesterday and started experiencing discomfort in L hip along with muscle spasm. Was not as consistent with her exercises while in [place] and might be the reason for increase in symptoms when flying back.      OBJECTIVE:   Updated below on 21  ROM PROM AROM Comments     Left Right Left Right     Flexion 117 pain 130 100 pain 110     Extension     10  12      Abduction 41 pain 48 38 45     ER 50 40 48 38     IR 47 60 45 55                      Flexibility Left Right Comments   Hamstrings 0 0     ITB (Obers test) WNL  WNL     Hip flexor(Mikhail test) WNL WNL                       Strength Left Right Comments   Hip flexors 3+ pain 4 SLR   Hip extension 4+ 5     Hip abduction 4+ 5     Hip adduction 4+ 4+     Hip ER 4+ mild pain  5     Hip IR 4+ 5     Quads 5 5     Hamstrings 5 5        Joint mobility:               []?Normal                       [x]? Hypo L hip into flexion               []?Hyper     Palpation: Tenderness and muscle tightness noted at L proximal hip flexors and iliopsoas      Functional Mobility/Transfers: Independent     Posture: WNL in standing      Bandages/Dressings/Incisions: N/A     Gait: Independent, WNL        RESTRICTIONS/PRECAUTIONS: HTN.  2 previous L hip labral repairs    Exercises/Interventions:     ROM/STRETCHES     Bike  7/29 completed on airdyne bike to prevent hip flexion past 90 deg    HF stretch 30\"hx3 L 6/24 completed at start of treatment   Adductor stretch 30\"hx3 L 6/24 completed at start of treatment   ^8/11 completed to 90 deg flex   SB LTR  8/2   Prone quad stretch 30\"hx3 L ^6/1 towel roll with stretch   ^8/26 withheld d/t ROM WNL and no excessive guarding   Prone bilat knee flexion 3x10 8/11 completed to actively stretch quad and hip flexors   Prone press up 30\"hx3  ^9/21 on forearms, completed for hip flexor stretch         PREs     Quad sets 8/2 supine   Prone glut sets 10\"hx10 bilat8/2   Prone BS with knee flex 10\"hx10 bilat7/29 8/2 d/t symptoms progressed to prone glut sets   6/22 Reintroduced based on continued joint line symptoms   7/29 hook lying with blue loops   Bridges 3x10 bilat  8/18 reintroduced with decrease in height and emphasis on proper PPT   Posterior pelvic tilt 10\"hx10 ^9/21 supine   TA overhead 7# 10\"hx10^9/15 supine   Hamstring curl 6# 3x10 L^9/30   Heel raises 3x10 bilat^9/2 standing on airex   Hip ext 6# 3x10 L^9/20   LAQ 6# 3x10 L ^9/30 with BS   Hip abd 6# 3x10 L ^9/30 completed in standing   Balance  Feet together eyes closed 30\"hx3 9/15 airex   Biodex Random Control  x2' @ L10 9/30    Reverse Lunge with slider 2x10 L 9/30        Manual interventions     Joint mobilization     PROM     9/23     LAD             Therapeutic Exercise and NMR EXR  [x] (24503) Provided verbal/tactile cueing for activities related to strengthening, flexibility, endurance, ROM for improvements in LE, proximal hip, and core control with self care, mobility, lifting, ambulation. [x] (40559) Provided verbal/tactile cueing for activities related to improving balance, coordination, kinesthetic sense, posture, motor skill, proprioception  to assist with LE, proximal hip, and core control in self care, mobility, lifting, ambulation and eccentric single leg control.      NMR and Therapeutic Activities:    [] (45439 or 96104) Provided verbal/tactile cueing for activities related to improving balance, coordination, kinesthetic sense, posture, motor skill, proprioception and motor activation to allow for proper function of core, proximal hip and LE with self care and ADLs  [] (54797) Gait Re-education- Provided training and instruction to the patient for proper LE, core and proximal hip recruitment and positioning and eccentric body weight control with ambulation re-education including up and down stairs     Home Exercise Program:    [x] (04721) Reviewed/Progressed HEP activities related to strengthening, flexibility, endurance, ROM of core, proximal hip and LE for functional self-care, mobility, lifting and ambulation/stair navigation   [] (32768)Reviewed/Progressed HEP activities related to improving balance, coordination, kinesthetic sense, posture, motor skill, proprioception of core, proximal hip and LE for self care, mobility, lifting, and ambulation/stair navigation      Manual Treatments:  PROM / STM / Oscillations-Mobs:  G-I, II, III, IV (PA's, Inf., Post.)  [] (14460) Provided manual therapy to mobilize LE, proximal hip and/or LS spine soft tissue/joints for the purpose of modulating pain, promoting relaxation,  increasing ROM, reducing/eliminating soft tissue swelling/inflammation/restriction, improving soft tissue extensibility and allowing for proper ROM for normal function with self care, mobility, lifting and ambulation. Modalities:    PT reviewed precautions, contraindications, and indications with pt in addition to application of dry needling within established plan of care and expected outcomes; pt verbalized understanding with all questions answered. Pt had signed consent form for dry needling and PT obtained written consent with updated plan of care from MD before beginning. Dry needling manual therapy: consisted on the placement of 4 needles in proximal hip flexors and hip adductors. 60 mm needles were inserted to produce intramuscular mobilization. These techniques were used to restore functional range of motion, reduce muscle spasm and induce healing in the corresponding musculature. (06104)  Clean Technique was utilized today while applying Dry needling treatment. The treatment sites where cleaned with 70% solution of  isopropyl alcohol . The PT washed their hands and utilized treatment gloves along with hand  prior to inserting the needles. All needles where removed and discarded in the appropriate sharps container.         Performed by Ricky Chavira PT, DPT   8 min  date 9/30/21        Charges:  Timed Code Treatment Minutes: 40'   Total Treatment Minutes: 7:52-8:41  52'       [] EVAL (LOW) 09979 (typically 20 minutes face-to-face)  [] EVAL (MOD) 56312 (typically 30 minutes face-to-face)  [] EVAL (HIGH) 70544 (typically 45 minutes face-to-face)  [] RE-EVAL     [x] VF(70989) x   1  [] IONTO  [x] NMR (76054) x 1     [] VASO  [] Manual (20791) x       [x] Other: Dn  [] TA x      [] [facility] Traction (11032)  [] ES(attended) (05733)      [] ES (un) (83048):     GOALS: Adjusted on 7/29/21 d/t new prescription from MD following PRP injection  Patient stated goal:  Eliminate pain in order to return to exercise  [x]? Progressing: []? Met: []? Not Met: []? Adjusted     Therapist goals for Patient:   Short Term Goals: To be achieved in: 4 weeks  1. Independent in HEP and progression per patient tolerance, in order to prevent re-injury. [x]? Progressing: []? Met: []? Not Met: []? Adjusted   2. Patient will have a decrease in pain to facilitate improvement in movement, function, and ADLs as indicated by Functional Deficits. [x]? Progressing: []? Met: []? Not Met: []? Adjusted     Long Term Goals: To be achieved in: 8 weeks  1. Disability index score of 10% or less for the [facility] to assist with reaching prior level of function. [x]? Progressing: []? Met: []? Not Met: []? Adjusted  2. Patient will demonstrate increased L hip AROM that is equal to R to allow for proper joint functioning as indicated by patients Functional Deficits. [x]? Progressing: []? Met: []? Not Met: []? Adjusted  3. Patient will demonstrate an increase in L hip strength that is equal to R to allow for proper functional mobility as indicated by patients Functional Deficits. [x]? Progressing: []? Met: []? Not Met: []? Adjusted  4. Patient will return to all functional activities without increased symptoms or restriction. [x]? Progressing: []? Met: []? Not Met: []? Adjusted  5. Patient will have increase strength and ROM in order to  10 lbs from floor with proper sequencing and no c/o pain allowing her to better take care of her grand children  [x]? Progressing: []? Met: []? Not Met: []?  Adjusted      Overall Progression Towards Functional goals/ Treatment Progress Update:  [x] Patient is progressing with increase in L hip ROM and strength allowing for decrease in severity of symptoms and increase in function. She continues to have mobility and strength deficits as well as hip joint pain. Based on PRP injection and continued limitations she is appropriate for continued PT to further progress hip stability and mobility while monitoring symptoms. 9/23/21  [] Progression is slowed d/t flare up that occurred with increase activity level on 8/20/21 symptoms have started to improve but still has not returned to baseline. Based on PRP injection and continued functional and objective deficits she would benefit from continued PT. 8/26/21  [] Progression has been slowed due to co-morbidities. [] Plan just implemented, too soon to assess goals progression <30days   [] Goals require adjustment due to lack of progress  [] Patient is not progressing as expected and requires additional follow up with physician  [] Patient returns to physical therapy after PRP injections demonstrating continued strength and ROM deficits and is therefore appropriate for continuation of PT to further increase hip stability and mobility in order to improve overall function. 7/29/21    Prognosis for POC: [x] Good [] Fair  [] Poor      Patient requires continued skilled intervention: [x] Yes  [] No    Treatment/Activity Tolerance:  [x] Patient able to complete treatment  [] Patient limited by fatigue  [] Patient limited by pain     [] Patient limited by other medical complications  [] Other:   9/30 Pt only presented with mild muscle tightness at L hip adductors and flexors and reports symptoms from yesterday appear to be originating from hip and being in seated position for long period of time. Tolerated increase in intensity of strengthening as well as balance and reverse lunges with appropriate fatigue but no adverse effects. Patient Education:              8/26 Continued to stress activity modification in order to further decrease symptoms at thips.    8/24 Time spent reviewing activity modification based on strength deficits and importance of taking breaks to avoid increase symptoms at hip joint. 8/11 Reviewed activity modification especially with baby sitting. Updated HEP based on tolerance to treatment   8/2 Updated HEP based on symptoms   7/29 Updated HEP based on tolerance to treatment   6/22 Updated HEP with isometrics in order to maintain muscle activaiton and avoid increase in severity of symptoms. 6/16 Updated HEP   6/1 Updated Piriformis stretch onto HEP and reviewed proper posture in sitting.   5/27  Updated HEP based on tolerance to treatment   5/6 Pt educated on monitoring symptoms and informing PT or MD if sensation she was describing increases with severity or consistency. 5/4 Updated HEP with weight, SLB and SL bridges. 4/30 Updated HEP   4/22 Added adductor stretch to HEP and withheld exercises in weight bearing. 4/16 Updated HEP   4/14 Educated on precautions, use of ice and importance of HEP. Educated on activity modification when taking care of grandchildren and avoid excessive hip flexion, adduction and internal rotation. Oncopeptides   Access Code: [place]      PLAN: See eval  [x] Continue per plan of care [] Alter current plan (see comments below)  [x] Plan of care initiated [] Hold pending MD visit [] Discharge  9/30 Monitor symptoms and progress with strengthening in weight bearing as well as balance as tolerated. 9/23 Continue with PT 1-2x a week for 4 weeks to monitor symptoms and further progress L hip mobility and stability as tolerated. Electronically signed by:  Fanny Ogden PT    Note: If patient does not return for scheduled/ recommended follow up visits, this note will serve as a discharge from care along with most recent update on progress.

## 2021-10-04 ENCOUNTER — OFFICE VISIT (OUTPATIENT)
Dept: ORTHOPEDIC SURGERY | Age: 56
End: 2021-10-04
Payer: COMMERCIAL

## 2021-10-04 VITALS — BODY MASS INDEX: 31.71 KG/M2 | HEIGHT: 63 IN | WEIGHT: 179 LBS

## 2021-10-04 DIAGNOSIS — M25.852 FEMOROACETABULAR IMPINGEMENT OF LEFT HIP: ICD-10-CM

## 2021-10-04 DIAGNOSIS — M25.552 LEFT HIP PAIN: Primary | ICD-10-CM

## 2021-10-04 PROCEDURE — 1036F TOBACCO NON-USER: CPT | Performed by: ORTHOPAEDIC SURGERY

## 2021-10-04 PROCEDURE — G8417 CALC BMI ABV UP PARAM F/U: HCPCS | Performed by: ORTHOPAEDIC SURGERY

## 2021-10-04 PROCEDURE — G8427 DOCREV CUR MEDS BY ELIG CLIN: HCPCS | Performed by: ORTHOPAEDIC SURGERY

## 2021-10-04 PROCEDURE — 3017F COLORECTAL CA SCREEN DOC REV: CPT | Performed by: ORTHOPAEDIC SURGERY

## 2021-10-04 PROCEDURE — G8484 FLU IMMUNIZE NO ADMIN: HCPCS | Performed by: ORTHOPAEDIC SURGERY

## 2021-10-04 PROCEDURE — 99215 OFFICE O/P EST HI 40 MIN: CPT | Performed by: ORTHOPAEDIC SURGERY

## 2021-10-04 NOTE — LETTER
[address] [address]  Phone: [phone]  Fax: [phone]           Stephany Gastelum MD      October 5, 2021     Patient: Ania Thapa   MR Number: [id number]   YOB: 1965   Date of Visit: 10/4/2021       Dear Mary Junior,     Thank you for referring Ania Thapa to me for evaluation/treatment. Below are the relevant portions of my assessment and plan of care. If you have questions, please do not hesitate to call me. I look forward to following Suzy along with you.     Sincerely,        Stephany Gastelum MD    CC providers:  Aydee Hyatt PT  [facility]

## 2021-10-04 NOTE — LETTER
[address] [place] [address]  Phone: [phone]  Fax: [phone]    Consuela Dakin, MD    October 5, 2021     [address] [facility],[address] [place] [id number]    Patient: Chaitanya Baker   MR Number: [id number]   YOB: 1965   Date of Visit: 10/4/2021       Dear Rita Jovel:    Thank you for referring Chaitanya Baker to me for evaluation/treatment. Below are the relevant portions of my assessment and plan of care. If you have questions, please do not hesitate to call me. I look forward to following Suzy along with you.     Sincerely,      Consuela Dakin, MD

## 2021-10-05 ENCOUNTER — HOSPITAL ENCOUNTER (OUTPATIENT)
Dept: PHYSICAL THERAPY | Age: 56
Setting detail: THERAPIES SERIES
Discharge: HOME | End: 2021-10-05
Payer: COMMERCIAL

## 2021-10-05 PROCEDURE — 97110 THERAPEUTIC EXERCISES: CPT

## 2021-10-05 PROCEDURE — 97112 NEUROMUSCULAR REEDUCATION: CPT

## 2021-10-05 NOTE — FLOWSHEET NOTE
The [facility] and [facility], [facility] [address] [facility], [address] [place] [address]  Phone: [phone]   Fax:     [phone]      Physical Therapy Progress/Daily Treatment Note  Date:  10/5/2021    Patient Name:  Roshan Hennessy    :  1965  MRN: [id number]  Restrictions/Precautions:    Medical/Treatment Diagnosis Information:  · Diagnosis: G54.837R (ICD-10-CM) - Strain of left groin  · Treatment Diagnosis: M25.552 Left hip pain  Insurance/Certification information:  PT Insurance Information: [facility]  Physician Information:  Referring Practitioner: Zoraida Ospina. Kenan Zamorano MD  Has the plan of care been signed (Y/N):        [x]  Yes  []  No     Date of Patient follow up with Physician: PRN      Is this a Progress Report:     []  Yes  [x]  No        If Yes:  Date Range for reporting period:  Beginning 3/11/21  Ending 21    Progress report will be due (10 Rx or 30 days whichever is less):       Recertification will be due (POC Duration  / 90 days whichever is less): 10/23/21        Visit # Insurance Allowable Auth Required   35  10/5 [facility]  UNL []  Yes []  No        Functional Scale:   WOMAC 49% deficit  Date assessed: 21  WOMAC 53% deficit  Date assessed: 21  WOMAC 44% deficit  Date assessed: 21  WOMAC 25% deficit  Date assessed: 6/10/21  WOMAC 23% deficit  Date assessed: 21  WOMAC 58% deficit  Date assessed: 21       Latex Allergy:  [x]NO      []YES  Preferred Language for Healthcare:   [x]English       []other:      Pain level:  5/10  10/5/21      SUBJECTIVE:  10/5: Pt saw Dr. Israel Eddy yesterday and feels like she had increase pain from his examination yesterday. Pt is awaiting to get most recent MRI and will f/u with MD in 2 weeks to discuss potential surgical interventions.      OBJECTIVE:   Updated below on 21  ROM PROM AROM Comments     Left Right Left Right     Flexion 117 pain 130 100 pain 110     Extension     10  12    Abduction 41 pain 48 38 45     ER 50 40 48 38     IR 47 60 45 55                      Flexibility Left Right Comments   Hamstrings 0 0     ITB (Obers test) WNL  WNL     Hip flexor(Mikhail test) WNL WNL                       Strength Left Right Comments   Hip flexors 3+ pain 4 SLR   Hip extension 4+ 5     Hip abduction 4+ 5     Hip adduction 4+ 4+     Hip ER 4+ mild pain  5     Hip IR 4+ 5     Quads 5 5     Hamstrings 5 5        Joint mobility:               []?Normal                       [x]? Hypo L hip into flexion               []?Hyper     Palpation: Tenderness and muscle tightness noted at L proximal hip flexors and iliopsoas      Functional Mobility/Transfers: Independent     Posture: WNL in standing      Bandages/Dressings/Incisions: N/A     Gait: Independent, WNL        RESTRICTIONS/PRECAUTIONS: HTN.  2 previous L hip labral repairs    Exercises/Interventions:     ROM/STRETCHES     Bike  7/29 completed on airdyne bike to prevent hip flexion past 90 deg    HF stretch 30\"hx3 L 6/24 completed at start of treatment   Adductor stretch 30\"hx3 L 6/24 completed at start of treatment   ^8/11 completed to 90 deg flex   SB LTR  8/2   Prone quad stretch 30\"hx3 L ^6/1 towel roll with stretch   ^8/26 withheld d/t ROM WNL and no excessive guarding   Prone bilat knee flexion 3x10 8/11 completed to actively stretch quad and hip flexors   Prone press up 30\"hx3  ^9/21 on forearms, completed for hip flexor stretch         PREs     Quad sets 8/2 supine   Prone glut sets 10\"hx10 bilat8/2   Prone BS with knee flex 10\"hx10 bilat7/29 8/2 d/t symptoms progressed to prone glut sets   6/22 Reintroduced based on continued joint line symptoms   7/29 hook lying with blue loops   Bridges 3x10 bilat  8/18 reintroduced with decrease in height and emphasis on proper PPT   Posterior pelvic tilt 10\"hx10 ^9/21 supine   TA overhead 7# 10\"hx10^9/15 supine   Hamstring curl 7.5# 3x10 L^10/6   Heel raises 3x10 bilat^9/2 standing on airex   Hip ext 7.5# 3x10 L^10/5   LAQ 7.5# 3x10 L ^10/5with BS   Hip abd 7.5# 3x10 L ^10/5 completed in standing   Balance  Feet together eyes closed 30\"hx3 9/15 airex   9/30 9/30        Manual interventions     Joint mobilization     PROM     9/23     LAD             Therapeutic Exercise and NMR EXR  [x] (83016) Provided verbal/tactile cueing for activities related to strengthening, flexibility, endurance, ROM for improvements in LE, proximal hip, and core control with self care, mobility, lifting, ambulation. [x] (42322) Provided verbal/tactile cueing for activities related to improving balance, coordination, kinesthetic sense, posture, motor skill, proprioception  to assist with LE, proximal hip, and core control in self care, mobility, lifting, ambulation and eccentric single leg control.      NMR and Therapeutic Activities:    [] (12947 or 23126) Provided verbal/tactile cueing for activities related to improving balance, coordination, kinesthetic sense, posture, motor skill, proprioception and motor activation to allow for proper function of core, proximal hip and LE with self care and ADLs  [] (23042) Gait Re-education- Provided training and instruction to the patient for proper LE, core and proximal hip recruitment and positioning and eccentric body weight control with ambulation re-education including up and down stairs     Home Exercise Program:    [x] (43025) Reviewed/Progressed HEP activities related to strengthening, flexibility, endurance, ROM of core, proximal hip and LE for functional self-care, mobility, lifting and ambulation/stair navigation   [] (57105)Reviewed/Progressed HEP activities related to improving balance, coordination, kinesthetic sense, posture, motor skill, proprioception of core, proximal hip and LE for self care, mobility, lifting, and ambulation/stair navigation      Manual Treatments:  PROM / STM / Oscillations-Mobs:  G-I, II, III, IV (PA's, Inf., Post.)  [] (08796) Provided manual therapy to mobilize LE, proximal hip and/or LS spine soft tissue/joints for the purpose of modulating pain, promoting relaxation,  increasing ROM, reducing/eliminating soft tissue swelling/inflammation/restriction, improving soft tissue extensibility and allowing for proper ROM for normal function with self care, mobility, lifting and ambulation. Modalities:            Charges:  Timed Code Treatment Minutes: 45'   Total Treatment Minutes: 11:47-12:35  52'       [] EVAL (LOW) 455 1011 (typically 20 minutes face-to-face)  [] EVAL (MOD) 90049 (typically 30 minutes face-to-face)  [] EVAL (HIGH) 16193 (typically 45 minutes face-to-face)  [] RE-EVAL     [x] ANNALEE(52796) x   2  [] IONTO  [x] NMR (94499) x 1     [] VASO  [] Manual (93876) x       [] Other:  [] TA x      [] Mech Traction (05206)  [] ES(attended) (91212)      [] ES (un) (33684):     GOALS: Adjusted on 7/29/21 d/t new prescription from MD following PRP injection  Patient stated goal:  Eliminate pain in order to return to exercise  [x]? Progressing: []? Met: []? Not Met: []? Adjusted     Therapist goals for Patient:   Short Term Goals: To be achieved in: 4 weeks  1. Independent in HEP and progression per patient tolerance, in order to prevent re-injury. [x]? Progressing: []? Met: []? Not Met: []? Adjusted   2. Patient will have a decrease in pain to facilitate improvement in movement, function, and ADLs as indicated by Functional Deficits. [x]? Progressing: []? Met: []? Not Met: []? Adjusted     Long Term Goals: To be achieved in: 8 weeks  1. Disability index score of 10% or less for the [facility] to assist with reaching prior level of function. [x]? Progressing: []? Met: []? Not Met: []? Adjusted  2. Patient will demonstrate increased L hip AROM that is equal to R to allow for proper joint functioning as indicated by patients Functional Deficits. [x]? Progressing: []? Met: []? Not Met: []? Adjusted  3.  Patient will demonstrate an increase in L hip strength that is equal to R to allow for proper functional mobility as indicated by patients Functional Deficits. [x]? Progressing: []? Met: []? Not Met: []? Adjusted  4. Patient will return to all functional activities without increased symptoms or restriction. [x]? Progressing: []? Met: []? Not Met: []? Adjusted  5. Patient will have increase strength and ROM in order to  10 lbs from floor with proper sequencing and no c/o pain allowing her to better take care of her grand children  [x]? Progressing: []? Met: []? Not Met: []? Adjusted      Overall Progression Towards Functional goals/ Treatment Progress Update:  [x] Patient is progressing with increase in L hip ROM and strength allowing for decrease in severity of symptoms and increase in function. She continues to have mobility and strength deficits as well as hip joint pain. Based on PRP injection and continued limitations she is appropriate for continued PT to further progress hip stability and mobility while monitoring symptoms. 9/23/21  [] Progression is slowed d/t flare up that occurred with increase activity level on 8/20/21 symptoms have started to improve but still has not returned to baseline. Based on PRP injection and continued functional and objective deficits she would benefit from continued PT. 8/26/21  [] Progression has been slowed due to co-morbidities. [] Plan just implemented, too soon to assess goals progression <30days   [] Goals require adjustment due to lack of progress  [] Patient is not progressing as expected and requires additional follow up with physician  [] Patient returns to physical therapy after PRP injections demonstrating continued strength and ROM deficits and is therefore appropriate for continuation of PT to further increase hip stability and mobility in order to improve overall function.  7/29/21    Prognosis for POC: [x] Good [] Fair  [] Poor      Patient requires continued skilled intervention: [x] Yes  [] No    Treatment/Activity Tolerance:  [x] Patient able to complete treatment  [] Patient limited by fatigue  [] Patient limited by pain     [] Patient limited by other medical complications  [] Other:   10/5 Withheld dry needling d/t no significant muscle tightness or guarding noted. Tolerated exercises well with increase in intensity of treatment with no adverse effects. Patient Education:              10/5 Time spent reviewing visit with Dr. Tremaine Keita and how rehab might look based on potential surgical intervention. Updated HEP and reviewed precautions. 8/26 Continued to stress activity modification in order to further decrease symptoms at thips. 8/24 Time spent reviewing activity modification based on strength deficits and importance of taking breaks to avoid increase symptoms at hip joint. 8/11 Reviewed activity modification especially with baby sitting. Updated HEP based on tolerance to treatment   8/2 Updated HEP based on symptoms   7/29 Updated HEP based on tolerance to treatment   6/22 Updated HEP with isometrics in order to maintain muscle activaiton and avoid increase in severity of symptoms. 6/16 Updated HEP   6/1 Updated Piriformis stretch onto HEP and reviewed proper posture in sitting.   5/27  Updated HEP based on tolerance to treatment   5/6 Pt educated on monitoring symptoms and informing PT or MD if sensation she was describing increases with severity or consistency. 5/4 Updated HEP with weight, SLB and SL bridges. 4/30 Updated HEP   4/22 Added adductor stretch to HEP and withheld exercises in weight bearing. 4/16 Updated HEP   4/14 Educated on precautions, use of ice and importance of HEP. Educated on activity modification when taking care of grandchildren and avoid excessive hip flexion, adduction and internal rotation.      Adaptly   Access Code: [place]      PLAN: See eval  [x] Continue per plan of care [] Alter current plan (see comments below)  [x] Plan of care initiated [x] Hold pending MD visit [] Discharge  10/5 Pt to continue HEP independently and will be put on hold from PT until she follows up with MD to determine potential surgical intervention. If pt has significant increase in symptoms while awaiting MD visit she will contact PT for potential f/u. If no f/u requested in 30 days she will be discharged. Electronically signed by:  Tami Gonzalez, PT    Note: If patient does not return for scheduled/ recommended follow up visits, this note will serve as a discharge from care along with most recent update on progress.

## 2021-10-06 ENCOUNTER — APPOINTMENT (OUTPATIENT)
Dept: PHYSICAL THERAPY | Age: 56
End: 2021-10-06
Payer: COMMERCIAL

## 2021-10-06 NOTE — PROGRESS NOTES
Date:  10/5/2021    Name:  Zari Reese  Address:  [address] [place] [address] [address] [id number]    :  1965      Age:   64 y.o.    SSN:  [id number]      Medical Record Number:  [id number]    Reason for Visit:    Chief Complaint    Hip Pain (NP LEFT HIP PAIN)      DOS:10/4/2021     HPI: Zari Reese is a 64 y.o. female here today for left leg pain for the past 2 years. It is anterior based hip pain in the groin worse with most activities of daily living but specifically sitting to standing and rolling over in bed. She otherwise  active with walking, hiking and biking with her . She has had an extensive surgical history of the left hip. She first underwent a left hip arthroscopy and labral debridement back in . Despite good relief she required revision surgery back in  for recurrent pain. This was done by Dr. Booker Daly. At that time she was told she had decompression surgery of cam and pincer and labral stabilization. Her hip did terrific however almost 2 years ago in  she felt a sudden twisting pain to her left hip out of nowhere. Since then she is continued to have left groin discomfort. This is now persisted despite extensive physical therapy, cortisone injection treatment, and most recently PRP injection in the month of 2021 given to her at the [facility]. In fact she saw Dr. Juan David Pryor for a an opinion regarding her hip but is also seeking care more locally here in [place]. She is now 10 weeks post PRP injection. She started to feel some relief in the past 2 to 3 weeks. She denies any bowel or bladder symptoms. No numbness or tingling. No low back pain.         Pain Assessment  Location of Pain:  (HIP)  Location Modifiers: Left  Quality of Pain: Dull  Frequency of Pain: Constant  Aggravating Factors: Stairs (SITTING TO STANDING, ROLLING OVER)  Limiting Behavior: Yes  Relieving Factors: Rest  Result of Injury: No  Work-Related Injury: No  Are there other pain locations you wish to document?: No  ROS: Review of systems reviewed from Patient History Form completed today and available in the patient's chart under the Media tab. Past Medical History:   Diagnosis Date    Arthritis     Hypercholesteremia     Medical history reviewed with no changes         Past Surgical History:   Procedure Laterality Date    BLADDER SUSPENSION      HIP SURGERY  5-3-13    LEFT HIP ARTHROSCOPE, LABRAL DEBRIDEMENT AND SYNOVECTOMY    HIP SURGERY Left 12/9/14    LEFT HIP INTRA- ARTICULAR INJECTION WITH C- ARM GUIDANCE    HYSTERECTOMY      LEG SURGERY  2007    right leg muscle release    PAIN MANAGEMENT PROCEDURE Right 5/19/2020    RIGHT LUMBAR ONE AND LUMBAR TWO TRANSFORAMINAL EPIDURAL STEROID INJECTION SITE CONFIRMED BY FLUOROSCOPY performed by Alonso Lorenzana MD at [place] [address]       Family History   Problem Relation Age of Onset    High Cholesterol Mother     High Blood Pressure Mother     High Cholesterol Father     Heart Disease Father     Alzheimer's Disease Sister        Social History     Socioeconomic History    Marital status:      Spouse name: None    Number of children: None    Years of education: None    Highest education level: None   Occupational History    None   Tobacco Use    Smoking status: Never Smoker    Smokeless tobacco: Never Used   Substance and Sexual Activity    Alcohol use: Yes     Comment: social    Drug use: No    Sexual activity: Yes     Partners: Male   Other Topics Concern    None   Social History Narrative    None     Social Determinants of Health     Financial Resource Strain:     Difficulty of Paying Living Expenses:    Food Insecurity:     Worried About Running Out of Food in the Last Year:     Ran Out of Food in the Last Year:    Transportation Needs:     Lack of Transportation (Medical):      Lack of Transportation (Non-Medical):    Physical Activity:     Days of Exercise per Week:     Minutes of Exercise per Session:    Stress:     Feeling of Stress :    Social Connections:     Frequency of Communication with Friends and Family:     Frequency of Social Gatherings with Friends and Family:     Attends Latter-day Services:     Active Member of Clubs or Organizations:     Attends Club or Organization Meetings:     Marital Status:    Intimate Partner Violence:     Fear of Current or Ex-Partner:     Emotionally Abused:     Physically Abused:     Sexually Abused:        Current Outpatient Medications   Medication Sig Dispense Refill    amLODIPine (NORVASC) 5 MG tablet Take 5 mg by mouth daily      cetirizine (ZYRTEC) 10 MG tablet Take 10 mg by mouth daily.  estrogens, conjugated, (PREMARIN) 1.25 MG tablet Take 0.9 mg by mouth daily.  pravastatin (PRAVACHOL) 40 MG tablet Take 40 mg by mouth daily.  Calcium Carbonate-Vitamin D (CALCIUM + D PO) Take 1 tablet by mouth daily.  GABAPENTIN PO Take by mouth      meloxicam (MOBIC) 15 MG tablet Take 1 tablet by mouth daily 90 tablet 3    diazepam (VALIUM) 5 MG tablet 1 PO TID FOR MUSCLE SPASMS. 30 tablet 0    Multiple Vitamin (THERA) TABS Take 1 tablet by mouth daily. No current facility-administered medications for this visit. Allergies   Allergen Reactions    Codeine Itching    Darvocet [Propoxyphene N-Acetaminophen] Itching    Percocet [Oxycodone-Acetaminophen] Itching    Keflex [Cephalexin] Rash    Zocor [Simvastatin] Rash       Vital signs:  Ht 5' 3\" (1.6 m)   Wt 179 lb (81.2 kg)   BMI 31.71 kg/m²        Constitutional: The physical examination finds the patient to be well-developed and well-nourished. The patient is alert and oriented x3 and was cooperative throughout the visit. Neuro: no focal deficits noted.  Normal mood, judgement, decision making  Eyes: sclera clear, EOMI  Ears: Normal external ear  Mouth:  No perioral lesions  Pulm: Respirations unlabored and regular  Pulse: Extremities well perfused, warm, capillary refill < 2 seconds  Musculoskeletal:    Hip Examination: LEFT    Skin/Inspection: no skin lesions, cellulitis, or extreme edema in the lower extremities. Previous scope portals are well-healed and nontender. Standing/Walking: mildly antalgic gait, no lower pelvic tilt, negative Trendelenburg sign. No use of assistive devices    Sitting Exam: 5/5 Hip Flexor Strength, 5/5 Abductor Strength, 5/5 Adductor strength, Negative Straight Leg Raise    Supine Exam: Non tender around the ASIS, AIIS, or iliopsoas tendon  Flexion arc 0 to 100deg, with terminal flexion pain  Internal Rotation 25deg   External Rotation 45deg  Special Tests: +Deep Flexion Test, +FADIR , mild pain with ARMAND, negative resisted sit-up (Athletic Pubalgia). Side Lying Exam: non tender at greater trochanter, non tender at abductor musculature, nor TFL origin. Abductor side leg raise 5/5 strength. -ve OberTest    Prone Exam: mild hip flexion contracture, internal rotation to 25 deg, external rotation to 45 deg. Non-tender at the ischial tuberosity nor proximal hamstring    Distal Neurovascular exam is intact (foot sensation, pulses, and motor exam)      Diagnostics:  Radiology:       Pertinent imaging reviewed, images only - no report available. Radiographs were obtained and reviewed in the office; 3 views: AP Pelvis and left hip 45-deg Richardson View, and left False Profile View    Tonnis ndGndrndanddndend:nd nd2nd LCEA: 35  Alpha Angle: 40deg  Cross over-sign is negative  Other: +Coxa Profunda, Negative Protrusio    Impression: mild joint space narrowing and sclerosis. No residual CAM deformity. No acute findings. Assessment: Patient is a 64 y.o. female recurrent left anterior hip and groin pain since a twisting incident in 2019 and previously twice operated hip, through hip arthroscopy. Impression:      Office Procedures:  No orders of the defined types were placed in this encounter.     Orders Placed This with the best treatment strategy moving forward. All the patient's questions were answered while in the clinic. The patient is understanding of all instructions and agrees with the plan. Approximately 60 minutes was spent on patient education and coordinating care. Follow up in: No follow-ups on file. Hip Self assessment forms      Sincerely,    Shruthi Caldera MD [address] [facility]   [address] [place], [address]  Email: [email or web address]  Office: [phone]    10/05/21  9:51 PM    This dictation was performed with a verbal recognition program (DRAGON) and it was checked for errors. It is possible that there are still dictated errors within this office note. If so, please bring any errors to my attention for an addendum. All efforts were made to ensure that this office note is accurate.

## 2021-10-13 ENCOUNTER — OFFICE VISIT (OUTPATIENT)
Dept: ORTHOPEDIC SURGERY | Age: 56
End: 2021-10-13
Payer: COMMERCIAL

## 2021-10-13 VITALS — HEIGHT: 63 IN | WEIGHT: 179 LBS | BODY MASS INDEX: 31.71 KG/M2

## 2021-10-13 DIAGNOSIS — M25.852 FEMOROACETABULAR IMPINGEMENT OF LEFT HIP: Primary | ICD-10-CM

## 2021-10-13 PROCEDURE — 3017F COLORECTAL CA SCREEN DOC REV: CPT | Performed by: ORTHOPAEDIC SURGERY

## 2021-10-13 PROCEDURE — G8484 FLU IMMUNIZE NO ADMIN: HCPCS | Performed by: ORTHOPAEDIC SURGERY

## 2021-10-13 PROCEDURE — 99214 OFFICE O/P EST MOD 30 MIN: CPT | Performed by: ORTHOPAEDIC SURGERY

## 2021-10-13 PROCEDURE — G8417 CALC BMI ABV UP PARAM F/U: HCPCS | Performed by: ORTHOPAEDIC SURGERY

## 2021-10-13 PROCEDURE — 1036F TOBACCO NON-USER: CPT | Performed by: ORTHOPAEDIC SURGERY

## 2021-10-13 PROCEDURE — G8427 DOCREV CUR MEDS BY ELIG CLIN: HCPCS | Performed by: ORTHOPAEDIC SURGERY

## 2021-10-13 NOTE — PROGRESS NOTES
Chief Complaint  Hip Pain (F/U LEFT HIP PAIN)      History of Present Illness:  Amari Canas is a pleasant 64 y.o. female who is here to rediscuss her left hip pain ongoing since 2019. To summarize she had hip arthroscopy in 2013, revision in 2015 for persistent impingement and labral repair. She had a repeat injury 2019. She brought her 2013 scope pictures and we reviewed her MRI. She is about 12 weeks post PRP injection given in [place]. She still has anterior groin pain worse with prolonged walking sitting and standing. Cannot bike or be active. Pain with turning her hip in n bed. She has done extensive physical therapy with Heike Bai at our [place] office to undo a lot of other pain generators in the soft tissue of the surrounding musculature. Including dry needling mobility and strengthening. Despite that her anterior groin pain has persisted and is now the residual discomfort. She looks after 4 grandchildren. She was accompanied by her  today. Medical History:  Patient's medications, allergies, past medical, surgical, social and family histories were reviewed and updated as appropriate. Pain Assessment  Location of Pain:  (HIP)  Location Modifiers: Left  Severity of Pain: 4  Quality of Pain: Aching, Sharp  Frequency of Pain: Constant  Aggravating Factors:  (SITTING TO STANDING. STANDING TO SITTING. LAYING DOWN. SITTING.)  Limiting Behavior: Yes  Result of Injury: No  Work-Related Injury: No  Are there other pain locations you wish to document?: No  ROS: Review of systems reviewed from Patient History Form completed today and available in the patient's chart under the Media tab. Pertinent items are noted in HPI  Review of systems reviewed from Patient History Form completed today and available in the patient's chart under the Media tab.        Vital Signs:  Ht 5' 3\" (1.6 m)   Wt 179 lb (81.2 kg)   BMI 31.71 kg/m²         Neuro: Alert & oriented x 3,  normal,  no focal 21.0 deg retroversion   Acetabular version (2:00) = 25.4 deg retroversion  Acetabular version (3:00) = 28.0 deg retroversion  Sagittal center edge agnle 50 = 56.1 deg  Alpha angle = 42 deg  Femoral version angle measures 19.0    MRI Left Hip 4/12/2021  (Outside Imaging, [facility]):  Images Reviewed along with report  1. Linear signal abnormality in the anterosuperior labrum concerning for underlying labral tear  2. Mild degenerative changes of the hips  3. Mild trochanteric bursitis bilaterally  Chondrolabral junction appears intact, specificlaly no chondral flap or delamination injury is identified. Assessment: Patient is a 64 y.o. female with persistent left anterior hip pain 6 years post left hip arthroscopy and ROCIO surgery. I believe this is still due to residual intra-articular labral deficiency, progressive acetabular cartilage deficiency, possible residual pincer impingement and mild over resection of a lateral cam.    Impression:  Visit Diagnoses       Codes    Femoroacetabular impingement of left hip    -  Primary M25.852          Office Procedures:  No orders of the defined types were placed in this encounter. No orders of the defined types were placed in this encounter. Plan:  I had a thorough discussion with Dejon Walker today. This is a difficult problem as this was a previously operated hip. I do believe her cartilage defect has likely advanced from 2015 surgery based on the operative reports and the natural history of cartilage defects. She still not at the point where she needs a hip replacement and she does meet the 4 criteria for hip arthroscopy. We offered her a left hip arthroscopy, synovectomy, Labral repair versus labral reconstruction, bio cartilage filling of a possible cartilage defect, and an endoscopic GT bursectomy. She may require revision pincer plasty but nothing on the femoral head side.   I urged her to take her time to consider her options and there is no rush to getting this fixed. We will see her back in 2 to 4 weeks to have a repeat discussion. Technical Note (postless +/- technology, tibialis posterior allograft to consider)     All the patient's questions were answered while in the clinic. The patient is understanding of all instructions and agrees with the plan. Approximately 45 minutes was spent on patient education and coordinating care. Follow up in: Return in about 2 weeks (around 10/27/2021). Sincerely,    Harry Branham MD [address] [facility]   [address] [place], [address]  Email: [email or web address]  Office: [phone]    10/13/21  12:49 PM      The encounter with Caesar Zamarripa was carried out by myself, Dr Andre Zhou, who personally examined the patient and reviewed the plan. This dictation was performed with a verbal recognition program (DRAGON) and it was checked for errors. It is possible that there are still dictated errors within this office note. If so, please bring any errors to my attention for an addendum. All efforts were made to ensure that this office note is accurate.

## 2021-10-13 NOTE — LETTER
SURGERY SCHEDULING SHEET         Account: [id number]  Patient: Carlos Orozco    : 1965  Address:  [place] [address]    Diagnosis:     ICD-10-CM    1. Femoroacetabular impingement of left hip  M25.852        Date of Surgery: 2021  Time of Surgery: 10:00  Facility: [facility]  Surgeon Name: Lyndon Hernández   Assisting: tbd  Procedure: LEFT REVISION HIP ARTHROSCOPY, SYNOVECTOMY AND LYSIS OF ADHESIONS, REVISION ACETABULOPLASTY, REVISION Labral repair versus Allograft labral reconstruction, Possible BioCartilage filling of a Cartilage Defect, and Endoscopic Greater Trochanteric bursectomy   CPT Code:  50308, 24648, 06164, 94304, 3840R  Insurance: [employer]  Length of Procedure: 120 Minutes  Special Equipment: Pawel Real and [place] Hip Distractor + [address] Pad + Post (as Backup)             Soina Pivot Hip Arthroscopy Equipment     Arthrex Biocartilage Kit and Farideh-operative Platelet Rich Plasma draw with FABPulous System  Labral Reconstruction: []Fascia Marie Allograft  []Graft prep stand [x]PosteriorTibialis  TANYA: []Cortical allograft ilium  Allograft: []Achilles tendon  []anterior tibialis  [x]Posterior tibialis  Position:  [x]Supine  []Lateral  []Beach-chair  []Prone    OR Bed:  []Regular  [][place]  []Nima  []Hip barajas  []Beach-chair  []Spyder  Radiology:  [x]Large C-arm (small headed with digital printing)  []Small C-arm  []Portable X-ray    Anesthesia:   [x] General [x] Block (PENG) [] MAC [] Local [] Spinal [] Choice  Endo Sedation:    [x] None [] Topical [] Other:     Patient Status:    [x] SDS (OP) [] AMA [] 23 HR OBS [] OIB  [] INPT (RM#)  PCP: Juan Cardenas     Blood Thinner: No  Pre op H&P Yes   Covid testing required: Yes   Covid Vaccinated: Yes     Allergies:    Allergies   Allergen Reactions    Codeine Itching    Darvocet [Propoxyphene N-Acetaminophen] Itching    Percocet [Oxycodone-Acetaminophen] Itching    Keflex [Cephalexin] Rash    Zocor [Simvastatin] Rash IMPLEMENT ATTACHED CLINICAL ORDERS FOR THIS PATIENT  MD SIGNATURE:   Nina Driscoll MD Today's Date: 10/19/21 Time 9:11 am  Phone:  [phone]  Fax: [phone]  [facility] 30: [phone] FX: [phone] [facility] FX: [phone]: [phone] FX: [phone]

## 2021-10-18 ENCOUNTER — TELEPHONE (OUTPATIENT)
Dept: ORTHOPEDIC SURGERY | Age: 56
End: 2021-10-18

## 2021-10-18 NOTE — TELEPHONE ENCOUNTER
Surgery and/or Procedure Scheduling     Contact Name: Leland Epps Request: PATIENT WOULD LIKE TO [address] - LEFT HIP  Patient Contact Number: [phone]

## 2021-10-19 DIAGNOSIS — M25.852 FEMOROACETABULAR IMPINGEMENT OF LEFT HIP: Primary | ICD-10-CM

## 2021-11-05 ENCOUNTER — TELEPHONE (OUTPATIENT)
Dept: ORTHOPEDIC SURGERY | Age: 56
End: 2021-11-05

## 2021-11-15 DIAGNOSIS — M25.852 FEMOROACETABULAR IMPINGEMENT OF LEFT HIP: Primary | ICD-10-CM

## 2021-11-15 PROCEDURE — L1686 HO POST-OP HIP ABDUCTION: HCPCS | Performed by: ORTHOPAEDIC SURGERY

## 2021-11-16 ENCOUNTER — HOSPITAL ENCOUNTER (OUTPATIENT)
Dept: PHYSICAL THERAPY | Age: 56
Setting detail: THERAPIES SERIES
Discharge: HOME | End: 2021-11-16
Payer: COMMERCIAL

## 2021-11-16 PROCEDURE — 97112 NEUROMUSCULAR REEDUCATION: CPT

## 2021-11-16 PROCEDURE — 97530 THERAPEUTIC ACTIVITIES: CPT

## 2021-11-16 NOTE — PROGRESS NOTES
[facility], [address] [place] [address], [address] [place] [address]  Phone: [phone]   Fax:     [phone]   Physical Therapy Re-Certification Plan of Care    Dear Dr. Harry Branham,    We had the pleasure of treating the following patient for physical therapy services at [address] [place] [address]. A summary of our findings can be found in the updated assessment below. This includes our plan of care. If you have any questions or concerns regarding these findings, please do not hesitate to contact me at the office phone number checked above. Thank you for the referral.     Physician Signature:________________________________Date:__________________  By signing above (or electronic signature), therapists plan is approved by physician      Overall Response to Treatment:   []Patient is responding well to treatment and improvement is noted with regards  to goals   []Patient should continue to improve in reasonable time if they continue HEP   []Patient has plateaued and is no longer responding to skilled PT intervention    []Patient is getting worse and would benefit from return to referring MD   []Patient unable to adhere to initial POC   [x]Other:  Patient is returning to PT per MD orders for pre operative visit d/t surgery scheduled on 21.        Physical Therapy Progress/Daily Treatment Note  Date:  2021    Patient Name:  Caesar Zamarripa    :  1965  MRN: [id number]  Restrictions/Precautions:    Medical/Treatment Diagnosis Information:  · Diagnosis: J01.326M (ICD-10-CM) - Strain of left groin  · Treatment Diagnosis: M25.552 Left hip pain  Insurance/Certification information:  PT Insurance Information: [facility]  Physician Information:  Referring Practitioner: Harry Branham   Has the plan of care been signed (Y/N):        [x]  Yes  []  No     Date of Patient follow up with Physician: PRN      Is this a Progress Report: [x]  Yes  []  No        If Yes:  Date Range for reporting period:  Beginning 3/11/21  Ending 11/16/21    Progress report will be due (10 Rx or 30 days whichever is less):       Recertification will be due (POC Duration  / 90 days whichever is less):         Visit # Insurance Allowable Auth Required   36  10/16 [address] []  Yes []  No        Functional Scale:   WOMAC 61% deficit  Date assessed: 11/16/21       Latex Allergy:  [x]NO      []YES  Preferred Language for Healthcare:   [x]English       []other:      Pain level:  5/10  11/16/21      SUBJECTIVE:  11/16 Pt has returned to physical therapy for pre operative visit and is scheduled to have surgery on 11/30/21. Pt was fitted with brace yesterday and scheduled to have bike delivered 11/16/21 to work on ROM following surgery. Pt has a history of ambulating with crutches and has a flight of stairs to get to bedroom/bathroom. OBJECTIVE:   Updated below on 11/16/21  ROM PROM AROM Comments     Left Right Left Right     Flexion 114 pain 130 105 pain  110     Extension     8 12      Abduction 45 pain 48 40 45     ER 45 40 42 38     IR 48 60 46 55                      Flexibility Left Right Comments   Hamstrings 0 0     ITB (Obers test) WNL  WNL     Hip flexor(Mikhail test) WNL WNL                    Strength Left Right Comments   Hip flexors 3+ pain 4 SLR   Hip extension 4 5     Hip abduction 5 5     Hip adduction 4 4+     Hip ER 4 mild pain  4+     Hip IR 4+ 5     Quads 5 5     Hamstrings 5 5           Palpation: Tenderness and muscle tightness noted at L proximal hip adductors       Functional Mobility/Transfers: Independent     Posture: WNL in standing      Bandages/Dressings/Incisions: N/A     Gait: Independent, WNL      RESTRICTIONS/PRECAUTIONS: HTN.  2 previous L hip labral repairs    Exercises/Interventions:     ROM/STRETCHES     Ankle pumps Educated to complete after surgical intervention 11/16                                                PREs     Quad sets Educated on completing after surgery in [id number]   Glut sets Educated on completing after surgery in [id number]                                           Neuromuscular re-education     Biodex weight distribution Completed lateral and forward weight shift @ 20-30% weight bearing with bilateral UE support x10' 11/16   Therapeutic activities     Gait training Educated on and ambulated around clinic with proper gait sequencing using bilateral axillary crutches and partial weight bearing  11/16   Stair training Performed ascending and descending 8\" step with bilateral axillary crutches 11/16   Functional movement Reviewed sit to stand from elevated table based on restrictions pt will have after surger 11/16       Therapeutic Exercise and NMR EXR  [] (81409) Provided verbal/tactile cueing for activities related to strengthening, flexibility, endurance, ROM for improvements in LE, proximal hip, and core control with self care, mobility, lifting, ambulation. [x] (88186) Provided verbal/tactile cueing for activities related to improving balance, coordination, kinesthetic sense, posture, motor skill, proprioception  to assist with LE, proximal hip, and core control in self care, mobility, lifting, ambulation and eccentric single leg control.      NMR and Therapeutic Activities:    [x] (98964 or 73273) Provided verbal/tactile cueing for activities related to improving balance, coordination, kinesthetic sense, posture, motor skill, proprioception and motor activation to allow for proper function of core, proximal hip and LE with self care and ADLs  [] (16950) Gait Re-education- Provided training and instruction to the patient for proper LE, core and proximal hip recruitment and positioning and eccentric body weight control with ambulation re-education including up and down stairs     Home Exercise Program:    [x] (27118) Reviewed/Progressed HEP activities related to strengthening, flexibility, endurance, ROM of core, proximal hip and LE for functional self-care, mobility, lifting and ambulation/stair navigation   [] (30367)Reviewed/Progressed HEP activities related to improving balance, coordination, kinesthetic sense, posture, motor skill, proprioception of core, proximal hip and LE for self care, mobility, lifting, and ambulation/stair navigation      Manual Treatments:  PROM / STM / Oscillations-Mobs:  G-I, II, III, IV (PA's, Inf., Post.)  [] (47887) Provided manual therapy to mobilize LE, proximal hip and/or LS spine soft tissue/joints for the purpose of modulating pain, promoting relaxation,  increasing ROM, reducing/eliminating soft tissue swelling/inflammation/restriction, improving soft tissue extensibility and allowing for proper ROM for normal function with self care, mobility, lifting and ambulation. Modalities:          Charges:  Timed Code Treatment Minutes: 32'   Total Treatment Minutes: 10:15-10:52  37'         [] EVAL (LOW) 455 1011 (typically 20 minutes face-to-face)  [] EVAL (MOD) 40509 (typically 30 minutes face-to-face)  [] EVAL (HIGH) 61131 (typically 45 minutes face-to-face)  [] RE-EVAL     [] CN(73011) x     [] IONTO  [x] NMR (59079) x 1     [] VASO  [] Manual (55720) x       [] Other:  [x] TA x  1    [] Mech Traction (31057)  [] ES(attended) (15807)      [] ES (un) (90683):     GOALS: Adjusted on 11/16/21 d/t surgical intervention scheduled 11/30/21  Patient stated goal:  Eliminate pain in order to return to exercise  [x]? Progressing: []? Met: []? Not Met: []? Adjusted     Therapist goals for Patient:   Short Term Goals: To be achieved in: 2 weeks  1. Independent in HEP and progression per patient tolerance, in order to prevent further injury prior to surgery . [x]? Progressing: []? Met: []? Not Met: []? Adjusted   2. Patient will have a decrease in pain to facilitate improvement in movement, function, and ADLs as indicated by Functional Deficits. [x]? Progressing: []? Met: []?  Not Met: []?

## 2021-11-22 NOTE — TELEPHONE ENCOUNTER
PARTIAL APPROVAL  CPT: 75917 Roberto: [id number]  INSURANCE: [facility]  NOTE: CPT 9016L DENIED  NOT A COVERED BENEFIT  FOR PEER TO PEER CALL [phone] AND USE REFERENCE [id number]  [facility] SCANNED

## 2021-11-23 RX ORDER — OXYCODONE HYDROCHLORIDE AND ACETAMINOPHEN 5; 325 MG/1; MG/1
1 TABLET ORAL EVERY 6 HOURS PRN
Qty: 20 TABLET | Refills: 0 | Status: CANCELLED | OUTPATIENT
Start: 2021-11-23 | End: 2021-11-28

## 2021-11-23 NOTE — PROGRESS NOTES
PRE-OP INSTRUCTIONS FOR THE SURGICAL PATIENT YOU ARE UNABLE TO MAKE CONTACT FOR AN INTERVIEW:    All patients having surgery or anesthesia are required to be Covid tested OR to have been vaccinated at least 14 days prior to your procedure. It is very important to return our call to [phone] and notify the staff of your last vaccination date otherwise you will be required to complete Covid PCR test within the 5-6 days prior to surgery & quarantine. The results will need to be faxed to PreAdmission Testing at [phone]. 1. Follow all instructions provided to you from your surgeons office, including your ARRIVAL TIME. 2. Enter the MAIN entrance located on [address] and report to the desk. 3. Bring your insurance & photo ID with you. You may also be asked to pay a co-pay, as you may want to bring a check or credit card with you. 4. Leave all other valuables at home. 5. Arrange for someone to drive you home and be with you for the first 24 hours after discharge. 6. Bring your medication list with you day of surgery with doses and frequency listed (including over the counter medications)  7. You must contact your surgeon for Instructions regarding:              - ALL medication instructions, especially if taking blood thinners, aspirin, or diabetic medication.         -Bariatric patients call surgeon if on diabetic medications as some need to be stopped 1 week preop  - IF  there is a change in your physical condition such as a cold, fever, rash, cuts, sores or any other infection, especially near your surgical site. 8. A Pre-op History and Physical for surgery MUST be completed by your Physician or an Urgent Care within 30 days of your procedure date. Please bring a copy with you on the day of your procedure and along with any other testing performed. 9. DO NOT EAT ANYTHING eight hours prior to arrival for surgery.   May have up to 8 ounces of water 4 hours prior to arrival for surgery. NOTE: ALL Gastric, Bariatric and Bowel surgery patients MUST follow their surgeon's instructions. 10. No gum, candy, mints, or ice chips day of procedure. 11. Please refrain from drinking alcohol the day before or day of your procedure. 12. Please do not smoke the day of your procedure. 13. Dress in loose, comfortable clothing appropriate for redressing after your procedure. Do not wear jewelry (including body piercings), make-up, fingernail polish, lotion, powders or metal hairclips. 15. Contacts will need to be removed prior to surgery. You may want to bring your eye glasses to wear immediately before and after surgery. 14. Dentures will need to be removed before your procedure. 13. Bring cases for your glasses, contacts, dentures, or hearing aids to protect them while you are in surgery. 16. If you use a CPAP, please bring it with you on the day of your procedure. 17. Do not shave or wax for 72 hours prior to procedure near your operative site  18. FOR WOMAN OF CHILDBEARING AGE ONLY- please make sure we can collect a urine sample on arrival.     If you have further questions, you may contact your surgeon's office or us at [phone]     Left instructions on patient's voicemail.     Jennie Shane RN.11/23/2021 .2:04 PM

## 2021-11-23 NOTE — PROGRESS NOTES
Place patient label inside box (if no patient label, complete below)  Name:  :  MR#:   Aubree Seay / PROCEDURE  1. I (we), Vero John (Patient Name) authorize Lanny Crane (Provider / Mayela Rothman) and/or such assistants as may be selected by him/her, to perform the following operation/procedure(s): LEFT REVISION HIP ARTHROSCOPY, SYNOVECTOMY AND LYSIS OF ADHESIONS, REVISION ACETABULOPLASTY, REVISION LABRAL REPAIR VERSUS ALLOGRAFT LABRAL RECONSTRUCTION, POSSIBLE BIOCARTILAGE FILLING OF A CARTILAGE DEFECT AND ENDOSCOPIC GREATER TROCHANTERIC BURSECTOMY        Note: If unable to obtain consent prior to an emergent procedure, document the emergent reason in the medical record. This procedure has been explained to my (our) satisfaction and included in the explanation was:  A) The intended benefit, nature, and extent of the procedure to be performed;  B) The significant risks involved and the probability of success;  C) Alternative procedures and methods of treatment;  D) The dangers and probable consequences of such alternatives (including no procedure or treatment); E) The expected consequences of the procedure on my future health;  F) Whether other qualified individuals would be performing important surgical tasks and/or whether  would be present to advise or support the procedure. I (we) understand that there are other risks of infection and other serious complications in the pre-operative/procedural and postoperative/procedural stages of my (our) care. I (we) have asked all of the questions which I (we) thought were important in deciding whether or not to undergo treatment or diagnosis. These questions have been answered to my (our) satisfaction. I (we) understand that no assurance can be given that the procedure will be a success, and no guarantee or warranty of success has been given to me (us).     2. It has been explained to me (us) that during the course of the operation/procedure, unforeseen conditions may be revealed that necessitate extension of the original procedure(s) or different procedure(s) than those set forth in Paragraph 1. I (we) authorize and request that the above-named physician, his/her assistants or his/her designees, perform procedures as necessary and desirable if deemed to be in my (our) best interest.     Revised 8/2/2021                                                                          Page 1 of 2           3. I acknowledge that health care personnel may be observing this procedure for the purpose of medical education or other specified purposes as may be necessary as requested and/or approved by my (our) physician. 4. I (we) consent to the disposal by the hospital Pathologist of the removed tissue, parts or organs in accordance with hospital policy. 5. I do ____ do not ____ consent to the use of a local infiltration pain blocking agent that will be used by my provider/surgical provider to help alleviate pain during my procedure. 6. I do ____ do not ____ consent to an emergent blood transfusion in the case of a life-threatening situation that requires blood components to be administered. This consent is valid for 24 hours from the beginning of the procedure. 7. This patient does ____ or does not ____ currently have a DNR status/order. If DNR order is in place, obtain Addendum to the Surgical Consent for ALL Patients with a DNR Order to address wilton-operative status for limited intervention or DNR suspension.      8. I have read and fully understand the above Consent for Operation/Procedure and that all blanks were completed before I signed the consent.   _____________________________       _____________________      ____/____am/pm  Signature of Patient or legal representative      Printed Name / Relationship            Date / Time   ____________________________       _____________________ ____/____am/pm  Witness to Signature                                    Printed Name                    Date / Time     If patient is unable to sign or is a minor, complete the following)  Patient is a minor, ____ years of age, or unable to sign because:   ______________________________________________________________________________________________    Schwartz If a phone consent is obtained, consent will be documented by using two health care professionals, each affirming that the consenting party has no questions and gives consent for the procedure discussed with the physician/provider.   _____________________          ____________________       _____/_____am/pm   2nd witness to phone consent        Printed name           Date / Time    Informed Consent:  I have provided the explanation described above in section 1 to the patient and/or legal representative.  I have provided the patient and/or legal representative with an opportunity to ask any questions about the proposed operation/procedure.   ___________________________          ____________________         ____/____am/pm  Provider / Proceduralist                            Printed name            Date / Time  Revised 8/2/2021                                                                      Page 2 of 2

## 2021-11-29 ENCOUNTER — ANESTHESIA EVENT (OUTPATIENT)
Dept: OPERATING ROOM | Age: 56
End: 2021-11-29
Payer: COMMERCIAL

## 2021-11-29 ENCOUNTER — OFFICE VISIT (OUTPATIENT)
Dept: ORTHOPEDIC SURGERY | Age: 56
End: 2021-11-29
Payer: COMMERCIAL

## 2021-11-29 VITALS — RESPIRATION RATE: 12 BRPM | WEIGHT: 179 LBS | HEIGHT: 63 IN | BODY MASS INDEX: 31.71 KG/M2

## 2021-11-29 DIAGNOSIS — M25.852 FEMOROACETABULAR IMPINGEMENT OF LEFT HIP: Primary | ICD-10-CM

## 2021-11-29 PROCEDURE — 3017F COLORECTAL CA SCREEN DOC REV: CPT | Performed by: ORTHOPAEDIC SURGERY

## 2021-11-29 PROCEDURE — G8427 DOCREV CUR MEDS BY ELIG CLIN: HCPCS | Performed by: ORTHOPAEDIC SURGERY

## 2021-11-29 PROCEDURE — G8417 CALC BMI ABV UP PARAM F/U: HCPCS | Performed by: ORTHOPAEDIC SURGERY

## 2021-11-29 PROCEDURE — 1036F TOBACCO NON-USER: CPT | Performed by: ORTHOPAEDIC SURGERY

## 2021-11-29 PROCEDURE — 99215 OFFICE O/P EST HI 40 MIN: CPT | Performed by: ORTHOPAEDIC SURGERY

## 2021-11-29 PROCEDURE — G8484 FLU IMMUNIZE NO ADMIN: HCPCS | Performed by: ORTHOPAEDIC SURGERY

## 2021-11-29 PROCEDURE — MISCCOLD COLD THERAPY UNIT AND PAD: Performed by: ORTHOPAEDIC SURGERY

## 2021-11-29 RX ORDER — NAPROXEN 500 MG/1
500 TABLET ORAL 2 TIMES DAILY WITH MEALS
Qty: 28 TABLET | Refills: 0 | Status: SHIPPED | OUTPATIENT
Start: 2021-11-29 | End: 2022-01-10

## 2021-11-29 RX ORDER — HYDROCODONE BITARTRATE AND ACETAMINOPHEN 5; 325 MG/1; MG/1
1 TABLET ORAL EVERY 6 HOURS PRN
Qty: 20 TABLET | Refills: 0 | Status: SHIPPED | OUTPATIENT
Start: 2021-11-29 | End: 2021-12-04

## 2021-11-29 RX ORDER — PANTOPRAZOLE SODIUM 40 MG/1
40 TABLET, DELAYED RELEASE ORAL
Qty: 14 TABLET | Refills: 1 | Status: SHIPPED | OUTPATIENT
Start: 2021-11-29 | End: 2022-01-10

## 2021-11-29 RX ORDER — TRAMADOL HYDROCHLORIDE 50 MG/1
50 TABLET ORAL EVERY 6 HOURS PRN
Qty: 20 TABLET | Refills: 0 | Status: SHIPPED | OUTPATIENT
Start: 2021-11-29 | End: 2021-12-04

## 2021-11-29 RX ORDER — SENNOSIDES 8.6 MG
1 TABLET ORAL 2 TIMES DAILY
Qty: 14 TABLET | Refills: 0 | Status: SHIPPED | OUTPATIENT
Start: 2021-11-29 | End: 2021-12-06

## 2021-11-29 RX ORDER — RIVAROXABAN 10 MG/1
10 TABLET, FILM COATED ORAL DAILY
Qty: 10 TABLET | Refills: 0 | Status: SHIPPED | OUTPATIENT
Start: 2021-11-29 | End: 2022-01-10

## 2021-11-29 NOTE — PROGRESS NOTES
Chief Complaint  Hip Pain (Pre-op left hip 11/30/21)      History of Present Illness:  Margarita Cabrera is a pleasant 64 y.o. female who is here for her pre-operative visit for upcoming revision left hip arthroscopy, possible labral reconstruction, possible biocartilage procedure. No other setbacks. She is having persistent anterior hip pain with all ADLs, and noted some pain with sneezing. She had prior right laparoscopic hernia repair with mesh. Denies any other valsalva related pain with bowel/bladder movements. To summarize she had hip arthroscopy in 2013, revision in 2015 for persistent impingement and labral repair. She had a repeat injury 2019. She brought her 2013 and 2015 scope pictures and we reviewed her MRI. She is about 16  weeks post PRP injection given in [facility]. She still has anterior groin pain worse with prolonged walking sitting and standing. Cannot bike or be active. Pain with turning her hip in n bed. She has done extensive physical therapy with Emmalene Denver at our [facility] office to undo a lot of other pain generators in the soft tissue of the surrounding musculature. Including dry needling mobility and strengthening. Despite that her anterior groin pain has persisted and is now the residual discomfort. She looks after 4 grandchildren. She was accompanied by her  today. Cheri Stevens used to work in . Medical History:  Patient's medications, allergies, past medical, surgical, social and family histories were reviewed and updated as appropriate. Pain Assessment  Location of Pain: Other (Comment) (HIP)  Location Modifiers: Left  Severity of Pain: 4  Quality of Pain: Sharp, Dull, Aching  Duration of Pain: Persistent  Frequency of Pain: Constant  Aggravating Factors:  Other (Comment) (Getting in and out of car; sneezing; sleeping)  Limiting Behavior: Yes  Relieving Factors: Rest  Result of Injury: No  Work-Related Injury: No  Are there other pain locations you wish to document?: No  ROS: Review of systems reviewed from Patient History Form completed today and available in the patient's chart under the Media tab. Pertinent items are noted in HPI  Review of systems reviewed from Patient History Form completed today and available in the patient's chart under the Media tab. Vital Signs:  Resp 12   Ht 5' 3\" (1.6 m)   Wt 179 lb (81.2 kg)   BMI 31.71 kg/m²         Neuro: Alert & oriented x 3,  normal,  no focal deficits noted. Normal affect. Eyes: sclera clear  Ears: Normal external ear  Mouth:  No perioral lesions  Pulm: Respirations unlabored and regular  Pulse: Extremities well perfused. 2+ peripheral pulses. Skin: Warm. No ulcerations. Constitutional: The physical examination finds the patient to be well-developed and well-nourished. The patient is alert and oriented x3 and was cooperative throughout the visit. Hip exam    Hip Examination: LEFT    Skin/Inspection: no skin lesions, cellulitis, or extreme edema in the lower extremities. Standing/Walking: Normal non-antalgic gait, no pelvic tilt, -veTrendelenburg sign. No use of assistive devices    Sitting Exam: 5/5 Hip Flexor Strength, 5/5 Abductor Strength, 5/5 Adductor strength, Negative Straight Leg Raise    Supine Exam: Non tender around the ASIS, AIIS  Flexion arc 0 to 100deg, with mild  pain  Internal Rotation 45deg   External Rotation 60deg  Special Tests: +Deep Flexion Test, +FADIR , -VE ARMAND, -VE resisted sit-up (Athletic Pubalgia). Side Lying Exam: moderate tender at greater trochanter, NON tender at abductor musculature, NON  tender along the TFL, NON  tender along short external rotators/piriformis. Abductor side leg raise 5/5 strength. -VE OberTest    Prone Exam: -VE hip flexion contracture, internal rotation to 45 deg, external rotation to 30 deg.  NON tender at the ischial tuberosity/proximal hamstring    Distal Neurovascular exam is intact (foot sensation, pulses, and motor exam)    Diagnostics:  Radiology:         Tonnis ndGndrndanddndend:nd nd2nd LCEA: 35  Alpha Angle: 40deg  Cross over-sign is negative  Other: +Coxa Profunda, Negative Protrusio    Impression: mild joint space narrowing and sclerosis. No residual CAM deformity. No acute findings. CT Left hip with 3D Rendering Jan 7, 2020 (Outside Imaging, [address] Radiology):  Reviewed Report only   CEA 35.8  tONNIS 6.6deg  Femoral neck shaft angle = 135.2 deg  Acetabular version (1:00) 21.0 deg retroversion   Acetabular version (2:00) = 25.4 deg retroversion  Acetabular version (3:00) = 28.0 deg retroversion  Sagittal center edge agnle 50 = 56.1 deg  Alpha angle = 42 deg  Femoral version angle measures 19.0    MRI Left Hip 4/12/2021  (Outside Imaging, [address] Radiology):  Images Reviewed along with report  1. Linear signal abnormality in the anterosuperior labrum concerning for underlying labral tear  2. Mild degenerative changes of the hips  3. Mild trochanteric bursitis bilaterally  Chondrolabral junction appears intact, specificlaly no chondral flap or delamination injury is identified. Assessment: Patient is a 64 y.o. female with persistent left anterior hip pain 2 years post revision left hip arthroscopy and ROCIO surgery at an outside institution. I believe this is still due to residual intra-articular labral deficiency, progressive acetabular cartilage deficiency, possible residual pincer impingement and mild over resection of a lateral cam.    Impression:  Visit Diagnoses       Codes    Femoroacetabular impingement of left hip    -  Primary M25.852          Office Procedures:  Orders Placed This Encounter   Medications    naproxen (NAPROSYN) 500 MG tablet     Sig: Take 1 tablet by mouth 2 times daily (with meals) for 14 days     Dispense:  28 tablet     Refill:  0    traMADol (ULTRAM) 50 MG tablet     Sig: Take 1 tablet by mouth every 6 hours as needed for Pain for up to 5 days.      Dispense:  20 tablet Refill:  0     Reduce doses taken as pain becomes manageable    senna (SENOKOT) 8.6 MG TABS tablet     Sig: Take 1 tablet by mouth 2 times daily for 7 days     Dispense:  14 tablet     Refill:  0    rivaroxaban (XARELTO) 10 MG TABS tablet     Sig: Take 1 tablet by mouth daily for 10 days     Dispense:  10 tablet     Refill:  0    HYDROcodone-acetaminophen (NORCO) 5-325 MG per tablet     Sig: Take 1 tablet by mouth every 6 hours as needed for Pain for up to 5 days. Intended supply: 5 days. Take lowest dose possible to manage pain     Dispense:  20 tablet     Refill:  0     Reduce doses taken as pain becomes manageable    pantoprazole (PROTONIX) 40 MG tablet     Sig: Take 1 tablet by mouth every morning (before breakfast)     Dispense:  14 tablet     Refill:  1     No orders of the defined types were placed in this encounter. Plan:  Following our previous thorough discussion and work-up with Suzy. This is a difficult problem as this was a previously operated hip. I do believe her cartilage defect has likely advanced from 2019 surgery based on the operative reports and the natural history of cartilage defects. She still not at the point where she needs a hip replacement and she does meet the 4 criteria for hip arthroscopy. We offered her a left hip arthroscopy, synovectomy, Labral repair versus labral reconstruction, bio cartilage filling of a possible cartilage defect, and an endoscopic GT bursectomy. She may require revision pincer plasty but nothing on the femoral head side, but a possible remplissage. Risks, benefits, advantages, disadvantages and potential complications as well as the anticipated postoperative course were discussed. We outlined the 80 - 90% success rate of the operation. The risks of infection, bleeding, nerve injury, perineal numbness, sexual dysfunction, hip stiffness, and the possibility of persistent pain and prolonged recovery.  We also discussed the involved rehabilitation timelines, no driving for 2 weeks, a need for a hip brace for up to 6 weeks, the general trajectory of improvement after hip surgery (pain relief, activities of daily living, return to sport), full hip loading and strengthening commencing at 3 months, and up to 6 - 7 months before full return to sport and unrestricted activities. The patient agreed to pursue this treatment option. All questions were addressed to their satisfaction. Technical Note (postless +/- technology, tibialis posterior allograft to consider augmentation vs complete reconstruction, segmental vs circumferential), biocartilage. No latex allergy  No adhesive allergy  + medication allergy   norco tramadol ok   Avoid keflex, bactrim, cipro  + hormonal treatment: premarine   Xarelto for DVTp  No personal history of diabetes, or blood pressure issues  No personal or family history of bleeding  No personal or family history of DVT/PE  No personal or family history of intolerance no NSAIDS or history of GI ulcers  No personal or family history of problems with Anaesthesia    We discussed the surgical process, postoperative recovery, the medications to be taken, and wound care instructions, left hip brace fitting, and crutch instructions and fitting. Prescriptions were printed and given to the patient today. All the patient's questions were answered while in the clinic. The patient is understanding of all instructions and agrees with the plan. Approximately 45 minutes was spent on patient education and coordinating care. Follow up in: No follow-ups on file. Sincerely,    Lyndon Hernández MD [address] [place] [address] Elisabeth Conde [place], [address]  Email: [email or web address]  Office: [phone]    11/29/21  2:08 PM      The encounter with Carlos Ernesto was carried out by myself, Dr Alise Kay, who personally examined the patient and reviewed the plan. This dictation was performed with a verbal recognition program (DRAGON) and it was checked for errors. It is possible that there are still dictated errors within this office note. If so, please bring any errors to my attention for an addendum. All efforts were made to ensure that this office note is accurate.

## 2021-11-30 ENCOUNTER — HOSPITAL ENCOUNTER (OUTPATIENT)
Age: 56
Setting detail: OUTPATIENT SURGERY
Discharge: HOME | End: 2021-11-30
Attending: ORTHOPAEDIC SURGERY | Admitting: ORTHOPAEDIC SURGERY
Payer: COMMERCIAL

## 2021-11-30 ENCOUNTER — APPOINTMENT (OUTPATIENT)
Dept: GENERAL RADIOLOGY | Age: 56
End: 2021-11-30
Attending: ORTHOPAEDIC SURGERY
Payer: COMMERCIAL

## 2021-11-30 ENCOUNTER — ANESTHESIA (OUTPATIENT)
Dept: OPERATING ROOM | Age: 56
End: 2021-11-30
Payer: COMMERCIAL

## 2021-11-30 VITALS
OXYGEN SATURATION: 98 % | SYSTOLIC BLOOD PRESSURE: 132 MMHG | BODY MASS INDEX: 31.71 KG/M2 | HEART RATE: 90 BPM | TEMPERATURE: 97.8 F | DIASTOLIC BLOOD PRESSURE: 82 MMHG | WEIGHT: 179 LBS | HEIGHT: 63 IN | RESPIRATION RATE: 16 BRPM

## 2021-11-30 VITALS
RESPIRATION RATE: 15 BRPM | OXYGEN SATURATION: 99 % | DIASTOLIC BLOOD PRESSURE: 50 MMHG | SYSTOLIC BLOOD PRESSURE: 97 MMHG | TEMPERATURE: 96.6 F

## 2021-11-30 PROCEDURE — 7100000001 HC PACU RECOVERY - ADDTL 15 MIN: Performed by: ORTHOPAEDIC SURGERY

## 2021-11-30 PROCEDURE — 6360000002 HC RX W HCPCS: Performed by: NURSE ANESTHETIST, CERTIFIED REGISTERED

## 2021-11-30 PROCEDURE — 6370000000 HC RX 637 (ALT 250 FOR IP): Performed by: ANESTHESIOLOGY

## 2021-11-30 PROCEDURE — 6360000002 HC RX W HCPCS: Performed by: ANESTHESIOLOGY

## 2021-11-30 PROCEDURE — 3700000000 HC ANESTHESIA ATTENDED CARE: Performed by: ORTHOPAEDIC SURGERY

## 2021-11-30 PROCEDURE — 2580000003 HC RX 258: Performed by: ORTHOPAEDIC SURGERY

## 2021-11-30 PROCEDURE — 3209999900 FLUORO FOR SURGICAL PROCEDURES

## 2021-11-30 PROCEDURE — 2500000003 HC RX 250 WO HCPCS: Performed by: ORTHOPAEDIC SURGERY

## 2021-11-30 PROCEDURE — 2580000003 HC RX 258: Performed by: ANESTHESIOLOGY

## 2021-11-30 PROCEDURE — 6360000002 HC RX W HCPCS: Performed by: ORTHOPAEDIC SURGERY

## 2021-11-30 PROCEDURE — C1713 ANCHOR/SCREW BN/BN,TIS/BN: HCPCS | Performed by: ORTHOPAEDIC SURGERY

## 2021-11-30 PROCEDURE — 2500000003 HC RX 250 WO HCPCS: Performed by: ANESTHESIOLOGY

## 2021-11-30 PROCEDURE — 73502 X-RAY EXAM HIP UNI 2-3 VIEWS: CPT

## 2021-11-30 PROCEDURE — 6360000002 HC RX W HCPCS: Performed by: STUDENT IN AN ORGANIZED HEALTH CARE EDUCATION/TRAINING PROGRAM

## 2021-11-30 PROCEDURE — 2500000003 HC RX 250 WO HCPCS: Performed by: NURSE ANESTHETIST, CERTIFIED REGISTERED

## 2021-11-30 PROCEDURE — 2720000010 HC SURG SUPPLY STERILE: Performed by: ORTHOPAEDIC SURGERY

## 2021-11-30 PROCEDURE — 7100000010 HC PHASE II RECOVERY - FIRST 15 MIN: Performed by: ORTHOPAEDIC SURGERY

## 2021-11-30 PROCEDURE — 76942 ECHO GUIDE FOR BIOPSY: CPT | Performed by: STUDENT IN AN ORGANIZED HEALTH CARE EDUCATION/TRAINING PROGRAM

## 2021-11-30 PROCEDURE — 2709999900 HC NON-CHARGEABLE SUPPLY: Performed by: ORTHOPAEDIC SURGERY

## 2021-11-30 PROCEDURE — 2580000003 HC RX 258: Performed by: NURSE ANESTHETIST, CERTIFIED REGISTERED

## 2021-11-30 PROCEDURE — 3700000001 HC ADD 15 MINUTES (ANESTHESIA): Performed by: ORTHOPAEDIC SURGERY

## 2021-11-30 PROCEDURE — 64450 NJX AA&/STRD OTHER PN/BRANCH: CPT

## 2021-11-30 PROCEDURE — 7100000000 HC PACU RECOVERY - FIRST 15 MIN: Performed by: ORTHOPAEDIC SURGERY

## 2021-11-30 PROCEDURE — 7100000011 HC PHASE II RECOVERY - ADDTL 15 MIN: Performed by: ORTHOPAEDIC SURGERY

## 2021-11-30 PROCEDURE — C9290 INJ, BUPIVACAINE LIPOSOME: HCPCS | Performed by: ANESTHESIOLOGY

## 2021-11-30 PROCEDURE — C1788 PORT, INDWELLING, IMP: HCPCS | Performed by: ORTHOPAEDIC SURGERY

## 2021-11-30 PROCEDURE — 3600000004 HC SURGERY LEVEL 4 BASE: Performed by: ORTHOPAEDIC SURGERY

## 2021-11-30 PROCEDURE — 3600000014 HC SURGERY LEVEL 4 ADDTL 15MIN: Performed by: ORTHOPAEDIC SURGERY

## 2021-11-30 DEVICE — NANOTACK SUTURE ANCHOR 1.4MM WITH FLEX INSERTER
Type: IMPLANTABLE DEVICE | Site: HIP | Status: FUNCTIONAL
Brand: NANOTACK

## 2021-11-30 DEVICE — GRAFT HUM TISS L23CM FLD DIA6MM FRZN DBL STRND POST: Type: IMPLANTABLE DEVICE | Site: HIP | Status: FUNCTIONAL

## 2021-11-30 DEVICE — QFIX 1.8 MINI SUTURE ANCHOR
Type: IMPLANTABLE DEVICE | Site: HIP | Status: FUNCTIONAL
Brand: Q-FIX

## 2021-11-30 RX ORDER — FENTANYL CITRATE 50 UG/ML
50 INJECTION, SOLUTION INTRAMUSCULAR; INTRAVENOUS EVERY 5 MIN PRN
Status: DISCONTINUED | OUTPATIENT
Start: 2021-11-30 | End: 2021-11-30 | Stop reason: HOSPADM

## 2021-11-30 RX ORDER — DEXAMETHASONE SODIUM PHOSPHATE 4 MG/ML
INJECTION, SOLUTION INTRA-ARTICULAR; INTRALESIONAL; INTRAMUSCULAR; INTRAVENOUS; SOFT TISSUE PRN
Status: DISCONTINUED | OUTPATIENT
Start: 2021-11-30 | End: 2021-11-30 | Stop reason: SDUPTHER

## 2021-11-30 RX ORDER — MAGNESIUM SULFATE HEPTAHYDRATE 500 MG/ML
INJECTION, SOLUTION INTRAMUSCULAR; INTRAVENOUS PRN
Status: DISCONTINUED | OUTPATIENT
Start: 2021-11-30 | End: 2021-11-30 | Stop reason: SDUPTHER

## 2021-11-30 RX ORDER — PROPOFOL 10 MG/ML
INJECTION, EMULSION INTRAVENOUS PRN
Status: DISCONTINUED | OUTPATIENT
Start: 2021-11-30 | End: 2021-11-30 | Stop reason: SDUPTHER

## 2021-11-30 RX ORDER — TOBRAMYCIN SULFATE 40 MG/ML
INJECTION, SOLUTION INTRAMUSCULAR; INTRAVENOUS PRN
Status: DISCONTINUED | OUTPATIENT
Start: 2021-11-30 | End: 2021-11-30 | Stop reason: ALTCHOICE

## 2021-11-30 RX ORDER — MIDAZOLAM HYDROCHLORIDE 1 MG/ML
INJECTION INTRAMUSCULAR; INTRAVENOUS
Status: COMPLETED
Start: 2021-11-30 | End: 2021-11-30

## 2021-11-30 RX ORDER — DIPHENHYDRAMINE HYDROCHLORIDE 50 MG/ML
12.5 INJECTION INTRAMUSCULAR; INTRAVENOUS
Status: DISCONTINUED | OUTPATIENT
Start: 2021-11-30 | End: 2021-11-30 | Stop reason: HOSPADM

## 2021-11-30 RX ORDER — BUPIVACAINE HYDROCHLORIDE 5 MG/ML
INJECTION, SOLUTION EPIDURAL; INTRACAUDAL
Status: COMPLETED
Start: 2021-11-30 | End: 2021-11-30

## 2021-11-30 RX ORDER — DIPHENHYDRAMINE HYDROCHLORIDE 50 MG/ML
INJECTION INTRAMUSCULAR; INTRAVENOUS PRN
Status: DISCONTINUED | OUTPATIENT
Start: 2021-11-30 | End: 2021-11-30 | Stop reason: SDUPTHER

## 2021-11-30 RX ORDER — SODIUM CHLORIDE, SODIUM LACTATE, POTASSIUM CHLORIDE, CALCIUM CHLORIDE 600; 310; 30; 20 MG/100ML; MG/100ML; MG/100ML; MG/100ML
INJECTION, SOLUTION INTRAVENOUS CONTINUOUS
Status: DISCONTINUED | OUTPATIENT
Start: 2021-11-30 | End: 2021-11-30 | Stop reason: HOSPADM

## 2021-11-30 RX ORDER — MIDAZOLAM HYDROCHLORIDE 1 MG/ML
INJECTION INTRAMUSCULAR; INTRAVENOUS PRN
Status: DISCONTINUED | OUTPATIENT
Start: 2021-11-30 | End: 2021-11-30 | Stop reason: SDUPTHER

## 2021-11-30 RX ORDER — PHENYLEPHRINE HYDROCHLORIDE 10 MG/ML
INJECTION INTRAVENOUS PRN
Status: DISCONTINUED | OUTPATIENT
Start: 2021-11-30 | End: 2021-11-30 | Stop reason: SDUPTHER

## 2021-11-30 RX ORDER — ROCURONIUM BROMIDE 10 MG/ML
INJECTION, SOLUTION INTRAVENOUS PRN
Status: DISCONTINUED | OUTPATIENT
Start: 2021-11-30 | End: 2021-11-30 | Stop reason: SDUPTHER

## 2021-11-30 RX ORDER — SODIUM CHLORIDE 0.9 % (FLUSH) 0.9 %
5-40 SYRINGE (ML) INJECTION EVERY 12 HOURS SCHEDULED
Status: DISCONTINUED | OUTPATIENT
Start: 2021-11-30 | End: 2021-11-30 | Stop reason: HOSPADM

## 2021-11-30 RX ORDER — PROCHLORPERAZINE EDISYLATE 5 MG/ML
5 INJECTION INTRAMUSCULAR; INTRAVENOUS
Status: DISCONTINUED | OUTPATIENT
Start: 2021-11-30 | End: 2021-11-30 | Stop reason: HOSPADM

## 2021-11-30 RX ORDER — HYDRALAZINE HYDROCHLORIDE 20 MG/ML
5 INJECTION INTRAMUSCULAR; INTRAVENOUS EVERY 10 MIN PRN
Status: DISCONTINUED | OUTPATIENT
Start: 2021-11-30 | End: 2021-11-30 | Stop reason: HOSPADM

## 2021-11-30 RX ORDER — ONDANSETRON 2 MG/ML
INJECTION INTRAMUSCULAR; INTRAVENOUS PRN
Status: DISCONTINUED | OUTPATIENT
Start: 2021-11-30 | End: 2021-11-30 | Stop reason: SDUPTHER

## 2021-11-30 RX ORDER — SCOLOPAMINE TRANSDERMAL SYSTEM 1 MG/1
1 PATCH, EXTENDED RELEASE TRANSDERMAL
Status: DISCONTINUED | OUTPATIENT
Start: 2021-11-30 | End: 2021-11-30 | Stop reason: HOSPADM

## 2021-11-30 RX ORDER — LABETALOL HYDROCHLORIDE 5 MG/ML
5 INJECTION, SOLUTION INTRAVENOUS EVERY 10 MIN PRN
Status: DISCONTINUED | OUTPATIENT
Start: 2021-11-30 | End: 2021-11-30 | Stop reason: HOSPADM

## 2021-11-30 RX ORDER — FENTANYL CITRATE 50 UG/ML
25 INJECTION, SOLUTION INTRAMUSCULAR; INTRAVENOUS EVERY 5 MIN PRN
Status: DISCONTINUED | OUTPATIENT
Start: 2021-11-30 | End: 2021-11-30 | Stop reason: HOSPADM

## 2021-11-30 RX ORDER — LIDOCAINE HYDROCHLORIDE 20 MG/ML
INJECTION, SOLUTION INTRAVENOUS PRN
Status: DISCONTINUED | OUTPATIENT
Start: 2021-11-30 | End: 2021-11-30 | Stop reason: SDUPTHER

## 2021-11-30 RX ORDER — ONDANSETRON 2 MG/ML
4 INJECTION INTRAMUSCULAR; INTRAVENOUS
Status: DISCONTINUED | OUTPATIENT
Start: 2021-11-30 | End: 2021-11-30 | Stop reason: HOSPADM

## 2021-11-30 RX ORDER — SODIUM CHLORIDE 9 MG/ML
25 INJECTION, SOLUTION INTRAVENOUS PRN
Status: DISCONTINUED | OUTPATIENT
Start: 2021-11-30 | End: 2021-11-30 | Stop reason: HOSPADM

## 2021-11-30 RX ORDER — BUPIVACAINE HYDROCHLORIDE 5 MG/ML
INJECTION, SOLUTION EPIDURAL; INTRACAUDAL
Status: COMPLETED | OUTPATIENT
Start: 2021-11-30 | End: 2021-11-30

## 2021-11-30 RX ORDER — KETOROLAC TROMETHAMINE 30 MG/ML
INJECTION, SOLUTION INTRAMUSCULAR; INTRAVENOUS PRN
Status: DISCONTINUED | OUTPATIENT
Start: 2021-11-30 | End: 2021-11-30 | Stop reason: SDUPTHER

## 2021-11-30 RX ORDER — CLINDAMYCIN PHOSPHATE 900 MG/50ML
900 INJECTION INTRAVENOUS ONCE
Status: COMPLETED | OUTPATIENT
Start: 2021-11-30 | End: 2021-11-30

## 2021-11-30 RX ORDER — SODIUM CHLORIDE 0.9 % (FLUSH) 0.9 %
5-40 SYRINGE (ML) INJECTION PRN
Status: DISCONTINUED | OUTPATIENT
Start: 2021-11-30 | End: 2021-11-30 | Stop reason: HOSPADM

## 2021-11-30 RX ORDER — MEPERIDINE HYDROCHLORIDE 25 MG/ML
12.5 INJECTION INTRAMUSCULAR; INTRAVENOUS; SUBCUTANEOUS
Status: COMPLETED | OUTPATIENT
Start: 2021-11-30 | End: 2021-11-30

## 2021-11-30 RX ORDER — MIDAZOLAM HYDROCHLORIDE 1 MG/ML
INJECTION INTRAMUSCULAR; INTRAVENOUS
Status: DISCONTINUED
Start: 2021-11-30 | End: 2021-11-30 | Stop reason: HOSPADM

## 2021-11-30 RX ORDER — HYDROMORPHONE HCL 110MG/55ML
PATIENT CONTROLLED ANALGESIA SYRINGE INTRAVENOUS PRN
Status: DISCONTINUED | OUTPATIENT
Start: 2021-11-30 | End: 2021-11-30 | Stop reason: SDUPTHER

## 2021-11-30 RX ORDER — APREPITANT 40 MG/1
40 CAPSULE ORAL ONCE
Status: COMPLETED | OUTPATIENT
Start: 2021-11-30 | End: 2021-11-30

## 2021-11-30 RX ORDER — GLYCOPYRROLATE 1 MG/5 ML
SYRINGE (ML) INTRAVENOUS PRN
Status: DISCONTINUED | OUTPATIENT
Start: 2021-11-30 | End: 2021-11-30 | Stop reason: SDUPTHER

## 2021-11-30 RX ORDER — LIDOCAINE HYDROCHLORIDE 10 MG/ML
1 INJECTION, SOLUTION EPIDURAL; INFILTRATION; INTRACAUDAL; PERINEURAL
Status: DISCONTINUED | OUTPATIENT
Start: 2021-11-30 | End: 2021-11-30 | Stop reason: HOSPADM

## 2021-11-30 RX ADMIN — BUPIVACAINE 20 ML: 13.3 INJECTION, SUSPENSION, LIPOSOMAL INFILTRATION at 09:50

## 2021-11-30 RX ADMIN — MAGNESIUM SULFATE HEPTAHYDRATE 2 G: 500 INJECTION, SOLUTION INTRAMUSCULAR; INTRAVENOUS at 14:30

## 2021-11-30 RX ADMIN — HYDROMORPHONE HYDROCHLORIDE 0.5 MG: 1 INJECTION, SOLUTION INTRAMUSCULAR; INTRAVENOUS; SUBCUTANEOUS at 15:45

## 2021-11-30 RX ADMIN — ROCURONIUM BROMIDE 10 MG: 10 INJECTION INTRAVENOUS at 10:34

## 2021-11-30 RX ADMIN — PHENYLEPHRINE HYDROCHLORIDE 100 MCG: 10 INJECTION INTRAVENOUS at 12:13

## 2021-11-30 RX ADMIN — APREPITANT 40 MG: 40 CAPSULE ORAL at 10:09

## 2021-11-30 RX ADMIN — PHENYLEPHRINE HYDROCHLORIDE 150 MCG: 10 INJECTION INTRAVENOUS at 11:32

## 2021-11-30 RX ADMIN — SODIUM CHLORIDE, POTASSIUM CHLORIDE, SODIUM LACTATE AND CALCIUM CHLORIDE: 600; 310; 30; 20 INJECTION, SOLUTION INTRAVENOUS at 09:50

## 2021-11-30 RX ADMIN — SODIUM CHLORIDE, POTASSIUM CHLORIDE, SODIUM LACTATE AND CALCIUM CHLORIDE: 600; 310; 30; 20 INJECTION, SOLUTION INTRAVENOUS at 09:24

## 2021-11-30 RX ADMIN — PHENYLEPHRINE HYDROCHLORIDE 100 MCG: 10 INJECTION INTRAVENOUS at 10:45

## 2021-11-30 RX ADMIN — PHENYLEPHRINE HYDROCHLORIDE 100 MCG: 10 INJECTION INTRAVENOUS at 09:24

## 2021-11-30 RX ADMIN — LIDOCAINE HYDROCHLORIDE 100 MG: 20 INJECTION, SOLUTION INTRAVENOUS at 10:34

## 2021-11-30 RX ADMIN — PHENYLEPHRINE HYDROCHLORIDE 150 MCG: 10 INJECTION INTRAVENOUS at 10:53

## 2021-11-30 RX ADMIN — Medication 0.2 MG: at 11:26

## 2021-11-30 RX ADMIN — KETOROLAC TROMETHAMINE 30 MG: 30 INJECTION, SOLUTION INTRAMUSCULAR at 15:15

## 2021-11-30 RX ADMIN — PHENYLEPHRINE HYDROCHLORIDE 50 MCG: 10 INJECTION INTRAVENOUS at 10:56

## 2021-11-30 RX ADMIN — SODIUM CHLORIDE, POTASSIUM CHLORIDE, SODIUM LACTATE AND CALCIUM CHLORIDE: 600; 310; 30; 20 INJECTION, SOLUTION INTRAVENOUS at 11:18

## 2021-11-30 RX ADMIN — HYDROMORPHONE HYDROCHLORIDE 0.5 MG: 1 INJECTION, SOLUTION INTRAMUSCULAR; INTRAVENOUS; SUBCUTANEOUS at 15:35

## 2021-11-30 RX ADMIN — PHENYLEPHRINE HYDROCHLORIDE 30 MCG/MIN: 10 INJECTION, SOLUTION INTRAMUSCULAR; INTRAVENOUS; SUBCUTANEOUS at 12:20

## 2021-11-30 RX ADMIN — PHENYLEPHRINE HYDROCHLORIDE 100 MCG: 10 INJECTION INTRAVENOUS at 11:40

## 2021-11-30 RX ADMIN — MEPERIDINE HYDROCHLORIDE 12.5 MG: 25 INJECTION INTRAMUSCULAR; INTRAVENOUS; SUBCUTANEOUS at 15:47

## 2021-11-30 RX ADMIN — HYDROMORPHONE HYDROCHLORIDE 0.5 MG: 1 INJECTION, SOLUTION INTRAMUSCULAR; INTRAVENOUS; SUBCUTANEOUS at 16:45

## 2021-11-30 RX ADMIN — TRANEXAMIC ACID 1000 MG: 1 INJECTION, SOLUTION INTRAVENOUS at 10:55

## 2021-11-30 RX ADMIN — SUGAMMADEX 200 MG: 100 INJECTION, SOLUTION INTRAVENOUS at 15:15

## 2021-11-30 RX ADMIN — MIDAZOLAM HYDROCHLORIDE 4 MG: 2 INJECTION, SOLUTION INTRAMUSCULAR; INTRAVENOUS at 09:45

## 2021-11-30 RX ADMIN — PROPOFOL 200 MG: 10 INJECTION, EMULSION INTRAVENOUS at 10:35

## 2021-11-30 RX ADMIN — HYDROMORPHONE HYDROCHLORIDE 0.5 MG: 1 INJECTION, SOLUTION INTRAMUSCULAR; INTRAVENOUS; SUBCUTANEOUS at 16:30

## 2021-11-30 RX ADMIN — DEXAMETHASONE SODIUM PHOSPHATE 4 MG: 4 INJECTION, SOLUTION INTRAMUSCULAR; INTRAVENOUS at 11:00

## 2021-11-30 RX ADMIN — CLINDAMYCIN PHOSPHATE 900 MG: 900 INJECTION, SOLUTION INTRAVENOUS at 10:50

## 2021-11-30 RX ADMIN — PHENYLEPHRINE HYDROCHLORIDE 150 MCG: 10 INJECTION INTRAVENOUS at 11:26

## 2021-11-30 RX ADMIN — ONDANSETRON 4 MG: 2 INJECTION INTRAMUSCULAR; INTRAVENOUS at 15:15

## 2021-11-30 RX ADMIN — PHENYLEPHRINE HYDROCHLORIDE 100 MCG: 10 INJECTION INTRAVENOUS at 11:16

## 2021-11-30 RX ADMIN — BUPIVACAINE HYDROCHLORIDE 20 ML: 5 INJECTION, SOLUTION EPIDURAL; INTRACAUDAL; PERINEURAL at 09:50

## 2021-11-30 RX ADMIN — ROCURONIUM BROMIDE 40 MG: 10 INJECTION INTRAVENOUS at 10:35

## 2021-11-30 RX ADMIN — DIPHENHYDRAMINE HYDROCHLORIDE 12.5 MG: 50 INJECTION, SOLUTION INTRAMUSCULAR; INTRAVENOUS at 11:00

## 2021-11-30 RX ADMIN — HYDROMORPHONE HYDROCHLORIDE 2 MG: 2 INJECTION, SOLUTION INTRAMUSCULAR; INTRAVENOUS; SUBCUTANEOUS at 10:29

## 2021-11-30 RX ADMIN — PHENYLEPHRINE HYDROCHLORIDE 100 MCG: 10 INJECTION INTRAVENOUS at 11:52

## 2021-11-30 ASSESSMENT — PULMONARY FUNCTION TESTS
PIF_VALUE: 21
PIF_VALUE: 23
PIF_VALUE: 21
PIF_VALUE: 11
PIF_VALUE: 23
PIF_VALUE: 22
PIF_VALUE: 22
PIF_VALUE: 21
PIF_VALUE: 21
PIF_VALUE: 18
PIF_VALUE: 21
PIF_VALUE: 23
PIF_VALUE: 22
PIF_VALUE: 21
PIF_VALUE: 22
PIF_VALUE: 21
PIF_VALUE: 21
PIF_VALUE: 23
PIF_VALUE: 21
PIF_VALUE: 22
PIF_VALUE: 0
PIF_VALUE: 22
PIF_VALUE: 21
PIF_VALUE: 21
PIF_VALUE: 23
PIF_VALUE: 21
PIF_VALUE: 11
PIF_VALUE: 1
PIF_VALUE: 21
PIF_VALUE: 21
PIF_VALUE: 23
PIF_VALUE: 21
PIF_VALUE: 18
PIF_VALUE: 21
PIF_VALUE: 20
PIF_VALUE: 21
PIF_VALUE: 1
PIF_VALUE: 21
PIF_VALUE: 22
PIF_VALUE: 21
PIF_VALUE: 21
PIF_VALUE: 23
PIF_VALUE: 21
PIF_VALUE: 23
PIF_VALUE: 21
PIF_VALUE: 19
PIF_VALUE: 21
PIF_VALUE: 21
PIF_VALUE: 23
PIF_VALUE: 22
PIF_VALUE: 21
PIF_VALUE: 23
PIF_VALUE: 21
PIF_VALUE: 22
PIF_VALUE: 21
PIF_VALUE: 23
PIF_VALUE: 21
PIF_VALUE: 23
PIF_VALUE: 22
PIF_VALUE: 21
PIF_VALUE: 11
PIF_VALUE: 23
PIF_VALUE: 5
PIF_VALUE: 21
PIF_VALUE: 21
PIF_VALUE: 16
PIF_VALUE: 22
PIF_VALUE: 17
PIF_VALUE: 22
PIF_VALUE: 21
PIF_VALUE: 23
PIF_VALUE: 21
PIF_VALUE: 13
PIF_VALUE: 21
PIF_VALUE: 23
PIF_VALUE: 21
PIF_VALUE: 21
PIF_VALUE: 23
PIF_VALUE: 21
PIF_VALUE: 11
PIF_VALUE: 21
PIF_VALUE: 16
PIF_VALUE: 21
PIF_VALUE: 22
PIF_VALUE: 21
PIF_VALUE: 22
PIF_VALUE: 21
PIF_VALUE: 23
PIF_VALUE: 21
PIF_VALUE: 15
PIF_VALUE: 21
PIF_VALUE: 0
PIF_VALUE: 23
PIF_VALUE: 11
PIF_VALUE: 29
PIF_VALUE: 21
PIF_VALUE: 23
PIF_VALUE: 22
PIF_VALUE: 22
PIF_VALUE: 21
PIF_VALUE: 22
PIF_VALUE: 21
PIF_VALUE: 23
PIF_VALUE: 22
PIF_VALUE: 23
PIF_VALUE: 21
PIF_VALUE: 22
PIF_VALUE: 21
PIF_VALUE: 22
PIF_VALUE: 21
PIF_VALUE: 22
PIF_VALUE: 21
PIF_VALUE: 23
PIF_VALUE: 23
PIF_VALUE: 21
PIF_VALUE: 16
PIF_VALUE: 18
PIF_VALUE: 21
PIF_VALUE: 2
PIF_VALUE: 11
PIF_VALUE: 21
PIF_VALUE: 21
PIF_VALUE: 0
PIF_VALUE: 23
PIF_VALUE: 23
PIF_VALUE: 21
PIF_VALUE: 11
PIF_VALUE: 18
PIF_VALUE: 21
PIF_VALUE: 21
PIF_VALUE: 19
PIF_VALUE: 21
PIF_VALUE: 21
PIF_VALUE: 22
PIF_VALUE: 23
PIF_VALUE: 21
PIF_VALUE: 23
PIF_VALUE: 21
PIF_VALUE: 21
PIF_VALUE: 22
PIF_VALUE: 22
PIF_VALUE: 21
PIF_VALUE: 11
PIF_VALUE: 16
PIF_VALUE: 21
PIF_VALUE: 21
PIF_VALUE: 11
PIF_VALUE: 23
PIF_VALUE: 20
PIF_VALUE: 21
PIF_VALUE: 21
PIF_VALUE: 16
PIF_VALUE: 18
PIF_VALUE: 21
PIF_VALUE: 22
PIF_VALUE: 21
PIF_VALUE: 18
PIF_VALUE: 23
PIF_VALUE: 21
PIF_VALUE: 20
PIF_VALUE: 11
PIF_VALUE: 22
PIF_VALUE: 21
PIF_VALUE: 15
PIF_VALUE: 21
PIF_VALUE: 21
PIF_VALUE: 11
PIF_VALUE: 21
PIF_VALUE: 23
PIF_VALUE: 21
PIF_VALUE: 23
PIF_VALUE: 16
PIF_VALUE: 21
PIF_VALUE: 23
PIF_VALUE: 22
PIF_VALUE: 21
PIF_VALUE: 21
PIF_VALUE: 23
PIF_VALUE: 21
PIF_VALUE: 21
PIF_VALUE: 22
PIF_VALUE: 20
PIF_VALUE: 23
PIF_VALUE: 10
PIF_VALUE: 19
PIF_VALUE: 22
PIF_VALUE: 23
PIF_VALUE: 23
PIF_VALUE: 21
PIF_VALUE: 22
PIF_VALUE: 21
PIF_VALUE: 21
PIF_VALUE: 23
PIF_VALUE: 11
PIF_VALUE: 21
PIF_VALUE: 23
PIF_VALUE: 21
PIF_VALUE: 18
PIF_VALUE: 11
PIF_VALUE: 17
PIF_VALUE: 23
PIF_VALUE: 21
PIF_VALUE: 1
PIF_VALUE: 21
PIF_VALUE: 21
PIF_VALUE: 16
PIF_VALUE: 17
PIF_VALUE: 23
PIF_VALUE: 19
PIF_VALUE: 13
PIF_VALUE: 21
PIF_VALUE: 30
PIF_VALUE: 21
PIF_VALUE: 23
PIF_VALUE: 21
PIF_VALUE: 4
PIF_VALUE: 21
PIF_VALUE: 22
PIF_VALUE: 21
PIF_VALUE: 21
PIF_VALUE: 22
PIF_VALUE: 22
PIF_VALUE: 21
PIF_VALUE: 11
PIF_VALUE: 21
PIF_VALUE: 22
PIF_VALUE: 11
PIF_VALUE: 21
PIF_VALUE: 23
PIF_VALUE: 21
PIF_VALUE: 22
PIF_VALUE: 20
PIF_VALUE: 21
PIF_VALUE: 11
PIF_VALUE: 2
PIF_VALUE: 21

## 2021-11-30 ASSESSMENT — PAIN DESCRIPTION - DESCRIPTORS
DESCRIPTORS: ACHING;BURNING
DESCRIPTORS: ACHING

## 2021-11-30 ASSESSMENT — PAIN SCALES - GENERAL
PAINLEVEL_OUTOF10: 8
PAINLEVEL_OUTOF10: 7
PAINLEVEL_OUTOF10: 8
PAINLEVEL_OUTOF10: 4
PAINLEVEL_OUTOF10: 7

## 2021-11-30 ASSESSMENT — PAIN DESCRIPTION - PAIN TYPE: TYPE: CHRONIC PAIN

## 2021-11-30 ASSESSMENT — PAIN DESCRIPTION - ORIENTATION: ORIENTATION: LEFT

## 2021-11-30 ASSESSMENT — PAIN DESCRIPTION - LOCATION: LOCATION: HIP

## 2021-11-30 ASSESSMENT — PAIN - FUNCTIONAL ASSESSMENT: PAIN_FUNCTIONAL_ASSESSMENT: 0-10

## 2021-11-30 NOTE — H&P
[facility]    [phone]    [facility] Same Day Surgery Update H & P  Department of General Surgery   Surgical Service   Pre-operative History and Physical  Last H & P within the last 30 days. DIAGNOSIS:   Femoroacetabular impingement with osteophyte of left hip [M25.852, M25.752]    Procedure(s):  LEFT REVISION HIP ARTHROSCOPY, SYNOVECTOMY AND LYSIS OF ADHESIONS, REVISION ACETABULOPLASTY, REVISION LABRAL REPAIR VERSUS ALLOGRAFT LABRAL RECONSTRUCTION, POSSIBLE BIOCARTILAGE FILLING OF A CARTILAGE DEFECT AND ENDOSCOPIC GREATER TROCHANTERIC BURSECTOMY     History obtained from: Patient interview and EHR     HISTORY OF PRESENT ILLNESS:   Patient with left anterior hip pain. The symptoms have been recalcitrant to conservative treatment and the patient presents today for the above procedure. Covid 19:  Patient denies fever, chills, worsening cough, or known exposure to Covid-19. Past Medical History:        Diagnosis Date    Arthritis     Hypercholesteremia     Medical history reviewed with no changes      Past Surgical History:        Procedure Laterality Date    BLADDER SUSPENSION      HIP SURGERY  5-3-13    LEFT HIP ARTHROSCOPE, LABRAL DEBRIDEMENT AND SYNOVECTOMY    HIP SURGERY Left 12/9/14    LEFT HIP INTRA- ARTICULAR INJECTION WITH C- ARM GUIDANCE    HYSTERECTOMY      LEG SURGERY  2007    right leg muscle release    PAIN MANAGEMENT PROCEDURE Right 5/19/2020    RIGHT LUMBAR ONE AND LUMBAR TWO TRANSFORAMINAL EPIDURAL STEROID INJECTION SITE CONFIRMED BY FLUOROSCOPY performed by Ron Orellana MD at [place] [address]     Past Social History:  Social History     Socioeconomic History    Marital status:      Spouse name: None    Number of children: None    Years of education: None    Highest education level: None   Occupational History    None   Tobacco Use    Smoking status: Never Smoker    Smokeless tobacco: Never Used   Substance and Sexual Activity    Alcohol use:  Yes Comment: social    Drug use: No    Sexual activity: Yes     Partners: Male   Other Topics Concern    None   Social History Narrative    None     Social Determinants of Health     Financial Resource Strain:     Difficulty of Paying Living Expenses: Not on file   Food Insecurity:     Worried About Running Out of Food in the Last Year: Not on file    Monster of Food in the Last Year: Not on file   Transportation Needs:     Lack of Transportation (Medical): Not on file    Lack of Transportation (Non-Medical): Not on file   Physical Activity:     Days of Exercise per Week: Not on file    Minutes of Exercise per Session: Not on file   Stress:     Feeling of Stress : Not on file   Social Connections:     Frequency of Communication with Friends and Family: Not on file    Frequency of Social Gatherings with Friends and Family: Not on file    Attends Synagogue Services: Not on file    Active Member of [address] [place] [address] [employer] or Organizations: Not on file    Attends Club or Organization Meetings: Not on file    Marital Status: Not on file   Intimate Partner Violence:     Fear of Current or Ex-Partner: Not on file    Emotionally Abused: Not on file    Physically Abused: Not on file    Sexually Abused: Not on file   Housing Stability:     Unable to Pay for Housing in the Last Year: Not on file    Number of [place] in the Last Year: Not on file    Unstable Housing in the Last Year: Not on file         Medications Prior to Admission:      Prior to Admission medications    Medication Sig Start Date End Date Taking? Authorizing Provider   naproxen (NAPROSYN) 500 MG tablet Take 1 tablet by mouth 2 times daily (with meals) for 14 days 11/29/21 12/13/21  Maria T Potts MD   traMADol (ULTRAM) 50 MG tablet Take 1 tablet by mouth every 6 hours as needed for Pain for up to 5 days.  11/29/21 12/4/21  Maria T Potts MD   senna (SENOKOT) 8.6 MG TABS tablet Take 1 tablet by mouth 2 times daily for 7 days 11/29/21 12/6/21 Sybil Blount MD   rivaroxaban (XARELTO) 10 MG TABS tablet Take 1 tablet by mouth daily for 10 days 11/29/21 12/9/21  Sybil Blount MD   HYDROcodone-acetaminophen (NORCO) 5-325 MG per tablet Take 1 tablet by mouth every 6 hours as needed for Pain for up to 5 days. Intended supply: 5 days. Take lowest dose possible to manage pain 11/29/21 12/4/21  Sybil Blount MD   pantoprazole (PROTONIX) 40 MG tablet Take 1 tablet by mouth every morning (before breakfast) 11/29/21   Sybil Blount MD   amLODIPine (NORVASC) 5 MG tablet Take 5 mg by mouth daily    Historical Provider, MD   cetirizine (ZYRTEC) 10 MG tablet Take 10 mg by mouth daily. Historical Provider, MD   estrogens, conjugated, (PREMARIN) 1.25 MG tablet Take 0.9 mg by mouth daily. Historical Provider, MD   pravastatin (PRAVACHOL) 40 MG tablet Take 40 mg by mouth daily. Historical Provider, MD   Calcium Carbonate-Vitamin D (CALCIUM + D PO) Take 1 tablet by mouth daily. Historical Provider, MD         Allergies:  Bactrim [sulfamethoxazole-trimethoprim], Ciprofloxacin, Codeine, Darvocet [propoxyphene n-acetaminophen], Percocet [oxycodone-acetaminophen], Robaxin [methocarbamol], Fentanyl, Keflex [cephalexin], and Zocor [simvastatin]    PHYSICAL EXAM:      /82   Pulse 78   Temp 96.4 °F (35.8 °C) (Temporal)   Resp 15   Ht 5' 3\" (1.6 m)   Wt 179 lb (81.2 kg)   SpO2 96%   BMI 31.71 kg/m²      Airway:  Airway patent with no audible stridor    Heart:  Regular rate and rhythm, No murmur noted    Lungs:  No increased work of breathing, good air exchange, clear to auscultation bilaterally, no crackles or wheezing    Abdomen:  Soft, non-distended, non-tender, no masses palpated    ASSESSMENT AND PLAN    Patient is a 64 y.o. female with above specified procedure planned. 1.  The patients history and physical was obtained and signed off by the pre-admission testing department.   Patient seen and focused exam done today- no new changes since last physical exam on 11/10/21    2. Access to ancillary services are available per request of the provider.     Mohsen Toribio, APRN - CNP     11/30/2021

## 2021-11-30 NOTE — ANESTHESIA POSTPROCEDURE EVALUATION
Department of Anesthesiology  Postprocedure Note    Patient: Shaila Odom  MRN: [id number]  YOB: 1965  Date of evaluation: 11/30/2021  Time:  4:06 PM     Procedure Summary     Date: 11/30/21 Room / Location: [facility] [address]    Anesthesia Start: 5748 Anesthesia Stop: 1683    Procedure: LEFT REVISION HIP ARTHROSCOPY, SYNOVECTOMY AND LYSIS OF ADHESIONS, REVISION ACETABULOPLASTY, ALLOGRAFT LABRAL RECONSTRUCTION, ENDOSCOPIC GREATER TROCHANTERIC BURSECTOMY (Left Hip) Diagnosis:       Femoroacetabular impingement with osteophyte of left hip      (Femoroacetabular impingement with osteophyte of left hip [M25.852, M25.752])    Surgeons: Madhav Arana MD Responsible Provider: Casey Coats MD    Anesthesia Type: general, regional ASA Status: 2          Anesthesia Type: general, regional    Cindy Phase I: Cindy Score: 10    Cindy Phase II:      Last vitals: Reviewed and per EMR flowsheets.        Anesthesia Post Evaluation    Patient location during evaluation: PACU  Patient participation: complete - patient participated  Level of consciousness: awake and lethargic  Airway patency: patent  Nausea & Vomiting: no nausea and no vomiting  Complications: no  Cardiovascular status: hemodynamically stable and blood pressure returned to baseline  Respiratory status: spontaneous ventilation and nasal cannula  Hydration status: stable

## 2021-11-30 NOTE — ANESTHESIA PRE PROCEDURE
Department of Anesthesiology  Preprocedure Note       Name:  Mieky Harden   Age:  64 y.o.  :  1965                                          MRN:  [id number]         Date:  2021      Surgeon: Marie Query):  Lulú Bailey MD    Procedure: Procedure(s):  LEFT REVISION HIP ARTHROSCOPY, SYNOVECTOMY AND LYSIS OF ADHESIONS, REVISION ACETABULOPLASTY, REVISION LABRAL REPAIR VERSUS ALLOGRAFT LABRAL RECONSTRUCTION, POSSIBLE BIOCARTILAGE FILLING OF A CARTILAGE DEFECT AND ENDOSCOPIC GREATER TROCHANTERIC BURSECTOMY    Medications prior to admission:   Prior to Admission medications    Medication Sig Start Date End Date Taking? Authorizing Provider   naproxen (NAPROSYN) 500 MG tablet Take 1 tablet by mouth 2 times daily (with meals) for 14 days 21  Lulú Bailey MD   traMADol (ULTRAM) 50 MG tablet Take 1 tablet by mouth every 6 hours as needed for Pain for up to 5 days. 21  Lulú Bailey MD   senna (SENOKOT) 8.6 MG TABS tablet Take 1 tablet by mouth 2 times daily for 7 days 21  Lulú Bailey MD   rivaroxaban (XARELTO) 10 MG TABS tablet Take 1 tablet by mouth daily for 10 days 21  Lulú Bailey MD   HYDROcodone-acetaminophen (NORCO) 5-325 MG per tablet Take 1 tablet by mouth every 6 hours as needed for Pain for up to 5 days. Intended supply: 5 days. Take lowest dose possible to manage pain 21  Lulú Bailey MD   pantoprazole (PROTONIX) 40 MG tablet Take 1 tablet by mouth every morning (before breakfast) 21   Lulú Bailey MD   amLODIPine (NORVASC) 5 MG tablet Take 5 mg by mouth daily    Historical Provider, MD   cetirizine (ZYRTEC) 10 MG tablet Take 10 mg by mouth daily. Historical Provider, MD   estrogens, conjugated, (PREMARIN) 1.25 MG tablet Take 0.9 mg by mouth daily. Historical Provider, MD   pravastatin (PRAVACHOL) 40 MG tablet Take 40 mg by mouth daily.       Historical Provider, MD   Calcium Carbonate-Vitamin D M54.2    Acute right-sided low back pain without sciatica M54.50       Past Medical History:        Diagnosis Date    Arthritis     Hypercholesteremia     Medical history reviewed with no changes        Past Surgical History:        Procedure Laterality Date    BLADDER SUSPENSION      HIP SURGERY  5-3-13    LEFT HIP ARTHROSCOPE, LABRAL DEBRIDEMENT AND SYNOVECTOMY    HIP SURGERY Left 12/9/14    LEFT HIP INTRA- ARTICULAR INJECTION WITH C- ARM GUIDANCE    HYSTERECTOMY      LEG SURGERY  2007    right leg muscle release    PAIN MANAGEMENT PROCEDURE Right 5/19/2020    RIGHT LUMBAR ONE AND LUMBAR TWO TRANSFORAMINAL EPIDURAL STEROID INJECTION SITE CONFIRMED BY FLUOROSCOPY performed by Sylvie Wise MD at [facility] History:    Social History     Tobacco Use    Smoking status: Never Smoker    Smokeless tobacco: Never Used   Substance Use Topics    Alcohol use: Yes     Comment: social                                Counseling given: Not Answered      Vital Signs (Current):   Vitals:    11/30/21 0949 11/30/21 0950 11/30/21 0955 11/30/21 1000   BP: 139/81 134/86  124/74   Pulse:       Resp:       Temp:       TempSrc:       SpO2:  100% 100%    Weight:       Height:                                                  BP Readings from Last 3 Encounters:   11/30/21 124/74   05/19/20 (!) 149/102   01/05/17 130/80       NPO Status: Time of last liquid consumption: 2100                        Time of last solid consumption: 2100                        Date of last liquid consumption: 11/29/21                        Date of last solid food consumption: 11/29/21    BMI:   Wt Readings from Last 3 Encounters:   11/30/21 179 lb (81.2 kg)   11/29/21 179 lb (81.2 kg)   10/13/21 179 lb (81.2 kg)     Body mass index is 31.71 kg/m².     CBC:   Lab Results   Component Value Date    WBC 8.0 11/27/2011    RBC 4.91 11/27/2011    HGB 13.9 11/27/2011    HCT 41.4 11/27/2011    MCV 84.4 11/27/2011    RDW 13.4 11/27/2011  11/27/2011       CMP:   Lab Results   Component Value Date    CREATININE 1.1 11/27/2011       POC Tests: No results for input(s): POCGLU, POCNA, POCK, POCCL, POCBUN, POCHEMO, POCHCT in the last 72 hours. Coags: No results found for: PROTIME, INR, APTT    HCG (If Applicable): No results found for: PREGTESTUR, PREGSERUM, HCG, HCGQUANT     ABGs: No results found for: PHART, PO2ART, DJZ5PFL, RZO9BIU, BEART, Q6UEYCFZ     Type & Screen (If Applicable):  No results found for: LABABO, LABRH    Drug/Infectious Status (If Applicable):  No results found for: HIV, HEPCAB    COVID-19 Screening (If Applicable):   Lab Results   Component Value Date    COVID19 Not Detected 05/12/2020           Anesthesia Evaluation  Patient summary reviewed and Nursing notes reviewed no history of anesthetic complications:   Airway: Mallampati: I  TM distance: <3 FB   Neck ROM: full  Mouth opening: > = 3 FB Dental: normal exam         Pulmonary:Negative Pulmonary ROS and normal exam                               Cardiovascular:  Exercise tolerance: good (>4 METS),   (+) hypertension:, hyperlipidemia      NYHA Classification: I    Rhythm: regular  Rate: normal                    Neuro/Psych:   Negative Neuro/Psych ROS              GI/Hepatic/Renal:   (+) GERD: well controlled,           Endo/Other: Negative Endo/Other ROS                    Abdominal:             Vascular: negative vascular ROS. Other Findings:             Anesthesia Plan      general and regional     ASA 2       Induction: intravenous. MIPS: Postoperative opioids intended. Anesthetic plan and risks discussed with patient. Use of blood products discussed with patient whom consented to blood products. Plan discussed with CRNA.     Attending anesthesiologist reviewed and agrees with Preprocedure content              María Elena Zamarripa DO   11/30/2021

## 2021-11-30 NOTE — PROGRESS NOTES
PACU Transfer to [facility]    Vitals:    11/30/21 1700   BP: 134/84   Pulse: 92   Resp: 16   Temp: 98.1 °F (36.7 °C)   SpO2: 98%         Intake/Output Summary (Last 24 hours) at 11/30/2021 1706  Last data filed at 11/30/2021 1534  Gross per 24 hour   Intake 1900 ml   Output 50 ml   Net 1850 ml       Pain assessment:  present - adequately treated      Patient transferred to care of HOPE RN.    11/30/2021 5:06 PM

## 2021-11-30 NOTE — PROGRESS NOTES
Patient came with hip brace and pad for ice machine. A patient sticker was placed on both the hip brace and ice machine pad and placed at bedside.

## 2021-11-30 NOTE — ANESTHESIA PROCEDURE NOTES
Peripheral Block    Patient location during procedure: pre-op  Start time: 11/30/2021 10:45 AM  End time: 11/30/2021 10:50 AM  Staffing  Performed: anesthesiologist   Anesthesiologist: Mariya Deleon DO  Preanesthetic Checklist  Completed: patient identified, IV checked, site marked, risks and benefits discussed, surgical consent, monitors and equipment checked, pre-op evaluation, timeout performed, anesthesia consent given, oxygen available and patient being monitored  Peripheral Block  Patient position: supine  Prep: ChloraPrep  Patient monitoring: cardiac monitor, continuous pulse ox, frequent blood pressure checks and IV access  Block type: PENG  Laterality: left  Injection technique: single-shot  Guidance: ultrasound guided  Provider prep: mask  Needle  Needle type: combined needle/nerve stimulator   Needle gauge: 20 G  Needle length: 10 cm  Needle localization: ultrasound guidance  Assessment  Injection assessment: negative aspiration for heme, no paresthesia on injection and local visualized surrounding nerve on ultrasound  Paresthesia pain: none  Slow fractionated injection: yes  Hemodynamics: stable  Medications Administered  Bupivacaine (PF) (MARCAINE) 0.5 % injection, 20 mL  bupivacaine liposome (EXPAREL) 1.3 % injection, 20 mL  Reason for block: post-op pain management and at surgeon's request

## 2021-11-30 NOTE — OP NOTE
nonoperative treatment including physical therapy, activity modification and, anti-inflammatory medicine, and numerous intra-articular hip injections including cortisone and PRP. Despite that their symptoms have persisted. The patient had reproducible pain in the hip on physical exam with flexion internal rotation, imaging that confirmed labral disorder tear and PINCER, and relief with an intra-articular injection. This allowed the patient to be a candidate for surgery in the form of left hip revision arthroscopy, lysis of adhesions, revision labral repair vs allograft reconstruction, GT bursectomy, and possible cartilage filling procedure. Risks, benefits, advantages, disadvantages and potential complications as well as the anticipated postoperative course were discussed. No guarantees were provided about complete resolution of symptoms nor being able to avoid future additional surgery, namely a total hip replacement. The patient agreed to pursue this treatment option. All questions were addressed to their satisfaction. Detailed Description of Procedure:   Florence Neumann was met in the pre-operative holding area. Consent was signed and questions were answered and the  LEFT side was confirmed as the correct operative side. The anaesthesia team then administered a PENG (peripheral nerve group) block. Patient  was then brought back to the operative room in stable condition. They  had a general anesthetic. Following this the patient received intravenous antibiotics and was then transferred to the operative table. Traction was then applied to the operative hip and this was deemed to be excellent for hip arthroscopy. Weight based IV TXA was given to the patient. Access & Diagnostic Scope: An anterolateral portal was then established using Seldinger technique under x-ray guidance. Under direct visualization a mid anterior portal was then established anteriorly. Periportal capsulotomies were completed.   A diagnostic arthroscopy revealed calcified labral between 12 and 11 oclock, and hypermobile labral with mild loosening of previous suture repair between 2 and 12 oclock. The 3 oclock the labrum was intact, and the posterior labrum was frayed but not unstable. There was  evidence of chondromalacia/softening and waving at the chondrolabral junction of 1 to 11 oclock, but not carpet delamination The femoral head cartilage was infract nearly  completely intact. The ligamentum teres was partially torn. The capsule was thickened. Central Compartment Work:  An interportal apsulotomy was carried out past the borders of the MA portal. Using an ablator soft tissue was then elevated off the anterior acetabular rim at the segments of the tear being careful not to truncate any of the labrum. it was evident that the labrum was completely calcified, diminutive, and irreparable along the entire segments from 2 o'clock to 11 o'clock. At this point we decided that a labral reconstruction was indicated given the deficiency and calcification of labrum across a large segment, and the fact that her chondral labral junction was still  Mostly intact, and that there was a very strong indication to preserve his hip from further degeneration from his ROCIO and labral deficiency. Rim preparation:  Calcified labrum was debrided using combination of  shaver and ablator at 12 and 11 oclock, but kept intact between 2 and 1 oclock, for augmentation. We planned for a pull-through labral reconstructive/augmentation technique using a 3 anchor construct. At this point we created accessory portals which included a DALA portal, posterior lateral portal, and proximal anterolateral portal, all created under the Seldinger technique using fluoroscopy.   Another cannula was then advanced in the DALA portal now giving us cannulation both in the mid anterior and in the DALA portal.    Allograft Labrum Preparation:  Tibialis posterior allograft was then thawed at the back table and soaked in gentamicin infused saline. The allograft was 5.5 mm wide and was fashioned to a length of 12 cm. Redudant allograft was kept at the back table as back-up. A ripstop stitch using a #2 Ethibond was secured 5mm from one end of the graft, with the limbs cut short. The other end of the graft was then whipstitched using a #2 whipstitch Sonia self locking suture. This acted as the leading limb of the graft to be pulled through. The graft was then soaked in gentamicin until ready to be used. It was then pre-soaked with sterile mineral oil in order to facilitate easy passage within the joint and minimize friction between the graft, the cannula and the sutures of the anchors. [place] insertion:  We advanced two 1.4 mm NanoTak hard body anchors at the 2:00, 1:00,  positions. Another 1.8mm QFix (S&N) was added to the 11 o'clock position. The limbs from these  anchors were then retrieved at the data portal and parked and tagged with a snap in order to identify there respective clock position. Allograft Labrum Pull-Through & Fixation:  The limbs from the 2 oclock position were then retrieved out of the mid anterior portal.  Now outside the body and using a free Coulter needle, the allograft was then pierced past the ripstop impingement  at 1 cm from the end of the graft using a simple horizontal mattress suture. A Revo sliding knot with 3 alternating half hitches were then used to deliver this end of the graft down into the joint to be seated at the 3 o'clock position with its respective Q fix anchor. This was well seated. Using the proximal anterolateral portal a BirdBeak was advanced through the V of each of the limbs of the 4 other existing anchors in preparation retrieve the whipstitched end of the graft.     Using a grasper the free end of the graft was then delivered through the mid anterior portal as a BirdBeak was ready to retrieve and shuttle the sutures out the proximal anterolateral portal.  The shuttle was successful and the allograft was now almost well seated within the joint and stationed within the V of each anchor, essentially having limbs of the anchors already looped around the graft pre-subsequent knot-tying. Now the labrum was secured to each respective clock segment as I tied a Revo knot with 3 alternating half hitches to stabilize the labrum at each segment. This seated the graft extremely nicely along the acetabular rim with a good roundness to the construct. The redundant end of the graft past 11:00 was then cauterized as traction was placed on the whipstitch sutures. Stability was then confirmed using hook instrument at the site of the reconstruction. This was satisfactory and completed a segmental labral reconstruction from 2:00 to 11:00. Capsular Management:  The hip was released from traction at approximately 4:00 hours. The hip and flexed and and internally rotated and the suction seal remained and there was no concern about anterior CAM over resection from the previous surgery. Capsule was closed with a single  vertical stitches (with #2 ForceFiber) for the interportal portion of the capsulotomy with good reapproximation being noted in the capsular closure without overtightening given the patient's early arthrosis, chondrmalacia and previous scarring, and the need to avoid iatrogenic adhesive capsulitis/stiffness. Endoscopic Trochanteric Bursectomy:  The Camera was then re-directed to the lateral compartment looking down and distal onto the GT. Using a pre-established MA portal, an ablator was triangulated onto the GT and a thorough bursectomy was carried out. Further bursectomy was completed with a shaver. Hemostasis was then completed with the ablator. The gluteus medius complex was intact. The arthroscope was then withdrawn and portal sites were irrigated and closed with interrupted 3-0 nylon sutures.  Waterproof dressings followed by bulky compressive dressing were then applied. No complications were encountered. Blood loss was minimal.    Patient was then awoken from surgery and transferred onto the stretcher. BRANNON hose stockings were reapplied. Foot was warm and well-perfused after surgery and coming out of the traction boot. Patient was taken to the recovery room in stable condition. A modifier 22 applies because of the need to carry out a large segmental labral reconstruction to this patient's hip. This complicated all aspects of  procedure, requiring 3 additional portals, the meticulous preparation and use of allograft labral tissue, comprehensive acetabular rim preparation and multiple anchor points of fixation, additional surgical assistance, special instrumentation, and added surgical time. This added 100% or an hour and a half additional time to an otherwise uncomplicated hip arthroscopy and labral repair procedure. Dr Yael Hyatt assisted me during this surgery. His services were required to assist with the procedure by providing help with patient positioning and prepping, exposure, retraction and closure. Sincerely,    Mireya Crocker MD [address] [place] [address] [address] [place], [address]  Email: [email or web address]  Office: [phone]    11/30/21  4:00 PM        Electronically signed by Mireya Crocker MD on 11/30/2021 at 3:58 PM

## 2021-11-30 NOTE — PROGRESS NOTES
Per patient and family, no discharge instructions were received from Dr. Breann Hightower preoperatively, Spoke with Gera Coleman RN from PACU that relayed discharge instructions, patients discharge instructions updated. Patient and spouse encouraged to follow up with Dr. Margo Godwin office tomorrow 12/1/21,  Patient and spouse agreeable to discharge instructions and follow up.

## 2021-12-02 ENCOUNTER — OFFICE VISIT (OUTPATIENT)
Dept: ORTHOPEDIC SURGERY | Age: 56
End: 2021-12-02

## 2021-12-02 ENCOUNTER — HOSPITAL ENCOUNTER (OUTPATIENT)
Dept: PHYSICAL THERAPY | Age: 56
Setting detail: THERAPIES SERIES
Discharge: HOME | End: 2021-12-02
Payer: COMMERCIAL

## 2021-12-02 VITALS — BODY MASS INDEX: 31.54 KG/M2 | WEIGHT: 178 LBS | HEIGHT: 63 IN

## 2021-12-02 DIAGNOSIS — Z98.890 S/P HIP ARTHROSCOPY: Primary | ICD-10-CM

## 2021-12-02 PROCEDURE — 97140 MANUAL THERAPY 1/> REGIONS: CPT

## 2021-12-02 PROCEDURE — 97110 THERAPEUTIC EXERCISES: CPT

## 2021-12-02 PROCEDURE — 97164 PT RE-EVAL EST PLAN CARE: CPT

## 2021-12-02 PROCEDURE — 99024 POSTOP FOLLOW-UP VISIT: CPT | Performed by: ORTHOPAEDIC SURGERY

## 2021-12-02 NOTE — PROGRESS NOTES
Range for reporting period:  Beginning 12/2/21  Ending     Progress report will be due (10 Rx or 30 days whichever is less):  4/4/06      Recertification will be due (POC Duration  / 90 days whichever is less): 3/2/21        Visit # Insurance Allowable Auth Required   [address] [place] []  Yes [x]  No        Functional Scale:   iHOT-12: 77% deficit Date assessed: 12/2/21       Latex Allergy:  [x]NO      []YES  Preferred Language for Healthcare:   [x]English       []other:      Pain level:  5/10  12/2/21      SUBJECTIVE:  12/2 Pt has returned to PT after having surgery 11/30/21. Saw referring MD on 12/2 and pt is in brace locked to 90 deg hip flexion and restricted to 20 lbs of weight bearing L foot.  Pt has been using raised toilet seat which has been helpful at home     OBJECTIVE:   Updated below on 12/2/21  ROM PROM AROM Comments     Left Right Left Right     Flexion 25 130  110     Extension     12      Abduction  48  45     ER  40  38     IR  60  55     Knee flexion 50 assist from strap   130     Knee ext     0  0     12/2 Hip ROM noted tested on L d/t acute post op      Flexibility Left NT d/t acute post op  Right Comments   Hamstrings  0     ITB (Obers test)  WNL     Hip flexor(Mikhail test)  WNL                    Strength Left NT d/t acute post op  Right Comments   Hip flexors  4 SLR   Hip extension  5     Hip abduction  5     Hip adduction  4+     Hip ER  4+     Hip IR  5     Quads  5     Hamstrings  5           Palpation: General tenderness d/t acute post op      Functional Mobility/Transfers: Independent     Posture: Brace locked to 90 deg flexion      Bandages/Dressings/Incisions: Covered with post operative dressing and to remain on for 2 weeks based on MD orders.      Gait: FWW, hip brace unlocked to 90 deg, partial weight bearing on L       RESTRICTIONS/PRECAUTIONS: LEFT REVISION HIP ARTHROSCOPY, SYNOVECTOMY AND LYSIS OF ADHESIONS, REVISION ACETABULOPLASTY, ALLOGRAFT LABRAL RECONSTRUCTION, ENDOSCOPIC pt found by [facility] sleeping on residential homes yard, ems called pt admits to drinking etoh, pt awake alert and oriented. undressed and clothing placed in locked area. a&o x3 GREATER TROCHANTERIC BURSECTOMY, FRACTIONAL PSOAS LENGTHENING 12/2/21    Exercises/Interventions:     ROM/STRETCHES     Ankle pumps x30 bilat 12/2   Heel slides 2x10 L 12/2 supine, strap for assistance                                           PREs     Quad sets 10\"cs0855/2   Glut sets 10\"rv6147/2   TA isometric 10\"hx10 12/2                       Manual Therapy     PROM L hip circumduction x8' 12/2        Neuromuscular re-education     Biodex weight distribution     Therapeutic activities     Gait training     Stair training     Functional movement         Therapeutic Exercise and NMR EXR  [] [phone]) Provided verbal/tactile cueing for activities related to strengthening, flexibility, endurance, ROM for improvements in LE, proximal hip, and core control with self care, mobility, lifting, ambulation. [x] (65883) Provided verbal/tactile cueing for activities related to improving balance, coordination, kinesthetic sense, posture, motor skill, proprioception  to assist with LE, proximal hip, and core control in self care, mobility, lifting, ambulation and eccentric single leg control.      NMR and Therapeutic Activities:    [x] (49842 or 60868) Provided verbal/tactile cueing for activities related to improving balance, coordination, kinesthetic sense, posture, motor skill, proprioception and motor activation to allow for proper function of core, proximal hip and LE with self care and ADLs  [] (52620) Gait Re-education- Provided training and instruction to the patient for proper LE, core and proximal hip recruitment and positioning and eccentric body weight control with ambulation re-education including up and down stairs     Home Exercise Program:    [x] (31699) Reviewed/Progressed HEP activities related to strengthening, flexibility, endurance, ROM of core, proximal hip and LE for functional self-care, mobility, lifting and ambulation/stair navigation   [] (29877)Reviewed/Progressed HEP activities related to improving balance, coordination, kinesthetic sense, posture, motor skill, proprioception of core, proximal hip and LE for self care, mobility, lifting, and ambulation/stair navigation      Manual Treatments:  PROM / STM / Oscillations-Mobs:  G-I, II, III, IV (PA's, Inf., Post.)  [] (66258) Provided manual therapy to mobilize LE, proximal hip and/or LS spine soft tissue/joints for the purpose of modulating pain, promoting relaxation,  increasing ROM, reducing/eliminating soft tissue swelling/inflammation/restriction, improving soft tissue extensibility and allowing for proper ROM for normal function with self care, mobility, lifting and ambulation. Modalities:          Charges:  Timed Code Treatment Minutes: 24'+re-eval   Total Treatment Minutes: 3:05-3:46  41'       [] EVAL (LOW) 53020 (typically 20 minutes face-to-face)  [] EVAL (MOD) 70276 (typically 30 minutes face-to-face)  [] EVAL (HIGH) 55705 (typically 45 minutes face-to-face)  [x] RE-EVAL     [x] GS(35103) x   1  [] IONTO  [] NMR (94864) x     [] VASO  [x] Manual (30991) x 1      [] Other:  [] TA x      [] Mech Traction (08207)  [] ES(attended) (66739)      [] ES (un) (41409):     GOALS: Adjusted on 12/2/21 d/t surgical intervention  Patient stated goal:  Walk without pain   [x]? Progressing: []? Met: []? Not Met: []? Adjusted     Therapist goals for Patient:   Short Term Goals: To be achieved in: 4 weeks  1. Independent in HEP and progression per patient tolerance, in order to prevent further injury prior to surgery   []? Progressing: []? Met: []? Not Met: []? Adjusted   2. Patient will have a decrease in pain to facilitate improvement in movement, function, and ADLs as indicated by Functional Deficits. []? Progressing: []? Met: []? Not Met: []? Adjusted     Long Term Goals: To be achieved in: 8 weeks  1.  Disability index score of 20% or less for the iHOT-12 to assist with reaching prior level of function.   []?? Progressing: []?? Met: []?? Not Met: []?? Adjusted  2. Patient will demonstrate increased L hip AROM that is equal to R to allow for proper joint functioning as indicated by patients Functional Deficits.    []? ? Progressing: []?? Met: []?? Not Met: []?? Adjusted  3. Patient will demonstrate an increase in L hip strength to 4/5 in all directions tested to allow for proper functional mobility as indicated by patients Functional Deficits. []?? Progressing: []?? Met: []?? Not Met: []?? Adjusted  4. Patient will return to all functional activities without increased symptoms or restriction.   []?? Progressing: []?? Met: []?? Not Met: []?? Adjusted  5. Patient will be able to walk over 500 ft with proper gait sequencing, no assistive device and no c/o pain. []?? Progressing: []?? Met: []?? Not Met: []?? Adjusted      Overall Progression Towards Functional goals/ Treatment Progress Update:  [] Patient is progressing with  [] Progression is slowed d/t  [] Progression has been slowed due to co-morbidities. [] Plan just implemented, too soon to assess goals progression <30days   [] Goals require adjustment due to lack of progress  [] Patient is not progressing as expected and requires additional follow up with physician  [x] Patient returns to physical therapy  d/t surgery that was performed on 11/30/21 on L hip. She is appropriate for PT to protect surgical repair and increase ROM and strength based on MD protocol     Prognosis for POC: [x] Good [] Fair  [] Poor      Patient requires continued skilled intervention: [x] Yes  [] No    Treatment/Activity Tolerance:  [x] Patient able to complete treatment  [] Patient limited by fatigue  [] Patient limited by pain     [] Patient limited by other medical complications  [] Other:   12/2 Pt fatigued today d/t acute post op but responded well to manual therapy and exercises with no adverse effects.      Patient Education:              12/2 Reviewed precautions based on surgical intervention     Access Code: [place]  URL: [email or web address]  Date: 12/02/2021  Prepared by: Brooke Campoverde    Exercises  Supine Ankle Pumps - 1-3 x daily - 7 x weekly - 1 sets - 30 reps  Supine Quadricep Sets - 1-3 x daily - 7 x weekly - 1 sets - 10 reps - 10 hold  Supine Gluteal Sets - 1-3 x daily - 7 x weekly - 1 sets - 10 reps - 10 hold  Supine Transversus Abdominis Bracing - Hands on Stomach - 1-3 x daily - 7 x weekly - 1 sets - 10 reps - 10 hold  Supine Heel Slide with Strap - 1 x daily - 7 x weekly - 2-3 sets - 10 reps      PLAN: See eval  [x] Continue per plan of care [x] Alter current plan (see comments below)  [] Plan of care initiated [] Hold pending MD visit [] Discharge  12/2: Pt to complete PT 2x a week for gentle PROM and focus on gentle muscle activation exercises. Electronically signed by:  Chon Yeboah PT    Note: If patient does not return for scheduled/ recommended follow up visits, this note will serve as a discharge from care along with most recent update on progress.

## 2021-12-02 NOTE — PROGRESS NOTES
History of Present Illness:  Debby Larry is a pleasant 64 y.o. female who presents for a post operative visit. She is 2 days out following a revision left hip arthroscopy, revision acetabuloplasty, fractional psoas lengthening, lysis of adhesions, and segmental labral reconstruction, and endoscopic GT bursectomy. Overall She is doing okay and feels that their pain is well controlled with current pain medications. She has been compliant with the 20lb flat foot weight bearing instructions and the hip abductor brace. She plans to do physical therapy at [place]. 5/10 pain in the groin, mild buttock pain. Mild toe numbness bilaterally but this is improving. She denies fevers, chills, numbness, and shortness of breath. Medical History:  Patient's medications, allergies, past medical, surgical, social and family histories were reviewed and updated as appropriate. No notes on file    Review of Systems  A 14 point review of systems was completed by the patient and is available in the media section of the scanned medical record and was reviewed on 12/2/2021. Vital Signs: There were no vitals filed for this visit. General/Appearance: Alert and oriented and in no apparent distress. Skin:  There are no skin lesions, cellulitis, or extreme edema. The patient has warm and well-perfused Bilateral lower  extremities with brisk capillary refill. Hip  Exam:    Inspection: Hip incision(s)  are clean, dry and intact and well approximated. The nylon closure and therabond waterproof dressing is still in place. Mild ecchymosis and swelling are present as can be expected. There is no erythema, drainage or other signs of infection    Palpation:  No crepitus to gentle motion / circumduction of the hip    Active Range of Motion: Deferred    Passive Range of Motion:  0 to 80deg with some pain     Strength:  Deferred    Special Tests:  Deferred.     Neurovascular: Sensation to light touch is intact, no motor deficits,

## 2021-12-07 ENCOUNTER — HOSPITAL ENCOUNTER (OUTPATIENT)
Dept: PHYSICAL THERAPY | Age: 56
Setting detail: THERAPIES SERIES
Discharge: HOME | End: 2021-12-07
Payer: COMMERCIAL

## 2021-12-07 ENCOUNTER — TELEPHONE (OUTPATIENT)
Dept: ORTHOPEDIC SURGERY | Age: 56
End: 2021-12-07

## 2021-12-07 PROCEDURE — 97140 MANUAL THERAPY 1/> REGIONS: CPT

## 2021-12-07 PROCEDURE — 97110 THERAPEUTIC EXERCISES: CPT

## 2021-12-07 NOTE — TELEPHONE ENCOUNTER
The patient has a question about her bandage that she was told was water proof but after taking a shower she has water under the bandage. Please call.

## 2021-12-07 NOTE — FLOWSHEET NOTE
The [address] [place], [place] [address], 035 [facility] [address], [address] [place] [address]  Phone: [phone]   Fax:     [phone]    Physical Therapy Progress/Daily Treatment Note  Date:  2021    Patient Name:  Roshan Hennessy    :  1965  MRN: [id number]  Restrictions/Precautions:    Medical/Treatment Diagnosis Information:  · Diagnosis: S76.212A (ICD-10-CM) - Strain of left groin  · Treatment Diagnosis: M25.552 Left hip pain  Insurance/Certification information:  PT Insurance Information: [facility]  Physician Information:  Referring Practitioner: Sybil Blount   Has the plan of care been signed (Y/N):        [x]  Yes  []  No     Date of Patient follow up with Physician: PRN      Is this a Progress Report:     []  Yes  [x]  No        If Yes:  Date Range for reporting period:  Beginning 21  Ending     Progress report will be due (10 Rx or 30 days whichever is less):  3/8/50      Recertification will be due (POC Duration  / 90 days whichever is less): 3/2/21        Visit # Insurance Allowable Auth Required   38   [facility]  UNL []  Yes [x]  No        Functional Scale:   iHOT-12: 77% deficit Date assessed: 21       Latex Allergy:  [x]NO      []YES  Preferred Language for Healthcare:   [x]English       []other:      Pain level:  5/10  21      SUBJECTIVE:   Pt states she got water under her bandage and had to change it today. States one of the incisions is open and feels it is do to rubbing against brace. Messaged MD d/t not sleeping and does not have to wear brace with sitting or sleeping. Placed towel rolls around handles of walker d/t hand discomfort.       OBJECTIVE:   Updated below on 21  ROM PROM AROM Comments     Left Right Left Right     Flexion 25 130  110     Extension     12      Abduction  48  45     ER  40  38     IR  60  55     Knee flexion 50 assist from strap   130     Knee ext     0  0      Hip ROM noted tested on L d/t acute post op      Flexibility Left NT d/t acute post op  Right Comments   Hamstrings  0     ITB (Obers test)  WNL     Hip flexor(Mikhail test)  WNL                    Strength Left NT d/t acute post op  Right Comments   Hip flexors  4 SLR   Hip extension  5     Hip abduction  5     Hip adduction  4+     Hip ER  4+     Hip IR  5     Quads  5     Hamstrings  5           Palpation: General tenderness d/t acute post op      Functional Mobility/Transfers: Independent     Posture: Brace locked to 90 deg flexion      Bandages/Dressings/Incisions: Covered with post operative dressing and to remain on for 2 weeks based on MD orders.      Gait: FWW, hip brace unlocked to 90 deg, partial weight bearing on L       RESTRICTIONS/PRECAUTIONS: LEFT REVISION HIP ARTHROSCOPY, SYNOVECTOMY AND LYSIS OF ADHESIONS, REVISION ACETABULOPLASTY, ALLOGRAFT LABRAL RECONSTRUCTION, ENDOSCOPIC GREATER TROCHANTERIC BURSECTOMY, FRACTIONAL PSOAS LENGTHENING 12/2/21    Exercises/Interventions:     ROM/STRETCHES     Ankle pumps  12/2   Heel slides 2x10 L 12/2 supine, strap for assistance   SB roll ins 3x10 12/7 limited motion to 60 deg hip flexion                                      PREs     Quad sets 10\"[id number]/2  12/7 completed in supine    Glut sets 10\"ga4734/2  12/7 completed in supine   TA isometric  12/2   TA overhead 3x10 12/7 supine with cane   SAQ  3x10 L 12/7 supine   Hamstring curl 3x10 L 12/7 standing with walker        Manual Therapy     PROM L hip circumduction, abduction and flexion x10' 12/7 abduction to 20-30 deg and flexion to 50-60 deg with knee flexion         Neuromuscular re-education     [employer] weight distribution     Therapeutic activities     Gait training     Stair training     Functional movement         Therapeutic Exercise and NMR EXR  [] ([id number]) Provided verbal/tactile cueing for activities related to strengthening, flexibility, endurance, ROM for improvements in LE, proximal hip, and core control with self care, mobility, lifting, ambulation. [x] (55647) Provided verbal/tactile cueing for activities related to improving balance, coordination, kinesthetic sense, posture, motor skill, proprioception  to assist with LE, proximal hip, and core control in self care, mobility, lifting, ambulation and eccentric single leg control. NMR and Therapeutic Activities:    [x] (07829 or 55722) Provided verbal/tactile cueing for activities related to improving balance, coordination, kinesthetic sense, posture, motor skill, proprioception and motor activation to allow for proper function of core, proximal hip and LE with self care and ADLs  [] (19937) Gait Re-education- Provided training and instruction to the patient for proper LE, core and proximal hip recruitment and positioning and eccentric body weight control with ambulation re-education including up and down stairs     Home Exercise Program:    [x] (27069) Reviewed/Progressed HEP activities related to strengthening, flexibility, endurance, ROM of core, proximal hip and LE for functional self-care, mobility, lifting and ambulation/stair navigation   [] (50007)Reviewed/Progressed HEP activities related to improving balance, coordination, kinesthetic sense, posture, motor skill, proprioception of core, proximal hip and LE for self care, mobility, lifting, and ambulation/stair navigation      Manual Treatments:  PROM / STM / Oscillations-Mobs:  G-I, II, III, IV (PA's, Inf., Post.)  [] (97683) Provided manual therapy to mobilize LE, proximal hip and/or LS spine soft tissue/joints for the purpose of modulating pain, promoting relaxation,  increasing ROM, reducing/eliminating soft tissue swelling/inflammation/restriction, improving soft tissue extensibility and allowing for proper ROM for normal function with self care, mobility, lifting and ambulation.      Modalities:  CP L hip x15' 12/7        Charges:  Timed Code Treatment Minutes: 45'   Total Treatment Minutes: 4:45-5:38  48'       [] NILSON (LOW) 85669 (typically 20 minutes face-to-face)  [] EVAL (MOD) 56872 (typically 30 minutes face-to-face)  [] EVAL (HIGH) 39287 (typically 45 minutes face-to-face)  [] RE-EVAL     [x] ES(78458) x   2  [] IONTO  [] NMR (88871) x     [] VASO  [x] Manual (24074) x 1      [] Other:  [] TA x      [] Mech Traction (29498)  [] ES(attended) (57431)      [] ES (un) (45608):     GOALS: Adjusted on 12/2/21 d/t surgical intervention  Patient stated goal:  Walk without pain   [x]? Progressing: []? Met: []? Not Met: []? Adjusted     Therapist goals for Patient:   Short Term Goals: To be achieved in: 4 weeks  1. Independent in HEP and progression per patient tolerance, in order to prevent further injury prior to surgery   []? Progressing: []? Met: []? Not Met: []? Adjusted   2. Patient will have a decrease in pain to facilitate improvement in movement, function, and ADLs as indicated by Functional Deficits. []? Progressing: []? Met: []? Not Met: []? Adjusted     Long Term Goals: To be achieved in: 8 weeks  1. Disability index score of 20% or less for the iHOT-12 to assist with reaching prior level of function.   []?? Progressing: []?? Met: []?? Not Met: []?? Adjusted  2. Patient will demonstrate increased L hip AROM that is equal to R to allow for proper joint functioning as indicated by patients Functional Deficits.    []? ? Progressing: []?? Met: []?? Not Met: []?? Adjusted  3. Patient will demonstrate an increase in L hip strength to 4/5 in all directions tested to allow for proper functional mobility as indicated by patients Functional Deficits. []?? Progressing: []?? Met: []?? Not Met: []?? Adjusted  4. Patient will return to all functional activities without increased symptoms or restriction.   []?? Progressing: []?? Met: []?? Not Met: []?? Adjusted  5. Patient will be able to walk over 500 ft with proper gait sequencing, no assistive device and no c/o pain.    []?? Progressing: []?? Met: []?? Not Met: []?? Adjusted      Overall Progression Towards Functional goals/ Treatment Progress Update:  [] Patient is progressing with  [] Progression is slowed d/t  [] Progression has been slowed due to co-morbidities. [] Plan just implemented, too soon to assess goals progression <30days   [] Goals require adjustment due to lack of progress  [] Patient is not progressing as expected and requires additional follow up with physician  [x] Patient returns to physical therapy  d/t surgery that was performed on 11/30/21 on L hip. She is appropriate for PT to protect surgical repair and increase ROM and strength based on MD protocol     Prognosis for POC: [x] Good [] Fair  [] Poor      Patient requires continued skilled intervention: [x] Yes  [] No    Treatment/Activity Tolerance:  [x] Patient able to complete treatment  [] Patient limited by fatigue  [] Patient limited by pain     [] Patient limited by other medical complications  [] Other:   12/7 Fatigued with progression in exercises but no adverse effects noted or reported. Responded well to manual therapy. Patient Education:              12/2 Reviewed precautions based on surgical intervention     Access Code: [facility] THE [facility]  URL: [email or web address]  Date: 12/02/2021  Prepared by: Craig Lundborg    Exercises  Supine Ankle Pumps - 1-3 x daily - 7 x weekly - 1 sets - 30 reps  Supine Quadricep Sets - 1-3 x daily - 7 x weekly - 1 sets - 10 reps - 10 hold  Supine Gluteal Sets - 1-3 x daily - 7 x weekly - 1 sets - 10 reps - 10 hold  Supine Transversus Abdominis Bracing - Hands on Stomach - 1-3 x daily - 7 x weekly - 1 sets - 10 reps - 10 hold  Supine Heel Slide with Strap - 1 x daily - 7 x weekly - 2-3 sets - 10 reps      PLAN: See eval  [x] Continue per plan of care [x] Alter current plan (see comments below)  [] Plan of care initiated [] Hold pending MD visit [] Discharge  12/2: Pt to complete PT 2x a week for gentle PROM and focus on gentle muscle activation exercises. Electronically signed by:  Heike Hampton PT    Note: If patient does not return for scheduled/ recommended follow up visits, this note will serve as a discharge from care along with most recent update on progress.

## 2021-12-09 ENCOUNTER — HOSPITAL ENCOUNTER (OUTPATIENT)
Dept: PHYSICAL THERAPY | Age: 56
Setting detail: THERAPIES SERIES
Discharge: HOME | End: 2021-12-09
Payer: COMMERCIAL

## 2021-12-09 PROCEDURE — 97140 MANUAL THERAPY 1/> REGIONS: CPT

## 2021-12-09 PROCEDURE — 97110 THERAPEUTIC EXERCISES: CPT

## 2021-12-09 NOTE — FLOWSHEET NOTE
d/t acute post op      Flexibility Left NT d/t acute post op  Right Comments   Hamstrings  0     ITB (Obers test)  WNL     Hip flexor(Mikhail test)  WNL                    Strength Left NT d/t acute post op  Right Comments   Hip flexors  4 SLR   Hip extension  5     Hip abduction  5     Hip adduction  4+     Hip ER  4+     Hip IR  5     Quads  5     Hamstrings  5           Palpation: General tenderness d/t acute post op      Functional Mobility/Transfers: Independent     Posture: Brace locked to 90 deg flexion      Bandages/Dressings/Incisions: Covered with post operative dressing and to remain on for 2 weeks based on MD orders.      Gait: FWW, hip brace unlocked to 90 deg, partial weight bearing on L       RESTRICTIONS/PRECAUTIONS: LEFT REVISION HIP ARTHROSCOPY, SYNOVECTOMY AND LYSIS OF ADHESIONS, REVISION ACETABULOPLASTY, ALLOGRAFT LABRAL RECONSTRUCTION, ENDOSCOPIC GREATER TROCHANTERIC BURSECTOMY, FRACTIONAL PSOAS LENGTHENING 12/2/21    Exercises/Interventions:     ROM/STRETCHES     Ankle pumps  12/2   Heel slides 3x10 L ^12/9 supine, strap for assistance   12/9 withheld d/t soreness after last visit and focused more on PROM below                                       PREs     Quad sets 10\"gg4883/2  12/7 completed in supine    Glut sets 10\"vz0448/2  12/7 completed in supine   TA isometric 10\"hx10 12/2   TA overhead 3x10 12/7 supine with cane   SAQ  3x10 L 12/7 supine   Hamstring curl 2x10 L ^12/9 standing with crutches, stopped after 2nd set d/t pinching noted at anterior aspect L hip         Manual Therapy     PROM L hip circumduction, abduction and flexion x12' 12/9 abduction to 20 deg and flexion to 70-80 deg with knee flexion         Neuromuscular re-education     [employer] weight distribution     Therapeutic activities     Gait training     Stair training     Functional movement         Therapeutic Exercise and NMR EXR  [] (71228) Provided verbal/tactile cueing for activities related to strengthening, flexibility, endurance, ROM for improvements in LE, proximal hip, and core control with self care, mobility, lifting, ambulation. [x] (24937) Provided verbal/tactile cueing for activities related to improving balance, coordination, kinesthetic sense, posture, motor skill, proprioception  to assist with LE, proximal hip, and core control in self care, mobility, lifting, ambulation and eccentric single leg control. NMR and Therapeutic Activities:    [x] (46760 or 48838) Provided verbal/tactile cueing for activities related to improving balance, coordination, kinesthetic sense, posture, motor skill, proprioception and motor activation to allow for proper function of core, proximal hip and LE with self care and ADLs  [] (15981) Gait Re-education- Provided training and instruction to the patient for proper LE, core and proximal hip recruitment and positioning and eccentric body weight control with ambulation re-education including up and down stairs     Home Exercise Program:    [x] (82697) Reviewed/Progressed HEP activities related to strengthening, flexibility, endurance, ROM of core, proximal hip and LE for functional self-care, mobility, lifting and ambulation/stair navigation   [] (16083)Reviewed/Progressed HEP activities related to improving balance, coordination, kinesthetic sense, posture, motor skill, proprioception of core, proximal hip and LE for self care, mobility, lifting, and ambulation/stair navigation      Manual Treatments:  PROM / STM / Oscillations-Mobs:  G-I, II, III, IV (PA's, Inf., Post.)  [] (54658) Provided manual therapy to mobilize LE, proximal hip and/or LS spine soft tissue/joints for the purpose of modulating pain, promoting relaxation,  increasing ROM, reducing/eliminating soft tissue swelling/inflammation/restriction, improving soft tissue extensibility and allowing for proper ROM for normal function with self care, mobility, lifting and ambulation.      Modalities:  CP L hip x15' 12/9    Charges:  Timed Code Treatment Minutes: 45'   Total Treatment Minutes: 4:45-5:38  48'       [] EVAL (LOW) 455 1011 (typically 20 minutes face-to-face)  [] EVAL (MOD) 64694 (typically 30 minutes face-to-face)  [] EVAL (HIGH) 67406 (typically 45 minutes face-to-face)  [] RE-EVAL     [x] VS(73401) x   2  [] IONTO  [] NMR (91678) x     [] VASO  [x] Manual (72612) x 1      [] Other:  [] TA x      [] Mech Traction (97746)  [] ES(attended) (30063)      [] ES (un) (42926):     GOALS: Adjusted on 12/2/21 d/t surgical intervention  Patient stated goal:  Walk without pain   [x]? Progressing: []? Met: []? Not Met: []? Adjusted     Therapist goals for Patient:   Short Term Goals: To be achieved in: 4 weeks  1. Independent in HEP and progression per patient tolerance, in order to prevent further injury prior to surgery   []? Progressing: []? Met: []? Not Met: []? Adjusted   2. Patient will have a decrease in pain to facilitate improvement in movement, function, and ADLs as indicated by Functional Deficits. []? Progressing: []? Met: []? Not Met: []? Adjusted     Long Term Goals: To be achieved in: 8 weeks  1. Disability index score of 20% or less for the iHOT-12 to assist with reaching prior level of function.   []?? Progressing: []?? Met: []?? Not Met: []?? Adjusted  2. Patient will demonstrate increased L hip AROM that is equal to R to allow for proper joint functioning as indicated by patients Functional Deficits.    []? ? Progressing: []?? Met: []?? Not Met: []?? Adjusted  3. Patient will demonstrate an increase in L hip strength to 4/5 in all directions tested to allow for proper functional mobility as indicated by patients Functional Deficits. []?? Progressing: []?? Met: []?? Not Met: []?? Adjusted  4.  Patient will return to all functional activities without increased symptoms or restriction.   []?? Progressing: []?? Met: []?? Not Met: []?? Adjusted  5. Patient will be able to walk over 500 ft with proper gait sequencing, no assistive device and no c/o pain. []?? Progressing: []?? Met: []?? Not Met: []?? Adjusted      Overall Progression Towards Functional goals/ Treatment Progress Update:  [] Patient is progressing with  [] Progression is slowed d/t  [] Progression has been slowed due to co-morbidities. [] Plan just implemented, too soon to assess goals progression <30days   [] Goals require adjustment due to lack of progress  [] Patient is not progressing as expected and requires additional follow up with physician  [x] Patient returns to physical therapy  d/t surgery that was performed on 11/30/21 on L hip. She is appropriate for PT to protect surgical repair and increase ROM and strength based on MD protocol     Prognosis for POC: [x] Good [] Fair  [] Poor      Patient requires continued skilled intervention: [x] Yes  [] No    Treatment/Activity Tolerance:  [x] Patient able to complete treatment  [] Patient limited by fatigue  [] Patient limited by pain     [] Patient limited by other medical complications  [] Other:   12/9 Responded well to PROM and able to slowly increase PROM to approximately 80 deg with no increase in symptoms. Demonstrated appropriate form with hamstring curl using bilateral crutches and minimal ROM but stopped after 2nd set d/t anterior hip discomfort. Overall responded well to treatment with no adverse effects. Patient Education:              12/9 Updated HEP   12/2 Reviewed precautions based on surgical intervention     Access Code: [facility] THE [facility]  URL: [email or web address]  Date: 12/02/2021  Prepared by: Treasure Schlatter    Exercises  Supine Ankle Pumps - 1-3 x daily - 7 x weekly - 1 sets - 30 reps  Supine Quadricep Sets - 1-3 x daily - 7 x weekly - 1 sets - 10 reps - 10 hold  Supine Gluteal Sets - 1-3 x daily - 7 x weekly - 1 sets - 10 reps - 10 hold  Supine Transversus Abdominis Bracing - Hands on Stomach - 1-3 x daily - 7 x weekly - 1 sets - 10 reps - 10 hold  Supine Heel Slide with

## 2021-12-13 ENCOUNTER — HOSPITAL ENCOUNTER (OUTPATIENT)
Dept: PHYSICAL THERAPY | Age: 56
Setting detail: THERAPIES SERIES
Discharge: HOME | End: 2021-12-13
Payer: COMMERCIAL

## 2021-12-13 PROCEDURE — 97110 THERAPEUTIC EXERCISES: CPT | Performed by: PHYSICAL THERAPIST

## 2021-12-13 PROCEDURE — 97140 MANUAL THERAPY 1/> REGIONS: CPT | Performed by: PHYSICAL THERAPIST

## 2021-12-13 NOTE — FLOWSHEET NOTE
[place] [address] , [address] [place] [address]  Phone: [phone]   Fax:     [phone]    Physical Therapy Progress/Daily Treatment Note  Date:  2021    Patient Name:  Veronika Mariscal    :  1965  MRN: [id number]  Restrictions/Precautions:    Medical/Treatment Diagnosis Information:  · Diagnosis: S76.212A (ICD-10-CM) - Strain of left groin  · Treatment Diagnosis: M25.552 Left hip pain  Insurance/Certification information:  PT Insurance Information: [facility]  Physician Information:  Referring Practitioner: Nadya Curry   Has the plan of care been signed (Y/N):        [x]  Yes  []  No     Date of Patient follow up with Physician: PRN      Is this a Progress Report:     []  Yes  [x]  No        If Yes:  Date Range for reporting period:  Beginning 21  Ending     Progress report will be due (10 Rx or 30 days whichever is less):  81      Recertification will be due (POC Duration  / 90 days whichever is less): 3/2/21        Visit # Insurance Allowable Auth Required   40 try upright bike NPV   [address] []  Yes [x]  No        Functional Scale:   iHOT-12: 77% deficit Date assessed: 21       Latex Allergy:  [x]NO      []YES  Preferred Language for Healthcare:   [x]English       []other:      Pain level:  3-4/10  21      SUBJECTIVE:   L SI discomfort and muscle spasms entire leg L>R.       OBJECTIVE:   Updated below on 21  ROM PROM AROM Comments     Left Right Left Right     Flexion 25 130  110     Extension     12      Abduction  48  45     ER  40  38     IR  60  55     Knee flexion 50 assist from strap   130     Knee ext     0  0      Hip ROM noted tested on L d/t acute post op      Flexibility Left NT d/t acute post op  Right Comments   Hamstrings  0     ITB (Obers test)  WNL     Hip flexor(Mikhail test)  WNL                    Strength Left NT d/t acute post op  Right Comments   Hip flexors  4 SLR   Hip extension  5     Hip abduction  5     Hip adduction  4+     Hip ER  4+     Hip IR  5     Quads  5     Hamstrings  5           Palpation: General tenderness d/t acute post op      Functional Mobility/Transfers: Independent     Posture: Brace locked to 90 deg flexion      Bandages/Dressings/Incisions: Covered with post operative dressing and to remain on for 2 weeks based on MD orders.      Gait: FWW, hip brace unlocked to 90 deg, partial weight bearing on L       RESTRICTIONS/PRECAUTIONS: LEFT REVISION HIP ARTHROSCOPY, SYNOVECTOMY AND LYSIS OF ADHESIONS, REVISION ACETABULOPLASTY, ALLOGRAFT LABRAL RECONSTRUCTION, ENDOSCOPIC GREATER TROCHANTERIC BURSECTOMY, FRACTIONAL PSOAS LENGTHENING 12/2/21    Exercises/Interventions:     ROM/STRETCHES     Ankle pumps  12/2   Heel slides 3x10 L ^12/9 supine, strap for assistance   12/9 withheld d/t soreness after last visit and focused more on PROM below    Prone lying  2 min start12/13                                 PREs     Quad sets 10\"am6861/2  12/7 completed in supine    Glut sets 10\"mt8190/2  12/7 completed in supine   TA isometric with pelvic floor engagement  10\"hx10 12/2   TA overhead with pelvic floor engagement  3x10 12/7 supine with cane   SAQ  3x10 L 12/7 supine   Hamstring curl 2x10 Lprone 12/13     LAQ 2x10    Manual Therapy     PROM L hip circumduction, abduction and flexion x12' 12/9 abduction to 20 deg and flexion to 70-80 deg with knee flexion         Neuromuscular re-education     Biodex weight distribution     Therapeutic activities     Gait training     Stair training     Functional movement         Therapeutic Exercise and NMR EXR  [] (33916) Provided verbal/tactile cueing for activities related to strengthening, flexibility, endurance, ROM for improvements in LE, proximal hip, and core control with self care, mobility, lifting, ambulation.   [x] (66465) Provided verbal/tactile cueing for activities related to improving balance, coordination, kinesthetic sense, posture, motor skill, proprioception  to assist with LE, proximal hip, and core control in self care, mobility, lifting, ambulation and eccentric single leg control. NMR and Therapeutic Activities:    [x] (67194 or 90267) Provided verbal/tactile cueing for activities related to improving balance, coordination, kinesthetic sense, posture, motor skill, proprioception and motor activation to allow for proper function of core, proximal hip and LE with self care and ADLs  [] (10030) Gait Re-education- Provided training and instruction to the patient for proper LE, core and proximal hip recruitment and positioning and eccentric body weight control with ambulation re-education including up and down stairs     Home Exercise Program:    [x] (38884) Reviewed/Progressed HEP activities related to strengthening, flexibility, endurance, ROM of core, proximal hip and LE for functional self-care, mobility, lifting and ambulation/stair navigation   [] (79485)Reviewed/Progressed HEP activities related to improving balance, coordination, kinesthetic sense, posture, motor skill, proprioception of core, proximal hip and LE for self care, mobility, lifting, and ambulation/stair navigation      Manual Treatments:  PROM / STM / Oscillations-Mobs:  G-I, II, III, IV (PA's, Inf., Post.)  [] (34626) Provided manual therapy to mobilize LE, proximal hip and/or LS spine soft tissue/joints for the purpose of modulating pain, promoting relaxation,  increasing ROM, reducing/eliminating soft tissue swelling/inflammation/restriction, improving soft tissue extensibility and allowing for proper ROM for normal function with self care, mobility, lifting and ambulation.      Modalities:  CP L hip x15' 12/13    Charges:  Timed Code Treatment Minutes: 40   Total Treatment Minutes: 354-3232       [] EVAL (LOW) 76108 (typically 20 minutes face-to-face)  [] EVAL (MOD) 39642 (typically 30 minutes face-to-face)  [] EVAL (HIGH) 31304 (typically 45 minutes face-to-face)  [] RE-EVAL     [x] CU(30084) x   2  [] IONTO  [] NMR (37250) x     [] VASO  [x] Manual (33534) x 1      [] Other:  [] TA x      [] Mech Traction (48704)  [] ES(attended) (94338)      [] ES (un) (24895):     GOALS: Adjusted on 12/2/21 d/t surgical intervention  Patient stated goal:  Walk without pain   [x]? Progressing: []? Met: []? Not Met: []? Adjusted     Therapist goals for Patient:   Short Term Goals: To be achieved in: 4 weeks  1. Independent in HEP and progression per patient tolerance, in order to prevent further injury prior to surgery   []? Progressing: []? Met: []? Not Met: []? Adjusted   2. Patient will have a decrease in pain to facilitate improvement in movement, function, and ADLs as indicated by Functional Deficits. []? Progressing: []? Met: []? Not Met: []? Adjusted     Long Term Goals: To be achieved in: 8 weeks  1. Disability index score of 20% or less for the iHOT-12 to assist with reaching prior level of function.   []?? Progressing: []?? Met: []?? Not Met: []?? Adjusted  2. Patient will demonstrate increased L hip AROM that is equal to R to allow for proper joint functioning as indicated by patients Functional Deficits.    []? ? Progressing: []?? Met: []?? Not Met: []?? Adjusted  3. Patient will demonstrate an increase in L hip strength to 4/5 in all directions tested to allow for proper functional mobility as indicated by patients Functional Deficits. []?? Progressing: []?? Met: []?? Not Met: []?? Adjusted  4. Patient will return to all functional activities without increased symptoms or restriction.   []?? Progressing: []?? Met: []?? Not Met: []?? Adjusted  5. Patient will be able to walk over 500 ft with proper gait sequencing, no assistive device and no c/o pain.    []?? Progressing: []?? Met: []?? Not Met: []?? Adjusted      Overall Progression Towards Functional goals/ Treatment Progress Update:  [] Patient is progressing with  [] Progression is slowed d/t  [] Progression has been slowed due to co-morbidities. [] Plan just implemented, too soon to assess goals progression <30days   [] Goals require adjustment due to lack of progress  [] Patient is not progressing as expected and requires additional follow up with physician  [x] Patient returns to physical therapy  d/t surgery that was performed on 11/30/21 on L hip. She is appropriate for PT to protect surgical repair and increase ROM and strength based on MD protocol     Prognosis for POC: [x] Good [] Fair  [] Poor      Patient requires continued skilled intervention: [x] Yes  [] No    Treatment/Activity Tolerance:  [x] Patient able to complete treatment  [] Patient limited by fatigue  [] Patient limited by pain     [] Patient limited by other medical complications  [] Other:   12/13 Responded well to PROM and able to slowly increase PROM to approximately 85 deg with no increase in symptoms. Trial of prone lying eliminated LB pain. Performed HS/femoral nerve glide in prone 2x10. Also added pelvic floor engagement to supine exercise. Overall responded well to treatment with no adverse effects. Patient Education:              12/9 Updated HEP   12/2 Reviewed precautions based on surgical intervention     Access Code: [facility]  URL: [email or web address]  Date: 12/02/2021  Prepared by: Treasure Schlatter    Exercises  Supine Ankle Pumps - 1-3 x daily - 7 x weekly - 1 sets - 30 reps  Supine Quadricep Sets - 1-3 x daily - 7 x weekly - 1 sets - 10 reps - 10 hold  Supine Gluteal Sets - 1-3 x daily - 7 x weekly - 1 sets - 10 reps - 10 hold  Supine Transversus Abdominis Bracing - Hands on Stomach - 1-3 x daily - 7 x weekly - 1 sets - 10 reps - 10 hold  Supine Heel Slide with Strap - 1 x daily - 7 x weekly - 2-3 sets - 10 reps      PLAN: See eval  [x] Continue per plan of care [x] Alter current plan (see comments below)  [] Plan of care initiated [] Hold pending MD visit [] Discharge  12/2: Pt to complete PT 2x a week for gentle PROM and focus on gentle muscle activation exercises. Electronically signed by:  Vandana Chino PT    Note: If patient does not return for scheduled/ recommended follow up visits, this note will serve as a discharge from care along with most recent update on progress.

## 2021-12-14 ENCOUNTER — OFFICE VISIT (OUTPATIENT)
Dept: ORTHOPEDIC SURGERY | Age: 56
End: 2021-12-14

## 2021-12-14 ENCOUNTER — PATIENT MESSAGE (OUTPATIENT)
Dept: ORTHOPEDIC SURGERY | Age: 56
End: 2021-12-14

## 2021-12-14 VITALS — BODY MASS INDEX: 31.54 KG/M2 | WEIGHT: 178 LBS | HEIGHT: 63 IN

## 2021-12-14 DIAGNOSIS — Z98.890 S/P HIP ARTHROSCOPY: Primary | ICD-10-CM

## 2021-12-14 PROCEDURE — 99024 POSTOP FOLLOW-UP VISIT: CPT | Performed by: STUDENT IN AN ORGANIZED HEALTH CARE EDUCATION/TRAINING PROGRAM

## 2021-12-14 NOTE — PROGRESS NOTES
History of Present Illness:  Shaila Odom is a pleasant 64 y.o. female who presents for a post operative visit. She is 2 weeks out following a a revision left hip arthroscopy, revision acetabuloplasty, fractional psoas lengthening, lysis of adhesions, and segmental labral reconstruction, and endoscopic GT bursectomy. Overall She is doing okay and feels that their pain is well controlled with current pain medications. She has been compliant with the 20lb flat foot weight bearing instructions and the hip abductor brace. She has been in physical therapy at [facility]. She denies fevers, chills, numbness, tingling, and shortness of breath. Medical History:  Patient's medications, allergies, past medical, surgical, social and family histories were reviewed and updated as appropriate. No notes on file    Review of Systems  A 14 point review of systems was completed by the patient and is available in the media section of the scanned medical record and was reviewed on 12/14/2021. Vital Signs: There were no vitals filed for this visit. General/Appearance: Alert and oriented and in no apparent distress. Skin:  There are no skin lesions, cellulitis, or extreme edema. The patient has warm and well-perfused Bilateral lower  extremities with brisk capillary refill. Hip  Exam:    Inspection: The nylon closure was removal and the Hip incision(s) are clean, dry and intact and well approximated. Mild ecchymosis and swelling are present as can be expected. There is no erythema, drainage or other signs of infection    Palpation:  No crepitus to gentle motion / circumduction of the hip    Active Range of Motion: Deferred    Passive Range of Motion: 25 deg flexion    Strength:  Deferred    Special Tests:  Deferred. Neurovascular: Sensation to light touch is intact, no motor deficits, palpable radial pulses 2+    Radiology:     No new XR obtained at this time.          Assessment :  Ms. Shaila Odom is a pleasant 64 y.o. patient who is 2 weeks out following a a revision left hip arthroscopy, revision acetabuloplasty, fractional psoas lengthening, lysis of adhesions, and segmental labral reconstruction, and endoscopic GT bursectomy. She is doing well with pain control and progressing well at PT. Impression:  Encounter Diagnosis   Name Primary?  S/P hip arthroscopy Yes       Office Procedures:  No orders of the defined types were placed in this encounter. Treatment Plan:    Overall Amanda Jimenez is doing well. The pain is well-controlled. We recommend that She continuing with physical therapy for timeline based progression of motion, weight bearing, and and strengthening. Patient can start phase 2 of our hip rehabilitation protocol. The patient was told that she is no longer restricted from driving for at least 2 weeks post op. All of her questions were fully answered today. We would like to see Amanda Jimenez back in 4 weeks for follow-up visit and ROM/Gait/SLR check.     Sincerely,    [facility]   Office: [phone]    12/14/21  12:09 PM

## 2021-12-15 NOTE — TELEPHONE ENCOUNTER
From: Army Guzman  To: Elisabet Willams  Sent: 12/14/2021 2:58 PM EST  Subject: Little bleeding     Hi Georgina   I have some (very little ) bleeding through my Stare strips on to my clothes. What if anything should we do?    Thank you  Jade Murphy

## 2021-12-16 ENCOUNTER — HOSPITAL ENCOUNTER (OUTPATIENT)
Dept: PHYSICAL THERAPY | Age: 56
Setting detail: THERAPIES SERIES
Discharge: HOME | End: 2021-12-16
Payer: COMMERCIAL

## 2021-12-16 PROCEDURE — 97140 MANUAL THERAPY 1/> REGIONS: CPT | Performed by: PHYSICAL THERAPIST

## 2021-12-16 PROCEDURE — 97110 THERAPEUTIC EXERCISES: CPT | Performed by: PHYSICAL THERAPIST

## 2021-12-16 PROCEDURE — 97112 NEUROMUSCULAR REEDUCATION: CPT | Performed by: PHYSICAL THERAPIST

## 2021-12-16 NOTE — FLOWSHEET NOTE
[facility], [facility] [address] [place] [address], [address] [place] [address]  Phone: [phone]   Fax:     [phone]    Physical Therapy Progress/Daily Treatment Note  Date:  2021    Patient Name:  Amari Canas    :  1965  MRN: [id number]  Restrictions/Precautions:    Medical/Treatment Diagnosis Information:  · Diagnosis: S76.212A (ICD-10-CM) - Strain of left groin  · Treatment Diagnosis: M25.552 Left hip pain  Insurance/Certification information:  PT Insurance Information: [facility]  Physician Information:  Referring Practitioner: Cleo Chapa   Has the plan of care been signed (Y/N):        [x]  Yes  []  No     Date of Patient follow up with Physician: PRN      Is this a Progress Report:     []  Yes  [x]  No        If Yes:  Date Range for reporting period:  Beginning 21  Ending     Progress report will be due (10 Rx or 30 days whichever is less):        Recertification will be due (POC Duration  / 90 days whichever is less): 3/2/21        Visit # Insurance Allowable Auth Required   41    [facility]  [facility] []  Yes [x]  No        Functional Scale:   iHOT-12: 77% deficit Date assessed: 21       Latex Allergy:  [x]NO      []YES  Preferred Language for Healthcare:   [x]English       []other:      Pain level:  3-4/10  21      SUBJECTIVE:   saw PA she said increased pain    OBJECTIVE:   Updated below on 21  ROM PROM AROM Comments     Left Right Left Right     Flexion 25 130  110     Extension     12      Abduction  48  45     ER  40  38     IR  60  55     Knee flexion 50 assist from strap   130     Knee ext     0  0      Hip ROM noted tested on L d/t acute post op      Flexibility Left NT d/t acute post op  Right Comments   Hamstrings  0     ITB (Obers test)  WNL     Hip flexor(Mikhail test)  WNL                    Strength Left NT d/t acute post op  Right Comments   Hip flexors  4 SLR   Hip extension  5     Hip abduction  5     Hip adduction  4+     Hip ER  4+     Hip IR  5     Quads  5     Hamstrings  5           Palpation: General tenderness d/t acute post op      Functional Mobility/Transfers: Independent     Posture: Brace locked to 90 deg flexion      Bandages/Dressings/Incisions: Covered with post operative dressing and to remain on for 2 weeks based on MD orders.      Gait: FWW, hip brace unlocked to 90 deg, partial weight bearing on L       RESTRICTIONS/PRECAUTIONS: LEFT REVISION HIP ARTHROSCOPY, SYNOVECTOMY AND LYSIS OF ADHESIONS, REVISION ACETABULOPLASTY, ALLOGRAFT LABRAL RECONSTRUCTION, ENDOSCOPIC GREATER TROCHANTERIC BURSECTOMY, FRACTIONAL PSOAS LENGTHENING 12/2/21    Exercises/Interventions:     ROM/STRETCHES     Ankle pumps  12/2   Heel slides 3x10 L ^12/9 supine, strap for assistance   12/9 withheld d/t soreness after last visit and focused more on PROM below    Prone lying  2 min start12/13   Prone elbow  10tbst2 start12/16                            PREs     Quad sets 10\"gx3970/2  12/7 completed in supine    Glut sets 10\"ha2756/2  12/7 completed in supine   TA isometric with pelvic floor engagement  10\"hx10 12/2   TA overhead with pelvic floor engagement  3x10 12/7 supine with cane   SAQ  3x10 L 12/7 supine   Hamstring curl 2x10 Lprone      Quadruped rocking  Front backx10  Side to sidex10 start12/16   Cat cow     modified Ramona pose     LAQ 2x10    Manual Therapy     PROM L hip circumduction, abduction and flexion x12'         Neuromuscular re-education     Biodex weight distribution     Therapeutic activities     Gait training     Stair training     Functional movement         Therapeutic Exercise and NMR EXR  [x] (81891) Provided verbal/tactile cueing for activities related to strengthening, flexibility, endurance, ROM for improvements in LE, proximal hip, and core control with self care, mobility, lifting, ambulation.   [x] (73311) Provided verbal/tactile cueing for activities related to improving balance, coordination, kinesthetic sense, posture, motor skill, proprioception  to assist with LE, proximal hip, and core control in self care, mobility, lifting, ambulation and eccentric single leg control. NMR and Therapeutic Activities:    [x] (89462 or 25837) Provided verbal/tactile cueing for activities related to improving balance, coordination, kinesthetic sense, posture, motor skill, proprioception and motor activation to allow for proper function of core, proximal hip and LE with self care and ADLs  [] (44982) Gait Re-education- Provided training and instruction to the patient for proper LE, core and proximal hip recruitment and positioning and eccentric body weight control with ambulation re-education including up and down stairs     Home Exercise Program:    [x] (34844) Reviewed/Progressed HEP activities related to strengthening, flexibility, endurance, ROM of core, proximal hip and LE for functional self-care, mobility, lifting and ambulation/stair navigation   [] (94339)Reviewed/Progressed HEP activities related to improving balance, coordination, kinesthetic sense, posture, motor skill, proprioception of core, proximal hip and LE for self care, mobility, lifting, and ambulation/stair navigation      Manual Treatments:  PROM / STM / Oscillations-Mobs:  G-I, II, III, IV (PA's, Inf., Post.)  [] (39102) Provided manual therapy to mobilize LE, proximal hip and/or LS spine soft tissue/joints for the purpose of modulating pain, promoting relaxation,  increasing ROM, reducing/eliminating soft tissue swelling/inflammation/restriction, improving soft tissue extensibility and allowing for proper ROM for normal function with self care, mobility, lifting and ambulation.      Modalities:  CP L hip x15' 12/16    Charges:  Timed Code Treatment Minutes: 45   Total Treatment Minutes: 645-574       [] EVAL (LOW) 02772 (typically 20 minutes face-to-face)  [] EVAL (MOD) 60858 (typically 30 minutes face-to-face)  [] EVAL (HIGH) 74945 (typically 45 minutes face-to-face)  [] RE-EVAL     [x] JH(49631) x   1  [] IONTO  [x] NMR (56739) x   1  [] VASO  [x] Manual (33275) x 1      [] Other:  [] TA x      [] Mech Traction (85011)  [] ES(attended) (45212)      [] ES (un) (81129):     GOALS: Adjusted on 12/2/21 d/t surgical intervention  Patient stated goal:  Walk without pain   [x]? Progressing: []? Met: []? Not Met: []? Adjusted     Therapist goals for Patient:   Short Term Goals: To be achieved in: 4 weeks  1. Independent in HEP and progression per patient tolerance, in order to prevent further injury prior to surgery   []? Progressing: []? Met: []? Not Met: []? Adjusted   2. Patient will have a decrease in pain to facilitate improvement in movement, function, and ADLs as indicated by Functional Deficits. []? Progressing: []? Met: []? Not Met: []? Adjusted     Long Term Goals: To be achieved in: 8 weeks  1. Disability index score of 20% or less for the iHOT-12 to assist with reaching prior level of function.   []?? Progressing: []?? Met: []?? Not Met: []?? Adjusted  2. Patient will demonstrate increased L hip AROM that is equal to R to allow for proper joint functioning as indicated by patients Functional Deficits.    []? ? Progressing: []?? Met: []?? Not Met: []?? Adjusted  3. Patient will demonstrate an increase in L hip strength to 4/5 in all directions tested to allow for proper functional mobility as indicated by patients Functional Deficits. []?? Progressing: []?? Met: []?? Not Met: []?? Adjusted  4. Patient will return to all functional activities without increased symptoms or restriction.   []?? Progressing: []?? Met: []?? Not Met: []?? Adjusted  5. Patient will be able to walk over 500 ft with proper gait sequencing, no assistive device and no c/o pain.    []?? Progressing: []?? Met: []?? Not Met: []?? Adjusted      Overall Progression Towards Functional goals/ Treatment Progress Update:  [] Patient is progressing with  [] Progression is slowed d/t  [] Progression has been slowed due to co-morbidities. [] Plan just implemented, too soon to assess goals progression <30days   [] Goals require adjustment due to lack of progress  [] Patient is not progressing as expected and requires additional follow up with physician  [x] Patient returns to physical therapy  d/t surgery that was performed on 11/30/21 on L hip. She is appropriate for PT to protect surgical repair and increase ROM and strength based on MD protocol     Prognosis for POC: [x] Good [] Fair  [] Poor      Patient requires continued skilled intervention: [x] Yes  [] No    Treatment/Activity Tolerance:  [x] Patient able to complete treatment  [] Patient limited by fatigue  [] Patient limited by pain     [] Patient limited by other medical complications  [] Other:   12/16  Responded well to PROM and able to slowly increase PROM to approximately 90 deg with no increase in symptoms. Initiated quadruped rocking. Overall responded well to treatment with no adverse effects. Patient Education:              12/9 Updated HEP   12/2 Reviewed precautions based on surgical intervention     Access Code: [facility]  URL: [email or web address]  Date: 12/02/2021  Prepared by: Jeramy Mcgees    Exercises  Supine Ankle Pumps - 1-3 x daily - 7 x weekly - 1 sets - 30 reps  Supine Quadricep Sets - 1-3 x daily - 7 x weekly - 1 sets - 10 reps - 10 hold  Supine Gluteal Sets - 1-3 x daily - 7 x weekly - 1 sets - 10 reps - 10 hold  Supine Transversus Abdominis Bracing - Hands on Stomach - 1-3 x daily - 7 x weekly - 1 sets - 10 reps - 10 hold  Supine Heel Slide with Strap - 1 x daily - 7 x weekly - 2-3 sets - 10 reps      PLAN: See eval  [x] Continue per plan of care [x] Alter current plan (see comments below)  [] Plan of care initiated [] Hold pending MD visit [] Discharge  12/2: Pt to complete PT 2x a week for gentle PROM and focus on gentle muscle activation exercises.        Electronically signed by:  Shae Walden PT    Note: If patient does not return for scheduled/ recommended follow up visits, this note will serve as a discharge from care along with most recent update on progress.

## 2021-12-20 ENCOUNTER — HOSPITAL ENCOUNTER (OUTPATIENT)
Dept: PHYSICAL THERAPY | Age: 56
Setting detail: THERAPIES SERIES
Discharge: HOME | End: 2021-12-20
Payer: COMMERCIAL

## 2021-12-20 PROCEDURE — 97140 MANUAL THERAPY 1/> REGIONS: CPT | Performed by: PHYSICAL THERAPIST

## 2021-12-20 PROCEDURE — 97112 NEUROMUSCULAR REEDUCATION: CPT | Performed by: PHYSICAL THERAPIST

## 2021-12-20 PROCEDURE — 97110 THERAPEUTIC EXERCISES: CPT | Performed by: PHYSICAL THERAPIST

## 2021-12-20 NOTE — FLOWSHEET NOTE
The [address] [place], [place] [address], [facility] [address] [facility], [address] [place] [address]  Phone: [phone]   Fax:     [phone]    Physical Therapy Progress/Daily Treatment Note  Date:  2021    Patient Name:  Zari Reese    :  1965  MRN: [id number]  Restrictions/Precautions:    Medical/Treatment Diagnosis Information:  · Diagnosis: S76.212A (ICD-10-CM) - Strain of left groin  · Treatment Diagnosis: M25.552 Left hip pain  Insurance/Certification information:  PT Insurance Information: [facility]  Physician Information:  Referring Practitioner: Shruthi Caldera   Has the plan of care been signed (Y/N):        [x]  Yes  []  No     Date of Patient follow up with Physician: PRN      Is this a Progress Report:     []  Yes  [x]  No        If Yes:  Date Range for reporting period:  Beginning 21  Ending     Progress report will be due (10 Rx or 30 days whichever is less):  03      Recertification will be due (POC Duration  / 90 days whichever is less): 3/2/21        Visit # Insurance Allowable Auth Required   42    [address] []  Yes [x]  No        Functional Scale:   iHOT-12: 77% deficit Date assessed: 21       Latex Allergy:  [x]NO      []YES  Preferred Language for Healthcare:   [x]English       []other:      Pain level:  2/10  12/20/21      SUBJECTIVE:   had two good days post last visit. Have rode peddlar a 2 times for 7 min. Able to to sit 20 min in chair  a few times too.  not as good of a day but patient  Still pleased with the progress.  No pain meds over the weekend except at night      OBJECTIVE:   Updated below on 21  ROM PROM AROM Comments     Left Right Left Right     Flexion 25 130  110     Extension     12      Abduction  48  45     ER  40  38     IR  60  55     Knee flexion 50 assist from strap   130     Knee ext     0  0      Hip ROM noted tested on L d/t acute post op      Flexibility Left NT d/t acute post op Right Comments   Hamstrings  0     ITB (Obers test)  WNL     Hip flexor(Mikhail test)  WNL                    Strength Left NT d/t acute post op  Right Comments   Hip flexors  4 SLR   Hip extension  5     Hip abduction  5     Hip adduction  4+     Hip ER  4+     Hip IR  5     Quads  5     Hamstrings  5           Palpation: General tenderness d/t acute post op      Functional Mobility/Transfers: Independent     Posture: Brace locked to 90 deg flexion      Bandages/Dressings/Incisions: Covered with post operative dressing and to remain on for 2 weeks based on MD orders.      Gait: FWW, hip brace unlocked to 90 deg, partial weight bearing on L       RESTRICTIONS/PRECAUTIONS: LEFT REVISION HIP ARTHROSCOPY, SYNOVECTOMY AND LYSIS OF ADHESIONS, REVISION ACETABULOPLASTY, ALLOGRAFT LABRAL RECONSTRUCTION, ENDOSCOPIC GREATER TROCHANTERIC BURSECTOMY, FRACTIONAL PSOAS LENGTHENING 12/2/21    Exercises/Interventions:     ROM/STRETCHES     Ankle pumps  12/2   Heel slides 3x10 L ^12/9 supine, strap for assistance   12/9 withheld d/t soreness after last visit and focused more on PROM below    Prone lying  2 min start12/13   Prone elbow  [id number] start12/16   Quad stretch prone  Active 57sbsr3 start12/30                       PREs     Quad sets 10\"va6199/2  12/7 completed in supine    Glut sets 10\"sw2800/2  12/7 completed in supine   TA isometric with pelvic floor engagement  10\"hx10 12/2   TA overhead with pelvic floor engagement  3x10 12/7 supine with cane   SAQ  3x10 L  1# ^12/20   Hamstring curl 2x10 Lprone      Quadruped rocking         Cat cow Front back 1x10   2x5    Side to side 1x10  2x5      2x5   Start12/16        start12/20        modified Ramona pose 3x 10sec start12/20   LAQ 3x10 1#   ^12/20   Manual Therapy     PROM L hip circumduction, abduction and flexion x12'         Neuromuscular re-education     Unite Us weight distribution     Therapeutic activities     Gait training     Stair training     Functional movement Therapeutic Exercise and NMR EXR  [x] (62411) Provided verbal/tactile cueing for activities related to strengthening, flexibility, endurance, ROM for improvements in LE, proximal hip, and core control with self care, mobility, lifting, ambulation. [x] (57471) Provided verbal/tactile cueing for activities related to improving balance, coordination, kinesthetic sense, posture, motor skill, proprioception  to assist with LE, proximal hip, and core control in self care, mobility, lifting, ambulation and eccentric single leg control.      NMR and Therapeutic Activities:    [x] (13642 or 20679) Provided verbal/tactile cueing for activities related to improving balance, coordination, kinesthetic sense, posture, motor skill, proprioception and motor activation to allow for proper function of core, proximal hip and LE with self care and ADLs  [] (07804) Gait Re-education- Provided training and instruction to the patient for proper LE, core and proximal hip recruitment and positioning and eccentric body weight control with ambulation re-education including up and down stairs     Home Exercise Program:    [x] (61809) Reviewed/Progressed HEP activities related to strengthening, flexibility, endurance, ROM of core, proximal hip and LE for functional self-care, mobility, lifting and ambulation/stair navigation   [] (72605)Reviewed/Progressed HEP activities related to improving balance, coordination, kinesthetic sense, posture, motor skill, proprioception of core, proximal hip and LE for self care, mobility, lifting, and ambulation/stair navigation      Manual Treatments:  PROM / STM / Oscillations-Mobs:  G-I, II, III, IV (PA's, Inf., Post.)  [] (39518) Provided manual therapy to mobilize LE, proximal hip and/or LS spine soft tissue/joints for the purpose of modulating pain, promoting relaxation,  increasing ROM, reducing/eliminating soft tissue swelling/inflammation/restriction, improving soft tissue extensibility and allowing for proper ROM for normal function with self care, mobility, lifting and ambulation. Modalities:  CP L hip x15' 12/16    Charges:  Timed Code Treatment Minutes: 45   Total Treatment Minutes: 200-300       [] EVAL (LOW) 27950 (typically 20 minutes face-to-face)  [] EVAL (MOD) 10805 (typically 30 minutes face-to-face)  [] EVAL (HIGH) 99004 (typically 45 minutes face-to-face)  [] RE-EVAL     [x] OR(86238) x   1  [] IONTO  [x] NMR (49739) x   1  [] VASO  [x] Manual (73580) x 1      [] Other:  [] TA x      [] Mech Traction (33036)  [] ES(attended) (06496)      [] ES (un) (89202):     GOALS: Adjusted on 12/2/21 d/t surgical intervention  Patient stated goal:  Walk without pain   [x]? Progressing: []? Met: []? Not Met: []? Adjusted     Therapist goals for Patient:   Short Term Goals: To be achieved in: 4 weeks  1. Independent in HEP and progression per patient tolerance, in order to prevent further injury prior to surgery   []? Progressing: []? Met: []? Not Met: []? Adjusted   2. Patient will have a decrease in pain to facilitate improvement in movement, function, and ADLs as indicated by Functional Deficits. []? Progressing: []? Met: []? Not Met: []? Adjusted     Long Term Goals: To be achieved in: 8 weeks  1. Disability index score of 20% or less for the iHOT-12 to assist with reaching prior level of function.   []?? Progressing: []?? Met: []?? Not Met: []?? Adjusted  2. Patient will demonstrate increased L hip AROM that is equal to R to allow for proper joint functioning as indicated by patients Functional Deficits.    []? ? Progressing: []?? Met: []?? Not Met: []?? Adjusted  3. Patient will demonstrate an increase in L hip strength to 4/5 in all directions tested to allow for proper functional mobility as indicated by patients Functional Deficits. []?? Progressing: []?? Met: []?? Not Met: []?? Adjusted  4. Patient will return to all functional activities without increased symptoms or restriction. []?? Progressing: []?? Met: []?? Not Met: []?? Adjusted  5. Patient will be able to walk over 500 ft with proper gait sequencing, no assistive device and no c/o pain. []?? Progressing: []?? Met: []?? Not Met: []?? Adjusted      Overall Progression Towards Functional goals/ Treatment Progress Update:  [] Patient is progressing with  [] Progression is slowed d/t  [] Progression has been slowed due to co-morbidities. [] Plan just implemented, too soon to assess goals progression <30days   [] Goals require adjustment due to lack of progress  [] Patient is not progressing as expected and requires additional follow up with physician  [x] Patient returns to physical therapy  d/t surgery that was performed on 11/30/21 on L hip. She is appropriate for PT to protect surgical repair and increase ROM and strength based on MD protocol     Prognosis for POC: [x] Good [] Fair  [] Poor      Patient requires continued skilled intervention: [x] Yes  [] No    Treatment/Activity Tolerance:  [x] Patient able to complete treatment  [] Patient limited by fatigue  [] Patient limited by pain     [] Patient limited by other medical complications  [] Other:   12/20     quadruped rocking going well. Initiated modified Ramona pose. And cat cow. Overall responded well to treatment with no adverse effects. Noted tightness and swelling anterior hip  Advised continuing ice 3-5x day     Patient Education:              12/9 Updated HEP   12/2 Reviewed precautions based on surgical intervention     Access Code: [facility]  URL: [email or web address]  Date: 12/02/2021  Prepared by: Chon     Exercises  Supine Ankle Pumps - 1-3 x daily - 7 x weekly - 1 sets - 30 reps  Supine Quadricep Sets - 1-3 x daily - 7 x weekly - 1 sets - 10 reps - 10 hold  Supine Gluteal Sets - 1-3 x daily - 7 x weekly - 1 sets - 10 reps - 10 hold  Supine Transversus Abdominis Bracing - Hands on Stomach - 1-3 x daily - 7 x weekly - 1 sets - 10 reps - 10 hold  Supine

## 2021-12-23 ENCOUNTER — HOSPITAL ENCOUNTER (OUTPATIENT)
Dept: PHYSICAL THERAPY | Age: 56
Setting detail: THERAPIES SERIES
Discharge: HOME | End: 2021-12-23
Payer: COMMERCIAL

## 2021-12-23 PROCEDURE — 97112 NEUROMUSCULAR REEDUCATION: CPT | Performed by: PHYSICAL THERAPIST

## 2021-12-23 PROCEDURE — 97140 MANUAL THERAPY 1/> REGIONS: CPT | Performed by: PHYSICAL THERAPIST

## 2021-12-23 PROCEDURE — 97110 THERAPEUTIC EXERCISES: CPT | Performed by: PHYSICAL THERAPIST

## 2021-12-23 NOTE — FLOWSHEET NOTE
The [facility], [address] [place] [address], [address] [place] [address]  Phone: [phone]   Fax:     [phone]    Physical Therapy Progress/Daily Treatment Note  Date:  2021    Patient Name:  Shaila Odom    :  1965  MRN: [id number]  Restrictions/Precautions:    Medical/Treatment Diagnosis Information:  · Diagnosis: S76.212A (ICD-10-CM) - Strain of left groin  · Treatment Diagnosis: M25.552 Left hip pain  Insurance/Certification information:  PT Insurance Information: [facility]  Physician Information:  Referring Practitioner: Madhav Arana   Has the plan of care been signed (Y/N):        [x]  Yes  []  No     Date of Patient follow up with Physician: PRN      Is this a Progress Report:     []  Yes  [x]  No        If Yes:  Date Range for reporting period:  Beginning 21  Ending     Progress report will be due (10 Rx or 30 days whichever is less):        Recertification will be due (POC Duration  / 90 days whichever is less): 3/2/21        Visit # Insurance Allowable Auth Required   43    [facility]  [facility] []  Yes [x]  No        Functional Scale:   iHOT-12: 77% deficit Date assessed: 21       Latex Allergy:  [x]NO      []YES  Preferred Language for Healthcare:   [x]English       []other:      Pain level:  2/10  12/20/21      SUBJECTIVE:   sore today,  Yesterday a good day. Surgeon called last noght to check on me. He asked if I was on cane yet?       OBJECTIVE:   Updated below on 21  ROM PROM AROM Comments     Left Right Left Right     Flexion 25 130  110     Extension     12      Abduction  48  45     ER  40  38     IR  60  55     Knee flexion 50 assist from strap   130     Knee ext     0  0      Hip ROM noted tested on L d/t acute post op      Flexibility Left NT d/t acute post op  Right Comments   Hamstrings  0     ITB (Obers test)  WNL     Hip flexor(Mikhail test)  WNL                    Strength Left NT d/t acute post op  Right Comments   Hip flexors  4 SLR   Hip extension  5     Hip abduction  5     Hip adduction  4+     Hip ER  4+     Hip IR  5     Quads  5     Hamstrings  5           Palpation: General tenderness d/t acute post op      Functional Mobility/Transfers: Independent     Posture: Brace locked to 90 deg flexion      Bandages/Dressings/Incisions: Covered with post operative dressing and to remain on for 2 weeks based on MD orders.      Gait: FWW, hip brace unlocked to 90 deg, partial weight bearing on L       RESTRICTIONS/PRECAUTIONS: LEFT REVISION HIP ARTHROSCOPY, SYNOVECTOMY AND LYSIS OF ADHESIONS, REVISION ACETABULOPLASTY, ALLOGRAFT LABRAL RECONSTRUCTION, ENDOSCOPIC GREATER TROCHANTERIC BURSECTOMY, FRACTIONAL PSOAS LENGTHENING 12/2/21    Exercises/Interventions:     ROM/STRETCHES     Upright bike seat 4 2 min start12/23   Ankle pumps  12/2   Heel slides  ^12/9 supine, strap for assistance   12/9 withheld d/t soreness after last visit and focused more on PROM below    Prone lying  2 min start12/13   Prone elbow  98gmjo7 start12/16   Quad stretch prone  Active 05kmhb4 start12/30                       PREs     Quad sets 37\"OD7447/0  12/7 completed in supine    Glut sets 10\"ou3646/2  12/7 completed in supine   TA isometric with pelvic floor engagement  10\"hx10 12/2   TA overhead with pelvic floor engagement  3x10 12/7 supine with cane   SAQ  3x10 L  2# ^12/23   Hamstring curl 2x10 Lprone      Quadruped rocking           Cat cow Front back 1x10   2x5    Side to side 1x10  2x5  Alternating arms x10 ea      2x5   Start12/16    start12/23        start12/20        modified Ramona pose 3x 10sec start12/20   LAQ 3x10 2#   ^12/23   Manual Therapy     PROM L hip circumduction, abduction and flexion x12'         Neuromuscular re-education     Biodex weight distribution     Therapeutic activities     Gait training     Stair training     Functional movement         Therapeutic Exercise and NMR EXR  [x] [phone]) Provided mobility, lifting and ambulation. Modalities:  CP L hip x15' 12/23    Charges:  Timed Code Treatment Minutes: 45   Total Treatment Minutes: 0889-7138       [] EVAL (LOW) 35996 (typically 20 minutes face-to-face)  [] EVAL (MOD) 95294 (typically 30 minutes face-to-face)  [] EVAL (HIGH) 85997 (typically 45 minutes face-to-face)  [] RE-EVAL     [x] DS(46059) x   1  [] IONTO  [x] NMR (36654) x   1  [] VASO  [x] Manual (76087) x 1      [] Other:  [] TA x      [] Mech Traction (95262)  [] ES(attended) (32400)      [] ES (un) (47210):     GOALS: Adjusted on 12/2/21 d/t surgical intervention  Patient stated goal:  Walk without pain   [x]? Progressing: []? Met: []? Not Met: []? Adjusted     Therapist goals for Patient:   Short Term Goals: To be achieved in: 4 weeks  1. Independent in HEP and progression per patient tolerance, in order to prevent further injury prior to surgery   []? Progressing: []? Met: []? Not Met: []? Adjusted   2. Patient will have a decrease in pain to facilitate improvement in movement, function, and ADLs as indicated by Functional Deficits. []? Progressing: []? Met: []? Not Met: []? Adjusted     Long Term Goals: To be achieved in: 8 weeks  1. Disability index score of 20% or less for the iHOT-12 to assist with reaching prior level of function.   []?? Progressing: []?? Met: []?? Not Met: []?? Adjusted  2. Patient will demonstrate increased L hip AROM that is equal to R to allow for proper joint functioning as indicated by patients Functional Deficits.    []? ? Progressing: []?? Met: []?? Not Met: []?? Adjusted  3. Patient will demonstrate an increase in L hip strength to 4/5 in all directions tested to allow for proper functional mobility as indicated by patients Functional Deficits. []?? Progressing: []?? Met: []?? Not Met: []?? Adjusted  4.  Patient will return to all functional activities without increased symptoms or restriction.   []?? Progressing: []?? Met: []?? Not Met: 10 hold  Supine Transversus Abdominis Bracing - Hands on Stomach - 1-3 x daily - 7 x weekly - 1 sets - 10 reps - 10 hold  Supine Heel Slide with Strap - 1 x daily - 7 x weekly - 2-3 sets - 10 reps      PLAN: See eval  [x] Continue per plan of care [x] Alter current plan (see comments below)  [] Plan of care initiated [] Hold pending MD visit [] Discharge  12/2: Pt to complete PT 2x a week for gentle PROM and focus on gentle muscle activation exercises. Electronically signed by:  Hailey Dennison PT    Note: If patient does not return for scheduled/ recommended follow up visits, this note will serve as a discharge from care along with most recent update on progress.

## 2021-12-28 ENCOUNTER — HOSPITAL ENCOUNTER (OUTPATIENT)
Dept: PHYSICAL THERAPY | Age: 56
Setting detail: THERAPIES SERIES
Discharge: HOME | End: 2021-12-28
Payer: COMMERCIAL

## 2021-12-28 NOTE — FLOWSHEET NOTE
Physical Therapy  Cancellation/No-show Note  Patient Name:  Margarita Cabrera  :  1965   Date:  2021  Cancelled visits to date: 1  No-shows to date: 0    For today's appointment patient:  [x]  Cancelled  []  Rescheduled appointment  []  No-show     Reason given by patient:  []  Patient ill  []  Conflicting appointment  []  No transportation    []  Conflict with work  []  No reason given  []  Other:     Comments: Pt awaiting results for COVID test     Electronically signed by:  Nathaly Tamez, PT, PT

## 2021-12-30 ENCOUNTER — HOSPITAL ENCOUNTER (OUTPATIENT)
Dept: PHYSICAL THERAPY | Age: 56
Setting detail: THERAPIES SERIES
Discharge: HOME | End: 2021-12-30
Payer: COMMERCIAL

## 2021-12-30 NOTE — FLOWSHEET NOTE
Physical Therapy  Cancellation/No-show Note  Patient Name:  Elvia Juárez  :  1965   Date:  2021  Cancelled visits to date: 2  No-shows to date: 0    For today's appointment patient:  [x]  Cancelled  []  Rescheduled appointment  []  No-show     Reason given by patient:  []  Patient ill  []  Conflicting appointment  []  No transportation    []  Conflict with work  []  No reason given  []  Other:     Comments: Pt awaiting results for COVID test     Electronically signed by:  Suraj Holguin PT, PT

## 2022-01-05 ENCOUNTER — HOSPITAL ENCOUNTER (OUTPATIENT)
Dept: PHYSICAL THERAPY | Age: 57
Setting detail: THERAPIES SERIES
Discharge: HOME | End: 2022-01-05
Payer: COMMERCIAL

## 2022-01-05 PROCEDURE — 97140 MANUAL THERAPY 1/> REGIONS: CPT

## 2022-01-05 PROCEDURE — 97112 NEUROMUSCULAR REEDUCATION: CPT

## 2022-01-05 PROCEDURE — 97110 THERAPEUTIC EXERCISES: CPT

## 2022-01-07 ENCOUNTER — HOSPITAL ENCOUNTER (OUTPATIENT)
Dept: PHYSICAL THERAPY | Age: 57
Setting detail: THERAPIES SERIES
Discharge: HOME | End: 2022-01-07
Payer: COMMERCIAL

## 2022-01-07 PROCEDURE — 97140 MANUAL THERAPY 1/> REGIONS: CPT

## 2022-01-07 PROCEDURE — 97110 THERAPEUTIC EXERCISES: CPT

## 2022-01-07 PROCEDURE — 97112 NEUROMUSCULAR REEDUCATION: CPT

## 2022-01-07 NOTE — FLOWSHEET NOTE
The [address] [place] [address], [address] [place] [address], [address] [place] [address]  Phone: [phone]   Fax:     [phone]    Physical Therapy Progress/Daily Treatment Note  Date:  2022    Patient Name:  Mary Orellana    :  1965  MRN: [id number]  Restrictions/Precautions:    Medical/Treatment Diagnosis Information:  · Diagnosis: S76.212A (ICD-10-CM) - Strain of left groin  · Treatment Diagnosis: M25.552 Left hip pain  Insurance/Certification information:  PT Insurance Information: Alex  Physician Information:  Referring Practitioner: Natasha Colunga   Has the plan of care been signed (Y/N):        [x]  Yes  []  No     Date of Patient follow up with Physician: PRN      Is this a Progress Report:     []  Yes  [x]  No        If Yes:  Date Range for reporting period:  Beginning 21  Ending  22    Progress report will be due (10 Rx or 30 days whichever is less):  47      Recertification will be due (POC Duration  / 90 days whichever is less): 3/2/22        Visit # Insurance Allowable Auth Required   45   [facility]  UNL []  Yes [x]  No        Functional Scale:   iHPT-12  62% deficit Date assessed: 22  iHOT-12: 77% deficit Date assessed: 21       Latex Allergy:  [x]NO      []YES  Preferred Language for Healthcare:   [x]English       []other:      Pain level:  0/10      SUBJECTIVE:   Pt states she is sore at her L proximal adductor after last visit. Also feeling weak in thigh and tightness at outside of L hip. Denies having any hip pain.  States using one crutch is going well but being very cautious because at times she feels she puts too much weight through L LE.     OBJECTIVE:   Updated below on 21  ROM PROM AROM Comments     Left Right Left Right     Flexion 100 130  110     Extension ~5    12      Abduction 25 48  45     ER 20 40  38     IR  60  55     Knee flexion 137  135 130     Knee ext  0   0  0          Flexibility Left NT d/t acute post op  Right Comments   Hamstrings  0     ITB (Obers test)  WNL     Hip flexor(Mikhail test)  WNL                    Strength Left  Right Comments   Hip flexors NT 4 SLR   Hip extension NT 5     Hip abduction NT 5     Hip adduction NT  4+     Hip ER NT 4+     Hip IR NT 5     Quads 4+ 5     Hamstrings 5 5           Palpation: Tenderness L greater trochanter and proximal distal adductor     Functional Mobility/Transfers: Modified independent     Posture:  WNL     Bandages/Dressings/Incisions: Pt reports incisions closed and denies any active exudate.       Gait: Bilateral axillary crutches, 50% weight bearing on L       RESTRICTIONS/PRECAUTIONS: LEFT REVISION HIP ARTHROSCOPY, SYNOVECTOMY AND LYSIS OF ADHESIONS, REVISION ACETABULOPLASTY, ALLOGRAFT LABRAL RECONSTRUCTION, ENDOSCOPIC GREATER TROCHANTERIC BURSECTOMY, FRACTIONAL PSOAS LENGTHENING 11/30/21    Exercises/Interventions:     ROM/STRETCHES     Upright bike seat 4 5 min ^1/5   Ankle pumps  12/2   Heel slides  ^12/9 supine, strap for assistance   12/9 withheld d/t soreness after last visit and focused more on PROM below    Prone lying  1/5 withheld d/t improvement in low back syptoms    Prone elbow  [id number] start12/16   Quad stretch prone   [id number] ^1/5 completed with strap                       PREs     Quad sets 1/5 d/c based on progress   Glut sets 1/5 d/c based on progress   TA isometric with pelvic floor engagement   1/5 cont with HEP    TA overhead with pelvic floor engagement  3x10 ^1/5 supine with 4# med ball    SAQ   ^12/23   Hamstring curl 4# 3x10 L ^1/7 standing   Quadruped rocking           Cat cow   Alternating arms 3x10 ea      3x10   Start12/16    ^1/5      ^1/7    Bridges 3x10 bilat 1/5 modified height    BKFO  Black TB 3x10 1/5 stabilization on L, movement into ER and abd on R   modified Ramona pose  start12/20   LAQ with BS  3x10 4#   ^1/7    Heel raises 3x10 bilat 1/5 bilat UE support   Weight shifts Side to side x30  Forward/backward x30 1/5   Hip extension  0# 3x10 L 1/7 completed in standing bent over table         Manual Therapy     PROM L hip circumduction, abduction and flexion x10' ^1/7   STM  1/7 withheld d/t decrease tension at muscle and soreness from last visit   Neuromuscular re-education     Biodex weight distribution     Therapeutic activities     Gait training  1/7 Withheld d/t demonstrating appropriate sequencing with unilateral cane   Stair training     Functional movement         Therapeutic Exercise and NMR EXR  [x] (68129) Provided verbal/tactile cueing for activities related to strengthening, flexibility, endurance, ROM for improvements in LE, proximal hip, and core control with self care, mobility, lifting, ambulation. [x] (17224) Provided verbal/tactile cueing for activities related to improving balance, coordination, kinesthetic sense, posture, motor skill, proprioception  to assist with LE, proximal hip, and core control in self care, mobility, lifting, ambulation and eccentric single leg control.      NMR and Therapeutic Activities:    [x] (84853 or 14893) Provided verbal/tactile cueing for activities related to improving balance, coordination, kinesthetic sense, posture, motor skill, proprioception and motor activation to allow for proper function of core, proximal hip and LE with self care and ADLs  [] (03489) Gait Re-education- Provided training and instruction to the patient for proper LE, core and proximal hip recruitment and positioning and eccentric body weight control with ambulation re-education including up and down stairs     Home Exercise Program:    [x] (56574) Reviewed/Progressed HEP activities related to strengthening, flexibility, endurance, ROM of core, proximal hip and LE for functional self-care, mobility, lifting and ambulation/stair navigation   [] (61294)Reviewed/Progressed HEP activities related to improving balance, coordination, kinesthetic sense, posture, motor skill, proprioception of core, proximal hip and LE for self care, mobility, lifting, and ambulation/stair navigation      Manual Treatments:  PROM / STM / Oscillations-Mobs:  G-I, II, III, IV (PA's, Inf., Post.)  [] (02813) Provided manual therapy to mobilize LE, proximal hip and/or LS spine soft tissue/joints for the purpose of modulating pain, promoting relaxation,  increasing ROM, reducing/eliminating soft tissue swelling/inflammation/restriction, improving soft tissue extensibility and allowing for proper ROM for normal function with self care, mobility, lifting and ambulation. Modalities:  CP L hip x15' 1/5    Charges:  Timed Code Treatment Minutes: 49   Total Treatment Minutes: 11:24-12:33  69       [] EVAL (LOW) 91420 (typically 20 minutes face-to-face)  [] EVAL (MOD) 41038 (typically 30 minutes face-to-face)  [] EVAL (HIGH) 78675 (typically 45 minutes face-to-face)  [] RE-EVAL     [x] NP(06845) x   1  [] IONTO  [x] NMR (06458) x   1  [] VASO  [x] Manual (91707) x 1      [] Other:  [] TA x      [] Mech Traction (30178)  [] ES(attended) (50128)      [] ES (un) (81555):     GOALS: Adjusted on 12/2/21 d/t surgical intervention  Patient stated goal:  Walk without pain   [x]? Progressing: []? Met: []? Not Met: []? Adjusted     Therapist goals for Patient:   Short Term Goals: To be achieved in: 4 weeks  1. Independent in HEP and progression per patient tolerance, in order to prevent further injury prior to surgery   [x]? Progressing: []? Met: []? Not Met: []? Adjusted   2. Patient will have a decrease in pain to facilitate improvement in movement, function, and ADLs as indicated by Functional Deficits. [x]? Progressing: []? Met: []? Not Met: []? Adjusted     Long Term Goals: To be achieved in: 8 weeks  1. Disability index score of 20% or less for the iHOT-12 to assist with reaching prior level of function. [x]? ? Progressing: []?? Met: []?? Not Met: []?? Adjusted  2.  Patient will demonstrate increased L hip AROM that is equal to R to allow for proper joint functioning as indicated by patients Functional Deficits.    [x]? ? Progressing: []?? Met: []?? Not Met: []?? Adjusted  3. Patient will demonstrate an increase in L hip strength to 4/5 in all directions tested to allow for proper functional mobility as indicated by patients Functional Deficits. [x]? ? Progressing: []?? Met: []?? Not Met: []?? Adjusted  4. Patient will return to all functional activities without increased symptoms or restriction. [x]? ? Progressing: []?? Met: []?? Not Met: []?? Adjusted  5. Patient will be able to walk over 500 ft with proper gait sequencing, no assistive device and no c/o pain. [x]? ? Progressing: []?? Met: []?? Not Met: []?? Adjusted      Overall Progression Towards Functional goals/ Treatment Progress Update:  [x] Patient is progressing as expected based on MD protocol with increase in L hip ROM and LE strength allowing her to ambulate properly with unilateral crutch. Based on surgical intervention and continued deficits she is appropriate for further PT to further increase LE strength and mobility. 1/5/22  [] Progression is slowed d/t  [] Progression has been slowed due to co-morbidities. [] Plan just implemented, too soon to assess goals progression <30days   [] Goals require adjustment due to lack of progress  [] Patient is not progressing as expected and requires additional follow up with physician  [x] Patient returns to physical therapy  d/t surgery that was performed on 11/30/21 on L hip.  She is appropriate for PT to protect surgical repair and increase ROM and strength based on MD protocol     Prognosis for POC: [x] Good [] Fair  [] Poor      Patient requires continued skilled intervention: [x] Yes  [] No    Treatment/Activity Tolerance:  [x] Patient able to complete treatment  [] Patient limited by fatigue  [] Patient limited by pain     [] Patient limited by other medical complications  [] Other:   1/7 Decrease tension noted at proximal hip adductors on L but did report soreness in the area d/t STM last visit. Fatigued with increase in intensity of treatment but no adverse effects noted or reported and pt was able to maintain appropriate form with exercises    Patient Education:              1/7 Added prone hip extension to HEP  1/5 Educated on use of unilateral crutch for short distance and bilateral crutches for community ambulation. 12/9 Updated HEP   12/2 Reviewed precautions based on surgical intervention     Access Code: Iza Mike        PLAN: See eval  [x] Continue per plan of care [x] Alter current plan (see comments below)  [] Plan of care initiated [] Hold pending MD visit [] Discharge  1/5 Progress L hip ROM and strength as well as work on weaning off assistive device as tolerated based on MD protocol. Electronically signed by:  Nnamdi Collado PT    Note: If patient does not return for scheduled/ recommended follow up visits, this note will serve as a discharge from care along with most recent update on progress.

## 2022-01-10 ENCOUNTER — OFFICE VISIT (OUTPATIENT)
Dept: ORTHOPEDIC SURGERY | Age: 57
End: 2022-01-10

## 2022-01-10 VITALS — BODY MASS INDEX: 31.54 KG/M2 | WEIGHT: 178 LBS | RESPIRATION RATE: 12 BRPM | HEIGHT: 63 IN

## 2022-01-10 DIAGNOSIS — Z98.890 S/P HIP ARTHROSCOPY: Primary | ICD-10-CM

## 2022-01-10 PROCEDURE — 99024 POSTOP FOLLOW-UP VISIT: CPT | Performed by: ORTHOPAEDIC SURGERY

## 2022-01-10 NOTE — PROGRESS NOTES
History of Present Illness:  Opal Norris is a pleasant 64 y.o. female who presents for a post operative visit. She is 6 weeks out following a revision left hip arthroscopy, revision acetabuloplasty, fractional psoas lengthening, lysis of adhesions, and segmental labral reconstruction, and endoscopic GT bursectomy. Overall She is doing a lot better. She is transition to FWB with 2 crutches outdoors, and 1 indoors. . She has been in physical therapy at [facility]. Not much sitting pan in the groin, more so mild buttock pain and adductor longus pain. Mild toe numbness bilaterally but this is improving with gabapentin. She denies fevers, chills, numbness, and shortness of breath. Medical History:  Patient's medications, allergies, past medical, surgical, social and family histories were reviewed and updated as appropriate. No notes on file    Review of Systems  A 14 point review of systems was completed by the patient and is available in the media section of the scanned medical record and was reviewed on 1/10/2022. Vital Signs:  Vitals:    01/10/22 1110   Resp: 12       General/Appearance: Alert and oriented and in no apparent distress. Skin:  There are no skin lesions, cellulitis, or extreme edema. The patient has warm and well-perfused Bilateral lower  extremities with brisk capillary refill. Hip  Exam:    Inspection: Hip incision(s) healed. Palpation:  No crepitus to gentle motion / circumduction of the hip    Active Range of Motion: somewhat weak active SLR/circumduction, but can initiate. Passive Range of Motion:  0 to 90 with no pain  Today, no pain with circumduction. Strength:  Good double leg bridging, deconditioned single-leg bridging. Acceptable stand-to-sit hip hinging with minimal assistance. Special Tests:  Deferred. Neurovascular: Sensation to light touch is intact, no motor deficits, palpable radial pulses 2+    Radiology:     No new xrays today.      Assessment :  Ms. Mary Orellana is a pleasant 64 y.o. patient who is 6 weeks out following a revision left hip arthroscopy, revision acetabuloplasty, fractional psoas lengthening, lysis of adhesions, and segmental labral reconstruction, and endoscopic GT bursectomy. She is progressing appropriately. Impression:  Encounter Diagnosis   Name Primary?  S/P hip arthroscopy Yes       Office Procedures:  No orders of the defined types were placed in this encounter. Treatment Plan:    Overall Mary Orellana is doing well. We recommend that She continue with physical therapy for timeline based progression of motion, weight bearing, and and strengthening, phase 3. To focus on hip-hinging, single leg balancing, and single leg bridging endurance, and abductor strengthening. To wean off crutches in next 7 days. To re-assess her ability to baby sit at the 9 week clyde with PT, more realistically at 12 weeks once walking with balanced gait, and has good hip/trunk control with turning/twisting. All of her questions were fully answered today. We would like to see Mary Orellana back in 6 weeks for follow-up visit. Sincerely,    Natasha Colunga MD [address] [facility] [address] [place] [address], [id number]  Email: [email or web address]  Office: [phone]    01/10/22  12:20 PM      01/10/22  12:20 PM

## 2022-01-10 NOTE — LETTER
Physical Therapy Rehabilitation Referral    Patient Name: Steven Hussein MRN: [id number]  DOS: 1/10/2022     Diagnosis:   1.  S/P hip arthroscopy          Precautions:     [x] Evaluate and Treat    Post Op Instructions:  [] Continuous passive motion (CPM)   [] AAROM: Flexion to 90   [] Uni-planar passive range of motion   [] AAROM: External rotation to 10   [] Hip brace on at all times    [] AAROM: Extension to 10   [] Hip brace when hip at risk     [] AAROM:  Neutral IR   [] Home exercise program (copy to patient)   [] AAROM: Abduction to 25    [] Isometric external rotator strengthening    [] Isometric glute max strengthening     [] Isometric abductor strengthening     [] Leg Circumduction            Post-op Phases:  []Phase I: Maximum Protection (Day 1-3 Weeks)  []Phase II: Mobility & Neuromuscular Retraining (3-6 Weeks)  [x]Phase III: Phase III: Muscle Balance and Strengthening (6-12 Weeks)  []Phase IV: Functional Training of the Hip & Lower Extremity (12-18 Weeks)  []Phase V: Advanced Training  Specificity for Return to Sport and/or Work (18-24 Cardiovascular Systems)    Non-Operative & Pre-Operative Rehabilitation:    Cardiovascular:            Deep Hip Rotators  [] Upright Bike (Baseline)           [] External Rotators AROM in 4PK (Baseline)  [] Walking (Progression 1)           [] Internal Rotators AROM in 4PK (Baseline)  [] Running (Progression 2)           [] ER in side lying (Progression 1)  [] Dynamic Loading (Progression 3)          [] IR in side lying (Progression 1)                [] ER with resistance in 4PK (Progression 2)                [] IR with resistance in 4PK (Progression 2)                 [] Lateral Step Up (Progression 3)    Positioning:  [] 4PK with pelvic tilts (Baseline)  [] Pelvic tilts in sitting (Progression 1)      Core:   [] Relaxation Breathing (Baseline)         [] [place] lying elbow presses (Progression 1)  [] Side Planks (Baseline)         [] Side planks + hip abduction (Progression 1)  [] Bird-Dog (Baseline)            [] Bird-Dog with resistance (Progression 1)    Glute Complex:            Load Transfer:  [] Bridging (Baseline)            [] Weight Shifting (Baseline)     [] Single Leg Bridge (Progression 1)        [] Single Leg Stance to SEBT(Progression 1)     [] Single Leg Lower (Progression 2)         [] Hip hinge + monster walks (Progression 2)       Mobility:  [] Mikhail position with breathing (baseline)  [] Hip Hinge with stick & neutral spine (baseline)  [] Sit to stand (baseline)  [] Hip Hinge without guidance (Progression 1)  [] Hip Hinge with resisted punch out guidance (Progression 2)      Pelvic Floor:  [] Relaxation breathing         Activities:       [] Rowing       [] Stepper/Exercise bike     [] Swimming  [] Water exercises    Modalities:     Return to Sport:  [x] Of Choice      [] Plyometrics  [] Ultrasound     [] Rhythmic stabilization  [] Iontophoresis    [] Core strengthening   [] Moist heat     [] Sports specific program:   [] Massage         [x] Cryotherapy      [] Electrical stimulation     [] Paraffin  [] Whirlpool  [] TENS    [x] Home exercise program (copy to patient). Perform exercises for:   15     minutes    3      times/day  [x] Supervised physical therapy  Frequency: []  1x week  [x] 2x week  [] 3x week  [] Other:   Duration: [] 2 weeks   [] 4 weeks  [x] 6 weeks  [] Other:     Additional Instructions:       Sincerely,    Efren Whyte MD [facility]  Hip Preservation & Sports Medicine Surgeon   [address] [facility]   [address] [place], [address]  Email: [email or web address]  Office: [phone]

## 2022-01-11 ENCOUNTER — HOSPITAL ENCOUNTER (OUTPATIENT)
Dept: PHYSICAL THERAPY | Age: 57
Setting detail: THERAPIES SERIES
Discharge: HOME | End: 2022-01-11
Payer: COMMERCIAL

## 2022-01-11 PROCEDURE — 97112 NEUROMUSCULAR REEDUCATION: CPT

## 2022-01-11 PROCEDURE — 97110 THERAPEUTIC EXERCISES: CPT

## 2022-01-11 PROCEDURE — 97140 MANUAL THERAPY 1/> REGIONS: CPT

## 2022-01-11 NOTE — FLOWSHEET NOTE
The [facility] and [address] [facility], [facility] [address], [address] [facility]  Phone: [phone]   Fax:     [phone]    Physical Therapy Progress/Daily Treatment Note  Date:  2022    Patient Name:  Yvon Ruiz    :  1965  MRN: [id number]  Restrictions/Precautions:    Medical/Treatment Diagnosis Information:  · Diagnosis: S76.212A (ICD-10-CM) - Strain of left groin  · Treatment Diagnosis: M25.552 Left hip pain  Insurance/Certification information:  PT Insurance Information: [facility]  Physician Information:  Referring Practitioner: Chante Cho   Has the plan of care been signed (Y/N):        [x]  Yes  []  No     Date of Patient follow up with Physician: PRN      Is this a Progress Report:     []  Yes  [x]  No        If Yes:  Date Range for reporting period:  Beginning 21  Ending  22    Progress report will be due (10 Rx or 30 days whichever is less):  3/4/55      Recertification will be due (POC Duration  / 90 days whichever is less): 3/2/22        Visit # Insurance Allowable Auth Required   46   [facility]  UNL []  Yes [x]  No        Functional Scale:   iHPT-12  62% deficit Date assessed: 22  iHOT-12: 77% deficit Date assessed: 21       Latex Allergy:  [x]NO      []YES  Preferred Language for Healthcare:   [x]English       []other:      Pain level:  2-310      SUBJECTIVE:   Pt saw MD yesterday and sore today d/t attempting to stand on 1 leg and walk without crutch. States her low back flared up on  with her L hip with no known cause. States that she iced and rested which helped decrease severity of symptoms. Reports having some burning in L adductor this morning that went away but still feels tight.      OBJECTIVE:   Updated below on 21  ROM PROM AROM Comments     Left Right Left Right     Flexion 100 130  110     Extension ~5    12      Abduction 25 48  45     ER 20 40  38     IR  60  55     Knee flexion 137  135 130     Knee ext  0   0  0          Flexibility Left NT d/t acute post op  Right Comments   Hamstrings  0     ITB (Obers test)  WNL     Hip flexor(Mikhail test)  WNL                    Strength Left  Right Comments   Hip flexors NT 4 SLR   Hip extension NT 5     Hip abduction NT 5     Hip adduction NT  4+     Hip ER NT 4+     Hip IR NT 5     Quads 4+ 5     Hamstrings 5 5           Palpation: Tenderness L piriformis and proximal gluts 1/11     Functional Mobility/Transfers: Modified independent     Posture:  WNL     Bandages/Dressings/Incisions: Pt reports incisions closed and denies any active exudate.       Gait: Bilateral axillary crutches, 50% weight bearing on L       RESTRICTIONS/PRECAUTIONS: LEFT REVISION HIP ARTHROSCOPY, SYNOVECTOMY AND LYSIS OF ADHESIONS, REVISION ACETABULOPLASTY, ALLOGRAFT LABRAL RECONSTRUCTION, ENDOSCOPIC GREATER TROCHANTERIC BURSECTOMY, FRACTIONAL PSOAS LENGTHENING 11/30/21    Exercises/Interventions:     ROM/STRETCHES     Upright bike seat 4 5 min ^1/5   Ankle pumps  12/2   Heel slides  ^12/9 supine, strap for assistance   12/9 withheld d/t soreness after last visit and focused more on PROM below    Prone lying  1/5 withheld d/t improvement in low back syptoms    Adductor stretch 30 sec x3 L Start 1/11   Prone elbow   start12/16   Quad stretch prone   [id number] ^1/5 completed with strap   SB roll ins x30 bilat Start 1/11   SB LTR x30 to R Start 1/11 did not complete to L              PREs     Quad sets 1/5 d/c based on progress   Glut sets 1/5 d/c based on progress   TA isometric with pelvic floor engagement   1/5 cont with HEP    TA overhead with pelvic floor engagement   1/11 progressed with TA marching   TA marching 3x10 alt LE 1/11   SAQ   ^12/23   Hamstring curl 4# 3x10 L ^1/7 standing   Quadruped rocking           Cat cow         3x10   Start12/16    ^1/5      ^1/7    Prone heel squeeze 10\"hx10 bilat Start 1/11   SLR abd 0# 3x10 L Start 1/11 to neutral with pillow between legs to decrease amount of hip add   Bridges 3x10 bilat 1/5 modified height    BKFO  Gray TB 3x10 ^1/11 stabilization on L, movement into ER and abd on R   modified Ramona pose  start12/20   LAQ with BS  3x10 4#   ^1/7    Heel raises 3x10 bilat 1/5 bilat UE support   Weight shifts Side to side 10\"hx10  Forward/backward 10\"h x10 ^1/11   Hip extension  0# 3x10 L 1/7 completed in standing bent over table         Manual Therapy     PROM L hip circumduction and flexion x6' ^1/11   STM L proximal hip adductor with hip abd PROM x4' ^1/11 completed d/t tightness at proximal hip adductors   Neuromuscular re-education     Biodex weight distribution     Therapeutic activities     Gait training  1/7 Withheld d/t demonstrating appropriate sequencing with unilateral cane   Stair training     Functional movement         Therapeutic Exercise and NMR EXR  [x] (32138) Provided verbal/tactile cueing for activities related to strengthening, flexibility, endurance, ROM for improvements in LE, proximal hip, and core control with self care, mobility, lifting, ambulation. [x] (36357) Provided verbal/tactile cueing for activities related to improving balance, coordination, kinesthetic sense, posture, motor skill, proprioception  to assist with LE, proximal hip, and core control in self care, mobility, lifting, ambulation and eccentric single leg control.      NMR and Therapeutic Activities:    [x] (76414 or 71645) Provided verbal/tactile cueing for activities related to improving balance, coordination, kinesthetic sense, posture, motor skill, proprioception and motor activation to allow for proper function of core, proximal hip and LE with self care and ADLs  [] (63127) Gait Re-education- Provided training and instruction to the patient for proper LE, core and proximal hip recruitment and positioning and eccentric body weight control with ambulation re-education including up and down stairs     Home Exercise Program:    [x] (20379) Reviewed/Progressed HEP activities related to strengthening, flexibility, endurance, ROM of core, proximal hip and LE for functional self-care, mobility, lifting and ambulation/stair navigation   [] (39464)Reviewed/Progressed HEP activities related to improving balance, coordination, kinesthetic sense, posture, motor skill, proprioception of core, proximal hip and LE for self care, mobility, lifting, and ambulation/stair navigation      Manual Treatments:  PROM / STM / Oscillations-Mobs:  G-I, II, III, IV (PA's, Inf., Post.)  [] (42631) Provided manual therapy to mobilize LE, proximal hip and/or LS spine soft tissue/joints for the purpose of modulating pain, promoting relaxation,  increasing ROM, reducing/eliminating soft tissue swelling/inflammation/restriction, improving soft tissue extensibility and allowing for proper ROM for normal function with self care, mobility, lifting and ambulation. Modalities:  CP L hip x15' 1/11    Charges:  Timed Code Treatment Minutes: 48'   Total Treatment Minutes: 9:24-10:47  80'       [] EVAL (LOW) 455 1011 (typically 20 minutes face-to-face)  [] EVAL (MOD) 64714 (typically 30 minutes face-to-face)  [] EVAL (HIGH) 90131 (typically 45 minutes face-to-face)  [] RE-EVAL     [x] ON(34539) x   2  [] IONTO  [x] NMR (96813) x   1  [] VASO  [x] Manual (42910) x 1      [] Other:  [] TA x      [] Mech Traction (25950)  [] ES(attended) (70959)      [] ES (un) (25916):     GOALS: Adjusted on 12/2/21 d/t surgical intervention  Patient stated goal:  Walk without pain   [x]? Progressing: []? Met: []? Not Met: []? Adjusted     Therapist goals for Patient:   Short Term Goals: To be achieved in: 4 weeks  1. Independent in HEP and progression per patient tolerance, in order to prevent further injury prior to surgery   [x]? Progressing: []? Met: []? Not Met: []? Adjusted   2.  Patient will have a decrease in pain to facilitate improvement in movement, function, and ADLs as indicated by Functional Deficits. [x]? Progressing: []? Met: []? Not Met: []? Adjusted     Long Term Goals: To be achieved in: 8 weeks  1. Disability index score of 20% or less for the iHOT-12 to assist with reaching prior level of function. [x]? ? Progressing: []?? Met: []?? Not Met: []?? Adjusted  2. Patient will demonstrate increased L hip AROM that is equal to R to allow for proper joint functioning as indicated by patients Functional Deficits.    [x]? ? Progressing: []?? Met: []?? Not Met: []?? Adjusted  3. Patient will demonstrate an increase in L hip strength to 4/5 in all directions tested to allow for proper functional mobility as indicated by patients Functional Deficits. [x]? ? Progressing: []?? Met: []?? Not Met: []?? Adjusted  4. Patient will return to all functional activities without increased symptoms or restriction. [x]? ? Progressing: []?? Met: []?? Not Met: []?? Adjusted  5. Patient will be able to walk over 500 ft with proper gait sequencing, no assistive device and no c/o pain. [x]? ? Progressing: []?? Met: []?? Not Met: []?? Adjusted      Overall Progression Towards Functional goals/ Treatment Progress Update:  [x] Patient is progressing as expected based on MD protocol with increase in L hip ROM and LE strength allowing her to ambulate properly with unilateral crutch. Based on surgical intervention and continued deficits she is appropriate for further PT to further increase LE strength and mobility. 1/5/22  [] Progression is slowed d/t  [] Progression has been slowed due to co-morbidities. [] Plan just implemented, too soon to assess goals progression <30days   [] Goals require adjustment due to lack of progress  [] Patient is not progressing as expected and requires additional follow up with physician  [x] Patient returns to physical therapy  d/t surgery that was performed on 11/30/21 on L hip.  She is appropriate for PT to protect surgical repair and increase ROM and strength based on MD protocol

## 2022-01-12 ENCOUNTER — APPOINTMENT (OUTPATIENT)
Dept: PHYSICAL THERAPY | Age: 57
End: 2022-01-12
Payer: COMMERCIAL

## 2022-01-14 ENCOUNTER — HOSPITAL ENCOUNTER (OUTPATIENT)
Dept: PHYSICAL THERAPY | Age: 57
Setting detail: THERAPIES SERIES
Discharge: HOME | End: 2022-01-14
Payer: COMMERCIAL

## 2022-01-14 PROCEDURE — 97112 NEUROMUSCULAR REEDUCATION: CPT | Performed by: PHYSICAL THERAPIST

## 2022-01-14 PROCEDURE — 97110 THERAPEUTIC EXERCISES: CPT | Performed by: PHYSICAL THERAPIST

## 2022-01-14 PROCEDURE — 97140 MANUAL THERAPY 1/> REGIONS: CPT | Performed by: PHYSICAL THERAPIST

## 2022-01-14 NOTE — FLOWSHEET NOTE
The [facility] and [address] [facility], [address] [place] [address] Drive [address], [address] [facility]  Phone: [phone]   Fax:     [phone]    Physical Therapy Progress/Daily Treatment Note  Date:  2022    Patient Name:  Kalyan Wood    :  1965  MRN: [id number]  Restrictions/Precautions:    Medical/Treatment Diagnosis Information:  · Diagnosis: S76.212A (ICD-10-CM) - Strain of left groin  · Treatment Diagnosis: M25.552 Left hip pain  Insurance/Certification information:  PT Insurance Information: [facility]  Physician Information:  Referring Practitioner: Wilberto Rodriguez   Has the plan of care been signed (Y/N):        [x]  Yes  []  No     Date of Patient follow up with Physician: PRN      Is this a Progress Report:     []  Yes  [x]  No        If Yes:  Date Range for reporting period:  Beginning 21  Ending  22    Progress report will be due (10 Rx or 30 days whichever is less):  38      Recertification will be due (POC Duration  / 90 days whichever is less): 3/2/22        Visit # Insurance Allowable Auth Required   47   [facility]  UNL []  Yes [x]  No        Functional Scale:   iHPT-12  62% deficit Date assessed: 22  iHOT-12: 77% deficit Date assessed: 21       Latex Allergy:  [x]NO      []YES  Preferred Language for Healthcare:   [x]English       []other:      Pain level:  2-3/10      SUBJECTIVE:   Reports that the soreness she was reporting last visit has improved significantly. Notes that she is tolerating ambulating with 1 crutch well.       OBJECTIVE:   Updated below on 21  ROM PROM AROM Comments     Left Right Left Right     Flexion 100 130  110     Extension ~5    12      Abduction 25 48  45     ER 20 40  38     IR  60  55     Knee flexion 137  135 130     Knee ext  0   0  0          Flexibility Left NT d/t acute post op  Right Comments   Hamstrings  0     ITB (Obers test)  WNL     Hip flexor(Mikhail test)  WNL                  Strength Left  Right Comments   Hip flexors NT 4 SLR   Hip extension NT 5     Hip abduction NT 5     Hip adduction NT  4+     Hip ER NT 4+     Hip IR NT 5     Quads 4+ 5     Hamstrings 5 5           Palpation: Tenderness L piriformis and proximal gluts 1/11     Functional Mobility/Transfers: Modified independent     Posture:  WNL     Bandages/Dressings/Incisions: Pt reports incisions closed and denies any active exudate.       Gait: Bilateral axillary crutches, 50% weight bearing on L       RESTRICTIONS/PRECAUTIONS: LEFT REVISION HIP ARTHROSCOPY, SYNOVECTOMY AND LYSIS OF ADHESIONS, REVISION ACETABULOPLASTY, ALLOGRAFT LABRAL RECONSTRUCTION, ENDOSCOPIC GREATER TROCHANTERIC BURSECTOMY, FRACTIONAL PSOAS LENGTHENING 11/30/21    Exercises/Interventions:     ROM/STRETCHES     Upright bike seat 4 5 min ^1/5   Ankle pumps  12/2   Heel slides  ^12/9 supine, strap for assistance   12/9 withheld d/t soreness after last visit and focused more on PROM below    Prone lying  1/5 withheld d/t improvement in low back syptoms    Adductor stretch 30 sec x3 L Start 1/11   Prone elbow   start12/16   Quad stretch prone   [id number] ^1/5 completed with strap   SB roll ins x30 bilat Start 1/11   SB LTR x30 to R Start 1/11 did not complete to L              PREs     Quad sets 1/5 d/c based on progress   Glut sets 1/5 d/c based on progress   TA isometric with pelvic floor engagement   1/5 cont with HEP    TA overhead with pelvic floor engagement   1/11 progressed with TA marching   Hip hinging stage 1 2x10 Added 1/14   TA marching 3x10 alt LE 1/11   SAQ   ^12/23   Hamstring curl 4# 3x10 L ^1/7 standing   Quadruped rocking           Cat cow         3x10   Start12/16    ^1/5      ^1/7    Prone heel squeeze 10\"hx10 bilat Start 1/11   SLR abd 0# 3x10 L Start 1/11 to neutral with pillow between legs to decrease amount of hip add   Bridges 3x10 bilat 1/5 modified height    BKFO  Gray TB 3x10 ^1/11 stabilization on L, movement into ER and abd on R   modified Ramona pose  start12/20   LAQ with BS  3x10 4#   ^1/7    Heel raises 3x10 bilat 1/5 bilat UE support   Weight shifts Side to side 10\"hx10  Forward/backward 10\"h x10 ^1/11   Hip extension  0# 3x10 L 1/7 completed in standing bent over table         Manual Therapy     PROM L hip circumduction and flexion x6' ^1/11   STM L proximal hip adductor with hip abd PROM x4' ^1/11 completed d/t tightness at proximal hip adductors   Neuromuscular re-education     Biodex weight distribution     Therapeutic activities     Gait training  1/7 Withheld d/t demonstrating appropriate sequencing with unilateral cane   Stair training     Functional movement         Therapeutic Exercise and NMR EXR  [x] (80440) Provided verbal/tactile cueing for activities related to strengthening, flexibility, endurance, ROM for improvements in LE, proximal hip, and core control with self care, mobility, lifting, ambulation. [x] (59580) Provided verbal/tactile cueing for activities related to improving balance, coordination, kinesthetic sense, posture, motor skill, proprioception  to assist with LE, proximal hip, and core control in self care, mobility, lifting, ambulation and eccentric single leg control.      NMR and Therapeutic Activities:    [x] (90041 or 49039) Provided verbal/tactile cueing for activities related to improving balance, coordination, kinesthetic sense, posture, motor skill, proprioception and motor activation to allow for proper function of core, proximal hip and LE with self care and ADLs  [] (54344) Gait Re-education- Provided training and instruction to the patient for proper LE, core and proximal hip recruitment and positioning and eccentric body weight control with ambulation re-education including up and down stairs     Home Exercise Program:    [x] (38330) Reviewed/Progressed HEP activities related to strengthening, flexibility, endurance, ROM of core, proximal hip and LE for functional self-care, mobility, lifting and ambulation/stair navigation   [] (37262)Reviewed/Progressed HEP activities related to improving balance, coordination, kinesthetic sense, posture, motor skill, proprioception of core, proximal hip and LE for self care, mobility, lifting, and ambulation/stair navigation      Manual Treatments:  PROM / STM / Oscillations-Mobs:  G-I, II, III, IV (PA's, Inf., Post.)  [] (50010) Provided manual therapy to mobilize LE, proximal hip and/or LS spine soft tissue/joints for the purpose of modulating pain, promoting relaxation,  increasing ROM, reducing/eliminating soft tissue swelling/inflammation/restriction, improving soft tissue extensibility and allowing for proper ROM for normal function with self care, mobility, lifting and ambulation. Modalities:  CP L hip x15' 1/14    Charges:  Timed Code Treatment Minutes: 48   Total Treatment Minutes: 11:45-1:05 80'        [] EVAL (LOW) 13346 (typically 20 minutes face-to-face)  [] EVAL (MOD) 50881 (typically 30 minutes face-to-face)  [] EVAL (HIGH) 99999 (typically 45 minutes face-to-face)  [] RE-EVAL     [x] OB(80907) x   1  [] IONTO  [x] NMR (23040) x   1  [] VASO  [x] Manual (35050) x 1      [] Other:  [] TA x      [] Mech Traction (99853)  [] ES(attended) (17117)      [] ES (un) (98276):     GOALS: Adjusted on 12/2/21 d/t surgical intervention  Patient stated goal:  Walk without pain   [x]? Progressing: []? Met: []? Not Met: []? Adjusted     Therapist goals for Patient:   Short Term Goals: To be achieved in: 4 weeks  1. Independent in HEP and progression per patient tolerance, in order to prevent further injury prior to surgery   [x]? Progressing: []? Met: []? Not Met: []? Adjusted   2. Patient will have a decrease in pain to facilitate improvement in movement, function, and ADLs as indicated by Functional Deficits. [x]? Progressing: []? Met: []? Not Met: []? Adjusted     Long Term Goals: To be achieved in: 8 weeks  1.  Disability index score of 20% or less for the iHOT-12 to assist with reaching prior level of function. [x]? ? Progressing: []?? Met: []?? Not Met: []?? Adjusted  2. Patient will demonstrate increased L hip AROM that is equal to R to allow for proper joint functioning as indicated by patients Functional Deficits.    [x]? ? Progressing: []?? Met: []?? Not Met: []?? Adjusted  3. Patient will demonstrate an increase in L hip strength to 4/5 in all directions tested to allow for proper functional mobility as indicated by patients Functional Deficits. [x]? ? Progressing: []?? Met: []?? Not Met: []?? Adjusted  4. Patient will return to all functional activities without increased symptoms or restriction. [x]? ? Progressing: []?? Met: []?? Not Met: []?? Adjusted  5. Patient will be able to walk over 500 ft with proper gait sequencing, no assistive device and no c/o pain. [x]? ? Progressing: []?? Met: []?? Not Met: []?? Adjusted      Overall Progression Towards Functional goals/ Treatment Progress Update:  [x] Patient is progressing as expected based on MD protocol with increase in L hip ROM and LE strength allowing her to ambulate properly with unilateral crutch. Based on surgical intervention and continued deficits she is appropriate for further PT to further increase LE strength and mobility. 1/5/22  [] Progression is slowed d/t  [] Progression has been slowed due to co-morbidities. [] Plan just implemented, too soon to assess goals progression <30days   [] Goals require adjustment due to lack of progress  [] Patient is not progressing as expected and requires additional follow up with physician  [x] Patient returns to physical therapy  d/t surgery that was performed on 11/30/21 on L hip.  She is appropriate for PT to protect surgical repair and increase ROM and strength based on MD protocol     Prognosis for POC: [x] Good [] Fair  [] Poor      Patient requires continued skilled intervention: [x] Yes  [] No    Treatment/Activity Tolerance:  [x] Patient able to complete treatment  [] Patient limited by fatigue  [] Patient limited by pain     [] Patient limited by other medical complications  [] Other:   1/14 No complaints with today's program.  Added hip hinging today and pt tolerated this well. We also working on ambulating without AD in clinic today. Pt demonstrated a fairly good gait pattern. She did present with decreased jennifer and stride length, however her overall pattern looked good and noted no pain with the activity. PROM appears to be progressing well. Patient Education:              1/11 Updated HEP   1/7 Added prone hip extension to HEP  1/5 Educated on use of unilateral crutch for short distance and bilateral crutches for community ambulation. 12/9 Updated HEP   12/2 Reviewed precautions based on surgical intervention     Access Code: Rachelle Kumar        PLAN: See eval  [x] Continue per plan of care [x] Alter current plan (see comments below)  [] Plan of care initiated [] Hold pending MD visit [] Discharge  1/11: Continue to push L hip ROM, strengthening and increase weight bearing status on L in order to progress off of unilateral crutch as tolerated. Electronically signed by:  Jalen Dodd PT    Note: If patient does not return for scheduled/ recommended follow up visits, this note will serve as a discharge from care along with most recent update on progress.

## 2022-01-18 ENCOUNTER — HOSPITAL ENCOUNTER (OUTPATIENT)
Dept: PHYSICAL THERAPY | Age: 57
Setting detail: THERAPIES SERIES
Discharge: HOME | End: 2022-01-18
Payer: COMMERCIAL

## 2022-01-18 PROCEDURE — 97112 NEUROMUSCULAR REEDUCATION: CPT

## 2022-01-18 PROCEDURE — 97140 MANUAL THERAPY 1/> REGIONS: CPT

## 2022-01-18 PROCEDURE — 97110 THERAPEUTIC EXERCISES: CPT

## 2022-01-18 NOTE — FLOWSHEET NOTE
The [address] [facility] and [address] [facility], [facility] [address], [address] [facility]  Phone: [phone]   Fax:     [phone]    Physical Therapy Progress/Daily Treatment Note  Date:  2022    Patient Name:  Mary Orellana    :  1965  MRN: [id number]  Restrictions/Precautions:    Medical/Treatment Diagnosis Information:  · Diagnosis: S76.212A (ICD-10-CM) - Strain of left groin  · Treatment Diagnosis: M25.552 Left hip pain  Insurance/Certification information:  PT Insurance Information: [facility]  Physician Information:  Referring Practitioner: Natasha Colunga   Has the plan of care been signed (Y/N):        [x]  Yes  []  No     Date of Patient follow up with Physician: PRN      Is this a Progress Report:     []  Yes  [x]  No        If Yes:  Date Range for reporting period:  Beginning 21  Ending  22    Progress report will be due (10 Rx or 30 days whichever is less):  35      Recertification will be due (POC Duration  / 90 days whichever is less): 3/2/22        Visit # Insurance Allowable Auth Required   48   [facility]  UNL []  Yes [x]  No        Functional Scale:   iHPT-12  62% deficit Date assessed: 22  iHOT-12: 77% deficit Date assessed: 21       Latex Allergy:  [x]NO      []YES  Preferred Language for Healthcare:   [x]English       []other:      Pain level:  1/10 1/18     SUBJECTIVE:   Pt reports she is doing well overall but still has periodic fatigue and soreness when she stands more. Has been walking around house without crutch but will still use crutch when she feels fatigued.  Pt states she was able to put on her shoes today without assistance      OBJECTIVE:   Updated below on 21  ROM PROM AROM Comments     Left Right Left Right     Flexion 100 130  110     Extension ~5    12      Abduction 25 48  45     ER 20 40  38     IR  60  55     Knee flexion 137  135 130     Knee ext  0   0  0          Flexibility Left NT d/t acute post op  Right Comments   Hamstrings  0     ITB (Obers test)  WNL     Hip flexor(Mikhail test)  WNL                    Strength Left  Right Comments   Hip flexors NT 4 SLR   Hip extension NT 5     Hip abduction NT 5     Hip adduction NT  4+     Hip ER NT 4+     Hip IR NT 5     Quads 4+ 5     Hamstrings 5 5           Palpation: Tenderness L piriformis and proximal gluts 1/11     Functional Mobility/Transfers: Modified independent     Posture:  WNL     Bandages/Dressings/Incisions: Pt reports incisions closed and denies any active exudate.       Gait: Bilateral axillary crutches, 50% weight bearing on L       RESTRICTIONS/PRECAUTIONS: LEFT REVISION HIP ARTHROSCOPY, SYNOVECTOMY AND LYSIS OF ADHESIONS, REVISION ACETABULOPLASTY, ALLOGRAFT LABRAL RECONSTRUCTION, ENDOSCOPIC GREATER TROCHANTERIC BURSECTOMY, FRACTIONAL PSOAS LENGTHENING 11/30/21    Exercises/Interventions:     ROM/STRETCHES     Upright bike seat 4 5 min ^1/5   Ankle pumps  12/2   Heel slides  ^12/9 supine, strap for assistance   12/9 withheld d/t soreness after last visit and focused more on PROM below    Prone lying  1/5 withheld d/t improvement in low back syptoms    Adductor stretch 30 sec x3 L Start 1/11   Prone elbow   start12/16   Quad stretch prone   [id number] ^1/5 completed with strap   SB roll ins x30 bilat Start 1/11   SB LTR x30 to R Start 1/11 did not complete to L              PREs     Hip hinging stage 1 3x10 ^1/18   TA marching 3x10 alt LE 1/11   TA with LE ext 2x10 alt LE Start 1/18   Hamstring curl 5# 3x10 L ^1/18 standing   Quadruped rocking           Cat cow         3x10   Start12/16    ^1/5      ^1/7    Prone heel squeeze 10\"hx10 bilat Start 1/11   SLR abd 1# 3x10 L ^1/18 side lying    Bridges 3x10 bilat 1/5 modified height    BKFO  Mallory TB 5\"h 2x10 ^1/18 stabilization on L, movement into ER and abd on R   modified Ramona pose  start12/20   LAQ with BS  3x10 5#   ^1/18    Heel raises 3x10 bilat 1/5 bilat UE support   Weight shifts  1/18 progressed with SLS   Hip extension  5# 3x10 L ^1/18 completed in standing bent over table         Manual Therapy     PROM L hip flexion in supine and ER/IR in prone x6' ^1/18   STM L proximal hip adductor with hip abd PROM x4' ^1/11 completed d/t tightness at proximal hip adductors   Neuromuscular re-education     SLS 10\"hx5 L Start 1/18 completed at table with finger tip support   Mini squat at table 2x10 bilat Start 1/18 cues for proper hip hinge    Therapeutic activities     Gait training  1/7 Withheld d/t demonstrating appropriate sequencing with unilateral cane   Stair training     Functional movement         Therapeutic Exercise and NMR EXR  [x] (47409) Provided verbal/tactile cueing for activities related to strengthening, flexibility, endurance, ROM for improvements in LE, proximal hip, and core control with self care, mobility, lifting, ambulation. [x] (74090) Provided verbal/tactile cueing for activities related to improving balance, coordination, kinesthetic sense, posture, motor skill, proprioception  to assist with LE, proximal hip, and core control in self care, mobility, lifting, ambulation and eccentric single leg control.      NMR and Therapeutic Activities:    [x] (56336 or 57936) Provided verbal/tactile cueing for activities related to improving balance, coordination, kinesthetic sense, posture, motor skill, proprioception and motor activation to allow for proper function of core, proximal hip and LE with self care and ADLs  [] (23573) Gait Re-education- Provided training and instruction to the patient for proper LE, core and proximal hip recruitment and positioning and eccentric body weight control with ambulation re-education including up and down stairs     Home Exercise Program:    [x] (65382) Reviewed/Progressed HEP activities related to strengthening, flexibility, endurance, ROM of core, proximal hip and LE for functional self-care, mobility, lifting and ambulation/stair navigation   [] (56226)Reviewed/Progressed HEP activities related to improving balance, coordination, kinesthetic sense, posture, motor skill, proprioception of core, proximal hip and LE for self care, mobility, lifting, and ambulation/stair navigation      Manual Treatments:  PROM / STM / Oscillations-Mobs:  G-I, II, III, IV (PA's, Inf., Post.)  [] (60754) Provided manual therapy to mobilize LE, proximal hip and/or LS spine soft tissue/joints for the purpose of modulating pain, promoting relaxation,  increasing ROM, reducing/eliminating soft tissue swelling/inflammation/restriction, improving soft tissue extensibility and allowing for proper ROM for normal function with self care, mobility, lifting and ambulation. Modalities:  CP L hip x15' 1/18    Charges:  Timed Code Treatment Minutes: 50'   Total Treatment Minutes: 10:10-11:20  70'       [] EVAL (LOW) 30466 (typically 20 minutes face-to-face)  [] EVAL (MOD) 69610 (typically 30 minutes face-to-face)  [] EVAL (HIGH) 04223 (typically 45 minutes face-to-face)  [] RE-EVAL     [x] FS(33850) x   1  [] IONTO  [x] NMR (86051) x   1  [] VASO  [x] Manual (52951) x 1      [] Other:  [] TA x      [] Mech Traction (16560)  [] ES(attended) (60374)      [] ES (un) (65967):     GOALS: Adjusted on 12/2/21 d/t surgical intervention  Patient stated goal:  Walk without pain   [x]? Progressing: []? Met: []? Not Met: []? Adjusted     Therapist goals for Patient:   Short Term Goals: To be achieved in: 4 weeks  1. Independent in HEP and progression per patient tolerance, in order to prevent further injury prior to surgery   [x]? Progressing: []? Met: []? Not Met: []? Adjusted   2. Patient will have a decrease in pain to facilitate improvement in movement, function, and ADLs as indicated by Functional Deficits. [x]? Progressing: []? Met: []? Not Met: []? Adjusted     Long Term Goals: To be achieved in: 8 weeks  1.  Disability index score of 20% or less for the iHOT-12 to assist with reaching prior level of function. [x]? ? Progressing: []?? Met: []?? Not Met: []?? Adjusted  2. Patient will demonstrate increased L hip AROM that is equal to R to allow for proper joint functioning as indicated by patients Functional Deficits.    [x]? ? Progressing: []?? Met: []?? Not Met: []?? Adjusted  3. Patient will demonstrate an increase in L hip strength to 4/5 in all directions tested to allow for proper functional mobility as indicated by patients Functional Deficits. [x]? ? Progressing: []?? Met: []?? Not Met: []?? Adjusted  4. Patient will return to all functional activities without increased symptoms or restriction. [x]? ? Progressing: []?? Met: []?? Not Met: []?? Adjusted  5. Patient will be able to walk over 500 ft with proper gait sequencing, no assistive device and no c/o pain. [x]? ? Progressing: []?? Met: []?? Not Met: []?? Adjusted      Overall Progression Towards Functional goals/ Treatment Progress Update:  [x] Patient is progressing as expected based on MD protocol with increase in L hip ROM and LE strength allowing her to ambulate properly with unilateral crutch. Based on surgical intervention and continued deficits she is appropriate for further PT to further increase LE strength and mobility. 1/5/22  [] Progression is slowed d/t  [] Progression has been slowed due to co-morbidities. [] Plan just implemented, too soon to assess goals progression <30days   [] Goals require adjustment due to lack of progress  [] Patient is not progressing as expected and requires additional follow up with physician  [x] Patient returns to physical therapy  d/t surgery that was performed on 11/30/21 on L hip.  She is appropriate for PT to protect surgical repair and increase ROM and strength based on MD protocol     Prognosis for POC: [x] Good [] Fair  [] Poor      Patient requires continued skilled intervention: [x] Yes  [] No    Treatment/Activity Tolerance:  [x] Patient able to complete treatment  [] Patient limited by fatigue  [] Patient limited by pain     [] Patient limited by other medical complications  [] Other:   1/18 Pt presented with good gait sequencing without use of unilateral crutch demonstrating mild decrease in jennifer and mild decrease in stance time on L but no significant limp or abnormality noted. Cues required to avoid compensation into excessive forward knee flexion during squat. Responded well to cues and completed exercise appropriately. Fatigued with increase in intensity of treatment including SLS but no adverse effects noted or reported. Patient Education:              1/11 Updated HEP   1/7 Added prone hip extension to HEP  1/5 Educated on use of unilateral crutch for short distance and bilateral crutches for community ambulation. 12/9 Updated HEP   12/2 Reviewed precautions based on surgical intervention     Access Code: Braeden [email or web address]        PLAN: See eval  [x] Continue per plan of care [x] Alter current plan (see comments below)  [] Plan of care initiated [] Hold pending MD visit [] Discharge  1/18: Pt to continue to ambulate without crutch around house as tolerated. D/t decrease endurance it is recommended she continue to use for community ambulation. Progress strength and balance in weight bearing in order to wean off of crutch. Update HEP NPV. Electronically signed by:  Cydney Nguyen PT    Note: If patient does not return for scheduled/ recommended follow up visits, this note will serve as a discharge from care along with most recent update on progress.

## 2022-01-20 ENCOUNTER — HOSPITAL ENCOUNTER (OUTPATIENT)
Dept: PHYSICAL THERAPY | Age: 57
Setting detail: THERAPIES SERIES
Discharge: HOME | End: 2022-01-20
Payer: COMMERCIAL

## 2022-01-20 PROCEDURE — 97112 NEUROMUSCULAR REEDUCATION: CPT

## 2022-01-20 PROCEDURE — 97140 MANUAL THERAPY 1/> REGIONS: CPT

## 2022-01-20 PROCEDURE — 97110 THERAPEUTIC EXERCISES: CPT

## 2022-01-20 NOTE — FLOWSHEET NOTE
The [address] [facility] and [address] [facility], [facility] [address], [address] [facility]  Phone: [phone]   Fax:     [phone]    Physical Therapy Progress/Daily Treatment Note  Date:  2022    Patient Name:  Jenna Desouza    :  1965  MRN: [id number]  Restrictions/Precautions:    Medical/Treatment Diagnosis Information:  · Diagnosis: S76.212A (ICD-10-CM) - Strain of left groin  · Treatment Diagnosis: M25.552 Left hip pain  Insurance/Certification information:  PT Insurance Information: [facility]  Physician Information:  Referring Practitioner: Hailey Ivory   Has the plan of care been signed (Y/N):        [x]  Yes  []  No     Date of Patient follow up with Physician: PRN      Is this a Progress Report:     []  Yes  [x]  No        If Yes:  Date Range for reporting period:  Beginning 21  Ending  22    Progress report will be due (10 Rx or 30 days whichever is less):  76      Recertification will be due (POC Duration  / 90 days whichever is less): 3/2/22        Visit # Insurance Allowable Auth Required   49   [facility]  UNL []  Yes [x]  No        Functional Scale:   iHPT-12  62% deficit Date assessed: 22  iHOT-12: 77% deficit Date assessed: 21       Latex Allergy:  [x]NO      []YES  Preferred Language for Healthcare:   [x]English       []other:      Pain level:  1-2/10 1/20     SUBJECTIVE:   Pt states she was standing a lot yesterday while baking and running errands causing soreness global hip. Started the day without using her crutch and ended up using it by the end of the day d/t fatigue. Symptoms improved with ice and rest. States having mild burning in L adductor this morning.        OBJECTIVE:   Updated below on 21  ROM PROM AROM Comments     Left Right Left Right     Flexion 100 130  110     Extension ~5    12      Abduction 25 48  45     ER 20 40  38     IR  60  55     Knee flexion 137  135 130     Knee ext  0   0  0      Flexibility Left NT d/t acute post op  Right Comments   Hamstrings  0     ITB (Obers test)  WNL     Hip flexor(Mikhail test)  WNL                    Strength Left  Right Comments   Hip flexors NT 4 SLR   Hip extension NT 5     Hip abduction NT 5     Hip adduction NT  4+     Hip ER NT 4+     Hip IR NT 5     Quads 4+ 5     Hamstrings 5 5           Palpation: Tenderness L piriformis and proximal gluts 1/11     Functional Mobility/Transfers: Modified independent     Posture:  WNL     Bandages/Dressings/Incisions: Pt reports incisions closed and denies any active exudate.       Gait: Bilateral axillary crutches, 50% weight bearing on L       RESTRICTIONS/PRECAUTIONS: LEFT REVISION HIP ARTHROSCOPY, SYNOVECTOMY AND LYSIS OF ADHESIONS, REVISION ACETABULOPLASTY, ALLOGRAFT LABRAL RECONSTRUCTION, ENDOSCOPIC GREATER TROCHANTERIC BURSECTOMY, FRACTIONAL PSOAS LENGTHENING 11/30/21    Exercises/Interventions:     ROM/STRETCHES     Upright bike seat 4 5 min ^1/5   Ankle pumps  12/2   Heel slides  ^12/9 supine, strap for assistance   12/9 withheld d/t soreness after last visit and focused more on PROM below    Prone lying  1/5 withheld d/t improvement in low back syptoms    Adductor stretch 30 sec x3 L Start 1/11   Prone elbow   start12/16   Quad stretch prone   [id number] ^1/5 completed with strap   SB roll ins  Start 1/11   SB LTR x30 to R Start 1/11 did not complete to L              PREs     Hip hinging stage 1  ^1/18   TA marching 3x10 alt LE 1/11   TA with LE ext 3x10 alt LE ^1/20   Hamstring curl 5# 3x10 L ^1/18 standing   Quadruped rocking           Cat cow         3x10   Start12/16    ^1/5      ^1/7    Prone heel squeeze 10\"hx10 bilat Start 1/11   SLR abd 1# 3x10 L ^1/18 side lying    Bridges 3x10 bilat ^1/20 blue loop around knees    BKFO  Blue loop 10\"h 1x10 ^1/20 stabilization on L, movement into ER and abd on R   modified Ramona pose  start12/20   LAQ with BS  3x10 5#   ^1/18    Heel raises 3x10 bilat 1/5 bilat UE support   Weight shifts  1/18 progressed with SLS   Hip extension  5# 3x10 L ^1/18 completed in standing bent over table         Manual Therapy     PROM L hip flexion in supine and ER/IR in prone x6' ^1/18   STM L proximal hip adductor with hip abd PROM x4' ^1/11 completed d/t tightness at proximal hip adductors   Neuromuscular re-education     SLS 20\"hx5 L ^1/20 completed at table with finger tip support   Mini squat at table 3x10 bilat ^1/20   Therapeutic activities     Gait training  1/7 Withheld d/t demonstrating appropriate sequencing with unilateral cane   Stair training     Functional movement         Therapeutic Exercise and NMR EXR  [x] (86095) Provided verbal/tactile cueing for activities related to strengthening, flexibility, endurance, ROM for improvements in LE, proximal hip, and core control with self care, mobility, lifting, ambulation. [x] (80217) Provided verbal/tactile cueing for activities related to improving balance, coordination, kinesthetic sense, posture, motor skill, proprioception  to assist with LE, proximal hip, and core control in self care, mobility, lifting, ambulation and eccentric single leg control.      NMR and Therapeutic Activities:    [x] (61409 or 77612) Provided verbal/tactile cueing for activities related to improving balance, coordination, kinesthetic sense, posture, motor skill, proprioception and motor activation to allow for proper function of core, proximal hip and LE with self care and ADLs  [] (32968) Gait Re-education- Provided training and instruction to the patient for proper LE, core and proximal hip recruitment and positioning and eccentric body weight control with ambulation re-education including up and down stairs     Home Exercise Program:    [x] (19660) Reviewed/Progressed HEP activities related to strengthening, flexibility, endurance, ROM of core, proximal hip and LE for functional self-care, mobility, lifting and ambulation/stair navigation   [] (91414)Reviewed/Progressed HEP activities related to improving balance, coordination, kinesthetic sense, posture, motor skill, proprioception of core, proximal hip and LE for self care, mobility, lifting, and ambulation/stair navigation      Manual Treatments:  PROM / STM / Oscillations-Mobs:  G-I, II, III, IV (PA's, Inf., Post.)  [] (76697) Provided manual therapy to mobilize LE, proximal hip and/or LS spine soft tissue/joints for the purpose of modulating pain, promoting relaxation,  increasing ROM, reducing/eliminating soft tissue swelling/inflammation/restriction, improving soft tissue extensibility and allowing for proper ROM for normal function with self care, mobility, lifting and ambulation. Modalities:  CP L hip x15' 1/20    Charges:  Timed Code Treatment Minutes: 50'   Total Treatment Minutes: 9:58-11:06  68'       [] EVAL (LOW) 80472 (typically 20 minutes face-to-face)  [] EVAL (MOD) 63395 (typically 30 minutes face-to-face)  [] EVAL (HIGH) 46728 (typically 45 minutes face-to-face)  [] RE-EVAL     [x] QP(01776) x   1  [] IONTO  [x] NMR (86325) x   1  [] VASO  [x] Manual (18456) x 1      [] Other:  [] TA x      [] Mech Traction (07447)  [] ES(attended) (49998)      [] ES (un) (87754):     GOALS: Adjusted on 12/2/21 d/t surgical intervention  Patient stated goal:  Walk without pain   [x]? Progressing: []? Met: []? Not Met: []? Adjusted     Therapist goals for Patient:   Short Term Goals: To be achieved in: 4 weeks  1. Independent in HEP and progression per patient tolerance, in order to prevent further injury prior to surgery   [x]? Progressing: []? Met: []? Not Met: []? Adjusted   2. Patient will have a decrease in pain to facilitate improvement in movement, function, and ADLs as indicated by Functional Deficits. [x]? Progressing: []? Met: []? Not Met: []? Adjusted     Long Term Goals: To be achieved in: 8 weeks  1.  Disability index score of 20% or less for the iHOT-12 to assist with reaching prior level of function. [x]? ? Progressing: []?? Met: []?? Not Met: []?? Adjusted  2. Patient will demonstrate increased L hip AROM that is equal to R to allow for proper joint functioning as indicated by patients Functional Deficits.    [x]? ? Progressing: []?? Met: []?? Not Met: []?? Adjusted  3. Patient will demonstrate an increase in L hip strength to 4/5 in all directions tested to allow for proper functional mobility as indicated by patients Functional Deficits. [x]? ? Progressing: []?? Met: []?? Not Met: []?? Adjusted  4. Patient will return to all functional activities without increased symptoms or restriction. [x]? ? Progressing: []?? Met: []?? Not Met: []?? Adjusted  5. Patient will be able to walk over 500 ft with proper gait sequencing, no assistive device and no c/o pain. [x]? ? Progressing: []?? Met: []?? Not Met: []?? Adjusted      Overall Progression Towards Functional goals/ Treatment Progress Update:  [x] Patient is progressing as expected based on MD protocol with increase in L hip ROM and LE strength allowing her to ambulate properly with unilateral crutch. Based on surgical intervention and continued deficits she is appropriate for further PT to further increase LE strength and mobility. 1/5/22  [] Progression is slowed d/t  [] Progression has been slowed due to co-morbidities. [] Plan just implemented, too soon to assess goals progression <30days   [] Goals require adjustment due to lack of progress  [] Patient is not progressing as expected and requires additional follow up with physician  [x] Patient returns to physical therapy  d/t surgery that was performed on 11/30/21 on L hip.  She is appropriate for PT to protect surgical repair and increase ROM and strength based on MD protocol     Prognosis for POC: [x] Good [] Fair  [] Poor      Patient requires continued skilled intervention: [x] Yes  [] No    Treatment/Activity Tolerance:  [x] Patient able to complete treatment  [] Patient limited by fatigue  [] Patient limited by pain     [] Patient limited by other medical complications  [] Other:   1/20 Pt did not present with excessive muscle tightness or ROM restrictions even with increase activity level yesterday. Fatigued with increase in intensity of treatment but no adverse effects noted or reported. Cues required for proper hip hinge with squats a table but after receiving cues was able to complete appropriately. Patient Education:              1/20 Updated HEP and educated on activity modification. 1/11 Updated HEP   1/7 Added prone hip extension to HEP  1/5 Educated on use of unilateral crutch for short distance and bilateral crutches for community ambulation. 12/9 Updated HEP   12/2 Reviewed precautions based on surgical intervention     Access Code: Andre Rodney        PLAN: See eval  [x] Continue per plan of care [x] Alter current plan (see comments below)  [] Plan of care initiated [] Hold pending MD visit [] Discharge  1/20 Pt to ambulate without crutch as tolerated and spread out her activity level in standing to decrease fatigue and compensation       Electronically signed by:  Sheba Andre, PT    Note: If patient does not return for scheduled/ recommended follow up visits, this note will serve as a discharge from care along with most recent update on progress.

## 2022-01-25 ENCOUNTER — HOSPITAL ENCOUNTER (OUTPATIENT)
Dept: PHYSICAL THERAPY | Age: 57
Setting detail: THERAPIES SERIES
Discharge: HOME | End: 2022-01-25
Payer: COMMERCIAL

## 2022-01-25 PROCEDURE — 97140 MANUAL THERAPY 1/> REGIONS: CPT

## 2022-01-25 PROCEDURE — 97110 THERAPEUTIC EXERCISES: CPT

## 2022-01-25 PROCEDURE — 97112 NEUROMUSCULAR REEDUCATION: CPT

## 2022-01-25 NOTE — FLOWSHEET NOTE
The [facility] and [facility], [facility] [address], [address] [place] [address]  Phone: [phone]   Fax:     [phone]    Physical Therapy Progress/Daily Treatment Note  Date:  2022    Patient Name:  Jenna Desouza    :  1965  MRN: [id number]  Restrictions/Precautions:    Medical/Treatment Diagnosis Information:  · Diagnosis: S76.212A (ICD-10-CM) - Strain of left groin  · Treatment Diagnosis: M25.552 Left hip pain  Insurance/Certification information:  PT Insurance Information: [facility]  Physician Information:  Referring Practitioner: Hailey Ivory   Has the plan of care been signed (Y/N):        [x]  Yes  []  No     Date of Patient follow up with Physician: PRN      Is this a Progress Report:     []  Yes  [x]  No        If Yes:  Date Range for reporting period:  Beginning 21  Ending  22    Progress report will be due (10 Rx or 30 days whichever is less):  66      Recertification will be due (POC Duration  / 90 days whichever is less): 3/2/22        Visit # Insurance Allowable Auth Required   50   [facility]  UNL []  Yes [x]  No        Functional Scale:   iHPT-12  62% deficit Date assessed: 22  iHOT-12: 77% deficit Date assessed: 21       Latex Allergy:  [x]NO      []YES  Preferred Language for Healthcare:   [x]English       []other:      Pain level:  -2/10 1/25     SUBJECTIVE:   Pt states she baby sat her grandchildren on  and did well but had burning in thigh muscles on . States symptoms are feeling better today. Stood in the shower for a longer period of time which helped with symptoms.  Continues to use unilateral crutch for community ambulation and in the evenings based on her fatigue level    OBJECTIVE:   Updated below on 21  ROM PROM AROM Comments     Left Right Left Right     Flexion 100 130  110     Extension ~5    12      Abduction 25 48  45     ER 20 40  38     IR  60  55     Knee flexion 137  135 130  Knee ext  0   0  0          Flexibility Left NT d/t acute post op  Right Comments   Hamstrings  0     ITB (Obers test)  WNL     Hip flexor(Mikhail test)  WNL                    Strength Left  Right Comments   Hip flexors NT 4 SLR   Hip extension NT 5     Hip abduction NT 5     Hip adduction NT  4+     Hip ER NT 4+     Hip IR NT 5     Quads 4+ 5     Hamstrings 5 5           Palpation: Tenderness L piriformis and proximal gluts 1/11     Functional Mobility/Transfers: Modified independent     Posture:  WNL     Bandages/Dressings/Incisions: Pt reports incisions closed and denies any active exudate.       Gait: Bilateral axillary crutches, 50% weight bearing on L       RESTRICTIONS/PRECAUTIONS: LEFT REVISION HIP ARTHROSCOPY, SYNOVECTOMY AND LYSIS OF ADHESIONS, REVISION ACETABULOPLASTY, ALLOGRAFT LABRAL RECONSTRUCTION, ENDOSCOPIC GREATER TROCHANTERIC BURSECTOMY, FRACTIONAL PSOAS LENGTHENING 11/30/21    Exercises/Interventions:     ROM/STRETCHES     Upright bike seat 4 5 min ^1/5   Ankle pumps  12/2   Heel slides  ^12/9 supine, strap for assistance   12/9 withheld d/t soreness after last visit and focused more on PROM below    Prone lying  1/5 withheld d/t improvement in low back syptoms    Adductor stretch 30 sec x3 L Start 1/11   Prone elbow   start12/16   Quad stretch prone   [id number] ^1/5 completed with strap   SB roll ins  Start 1/11   SB LTR x30 to R Start 1/11 did not complete to L              PREs     Hip hinging stage 1  ^1/18   TA marching 3x10 alt LE 1/11   TA with LE ext 3x10 alt LE ^1/20   Hamstring curl 6# 3x10 L ^1/25 standing   Quadruped rocking           Cat cow         3x10   Start12/16    ^1/5      ^1/7    Prone heel squeeze  Start 1/11   SLR abd 2# 3x10 L ^1/25 side lying    Bridges 3x10 bilat ^1/25 gray loop around knees    BKFO  Gray loop 10\"h 1x10 ^1/25 stabilization on L, movement into ER and abd on R   modified Ramona pose  start12/20   LAQ with BS  3x10 6#   ^1/25    Heel raises 3x10 bilat 1/5 bilat UE support   Weight shifts  1/18 progressed with SLS   Hip extension  6# 3x10 L ^1/25 completed in standing bent over table         Manual Therapy     PROM L hip flexion and abd in supine and ER/IR in prone x8' ^1/25   STM 1/25 withheld d/t decrease in muscle tension   Neuromuscular re-education     SLS 30\"hx3 L ^1/25 completed at table with finger tip support   Mini squat at table 3x10 bilat ^1/20   Therapeutic activities     Gait training  1/7 Withheld d/t demonstrating appropriate sequencing with unilateral cane   Stair training Forward step up 3x10 L Start 1/25 4\" step    Functional movement         Therapeutic Exercise and NMR EXR  [x] (11273) Provided verbal/tactile cueing for activities related to strengthening, flexibility, endurance, ROM for improvements in LE, proximal hip, and core control with self care, mobility, lifting, ambulation. [x] (77959) Provided verbal/tactile cueing for activities related to improving balance, coordination, kinesthetic sense, posture, motor skill, proprioception  to assist with LE, proximal hip, and core control in self care, mobility, lifting, ambulation and eccentric single leg control.      NMR and Therapeutic Activities:    [x] (02375 or 58938) Provided verbal/tactile cueing for activities related to improving balance, coordination, kinesthetic sense, posture, motor skill, proprioception and motor activation to allow for proper function of core, proximal hip and LE with self care and ADLs  [] (91831) Gait Re-education- Provided training and instruction to the patient for proper LE, core and proximal hip recruitment and positioning and eccentric body weight control with ambulation re-education including up and down stairs     Home Exercise Program:    [x] (05780) Reviewed/Progressed HEP activities related to strengthening, flexibility, endurance, ROM of core, proximal hip and LE for functional self-care, mobility, lifting and ambulation/stair navigation   [] (95708)Reviewed/Progressed HEP activities related to improving balance, coordination, kinesthetic sense, posture, motor skill, proprioception of core, proximal hip and LE for self care, mobility, lifting, and ambulation/stair navigation      Manual Treatments:  PROM / STM / Oscillations-Mobs:  G-I, II, III, IV (PA's, Inf., Post.)  [] (55292) Provided manual therapy to mobilize LE, proximal hip and/or LS spine soft tissue/joints for the purpose of modulating pain, promoting relaxation,  increasing ROM, reducing/eliminating soft tissue swelling/inflammation/restriction, improving soft tissue extensibility and allowing for proper ROM for normal function with self care, mobility, lifting and ambulation. Modalities:  CP L hip x15' 1/25    Charges:  Timed Code Treatment Minutes: 50'   Total Treatment Minutes: 9:26-10:34  68'       [] EVAL (LOW) 52106 (typically 20 minutes face-to-face)  [] EVAL (MOD) 52754 (typically 30 minutes face-to-face)  [] EVAL (HIGH) 78909 (typically 45 minutes face-to-face)  [] RE-EVAL     [x] SI(95723) x   1  [] IONTO  [x] NMR (17416) x   1  [] VASO  [x] Manual (15284) x 1      [] Other:  [] TA x      [] Mech Traction (94334)  [] ES(attended) (93699)      [] ES (un) (55267):     GOALS: Adjusted on 12/2/21 d/t surgical intervention  Patient stated goal:  Walk without pain   [x]? Progressing: []? Met: []? Not Met: []? Adjusted     Therapist goals for Patient:   Short Term Goals: To be achieved in: 4 weeks  1. Independent in HEP and progression per patient tolerance, in order to prevent further injury prior to surgery   [x]? Progressing: []? Met: []? Not Met: []? Adjusted   2. Patient will have a decrease in pain to facilitate improvement in movement, function, and ADLs as indicated by Functional Deficits. [x]? Progressing: []? Met: []? Not Met: []? Adjusted     Long Term Goals: To be achieved in: 8 weeks  1.  Disability index score of 20% or less for the iHOT-12 to assist with reaching prior level of function. [x]? ? Progressing: []?? Met: []?? Not Met: []?? Adjusted  2. Patient will demonstrate increased L hip AROM that is equal to R to allow for proper joint functioning as indicated by patients Functional Deficits.    [x]? ? Progressing: []?? Met: []?? Not Met: []?? Adjusted  3. Patient will demonstrate an increase in L hip strength to 4/5 in all directions tested to allow for proper functional mobility as indicated by patients Functional Deficits. [x]? ? Progressing: []?? Met: []?? Not Met: []?? Adjusted  4. Patient will return to all functional activities without increased symptoms or restriction. [x]? ? Progressing: []?? Met: []?? Not Met: []?? Adjusted  5. Patient will be able to walk over 500 ft with proper gait sequencing, no assistive device and no c/o pain. [x]? ? Progressing: []?? Met: []?? Not Met: []?? Adjusted      Overall Progression Towards Functional goals/ Treatment Progress Update:  [x] Patient is progressing as expected based on MD protocol with increase in L hip ROM and LE strength allowing her to ambulate properly with unilateral crutch. Based on surgical intervention and continued deficits she is appropriate for further PT to further increase LE strength and mobility. 1/5/22  [] Progression is slowed d/t  [] Progression has been slowed due to co-morbidities. [] Plan just implemented, too soon to assess goals progression <30days   [] Goals require adjustment due to lack of progress  [] Patient is not progressing as expected and requires additional follow up with physician  [x] Patient returns to physical therapy  d/t surgery that was performed on 11/30/21 on L hip.  She is appropriate for PT to protect surgical repair and increase ROM and strength based on MD protocol     Prognosis for POC: [x] Good [] Fair  [] Poor      Patient requires continued skilled intervention: [x] Yes  [] No    Treatment/Activity Tolerance:  [x] Patient able to complete treatment  [] Patient limited by fatigue  [] Patient limited by pain     [] Patient limited by other medical complications  [] Other:   1/25 Decrease in muscle tension noted at L adductor. Fatigued with increase in intensity of treatment but no adverse effects noted or reported. Patient Education:              1/25 Updated HEP   1/20 Updated HEP and educated on activity modification. 1/11 Updated HEP   1/7 Added prone hip extension to HEP  1/5 Educated on use of unilateral crutch for short distance and bilateral crutches for community ambulation. 12/9 Updated HEP   12/2 Reviewed precautions based on surgical intervention     Access Code: Uri Baldwin        PLAN: See eval  [x] Continue per plan of care [x] Alter current plan (see comments below)  [] Plan of care initiated [] Hold pending MD visit [] Discharge  1/25 Continue to push strength and endurance in weight bearing as tolerated. Pt to continue to use unilateral crutch as needed based on fatigue levels. Electronically signed by:  Kati Cohen PT    Note: If patient does not return for scheduled/ recommended follow up visits, this note will serve as a discharge from care along with most recent update on progress.

## 2022-01-27 ENCOUNTER — HOSPITAL ENCOUNTER (OUTPATIENT)
Dept: PHYSICAL THERAPY | Age: 57
Setting detail: THERAPIES SERIES
Discharge: HOME | End: 2022-01-27
Payer: COMMERCIAL

## 2022-01-27 PROCEDURE — 97110 THERAPEUTIC EXERCISES: CPT

## 2022-01-27 PROCEDURE — 97112 NEUROMUSCULAR REEDUCATION: CPT

## 2022-01-27 NOTE — FLOWSHEET NOTE
Strength Left  Right Comments   Hip flexors NT 4 SLR   Hip extension NT 5     Hip abduction NT 5     Hip adduction NT  4+     Hip ER NT 4+     Hip IR NT 5     Quads 4+ 5     Hamstrings 5 5           Palpation: Tenderness L piriformis and proximal gluts 1/11     Functional Mobility/Transfers: Modified independent     Posture:  WNL     Bandages/Dressings/Incisions: Pt reports incisions closed and denies any active exudate.       Gait: Bilateral axillary crutches, 50% weight bearing on L       RESTRICTIONS/PRECAUTIONS: LEFT REVISION HIP ARTHROSCOPY, SYNOVECTOMY AND LYSIS OF ADHESIONS, REVISION ACETABULOPLASTY, ALLOGRAFT LABRAL RECONSTRUCTION, ENDOSCOPIC GREATER TROCHANTERIC BURSECTOMY, FRACTIONAL PSOAS LENGTHENING 11/30/21    Exercises/Interventions:     ROM/STRETCHES     Upright bike seat 4 5 min ^1/5   Ankle pumps  12/2   Heel slides  ^12/9 supine, strap for assistance   12/9 withheld d/t soreness after last visit and focused more on PROM below    Prone lying  1/5 withheld d/t improvement in low back syptoms    Adductor stretch 30 sec x3 L Start 1/11   Prone elbow   start12/16   Quad stretch prone   [id number] ^1/27 towel roll under thigh    Prone hip ER/IR AROM 3x10 L Start 1/27   SKTC 10\"hx10 L Start 1/27   SB roll ins  Start 1/11   SB LTR Start 1/11 did not complete to L              PREs     Hip hinging stage 1  ^1/18   TA marching 3x10 alt LE 1/11   TA with LE ext 3x10 alt LE ^1/20   Hamstring curl 7.5# 3x10 L ^1/27 standing   Quadruped rocking           Cat cow            Start12/16    ^1/5      ^1/7    Prone heel squeeze  Start 1/11   SLR abd 4# 3x10 L ^1/27 side lying    Bridges 3x10 bilat ^1/25 gray loop around knees    Clams Black TB 3x10 L Start 1/27 side lying   BKFO   1/27 progress with clams   modified Ramona pose  start12/20   LAQ 3x10 7.5#   ^1/27    Heel raises 1/5 bilat UE support   Weight shifts  1/18 progressed with SLS   Hip extension  7.5# 3x10 L ^1/27 completed in standing bent over table Manual Therapy     PROM ' 1/27 withheld d/t improvements and having pt complete AROM above    STM 1/25 withheld d/t decrease in muscle tension   Neuromuscular re-education     SLS 30\"hx3 L ^1/27 Biodex postural stability static    Biodex random control  x2' bilat LE Start 1/27 L11   Mini squat  3x10 bilat ^1/27 completed on TRX   Therapeutic activities     Gait training  1/7 Withheld d/t demonstrating appropriate sequencing with unilateral cane   Stair training 1/27 withheld d/t fatigue   Functional movement         Therapeutic Exercise and NMR EXR  [x] (67563) Provided verbal/tactile cueing for activities related to strengthening, flexibility, endurance, ROM for improvements in LE, proximal hip, and core control with self care, mobility, lifting, ambulation. [x] (19134) Provided verbal/tactile cueing for activities related to improving balance, coordination, kinesthetic sense, posture, motor skill, proprioception  to assist with LE, proximal hip, and core control in self care, mobility, lifting, ambulation and eccentric single leg control.      NMR and Therapeutic Activities:    [x] (83282 or 23743) Provided verbal/tactile cueing for activities related to improving balance, coordination, kinesthetic sense, posture, motor skill, proprioception and motor activation to allow for proper function of core, proximal hip and LE with self care and ADLs  [] (37727) Gait Re-education- Provided training and instruction to the patient for proper LE, core and proximal hip recruitment and positioning and eccentric body weight control with ambulation re-education including up and down stairs     Home Exercise Program:    [x] (21950) Reviewed/Progressed HEP activities related to strengthening, flexibility, endurance, ROM of core, proximal hip and LE for functional self-care, mobility, lifting and ambulation/stair navigation   [] (29178)Reviewed/Progressed HEP activities related to improving balance, coordination, kinesthetic sense, posture, motor skill, proprioception of core, proximal hip and LE for self care, mobility, lifting, and ambulation/stair navigation      Manual Treatments:  PROM / STM / Oscillations-Mobs:  G-I, II, III, IV (PA's, Inf., Post.)  [] (37563) Provided manual therapy to mobilize LE, proximal hip and/or LS spine soft tissue/joints for the purpose of modulating pain, promoting relaxation,  increasing ROM, reducing/eliminating soft tissue swelling/inflammation/restriction, improving soft tissue extensibility and allowing for proper ROM for normal function with self care, mobility, lifting and ambulation. Modalities:  CP L hip x15' 1/27    Charges:  Timed Code Treatment Minutes: 40'   Total Treatment Minutes: 9:21-10:25  64'       [] EVAL (LOW) 455 1011 (typically 20 minutes face-to-face)  [] EVAL (MOD) 12357 (typically 30 minutes face-to-face)  [] EVAL (HIGH) 80220 (typically 45 minutes face-to-face)  [] RE-EVAL     [x] XB(13913) x   2  [] IONTO  [x] NMR (74331) x   1  [] VASO  [] Manual (38906) x       [] Other:  [] TA x      [] Mech Traction (22063)  [] ES(attended) (07394)      [] ES (un) (27724):     GOALS: Adjusted on 12/2/21 d/t surgical intervention  Patient stated goal:  Walk without pain   [x]? Progressing: []? Met: []? Not Met: []? Adjusted     Therapist goals for Patient:   Short Term Goals: To be achieved in: 4 weeks  1. Independent in HEP and progression per patient tolerance, in order to prevent further injury prior to surgery   [x]? Progressing: []? Met: []? Not Met: []? Adjusted   2. Patient will have a decrease in pain to facilitate improvement in movement, function, and ADLs as indicated by Functional Deficits. [x]? Progressing: []? Met: []? Not Met: []? Adjusted     Long Term Goals: To be achieved in: 8 weeks  1. Disability index score of 20% or less for the iHOT-12 to assist with reaching prior level of function. [x]? ? Progressing: []?? Met: []?? Not Met: []?? Adjusted  2.  Patient will demonstrate increased L hip AROM that is equal to R to allow for proper joint functioning as indicated by patients Functional Deficits.    [x]? ? Progressing: []?? Met: []?? Not Met: []?? Adjusted  3. Patient will demonstrate an increase in L hip strength to 4/5 in all directions tested to allow for proper functional mobility as indicated by patients Functional Deficits. [x]? ? Progressing: []?? Met: []?? Not Met: []?? Adjusted  4. Patient will return to all functional activities without increased symptoms or restriction. [x]? ? Progressing: []?? Met: []?? Not Met: []?? Adjusted  5. Patient will be able to walk over 500 ft with proper gait sequencing, no assistive device and no c/o pain. [x]? ? Progressing: []?? Met: []?? Not Met: []?? Adjusted      Overall Progression Towards Functional goals/ Treatment Progress Update:  [x] Patient is progressing as expected based on MD protocol with increase in L hip ROM and LE strength allowing her to ambulate properly with unilateral crutch. Based on surgical intervention and continued deficits she is appropriate for further PT to further increase LE strength and mobility. 1/5/22  [] Progression is slowed d/t  [] Progression has been slowed due to co-morbidities. [] Plan just implemented, too soon to assess goals progression <30days   [] Goals require adjustment due to lack of progress  [] Patient is not progressing as expected and requires additional follow up with physician  [x] Patient returns to physical therapy  d/t surgery that was performed on 11/30/21 on L hip.  She is appropriate for PT to protect surgical repair and increase ROM and strength based on MD protocol     Prognosis for POC: [x] Good [] Fair  [] Poor      Patient requires continued skilled intervention: [x] Yes  [] No    Treatment/Activity Tolerance:  [x] Patient able to complete treatment  [] Patient limited by fatigue  [] Patient limited by pain     [] Patient limited by other medical complications  [] Other:   1/27: Pt L hip ROM improving and responded well to introduction of AROM exercises for pt to complete with HEP. Pt was fatigued and challenged with increase in intensity of treatment but no adverse effects noted or reported. Pt demonstrated appropriate gait sequencing without assistive device and was instructed to only use if she is fatigued and feels it is necessary. Patient Education:              1/27 Educated on strengthening progress and milestones she needs to achieve before she attempts to baby sit independently. Updated HEP  1/25 Updated HEP   1/20 Updated HEP and educated on activity modification. 1/11 Updated HEP   1/7 Added prone hip extension to HEP  1/5 Educated on use of unilateral crutch for short distance and bilateral crutches for community ambulation. 12/9 Updated HEP   12/2 Reviewed precautions based on surgical intervention     Access Code: Norma Baldwin        PLAN: See eval  [x] Continue per plan of care [x] Alter current plan (see comments below)  [] Plan of care initiated [] Hold pending MD visit [] Discharge  1/27 Pt to ambulate without crutch as tolerated. Progress with hip hike NPV      Electronically signed by:  Charli Santamaria, PT    Note: If patient does not return for scheduled/ recommended follow up visits, this note will serve as a discharge from care along with most recent update on progress.

## 2022-02-01 ENCOUNTER — APPOINTMENT (OUTPATIENT)
Dept: PHYSICAL THERAPY | Age: 57
End: 2022-02-01
Payer: COMMERCIAL

## 2022-02-01 ENCOUNTER — HOSPITAL ENCOUNTER (OUTPATIENT)
Dept: PHYSICAL THERAPY | Age: 57
Setting detail: THERAPIES SERIES
Discharge: HOME | End: 2022-02-01
Payer: COMMERCIAL

## 2022-02-01 PROCEDURE — 97110 THERAPEUTIC EXERCISES: CPT

## 2022-02-01 PROCEDURE — 97112 NEUROMUSCULAR REEDUCATION: CPT

## 2022-02-01 NOTE — FLOWSHEET NOTE
The [facility] and [address] [facility], [address], [address] [facility]  Phone: [phone]   Fax:     [phone]    Physical Therapy Progress/Daily Treatment Note  Date:  2022    Patient Name:  Marj Tan    :  1965  MRN: [id number]  Restrictions/Precautions:    Medical/Treatment Diagnosis Information:  · Diagnosis: S76.212A (ICD-10-CM) - Strain of left groin  · Treatment Diagnosis: M25.552 Left hip pain  Insurance/Certification information:  PT Insurance Information: [facility]  Physician Information:  Referring Practitioner: Thiago Wilder   Has the plan of care been signed (Y/N):        [x]  Yes  []  No     Date of Patient follow up with Physician: PRN      Is this a Progress Report:     []  Yes  [x]  No        If Yes:  Date Range for reporting period:  Beginning 21  Ending  22    Progress report will be due (10 Rx or 30 days whichever is less):  29      Recertification will be due (POC Duration  / 90 days whichever is less): 3/2/22        Visit # Insurance Allowable Auth Required   52   [facility]  UNL []  Yes [x]  No        Functional Scale:   iHPT-12  62% deficit Date assessed: 22  iHOT-12: 77% deficit Date assessed: 21       Latex Allergy:  [x]NO      []YES  Preferred Language for Healthcare:   [x]English       []other:      Pain level:  0/10     SUBJECTIVE:  2 Pt was sore after last visit causing a muscle spasm. 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 States soreness/ symptoms have improved and feeling better today.      OBJECTIVE:   Updated below on 21  ROM PROM AROM Comments     Left Right Left Right     Flexion 100 130  110     Extension ~5    12      Abduction 25 48  45     ER 20 40  38     IR  60  55     Knee flexion 137  135 130     Knee ext  0   0  0          Flexibility Left NT d/t acute post op  Right Comments   Hamstrings  0     ITB (Obers test)  WNL     Hip flexor(Mikhail test) bent over table         Manual Therapy     PROM ' 1/27 withheld d/t improvements and having pt complete AROM above    STM 1/25 withheld d/t decrease in muscle tension   Neuromuscular re-education     SLS 30\"hx3 L ^1/27 Biodex postural stability static    Biodex random control  x2' bilat LE Start 1/27 L11   Mini squat  3x10 bilat ^1/27 completed on TRX   Therapeutic activities     Gait training  1/7 Withheld d/t demonstrating appropriate sequencing with unilateral cane   Stair training 1/27 withheld d/t fatigue   Functional movement         Therapeutic Exercise and NMR EXR  [x] (36249) Provided verbal/tactile cueing for activities related to strengthening, flexibility, endurance, ROM for improvements in LE, proximal hip, and core control with self care, mobility, lifting, ambulation. [x] (65008) Provided verbal/tactile cueing for activities related to improving balance, coordination, kinesthetic sense, posture, motor skill, proprioception  to assist with LE, proximal hip, and core control in self care, mobility, lifting, ambulation and eccentric single leg control.      NMR and Therapeutic Activities:    [x] (15059 or 68618) Provided verbal/tactile cueing for activities related to improving balance, coordination, kinesthetic sense, posture, motor skill, proprioception and motor activation to allow for proper function of core, proximal hip and LE with self care and ADLs  [] (48158) Gait Re-education- Provided training and instruction to the patient for proper LE, core and proximal hip recruitment and positioning and eccentric body weight control with ambulation re-education including up and down stairs     Home Exercise Program:    [x] (83264) Reviewed/Progressed HEP activities related to strengthening, flexibility, endurance, ROM of core, proximal hip and LE for functional self-care, mobility, lifting and ambulation/stair navigation   [] (16564)Reviewed/Progressed HEP activities related to improving balance, coordination, kinesthetic sense, posture, motor skill, proprioception of core, proximal hip and LE for self care, mobility, lifting, and ambulation/stair navigation      Manual Treatments:  PROM / STM / Oscillations-Mobs:  G-I, II, III, IV (PA's, Inf., Post.)  [] (50133) Provided manual therapy to mobilize LE, proximal hip and/or LS spine soft tissue/joints for the purpose of modulating pain, promoting relaxation,  increasing ROM, reducing/eliminating soft tissue swelling/inflammation/restriction, improving soft tissue extensibility and allowing for proper ROM for normal function with self care, mobility, lifting and ambulation. Modalities:  CP L hip x15' 2/1    Charges:  Timed Code Treatment Minutes: 45'   Total Treatment Minutes: 10:15-11:13  62'       [] EVAL (LOW) 88310 (typically 20 minutes face-to-face)  [] EVAL (MOD) 02421 (typically 30 minutes face-to-face)  [] EVAL (HIGH) 24351 (typically 45 minutes face-to-face)  [] RE-EVAL     [x] PW(61540) x   2  [] IONTO  [x] NMR (47864) x   1  [] VASO  [] Manual (18167) x       [] Other:  [] TA x      [] Mech Traction (40728)  [] ES(attended) (41076)      [] ES (un) (92132):     GOALS: Adjusted on 12/2/21 d/t surgical intervention  Patient stated goal:  Walk without pain   [x]? Progressing: []? Met: []? Not Met: []? Adjusted     Therapist goals for Patient:   Short Term Goals: To be achieved in: 4 weeks  1. Independent in HEP and progression per patient tolerance, in order to prevent further injury prior to surgery   [x]? Progressing: []? Met: []? Not Met: []? Adjusted   2. Patient will have a decrease in pain to facilitate improvement in movement, function, and ADLs as indicated by Functional Deficits. [x]? Progressing: []? Met: []? Not Met: []? Adjusted     Long Term Goals: To be achieved in: 8 weeks  1. Disability index score of 20% or less for the iHOT-12 to assist with reaching prior level of function. [x]? ? Progressing: []?? Met: []?? Not Met: []?? Adjusted  2. Patient will demonstrate increased L hip AROM that is equal to R to allow for proper joint functioning as indicated by patients Functional Deficits.    [x]? ? Progressing: []?? Met: []?? Not Met: []?? Adjusted  3. Patient will demonstrate an increase in L hip strength to 4/5 in all directions tested to allow for proper functional mobility as indicated by patients Functional Deficits. [x]? ? Progressing: []?? Met: []?? Not Met: []?? Adjusted  4. Patient will return to all functional activities without increased symptoms or restriction. [x]? ? Progressing: []?? Met: []?? Not Met: []?? Adjusted  5. Patient will be able to walk over 500 ft with proper gait sequencing, no assistive device and no c/o pain. [x]? ? Progressing: []?? Met: []?? Not Met: []?? Adjusted      Overall Progression Towards Functional goals/ Treatment Progress Update:  [x] Patient is progressing as expected based on MD protocol with increase in L hip ROM and LE strength allowing her to ambulate properly with unilateral crutch. Based on surgical intervention and continued deficits she is appropriate for further PT to further increase LE strength and mobility. 1/5/22  [] Progression is slowed d/t  [] Progression has been slowed due to co-morbidities. [] Plan just implemented, too soon to assess goals progression <30days   [] Goals require adjustment due to lack of progress  [] Patient is not progressing as expected and requires additional follow up with physician  [x] Patient returns to physical therapy  d/t surgery that was performed on 11/30/21 on L hip.  She is appropriate for PT to protect surgical repair and increase ROM and strength based on MD protocol     Prognosis for POC: [x] Good [] Fair  [] Poor      Patient requires continued skilled intervention: [x] Yes  [] No    Treatment/Activity Tolerance:  [x] Patient able to complete treatment  [] Patient limited by fatigue  [] Patient limited by pain     [] Patient limited by other medical complications  [] Other:   2/1 Pt fatigued with treatment and did not increase resistance or intensity of treatment d/t response to treatment after last visit. Responded well to treatment with no adverse effects noted or reported. Patient Education:              1/27 Educated on strengthening progress and milestones she needs to achieve before she attempts to baby sit independently. Updated HEP  1/25 Updated HEP   1/20 Updated HEP and educated on activity modification. 1/11 Updated HEP   1/7 Added prone hip extension to HEP  1/5 Educated on use of unilateral crutch for short distance and bilateral crutches for community ambulation. 12/9 Updated HEP   12/2 Reviewed precautions based on surgical intervention     Access Code: Cathy Guzman        PLAN: See eval  [x] Continue per plan of care [x] Alter current plan (see comments below)  [] Plan of care initiated [] Hold pending MD visit [] Discharge  2/1 Monitor symptoms and progress with exercise in weight bearing as tolerated including hip hike. Complete progress note NPV      Electronically signed by:  Lis Pulido, PT    Note: If patient does not return for scheduled/ recommended follow up visits, this note will serve as a discharge from care along with most recent update on progress.

## 2022-02-03 ENCOUNTER — HOSPITAL ENCOUNTER (OUTPATIENT)
Dept: PHYSICAL THERAPY | Age: 57
Setting detail: THERAPIES SERIES
End: 2022-02-03
Payer: COMMERCIAL

## 2022-02-08 ENCOUNTER — HOSPITAL ENCOUNTER (OUTPATIENT)
Dept: PHYSICAL THERAPY | Age: 57
Setting detail: THERAPIES SERIES
Discharge: HOME | End: 2022-02-08
Payer: COMMERCIAL

## 2022-02-08 PROCEDURE — 97110 THERAPEUTIC EXERCISES: CPT

## 2022-02-08 PROCEDURE — 97112 NEUROMUSCULAR REEDUCATION: CPT

## 2022-02-08 NOTE — PROGRESS NOTES
[facility] [address] [facility] [address] , [address] [place] [address]  Phone: [phone]   Fax:     [phone]    Physical Therapy Progress/Daily Treatment Note  Date:  2022    Patient Name:  Abby Bee    :  1965  MRN: [id number]  Restrictions/Precautions:    Medical/Treatment Diagnosis Information:  · Diagnosis: S76.212A (ICD-10-CM) - Strain of left groin  · Treatment Diagnosis: M25.552 Left hip pain  Insurance/Certification information:  PT Insurance Information: [facility]  Physician Information:  Referring Practitioner: Radha Howell   Has the plan of care been signed (Y/N):        [x]  Yes  []  No     Date of Patient follow up with Physician: PRN      Is this a Progress Report:     [x]  Yes  []  No        If Yes:  Date Range for reporting period:  Beginning 21  Ending  22    Progress report will be due (10 Rx or 30 days whichever is less):  2/3/76      Recertification will be due (POC Duration  / 90 days whichever is less): 3/2/22        Visit # Insurance Allowable Auth Required   53   [facility]  UNL []  Yes [x]  No        Functional Scale:   iHPT-12   52% deficit Date assessed: 22   iHOT-12  62% deficit Date assessed: 22  iHOT-12: 77% deficit Date assessed: 21       Latex Allergy:  [x]NO      []YES  Preferred Language for Healthcare:   [x]English       []other:      Pain level:  0/10     SUBJECTIVE:   Pt states she is doing well overall but still has days when she is sore from increase activity level that improves with rest and ice. Pt states she got a massage yesterday which helped a lot.      OBJECTIVE:   Updated below on 22  ROM PROM AROM Comments     Left Right Left Right     Flexion 108 130 95 110     Extension 10   8 12      Abduction 33 48 30 45     ER 36 40  38     IR 45 60  55     Knee flexion 137  135 130     Knee ext  0   0  0          Flexibility Left NT d/t acute post op  Right Comments Hamstrings 0 0     ITB (Obers test) WNL  WNL     Hip flexor(Mikhail test) Mild restriction WNL                    Strength Left  Right Comments   Hip flexors 3+ 4 SLR   Hip extension 4+ 5     Hip abduction 4 5     Hip adduction 4- 4+     Hip ER 4 mild pain 4+     Hip IR 4- 5     Quads 4+ 5     Hamstrings 5 5           Palpation: Tenderness and muscle tension L proximal hip flexor, L greater trochanter 2/8     Functional Mobility/Transfers: Independent      Posture:  WNL      Gait: Independent, WNL        RESTRICTIONS/PRECAUTIONS: LEFT REVISION HIP ARTHROSCOPY, SYNOVECTOMY AND LYSIS OF ADHESIONS, REVISION ACETABULOPLASTY, ALLOGRAFT LABRAL RECONSTRUCTION, ENDOSCOPIC GREATER TROCHANTERIC BURSECTOMY, FRACTIONAL PSOAS LENGTHENING 11/30/21    Exercises/Interventions:     ROM/STRETCHES     Upright bike seat 4 5 min ^1/5   Ankle pumps  12/2   Heel slides  ^12/9 supine, strap for assistance   12/9 withheld d/t soreness after last visit and focused more on PROM below    Prone lying  1/5 withheld d/t improvement in low back syptoms    Adductor stretch 30 sec x3 L Start 1/11   Prone elbow   start12/16   Quad stretch prone   [id number] ^1/27 towel roll under thigh    Prone hip ER/IR AROM 3x10 L Start 1/27   SKTC 10\"hx10 L Start 1/27   SB roll ins  Start 1/11   SB LTR Start 1/11 did not complete to L              PREs     Hip hinging stage 1  ^1/18   TA marching 3x10 alt LE 1/11   TA with LE ext 3x10 alt LE ^1/20   Leg press 60# 3x10 DL concentric, SL eccentric L Start 2/8   Hamstring curl 15# 3x10 SL on L ^2/8 completed on weight machine   Knee extension machine  10# 3x10 SL on L Start 2/8 90<>30 deg    SLR abd 4# 3x10 L ^1/27 side lying    Bridges 3x10 bilat ^1/25 gray loop around knees    Clams Black TB 3x10 L Start 1/27 side lying   LAQ  2/8 progressed knee extension machine   Hip extension  7.5# 3x10 L ^1/27 completed in standing bent over table         Manual Therapy     PROM ' 1/27 withheld d/t improvements and having pt complete AROM above    STM 1/25 withheld d/t decrease in muscle tension   Neuromuscular re-education     SLS 30\"hx3 L ^2/8A Biodex postural stability L12   Biodex random control  x3' bilat LE ^2/8 L11   Mini squat  5\"h 2x10 bilat ^2/8 completed on TRX   Therapeutic activities     Gait training  1/7 Withheld d/t demonstrating appropriate sequencing with unilateral cane   Stair training 1/27 withheld d/t fatigue   Functional movement         Therapeutic Exercise and NMR EXR  [x] (50054) Provided verbal/tactile cueing for activities related to strengthening, flexibility, endurance, ROM for improvements in LE, proximal hip, and core control with self care, mobility, lifting, ambulation. [x] (84683) Provided verbal/tactile cueing for activities related to improving balance, coordination, kinesthetic sense, posture, motor skill, proprioception  to assist with LE, proximal hip, and core control in self care, mobility, lifting, ambulation and eccentric single leg control.      NMR and Therapeutic Activities:    [x] (08744 or 88215) Provided verbal/tactile cueing for activities related to improving balance, coordination, kinesthetic sense, posture, motor skill, proprioception and motor activation to allow for proper function of core, proximal hip and LE with self care and ADLs  [] (01171) Gait Re-education- Provided training and instruction to the patient for proper LE, core and proximal hip recruitment and positioning and eccentric body weight control with ambulation re-education including up and down stairs     Home Exercise Program:    [x] (15577) Reviewed/Progressed HEP activities related to strengthening, flexibility, endurance, ROM of core, proximal hip and LE for functional self-care, mobility, lifting and ambulation/stair navigation   [] (35831)Reviewed/Progressed HEP activities related to improving balance, coordination, kinesthetic sense, posture, motor skill, proprioception of core, proximal hip and LE for self care, mobility, lifting, and ambulation/stair navigation      Manual Treatments:  PROM / STM / Oscillations-Mobs:  G-I, II, III, IV (PA's, Inf., Post.)  [] (88544) Provided manual therapy to mobilize LE, proximal hip and/or LS spine soft tissue/joints for the purpose of modulating pain, promoting relaxation,  increasing ROM, reducing/eliminating soft tissue swelling/inflammation/restriction, improving soft tissue extensibility and allowing for proper ROM for normal function with self care, mobility, lifting and ambulation. Modalities:  CP L hip x15' 2/8    Charges:  Timed Code Treatment Minutes: 48'   Total Treatment Minutes: 11:30-12:40  70'       [] EVAL (LOW) 84110 (typically 20 minutes face-to-face)  [] EVAL (MOD) 30623 (typically 30 minutes face-to-face)  [] EVAL (HIGH) 39696 (typically 45 minutes face-to-face)  [] RE-EVAL     [x] BC(18582) x   2  [] IONTO  [x] NMR (83238) x   1  [] VASO  [] Manual (04007) x       [] Other:  [] TA x      [] Mech Traction (59139)  [] ES(attended) (14374)      [] ES (un) (20733):     GOALS: Adjusted on 12/2/21 d/t surgical intervention  Patient stated goal:  Walk without pain   [x]? Progressing: []? Met: []? Not Met: []? Adjusted     Therapist goals for Patient:   Short Term Goals: To be achieved in: 4 weeks  1. Independent in HEP and progression per patient tolerance, in order to prevent further injury prior to surgery   [x]? Progressing: []? Met: []? Not Met: []? Adjusted   2. Patient will have a decrease in pain to facilitate improvement in movement, function, and ADLs as indicated by Functional Deficits. [x]? Progressing: []? Met: []? Not Met: []? Adjusted     Long Term Goals: To be achieved in: 8 weeks  1. Disability index score of 20% or less for the iHOT-12 to assist with reaching prior level of function. [x]? ? Progressing: []?? Met: []?? Not Met: []?? Adjusted  2.  Patient will demonstrate increased L hip AROM that is equal to R to allow for proper joint functioning as indicated by patients Functional Deficits.    [x]? ? Progressing: []?? Met: []?? Not Met: []?? Adjusted  3. Patient will demonstrate an increase in L hip strength to 4/5 in all directions tested to allow for proper functional mobility as indicated by patients Functional Deficits. [x]? ? Progressing: []?? Met: []?? Not Met: []?? Adjusted  4. Patient will return to all functional activities without increased symptoms or restriction. [x]? ? Progressing: []?? Met: []?? Not Met: []?? Adjusted  5. Patient will be able to walk over 500 ft with proper gait sequencing, no assistive device and no c/o pain. [x]? ? Progressing: []?? Met: []?? Not Met: []?? Adjusted      Overall Progression Towards Functional goals/ Treatment Progress Update:  [x] Patient is progressing as expected based on MD protocol with increase in L hip ROM and LE strength allowing her to ambulate without assistive device. Based on surgical intervention and continued deficits she is appropriate for further PT to further increase LE strength and mobility. 2/8/22  [] Progression is slowed d/t  [] Progression has been slowed due to co-morbidities. [] Plan just implemented, too soon to assess goals progression <30days   [] Goals require adjustment due to lack of progress  [] Patient is not progressing as expected and requires additional follow up with physician  []     Prognosis for POC: [x] Good [] Fair  [] Poor      Patient requires continued skilled intervention: [x] Yes  [] No    Treatment/Activity Tolerance:  [x] Patient able to complete treatment  [] Patient limited by fatigue  [] Patient limited by pain     [] Patient limited by other medical complications  [] Other:   2/8: Pt continues to have strength and endurance deficits with activities in weight bearing. Fatigued with increase in intensity of treatment and withheld certain table exercises to focus on strength and balance in weight bearing.      Patient Education:              1/27 Educated on strengthening progress and milestones she needs to achieve before she attempts to baby sit independently. Updated HEP  1/25 Updated HEP   1/20 Updated HEP and educated on activity modification. 1/11 Updated HEP   1/7 Added prone hip extension to HEP  1/5 Educated on use of unilateral crutch for short distance and bilateral crutches for community ambulation. 12/9 Updated HEP   12/2 Reviewed precautions based on surgical intervention     Access Code: Dionne Palafox        PLAN: See eval  [x] Continue per plan of care [x] Alter current plan (see comments below)  [] Plan of care initiated [] Hold pending MD visit [] Discharge  2/8 Based on surgical intervention she continues to have L hip strength and AROM deficits causing functional limitations. She would benefit from continued PT 2x a week for 4 more weeks to further increase strength and endurance in order to improve function with activities in weight bearing      Electronically signed by:  Tere Ferrera PT    Note: If patient does not return for scheduled/ recommended follow up visits, this note will serve as a discharge from care along with most recent update on progress.

## 2022-02-09 ENCOUNTER — TELEPHONE (OUTPATIENT)
Dept: ORTHOPEDIC SURGERY | Age: 57
End: 2022-02-09

## 2022-02-10 ENCOUNTER — HOSPITAL ENCOUNTER (OUTPATIENT)
Dept: PHYSICAL THERAPY | Age: 57
Setting detail: THERAPIES SERIES
Discharge: HOME | End: 2022-02-10
Payer: COMMERCIAL

## 2022-02-10 PROCEDURE — 97110 THERAPEUTIC EXERCISES: CPT

## 2022-02-10 PROCEDURE — 97112 NEUROMUSCULAR REEDUCATION: CPT

## 2022-02-10 NOTE — FLOWSHEET NOTE
Comments   Hamstrings 0 0     ITB (Obers test) WNL  WNL     Hip flexor(Mikhail test) Mild restriction WNL                    Strength Left  Right Comments   Hip flexors 3+ 4 SLR   Hip extension 4+ 5     Hip abduction 4 5     Hip adduction 4- 4+     Hip ER 4 mild pain 4+     Hip IR 4- 5     Quads 4+ 5     Hamstrings 5 5           Palpation: Tenderness and muscle tension L proximal hip flexor, L greater trochanter 2/8     Functional Mobility/Transfers: Independent      Posture:  WNL      Gait: Independent, WNL        RESTRICTIONS/PRECAUTIONS: LEFT REVISION HIP ARTHROSCOPY, SYNOVECTOMY AND LYSIS OF ADHESIONS, REVISION ACETABULOPLASTY, ALLOGRAFT LABRAL RECONSTRUCTION, ENDOSCOPIC GREATER TROCHANTERIC BURSECTOMY, FRACTIONAL PSOAS LENGTHENING 11/30/21    Exercises/Interventions:     ROM/STRETCHES     Upright bike seat 4 5 min ^1/5   Ankle pumps  12/2   Heel slides  ^12/9 supine, strap for assistance   12/9 withheld d/t soreness after last visit and focused more on PROM below    Prone lying  1/5 withheld d/t improvement in low back syptoms    Adductor stretch 30 sec x3 L Start 1/11   Prone elbow   start12/16   Quad stretch prone   [id number] ^1/27 towel roll under thigh    Prone hip ER/IR AROM 3x10 L Start 1/27   SKTC 20\"hx5 L ^2/10   SB roll ins  Start 1/11   SB LTR Start 1/11 did not complete to L              PREs     Hip hinging stage 1  ^1/18   TA marching 3x10 alt LE 1/11   TA with LE ext 3x10 alt LE ^1/20   Leg press 65# 3x10 DL concentric, SL eccentric L ^2/10    Hamstring curl 20# 3x10 SL on L ^2/10 completed on weight machine   Knee extension machine  10# 3x10 SL on L Start 2/8 90<>30 deg    SLR abd 4# 3x10 L ^1/27 side lying    Bridges Withheld d/t fatigue progress with SL NPV 2/10   Clams Withheld d/t fatigue cont with HEP 2/10    LAQ  2/8 progressed knee extension machine   Hip extension  8# 3x10 L ^1/27 completed in standing bent over table    Step ups forward  3x10 L Start 2/10 6\" step Manual Therapy     PROM ' 1/27 withheld d/t improvements and having pt complete AROM above    STM 1/25 withheld d/t decrease in muscle tension   Neuromuscular re-education     SLS 30\"hx4 L ^2/10 Biodex postural stability L12   Biodex random control  x3' bilat LE ^2/8 L11  2/10 completed without UE support    Mini squat  10\"h 1x10 bilat ^2/10 completed on TRX   Therapeutic activities     Gait training  1/7 Withheld d/t demonstrating appropriate sequencing with unilateral cane   Stair training 1/27 withheld d/t fatigue   Functional movement         Therapeutic Exercise and NMR EXR  [x] (13216) Provided verbal/tactile cueing for activities related to strengthening, flexibility, endurance, ROM for improvements in LE, proximal hip, and core control with self care, mobility, lifting, ambulation. [x] (55021) Provided verbal/tactile cueing for activities related to improving balance, coordination, kinesthetic sense, posture, motor skill, proprioception  to assist with LE, proximal hip, and core control in self care, mobility, lifting, ambulation and eccentric single leg control.      NMR and Therapeutic Activities:    [x] (39212 or 92349) Provided verbal/tactile cueing for activities related to improving balance, coordination, kinesthetic sense, posture, motor skill, proprioception and motor activation to allow for proper function of core, proximal hip and LE with self care and ADLs  [] (22377) Gait Re-education- Provided training and instruction to the patient for proper LE, core and proximal hip recruitment and positioning and eccentric body weight control with ambulation re-education including up and down stairs     Home Exercise Program:    [x] (31137) Reviewed/Progressed HEP activities related to strengthening, flexibility, endurance, ROM of core, proximal hip and LE for functional self-care, mobility, lifting and ambulation/stair navigation   [] (09366)Reviewed/Progressed HEP activities related to improving balance, coordination, kinesthetic sense, posture, motor skill, proprioception of core, proximal hip and LE for self care, mobility, lifting, and ambulation/stair navigation      Manual Treatments:  PROM / STM / Oscillations-Mobs:  G-I, II, III, IV (PA's, Inf., Post.)  [] (56384) Provided manual therapy to mobilize LE, proximal hip and/or LS spine soft tissue/joints for the purpose of modulating pain, promoting relaxation,  increasing ROM, reducing/eliminating soft tissue swelling/inflammation/restriction, improving soft tissue extensibility and allowing for proper ROM for normal function with self care, mobility, lifting and ambulation. Modalities:  CP L hip x15' 2/10    Charges:  Timed Code Treatment Minutes: 46'   Total Treatment Minutes: 4:13-5:25  72'       [] EVAL (LOW) 62524 (typically 20 minutes face-to-face)  [] EVAL (MOD) 46332 (typically 30 minutes face-to-face)  [] EVAL (HIGH) 87024 (typically 45 minutes face-to-face)  [] RE-EVAL     [x] FM(70364) x   2  [] IONTO  [x] NMR (99227) x   1  [] VASO  [] Manual (73049) x       [] Other:  [] TA x      [] Mech Traction (29248)  [] ES(attended) (86165)      [] ES (un) (23609):     GOALS: Adjusted on 12/2/21 d/t surgical intervention  Patient stated goal:  Walk without pain   [x]? Progressing: []? Met: []? Not Met: []? Adjusted     Therapist goals for Patient:   Short Term Goals: To be achieved in: 4 weeks  1. Independent in HEP and progression per patient tolerance, in order to prevent further injury prior to surgery   [x]? Progressing: []? Met: []? Not Met: []? Adjusted   2. Patient will have a decrease in pain to facilitate improvement in movement, function, and ADLs as indicated by Functional Deficits. [x]? Progressing: []? Met: []? Not Met: []? Adjusted     Long Term Goals: To be achieved in: 8 weeks  1. Disability index score of 20% or less for the iHOT-12 to assist with reaching prior level of function. [x]? ? Progressing: []?? Met: []?? Not Met: []?? Adjusted  2. Patient will demonstrate increased L hip AROM that is equal to R to allow for proper joint functioning as indicated by patients Functional Deficits.    [x]? ? Progressing: []?? Met: []?? Not Met: []?? Adjusted  3. Patient will demonstrate an increase in L hip strength to 4/5 in all directions tested to allow for proper functional mobility as indicated by patients Functional Deficits. [x]? ? Progressing: []?? Met: []?? Not Met: []?? Adjusted  4. Patient will return to all functional activities without increased symptoms or restriction. [x]? ? Progressing: []?? Met: []?? Not Met: []?? Adjusted  5. Patient will be able to walk over 500 ft with proper gait sequencing, no assistive device and no c/o pain. [x]? ? Progressing: []?? Met: []?? Not Met: []?? Adjusted      Overall Progression Towards Functional goals/ Treatment Progress Update:  [x] Patient is progressing as expected based on MD protocol with increase in L hip ROM and LE strength allowing her to ambulate without assistive device. Based on surgical intervention and continued deficits she is appropriate for further PT to further increase LE strength and mobility. 2/8/22  [] Progression is slowed d/t  [] Progression has been slowed due to co-morbidities. [] Plan just implemented, too soon to assess goals progression <30days   [] Goals require adjustment due to lack of progress  [] Patient is not progressing as expected and requires additional follow up with physician  []     Prognosis for POC: [x] Good [] Fair  [] Poor      Patient requires continued skilled intervention: [x] Yes  [] No    Treatment/Activity Tolerance:  [x] Patient able to complete treatment  [] Patient limited by fatigue  [] Patient limited by pain     [] Patient limited by other medical complications  [] Other:   2/10 Responded well to increase in intensity of treatment with moderate fatigue with increase in weight and exercise intensity in weight bearing.  Withheld bridges and clams d/t fatigue. Overall responding well to treatment with no adverse effects. Patient Education:              1/27 Educated on strengthening progress and milestones she needs to achieve before she attempts to baby sit independently. Updated HEP  1/25 Updated HEP   1/20 Updated HEP and educated on activity modification. 1/11 Updated HEP   1/7 Added prone hip extension to HEP  1/5 Educated on use of unilateral crutch for short distance and bilateral crutches for community ambulation. 12/9 Updated HEP   12/2 Reviewed precautions based on surgical intervention     Access Code: Екатерина Summers        PLAN: See eval  [x] Continue per plan of care [x] Alter current plan (see comments below)  [] Plan of care initiated [] Hold pending MD visit [] Discharge  2/8 Based on surgical intervention she continues to have L hip strength and AROM deficits causing functional limitations. She would benefit from continued PT 2x a week for 4 more weeks to further increase strength and endurance in order to improve function with activities in weight bearing      Electronically signed by:  Alba Bryant, PT    Note: If patient does not return for scheduled/ recommended follow up visits, this note will serve as a discharge from care along with most recent update on progress.

## 2022-02-15 ENCOUNTER — HOSPITAL ENCOUNTER (OUTPATIENT)
Dept: PHYSICAL THERAPY | Age: 57
Setting detail: THERAPIES SERIES
Discharge: HOME | End: 2022-02-15
Payer: COMMERCIAL

## 2022-02-15 PROCEDURE — 97110 THERAPEUTIC EXERCISES: CPT

## 2022-02-15 PROCEDURE — 97112 NEUROMUSCULAR REEDUCATION: CPT

## 2022-02-15 NOTE — FLOWSHEET NOTE
The [address] [place], [place] [address], [facility], [address] [place] [address]  Phone: [phone]   Fax:     [phone]    Physical Therapy Progress/Daily Treatment Note  Date:  2/15/2022    Patient Name:  Andre Montalvo    :  1965  MRN: [id number]  Restrictions/Precautions:    Medical/Treatment Diagnosis Information:  · Diagnosis: S76.212A (ICD-10-CM) - Strain of left groin  · Treatment Diagnosis: M25.552 Left hip pain  Insurance/Certification information:  PT Insurance Information: [facility]  Physician Information:  Referring Practitioner: Jeanette Lindo   Has the plan of care been signed (Y/N):        [x]  Yes  []  No     Date of Patient follow up with Physician: PRN      Is this a Progress Report:     []  Yes  [x]  No        If Yes:  Date Range for reporting period:  Beginning 21  Ending  22    Progress report will be due (10 Rx or 30 days whichever is less):  86      Recertification will be due (POC Duration  / 90 days whichever is less): 3/2/22        Visit # Insurance Allowable Auth Required   55  2/15 [address] []  Yes [x]  No        Functional Scale:   iHPT-12   52% deficit Date assessed: 22   iHOT-12  62% deficit Date assessed: 22  iHOT-12: 77% deficit Date assessed: 21       Latex Allergy:  [x]NO      []YES  Preferred Language for Healthcare:   [x]English       []other:      Pain level:  0-1/10 2/15    SUBJECTIVE:  2/15: Pt reports she was sore after last visit for 2 days but soreness was located in muscles and denied any sharp or deep hip pain. States she still experiences soreness in the evening but overall doing well.      OBJECTIVE:   Updated below on 22  ROM PROM AROM Comments     Left Right Left Right     Flexion 108 130 95 110     Extension 10   8 12      Abduction 33 48 30 45     ER 36 40  38     IR 45 60  55     Knee flexion 137  135 130     Knee ext  0   0  0          Flexibility Left NT d/t acute post op Right Comments   Hamstrings 0 0     ITB (Obers test) WNL  WNL     Hip flexor(Mikhail test) Mild restriction WNL                    Strength Left  Right Comments   Hip flexors 3+ 4 SLR   Hip extension 4+ 5     Hip abduction 4 5     Hip adduction 4- 4+     Hip ER 4 mild pain 4+     Hip IR 4- 5     Quads 4+ 5     Hamstrings 5 5           Palpation: Tenderness and muscle tension L proximal hip flexor, L greater trochanter 2/8     Functional Mobility/Transfers: Independent      Posture:  WNL      Gait: Independent, WNL        RESTRICTIONS/PRECAUTIONS: LEFT REVISION HIP ARTHROSCOPY, SYNOVECTOMY AND LYSIS OF ADHESIONS, REVISION ACETABULOPLASTY, ALLOGRAFT LABRAL RECONSTRUCTION, ENDOSCOPIC GREATER TROCHANTERIC BURSECTOMY, FRACTIONAL PSOAS LENGTHENING 11/30/21    Exercises/Interventions:     ROM/STRETCHES     Upright bike seat 4 5 min ^1/5   Ankle pumps  12/2   Heel slides  ^12/9 supine, strap for assistance   12/9 withheld d/t soreness after last visit and focused more on PROM below    Prone lying  1/5 withheld d/t improvement in low back syptoms    Adductor stretch 30 sec x3 L Start 1/11   Prone elbow   start12/16   Quad stretch prone   [id number] ^1/27 towel roll under thigh    Prone hip ER/IR AROM 3x10 L Start 1/27   SKTC 30\"hx3 L ^2/15   SB roll ins  Start 1/11   SB LTR Start 1/11 did not complete to L              PREs     Hip hinging stage 1  ^1/18   TA marching 1/11   TA with LE ext ^1/20   SLR with TA 0# 3x10 L Start 2/15 supine    Leg press 70# 3x10 DL concentric, SL eccentric L ^2/15   Hamstring curl 25# 3x10 SL on L ^2/15 completed on weight machine   Knee extension machine  15# 3x10 SL on L Start 2/8 90<>30 deg    SLR abd 5# 3x10 L ^2/15 side lying    Bridges Withheld d/t fatigue progress with SL NPV 2/10   Clams Withheld d/t fatigue cont with HEP 2/10    LAQ  2/8 progressed knee extension machine   Hip extension  8# 3x10 L ^1/27 completed in standing bent over table    Step ups forward  3x10 L Start 2/10 6\" step Manual Therapy     PROM ' 1/27 withheld d/t improvements and having pt complete AROM above    STM 1/25 withheld d/t decrease in muscle tension   Neuromuscular re-education     SLS 30\"hx5 L ^2/15 Biodex postural stability L12   Biodex random control  x3' bilat LE ^2/15 L10    Mini squat  10\"h 1x10 bilat ^2/10 completed on TRX   Therapeutic activities     Gait training  1/7 Withheld d/t demonstrating appropriate sequencing with unilateral cane   Stair training 1/27 withheld d/t fatigue   Functional movement         Therapeutic Exercise and NMR EXR  [x] (01917) Provided verbal/tactile cueing for activities related to strengthening, flexibility, endurance, ROM for improvements in LE, proximal hip, and core control with self care, mobility, lifting, ambulation. [x] (19988) Provided verbal/tactile cueing for activities related to improving balance, coordination, kinesthetic sense, posture, motor skill, proprioception  to assist with LE, proximal hip, and core control in self care, mobility, lifting, ambulation and eccentric single leg control.      NMR and Therapeutic Activities:    [x] (35782 or 38485) Provided verbal/tactile cueing for activities related to improving balance, coordination, kinesthetic sense, posture, motor skill, proprioception and motor activation to allow for proper function of core, proximal hip and LE with self care and ADLs  [] (29665) Gait Re-education- Provided training and instruction to the patient for proper LE, core and proximal hip recruitment and positioning and eccentric body weight control with ambulation re-education including up and down stairs     Home Exercise Program:    [x] (30495) Reviewed/Progressed HEP activities related to strengthening, flexibility, endurance, ROM of core, proximal hip and LE for functional self-care, mobility, lifting and ambulation/stair navigation   [] (31727)Reviewed/Progressed HEP activities related to improving balance, coordination, kinesthetic sense, posture, motor skill, proprioception of core, proximal hip and LE for self care, mobility, lifting, and ambulation/stair navigation      Manual Treatments:  PROM / STM / Oscillations-Mobs:  G-I, II, III, IV (PA's, Inf., Post.)  [] (68213) Provided manual therapy to mobilize LE, proximal hip and/or LS spine soft tissue/joints for the purpose of modulating pain, promoting relaxation,  increasing ROM, reducing/eliminating soft tissue swelling/inflammation/restriction, improving soft tissue extensibility and allowing for proper ROM for normal function with self care, mobility, lifting and ambulation. Modalities:  CP L hip x15' 2/15    Charges:  Timed Code Treatment Minutes: 39'   Total Treatment Minutes: 9:24-10:25  61'       [] EVAL (LOW) 455 1011 (typically 20 minutes face-to-face)  [] EVAL (MOD) 36235 (typically 30 minutes face-to-face)  [] EVAL (HIGH) 65449 (typically 45 minutes face-to-face)  [] RE-EVAL     [x] AQ(99716) x   2  [] IONTO  [x] NMR (50052) x   1  [] VASO  [] Manual (79359) x       [] Other:  [] TA x      [] Mech Traction (26521)  [] ES(attended) (22404)      [] ES (un) (75894):     GOALS: Adjusted on 12/2/21 d/t surgical intervention  Patient stated goal:  Walk without pain   [x]? Progressing: []? Met: []? Not Met: []? Adjusted     Therapist goals for Patient:   Short Term Goals: To be achieved in: 4 weeks  1. Independent in HEP and progression per patient tolerance, in order to prevent further injury prior to surgery   [x]? Progressing: []? Met: []? Not Met: []? Adjusted   2. Patient will have a decrease in pain to facilitate improvement in movement, function, and ADLs as indicated by Functional Deficits. [x]? Progressing: []? Met: []? Not Met: []? Adjusted     Long Term Goals: To be achieved in: 8 weeks  1. Disability index score of 20% or less for the iHOT-12 to assist with reaching prior level of function. [x]? ? Progressing: []?? Met: []?? Not Met: []?? Adjusted  2. Patient will demonstrate increased L hip AROM that is equal to R to allow for proper joint functioning as indicated by patients Functional Deficits.    [x]? ? Progressing: []?? Met: []?? Not Met: []?? Adjusted  3. Patient will demonstrate an increase in L hip strength to 4/5 in all directions tested to allow for proper functional mobility as indicated by patients Functional Deficits. [x]? ? Progressing: []?? Met: []?? Not Met: []?? Adjusted  4. Patient will return to all functional activities without increased symptoms or restriction. [x]? ? Progressing: []?? Met: []?? Not Met: []?? Adjusted  5. Patient will be able to walk over 500 ft with proper gait sequencing, no assistive device and no c/o pain. [x]? ? Progressing: []?? Met: []?? Not Met: []?? Adjusted      Overall Progression Towards Functional goals/ Treatment Progress Update:  [x] Patient is progressing as expected based on MD protocol with increase in L hip ROM and LE strength allowing her to ambulate without assistive device. Based on surgical intervention and continued deficits she is appropriate for further PT to further increase LE strength and mobility. 2/8/22  [] Progression is slowed d/t  [] Progression has been slowed due to co-morbidities. [] Plan just implemented, too soon to assess goals progression <30days   [] Goals require adjustment due to lack of progress  [] Patient is not progressing as expected and requires additional follow up with physician  []     Prognosis for POC: [x] Good [] Fair  [] Poor      Patient requires continued skilled intervention: [x] Yes  [] No    Treatment/Activity Tolerance:  [x] Patient able to complete treatment  [] Patient limited by fatigue  [] Patient limited by pain     [] Patient limited by other medical complications  [] Other:   2/15 Pt appears to be responding well to treatment with appropriate fatigue with increase in intensity of treatment but no adverse effects noted or reported.       Patient Education: 1/27 Educated on strengthening progress and milestones she needs to achieve before she attempts to baby sit independently. Updated HEP  1/25 Updated HEP   1/20 Updated HEP and educated on activity modification. 1/11 Updated HEP   1/7 Added prone hip extension to HEP  1/5 Educated on use of unilateral crutch for short distance and bilateral crutches for community ambulation. 12/9 Updated HEP   12/2 Reviewed precautions based on surgical intervention     Access Code: Johanna Cesar        PLAN: See eval  [x] Continue per plan of care [x] Alter current plan (see comments below)  [] Plan of care initiated [] Hold pending MD visit [] Discharge  2/8 Based on surgical intervention she continues to have L hip strength and AROM deficits causing functional limitations. She would benefit from continued PT 2x a week for 4 more weeks to further increase strength and endurance in order to improve function with activities in weight bearing      Electronically signed by:  Jamia Cardenas PT    Note: If patient does not return for scheduled/ recommended follow up visits, this note will serve as a discharge from care along with most recent update on progress.

## 2022-02-17 ENCOUNTER — HOSPITAL ENCOUNTER (OUTPATIENT)
Dept: PHYSICAL THERAPY | Age: 57
Setting detail: THERAPIES SERIES
Discharge: HOME | End: 2022-02-17
Payer: COMMERCIAL

## 2022-02-17 PROCEDURE — 97110 THERAPEUTIC EXERCISES: CPT

## 2022-02-17 PROCEDURE — 97112 NEUROMUSCULAR REEDUCATION: CPT

## 2022-02-17 NOTE — FLOWSHEET NOTE
The [facility] and [address] [place] [address], [facility]– [address], [address] [place] [address]  Phone: [phone]   Fax:     [phone]    Physical Therapy Progress/Daily Treatment Note  Date:  2022    Patient Name:  Percy Hidalgo    :  1965  MRN: [id number]  Restrictions/Precautions:    Medical/Treatment Diagnosis Information:  · Diagnosis: S76.212A (ICD-10-CM) - Strain of left groin  · Treatment Diagnosis: M25.552 Left hip pain  Insurance/Certification information:  PT Insurance Information: [facility]  Physician Information:  Referring Practitioner: Irwin Ervin   Has the plan of care been signed (Y/N):        [x]  Yes  []  No     Date of Patient follow up with Physician: PRN      Is this a Progress Report:     []  Yes  [x]  No        If Yes:  Date Range for reporting period:  Beginning 21  Ending  22    Progress report will be due (10 Rx or 30 days whichever is less):  42      Recertification will be due (POC Duration  / 90 days whichever is less): 3/2/22        Visit # Insurance Allowable Auth Required   56   [facility]  UNL []  Yes [x]  No        Functional Scale:   iHPT-12   52% deficit Date assessed: 22   iHOT-12  62% deficit Date assessed: 22  iHOT-12: 77% deficit Date assessed: 21       Latex Allergy:  [x]NO      []YES  Preferred Language for Healthcare:   [x]English       []other:      Pain level:  0/10     SUBJECTIVE:   Pt states she was not as sore after last visit and not having any pain. Attempted to sew yesterday and not able to sit as long d/t being uncomfortable. Pt thinks that increase in discomfort occurred d/t moving around a lot in her sewing room prior to sitting.        OBJECTIVE:   Updated below on 22  ROM PROM AROM Comments     Left Right Left Right     Flexion 108 130 95 110     Extension 10   8 12      Abduction 33 48 30 45     ER 36 40  38     IR 45 60  55     Knee flexion 137  135 130     Knee ext  0   0  0          Flexibility Left NT d/t acute post op  Right Comments   Hamstrings 0 0     ITB (Obers test) WNL  WNL     Hip flexor(Mikhail test) Mild restriction WNL                    Strength Left  Right Comments   Hip flexors 3+ 4 SLR   Hip extension 4+ 5     Hip abduction 4 5     Hip adduction 4- 4+     Hip ER 4 mild pain 4+     Hip IR 4- 5     Quads 4+ 5     Hamstrings 5 5           Palpation: Tenderness and muscle tension L proximal hip flexor, L greater trochanter 2/8     Functional Mobility/Transfers: Independent      Posture:  WNL      Gait: Independent, WNL        RESTRICTIONS/PRECAUTIONS: LEFT REVISION HIP ARTHROSCOPY, SYNOVECTOMY AND LYSIS OF ADHESIONS, REVISION ACETABULOPLASTY, ALLOGRAFT LABRAL RECONSTRUCTION, ENDOSCOPIC GREATER TROCHANTERIC BURSECTOMY, FRACTIONAL PSOAS LENGTHENING 11/30/21    Exercises/Interventions:     ROM/STRETCHES     Upright bike seat 4 5 min ^1/5   Ankle pumps  12/2   Heel slides  ^12/9 supine, strap for assistance   12/9 withheld d/t soreness after last visit and focused more on PROM below    Prone lying  1/5 withheld d/t improvement in low back syptoms    Adductor stretch 30 sec x3 L Start 1/11   Prone elbow   start12/16   Quad stretch prone   [id number] ^1/27 towel roll under thigh    Prone hip ER/IR AROM 3x10 L Start 1/27   SKTC 30\"hx3 L ^2/15   SB roll ins  Start 1/11   SB LTR Start 1/11 did not complete to L              PREs     Hip hinging stage 1  ^1/18   TA marching 1/11   TA with LE ext ^1/20   SLR with TA 0# 3x10 L Start 2/15 supine    Leg press 80# 3x10 DL concentric, SL eccentric L ^2/17   Hamstring curl 25# 3x10 SL on L ^2/15 completed on weight machine   Knee extension machine  15# 3x10 SL on L Start 2/8 90<>30 deg    SLR abd 5# 3x10 L ^2/15 side lying    Bridges Withheld d/t fatigue progress with SL NPV 2/10   Clams Withheld d/t fatigue cont with HEP 2/10    LAQ  2/8 progressed knee extension machine   Hip extension  8# 3x10 L ^1/27 completed in standing bent over table Step ups forward  3x10 L Start 2/10 6\" step                        Manual Therapy     PROM ' 1/27 withheld d/t improvements and having pt complete AROM above    STM 1/25 withheld d/t decrease in muscle tension   Neuromuscular re-education     SLS 30\"hx5 L ^2/15 Biodex postural stability L12   Biodex random control  x3' bilat LE ^2/15 L10    Mini squat  10\"h 1x10 bilat ^2/10 completed on TRX   Therapeutic activities     Gait training  1/7 Withheld d/t demonstrating appropriate sequencing with unilateral cane   Stair training 1/27 withheld d/t fatigue   Functional movement         Therapeutic Exercise and NMR EXR  [x] (17955) Provided verbal/tactile cueing for activities related to strengthening, flexibility, endurance, ROM for improvements in LE, proximal hip, and core control with self care, mobility, lifting, ambulation. [x] (18783) Provided verbal/tactile cueing for activities related to improving balance, coordination, kinesthetic sense, posture, motor skill, proprioception  to assist with LE, proximal hip, and core control in self care, mobility, lifting, ambulation and eccentric single leg control.      NMR and Therapeutic Activities:    [x] (41409 or 67185) Provided verbal/tactile cueing for activities related to improving balance, coordination, kinesthetic sense, posture, motor skill, proprioception and motor activation to allow for proper function of core, proximal hip and LE with self care and ADLs  [] (30305) Gait Re-education- Provided training and instruction to the patient for proper LE, core and proximal hip recruitment and positioning and eccentric body weight control with ambulation re-education including up and down stairs     Home Exercise Program:    [x] (68829) Reviewed/Progressed HEP activities related to strengthening, flexibility, endurance, ROM of core, proximal hip and LE for functional self-care, mobility, lifting and ambulation/stair navigation   [] (72090)Reviewed/Progressed HEP activities related to improving balance, coordination, kinesthetic sense, posture, motor skill, proprioception of core, proximal hip and LE for self care, mobility, lifting, and ambulation/stair navigation      Manual Treatments:  PROM / STM / Oscillations-Mobs:  G-I, II, III, IV (PA's, Inf., Post.)  [] (28612) Provided manual therapy to mobilize LE, proximal hip and/or LS spine soft tissue/joints for the purpose of modulating pain, promoting relaxation,  increasing ROM, reducing/eliminating soft tissue swelling/inflammation/restriction, improving soft tissue extensibility and allowing for proper ROM for normal function with self care, mobility, lifting and ambulation. Modalities:  CP L hip x15' 2/15    Charges:  Timed Code Treatment Minutes: 40'   Total Treatment Minutes: 10:09-11:09  60'       [] EVAL (LOW) 455 1011 (typically 20 minutes face-to-face)  [] EVAL (MOD) 60559 (typically 30 minutes face-to-face)  [] EVAL (HIGH) 10222 (typically 45 minutes face-to-face)  [] RE-EVAL     [x] TA(91479) x   2  [] IONTO  [x] NMR (60045) x   1  [] VASO  [] Manual (12320) x       [] Other:  [] TA x      [] Mech Traction (68359)  [] ES(attended) (83645)      [] ES (un) (05135):     GOALS: Adjusted on 12/2/21 d/t surgical intervention  Patient stated goal:  Walk without pain   [x]? Progressing: []? Met: []? Not Met: []? Adjusted     Therapist goals for Patient:   Short Term Goals: To be achieved in: 4 weeks  1. Independent in HEP and progression per patient tolerance, in order to prevent further injury prior to surgery   [x]? Progressing: []? Met: []? Not Met: []? Adjusted   2. Patient will have a decrease in pain to facilitate improvement in movement, function, and ADLs as indicated by Functional Deficits. [x]? Progressing: []? Met: []? Not Met: []? Adjusted     Long Term Goals: To be achieved in: 8 weeks  1. Disability index score of 20% or less for the iHOT-12 to assist with reaching prior level of function. [x]?? Progressing: []?? Met: []?? Not Met: []?? Adjusted  2. Patient will demonstrate increased L hip AROM that is equal to R to allow for proper joint functioning as indicated by patients Functional Deficits.    [x]? ? Progressing: []?? Met: []?? Not Met: []?? Adjusted  3. Patient will demonstrate an increase in L hip strength to 4/5 in all directions tested to allow for proper functional mobility as indicated by patients Functional Deficits. [x]? ? Progressing: []?? Met: []?? Not Met: []?? Adjusted  4. Patient will return to all functional activities without increased symptoms or restriction. [x]? ? Progressing: []?? Met: []?? Not Met: []?? Adjusted  5. Patient will be able to walk over 500 ft with proper gait sequencing, no assistive device and no c/o pain. [x]? ? Progressing: []?? Met: []?? Not Met: []?? Adjusted      Overall Progression Towards Functional goals/ Treatment Progress Update:  [x] Patient is progressing as expected based on MD protocol with increase in L hip ROM and LE strength allowing her to ambulate without assistive device. Based on surgical intervention and continued deficits she is appropriate for further PT to further increase LE strength and mobility. 2/8/22  [] Progression is slowed d/t  [] Progression has been slowed due to co-morbidities. [] Plan just implemented, too soon to assess goals progression <30days   [] Goals require adjustment due to lack of progress  [] Patient is not progressing as expected and requires additional follow up with physician  []     Prognosis for POC: [x] Good [] Fair  [] Poor      Patient requires continued skilled intervention: [x] Yes  [] No    Treatment/Activity Tolerance:  [x] Patient able to complete treatment  [] Patient limited by fatigue  [] Patient limited by pain     [] Patient limited by other medical complications  [] Other:   2/17 Pt continues to be fatigued with exercises requiring rest breaks but no adverse effects noted or reported. Patient Education:              1/27 Educated on strengthening progress and milestones she needs to achieve before she attempts to baby sit independently. Updated HEP  1/25 Updated HEP   1/20 Updated HEP and educated on activity modification. 1/11 Updated HEP   1/7 Added prone hip extension to HEP  1/5 Educated on use of unilateral crutch for short distance and bilateral crutches for community ambulation. 12/9 Updated HEP   12/2 Reviewed precautions based on surgical intervention     Access Code: Willma Just        PLAN: See eval  [x] Continue per plan of care [x] Alter current plan (see comments below)  [] Plan of care initiated [] Hold pending MD visit [] Discharge  2/8 Based on surgical intervention she continues to have L hip strength and AROM deficits causing functional limitations. She would benefit from continued PT 2x a week for 4 more weeks to further increase strength and endurance in order to improve function with activities in weight bearing      Electronically signed by:  Amita Lainez PT    Note: If patient does not return for scheduled/ recommended follow up visits, this note will serve as a discharge from care along with most recent update on progress.

## 2022-02-22 ENCOUNTER — APPOINTMENT (OUTPATIENT)
Dept: PHYSICAL THERAPY | Age: 57
End: 2022-02-22
Payer: COMMERCIAL

## 2022-02-22 ENCOUNTER — HOSPITAL ENCOUNTER (OUTPATIENT)
Dept: PHYSICAL THERAPY | Age: 57
Setting detail: THERAPIES SERIES
Discharge: HOME | End: 2022-02-22
Payer: COMMERCIAL

## 2022-02-22 PROCEDURE — 97110 THERAPEUTIC EXERCISES: CPT

## 2022-02-22 PROCEDURE — 97112 NEUROMUSCULAR REEDUCATION: CPT

## 2022-02-22 NOTE — FLOWSHEET NOTE
The [facility] and [facility], [address], [address] [facility]  Phone: [phone]   Fax:     [phone]    Physical Therapy Progress/Daily Treatment Note  Date:  2022    Patient Name:  Jessica Gilbert    :  1965  MRN: [id number]  Restrictions/Precautions:    Medical/Treatment Diagnosis Information:  · Diagnosis: S76.212A (ICD-10-CM) - Strain of left groin  · Treatment Diagnosis: M25.552 Left hip pain  Insurance/Certification information:  PT Insurance Information: [facility]  Physician Information:  Referring Practitioner: [employer]   Has the plan of care been signed (Y/N):        [x]  Yes  []  No     Date of Patient follow up with Physician: PRN      Is this a Progress Report:     []  Yes  [x]  No        If Yes:  Date Range for reporting period:  Beginning 21  Ending  22    Progress report will be due (10 Rx or 30 days whichever is less):        Recertification will be due (POC Duration  / 90 days whichever is less): 3/2/22        Visit # Insurance Allowable Auth Required   57   [address] []  Yes [x]  No        Functional Scale:   iHPT-12   52% deficit Date assessed: 22   iHOT-12  62% deficit Date assessed: 22  iHOT-12: 77% deficit Date assessed: 21       Latex Allergy:  [x]NO      []YES  Preferred Language for Healthcare:   [x]English       []other:      Pain level:  0/10     SUBJECTIVE:  : Pt states she baby sat her grandchildren on  and it was a good day with no excessive increase in soreness. Still has discomfort being on the floor.      OBJECTIVE:   Updated below on 22  ROM PROM AROM Comments     Left Right Left Right     Flexion 108 130 95 110     Extension 10   8 12      Abduction 33 48 30 45     ER 36 40  38     IR 45 60  55     Knee flexion 137  135 130     Knee ext  0   0  0          Flexibility Left NT d/t acute post op  Right Comments   Hamstrings 0 0     ITB (Obers test) WNL WNL     Hip flexor(Mikhail test) Mild restriction WNL                    Strength Left  Right Comments   Hip flexors 3+ 4 SLR   Hip extension 4+ 5     Hip abduction 4 5     Hip adduction 4- 4+     Hip ER 4 mild pain 4+     Hip IR 4- 5     Quads 4+ 5     Hamstrings 5 5           Palpation: Tenderness and muscle tension L proximal hip flexor, L greater trochanter 2/8     Functional Mobility/Transfers: Independent      Posture:  WNL      Gait: Independent, WNL        RESTRICTIONS/PRECAUTIONS: LEFT REVISION HIP ARTHROSCOPY, SYNOVECTOMY AND LYSIS OF ADHESIONS, REVISION ACETABULOPLASTY, ALLOGRAFT LABRAL RECONSTRUCTION, ENDOSCOPIC GREATER TROCHANTERIC BURSECTOMY, FRACTIONAL PSOAS LENGTHENING 11/30/21    Exercises/Interventions:     ROM/STRETCHES     Upright bike seat 4 5 min ^1/5   Ankle pumps  12/2   Heel slides  ^12/9 supine, strap for assistance   12/9 withheld d/t soreness after last visit and focused more on PROM below    Prone lying  1/5 withheld d/t improvement in low back syptoms    Adductor stretch 30 sec x3 L Start 1/11   Prone elbow   start12/16   Quad stretch prone   [id number] ^1/27 towel roll under thigh    Prone hip ER/IR AROM 3x10 L Start 1/27   SKTC 30\"hx3 L ^2/15   SB roll ins  Start 1/11   SB LTR Start 1/11 did not complete to L              PREs     Hip hinging stage 1  ^1/18   TA marching 1/11   TA with LE ext ^1/20   SLR with TA 1# 3x10 L ^2/22 supine   Leg press 85# 3x10 DL concentric, SL eccentric L ^2/22   Hamstring curl 25# 3x10 SL on L ^2/15 completed on weight machine   Knee extension machine  15# 3x10 SL on L Start 2/8 90<>30 deg    SLR abd 6# 3x10 L ^2/22 side lying    Bridges Withheld d/t fatigue progress with SL NPV 2/10   Clams Withheld d/t fatigue cont with HEP 2/10    LAQ  2/8 progressed knee extension machine   Hip extension  9# 3x10 L ^2/22 completed in standing bent over table    Step ups forward  3x10 L Start 2/10 6\" step    Step ups lateral  3x10 L Start 2/22 6\" step Manual Therapy     PROM ' 1/27 withheld d/t improvements and having pt complete AROM above    STM 1/25 withheld d/t decrease in muscle tension   Neuromuscular re-education     SLS 30\"hx3 L ^2/22 Biodex postural stability L11   Biodex random control  x3' bilat LE ^2/22 L9    Mini squat  20\"h 1x5 bilat ^2/22 completed on TRX   Therapeutic activities     Gait training  1/7 Withheld d/t demonstrating appropriate sequencing with unilateral cane   Stair training 1/27 withheld d/t fatigue   Functional movement         Therapeutic Exercise and NMR EXR  [x] (17299) Provided verbal/tactile cueing for activities related to strengthening, flexibility, endurance, ROM for improvements in LE, proximal hip, and core control with self care, mobility, lifting, ambulation. [x] (72913) Provided verbal/tactile cueing for activities related to improving balance, coordination, kinesthetic sense, posture, motor skill, proprioception  to assist with LE, proximal hip, and core control in self care, mobility, lifting, ambulation and eccentric single leg control.      NMR and Therapeutic Activities:    [x] (25617 or 30932) Provided verbal/tactile cueing for activities related to improving balance, coordination, kinesthetic sense, posture, motor skill, proprioception and motor activation to allow for proper function of core, proximal hip and LE with self care and ADLs  [] (91528) Gait Re-education- Provided training and instruction to the patient for proper LE, core and proximal hip recruitment and positioning and eccentric body weight control with ambulation re-education including up and down stairs     Home Exercise Program:    [x] (59086) Reviewed/Progressed HEP activities related to strengthening, flexibility, endurance, ROM of core, proximal hip and LE for functional self-care, mobility, lifting and ambulation/stair navigation   [] (77980)Reviewed/Progressed HEP activities related to improving balance, coordination, kinesthetic sense, posture, motor skill, proprioception of core, proximal hip and LE for self care, mobility, lifting, and ambulation/stair navigation      Manual Treatments:  PROM / STM / Oscillations-Mobs:  G-I, II, III, IV (PA's, Inf., Post.)  [] (77456) Provided manual therapy to mobilize LE, proximal hip and/or LS spine soft tissue/joints for the purpose of modulating pain, promoting relaxation,  increasing ROM, reducing/eliminating soft tissue swelling/inflammation/restriction, improving soft tissue extensibility and allowing for proper ROM for normal function with self care, mobility, lifting and ambulation. Modalities:  CP L hip x10' 2/22    Charges:  Timed Code Treatment Minutes: 39'   Total Treatment Minutes: 9:23-10:19  64'       [] EVAL (LOW) 455 1011 (typically 20 minutes face-to-face)  [] EVAL (MOD) 19973 (typically 30 minutes face-to-face)  [] EVAL (HIGH) 25677 (typically 45 minutes face-to-face)  [] RE-EVAL     [x] TA(24934) x   2  [] IONTO  [x] NMR (96386) x   1  [] VASO  [] Manual (89154) x       [] Other:  [] TA x      [] Mech Traction (63359)  [] ES(attended) (65060)      [] ES (un) (86009):     GOALS: Adjusted on 12/2/21 d/t surgical intervention  Patient stated goal:  Walk without pain   [x]? Progressing: []? Met: []? Not Met: []? Adjusted     Therapist goals for Patient:   Short Term Goals: To be achieved in: 4 weeks  1. Independent in HEP and progression per patient tolerance, in order to prevent further injury prior to surgery   [x]? Progressing: []? Met: []? Not Met: []? Adjusted   2. Patient will have a decrease in pain to facilitate improvement in movement, function, and ADLs as indicated by Functional Deficits. [x]? Progressing: []? Met: []? Not Met: []? Adjusted     Long Term Goals: To be achieved in: 8 weeks  1. Disability index score of 20% or less for the iHOT-12 to assist with reaching prior level of function. [x]? ? Progressing: []?? Met: []?? Not Met: []?? Adjusted  2.  Patient will demonstrate increased L hip AROM that is equal to R to allow for proper joint functioning as indicated by patients Functional Deficits.    [x]? ? Progressing: []?? Met: []?? Not Met: []?? Adjusted  3. Patient will demonstrate an increase in L hip strength to 4/5 in all directions tested to allow for proper functional mobility as indicated by patients Functional Deficits. [x]? ? Progressing: []?? Met: []?? Not Met: []?? Adjusted  4. Patient will return to all functional activities without increased symptoms or restriction. [x]? ? Progressing: []?? Met: []?? Not Met: []?? Adjusted  5. Patient will be able to walk over 500 ft with proper gait sequencing, no assistive device and no c/o pain. [x]? ? Progressing: []?? Met: []?? Not Met: []?? Adjusted      Overall Progression Towards Functional goals/ Treatment Progress Update:  [x] Patient is progressing as expected based on MD protocol with increase in L hip ROM and LE strength allowing her to ambulate without assistive device. Based on surgical intervention and continued deficits she is appropriate for further PT to further increase LE strength and mobility. 2/8/22  [] Progression is slowed d/t  [] Progression has been slowed due to co-morbidities. [] Plan just implemented, too soon to assess goals progression <30days   [] Goals require adjustment due to lack of progress  [] Patient is not progressing as expected and requires additional follow up with physician  []     Prognosis for POC: [x] Good [] Fair  [] Poor      Patient requires continued skilled intervention: [x] Yes  [] No    Treatment/Activity Tolerance:  [x] Patient able to complete treatment  [] Patient limited by fatigue  [] Patient limited by pain     [] Patient limited by other medical complications  [] Other:   2/22: Challenged with treatment but no adverse effects noted or reported.      Patient Education:              1/27 Educated on strengthening progress and milestones she needs to achieve before she attempts to baby sit independently. Updated HEP  1/25 Updated HEP   1/20 Updated HEP and educated on activity modification. 1/11 Updated HEP   1/7 Added prone hip extension to HEP  1/5 Educated on use of unilateral crutch for short distance and bilateral crutches for community ambulation. 12/9 Updated HEP   12/2 Reviewed precautions based on surgical intervention     Access Code: Delmi Campbell        PLAN: See eval  [x] Continue per plan of care [x] Alter current plan (see comments below)  [] Plan of care initiated [] Hold pending MD visit [] Discharge  2/8 Based on surgical intervention she continues to have L hip strength and AROM deficits causing functional limitations. She would benefit from continued PT 2x a week for 4 more weeks to further increase strength and endurance in order to improve function with activities in weight bearing      Electronically signed by:  Kim River PT    Note: If patient does not return for scheduled/ recommended follow up visits, this note will serve as a discharge from care along with most recent update on progress.

## 2022-02-24 ENCOUNTER — OFFICE VISIT (OUTPATIENT)
Dept: ORTHOPEDIC SURGERY | Age: 57
End: 2022-02-24

## 2022-02-24 ENCOUNTER — HOSPITAL ENCOUNTER (OUTPATIENT)
Dept: PHYSICAL THERAPY | Age: 57
Setting detail: THERAPIES SERIES
Discharge: HOME | End: 2022-02-24
Payer: COMMERCIAL

## 2022-02-24 VITALS — WEIGHT: 178 LBS | BODY MASS INDEX: 31.54 KG/M2 | HEIGHT: 63 IN

## 2022-02-24 DIAGNOSIS — Z98.890 S/P HIP ARTHROSCOPY: Primary | ICD-10-CM

## 2022-02-24 PROCEDURE — 99024 POSTOP FOLLOW-UP VISIT: CPT | Performed by: ORTHOPAEDIC SURGERY

## 2022-02-24 PROCEDURE — 97110 THERAPEUTIC EXERCISES: CPT

## 2022-02-24 PROCEDURE — 97112 NEUROMUSCULAR REEDUCATION: CPT

## 2022-02-24 RX ORDER — GABAPENTIN 100 MG/1
100 CAPSULE ORAL 2 TIMES DAILY
Qty: 60 CAPSULE | Refills: 0 | Status: SHIPPED | OUTPATIENT
Start: 2022-02-24 | End: 2022-02-24 | Stop reason: CLARIF

## 2022-02-24 RX ORDER — GABAPENTIN 100 MG/1
100 CAPSULE ORAL 2 TIMES DAILY
Qty: 60 CAPSULE | Refills: 2 | Status: SHIPPED | OUTPATIENT
Start: 2022-02-24 | End: 2022-05-26 | Stop reason: ALTCHOICE

## 2022-02-24 NOTE — LETTER
Physical Therapy Rehabilitation Referral    Patient Name: Sofy Becker MRN: [id number]  DOS: 2/24/2022     Diagnosis:   1.  S/P hip arthroscopy          Precautions:     [x] Evaluate and Treat    Post Op Instructions:  [] Continuous passive motion (CPM)   [] AAROM: Flexion to 90   [] Uni-planar passive range of motion   [] AAROM: External rotation to 10   [] Hip brace on at all times    [] AAROM: Extension to 10   [] Hip brace when hip at risk     [] AAROM:  Neutral IR   [] Home exercise program (copy to patient)   [] AAROM: Abduction to 25    [] Isometric external rotator strengthening    [] Isometric glute max strengthening     [] Isometric abductor strengthening     [] Leg Circumduction            Post-op Phases:  []Phase I: Maximum Protection (Day 1-3 Weeks)  []Phase II: Mobility & Neuromuscular Retraining (3-6 Weeks)  []Phase III: Phase III: Muscle Balance and Strengthening (6-12 Weeks)  [x]Phase IV: Functional Training of the Hip & Lower Extremity (12-18 Weeks)  []Phase V: Advanced Training  Specificity for Return to Sport and/or Work (18-24 [employer])    Non-Operative & Pre-Operative Rehabilitation:    Cardiovascular:            Deep Hip Rotators  [] Upright Bike (Baseline)           [] External Rotators AROM in 4PK (Baseline)  [] Walking (Progression 1)           [] Internal Rotators AROM in 4PK (Baseline)  [] Running (Progression 2)           [] ER in side lying (Progression 1)  [] Dynamic Loading (Progression 3)          [] IR in side lying (Progression 1)                [] ER with resistance in 4PK (Progression 2)                [] IR with resistance in 4PK (Progression 2)                 [] Lateral Step Up (Progression 3)    Positioning:  [] 4PK with pelvic tilts (Baseline)  [] Pelvic tilts in sitting (Progression 1)      Core:   [] Relaxation Breathing (Baseline)         [] [place] lying elbow presses (Progression 1)  [] Side Planks (Baseline)         [] Side planks + hip abduction (Progression 1)  [] Bird-Dog (Baseline)            [] Bird-Dog with resistance (Progression 1)    Glute Complex:            Load Transfer:  [] Bridging (Baseline)            [] Weight Shifting (Baseline)     [] Single Leg Bridge (Progression 1)        [] Single Leg Stance to SEBT(Progression 1)     [] Single Leg Lower (Progression 2)         [] Hip hinge + monster walks (Progression 2)       Mobility:  [] Mikhail position with breathing (baseline)  [] Hip Hinge with stick & neutral spine (baseline)  [] Sit to stand (baseline)  [] Hip Hinge without guidance (Progression 1)  [] Hip Hinge with resisted punch out guidance (Progression 2)      Pelvic Floor:  [] Relaxation breathing         Activities:       [] Rowing       [] Stepper/Exercise bike     [] Swimming  [] Water exercises    Modalities:     Return to Sport:  [x] Of Choice      [] Plyometrics  [] Ultrasound     [] Rhythmic stabilization  [] Iontophoresis    [] Core strengthening   [] Moist heat     [] Sports specific program:   [] Massage         [x] Cryotherapy      [] Electrical stimulation     [] Paraffin  [] Whirlpool  [] TENS    [x] Home exercise program (copy to patient). Perform exercises for:   15     minutes    3      times/day  [x] Supervised physical therapy  Frequency: []  1x week  [x] 2x week  [] 3x week  [] Other:   Duration: [] 2 weeks   [] 4 weeks  [x] 6 weeks  [] Other:     Additional Instructions:   Planks  Hip hinge with resistance    Sincerely,    Ashley Moore MD [facility] and [address] [place], [place] [address], [address]  Email: [email or web address]  Office: [phone]

## 2022-02-24 NOTE — PROGRESS NOTES
Chief Complaint  Post-Op Check (12 WEEK POST OP LEFT HIP SCOPE 11/30/2021)      History of Present Illness:  Yohannes Heller is a pleasant 64 y.o. female who is here for 3-month follow-up post left hip revision arthroscopy, acetabuloplasty, segmental labral reconstruction, psoas lengthening capsular closure, andendoscopic GT bursectomy. She is doing a lot better. Has been doing physical therapy at her [facility] office. She no longer has a resting deep-seated groin pain and burning. She has the occasional twinge with twisting movements. She still has some subjective stiffness and getting up from a seated position. But otherwise feels as though she is making great progress and has 1 out of 10 soreness. She is keen on returning to [facility] and has therapy booked for another month. Medical History:  Patient's medications, allergies, past medical, surgical, social and family histories were reviewed and updated as appropriate. Pain Assessment  Location of Pain:  (HIP)  Location Modifiers: Left  Severity of Pain: 1  Quality of Pain: Dull,Aching (SORE)  Frequency of Pain: Intermittent  Aggravating Factors:  (EXTENDED SITTING. SITTING TO STANDING)  Limiting Behavior: Some  Relieving Factors: Ice,Exercise,Rest  Result of Injury: No  Work-Related Injury: No  Are there other pain locations you wish to document?: No  ROS: Review of systems reviewed from Patient History Form completed today and available in the patient's chart under the Media tab. Pertinent items are noted in HPI  Review of systems reviewed from Patient History Form completed today and available in the patient's chart under the Media tab. Vital Signs:  Ht 5' 3\" (1.6 m)   Wt 178 lb (80.7 kg)   BMI 31.53 kg/m²         Neuro: Alert & oriented x 3,  normal,  no focal deficits noted. Normal affect.   Eyes: sclera clear  Ears: Normal external ear  Mouth:  No perioral lesions  Pulm: Respirations unlabored and regular  Pulse: Extremities well perfused. 2+ peripheral pulses. Skin: Warm. No ulcerations. Constitutional: The physical examination finds the patient to be well-developed and well-nourished. The patient is alert and oriented x3 and was cooperative throughout the visit. Hip Examination: left    Skin/Inspection: Incisions full healed. No skin lesions, cellulitis, or extreme edema in the lower extremities. Standing/Walking: normal gait, negative Trendelenburg sign. Supine Exam: Non tender around the ASIS, AIIS  Flexion arc 0 to 100deg, IR 25 deg, ER 45 deg full range of motion  Special Tests:  negative Deep Flexion Test, negative  FADIR ,  negative  pain with   Resisted Abduction 5/5   Resisted Adduction 5/5   Resisted Hip Flexion 5/5    Side Lying Exam: mildly tender at greater trochanter, not tender abductor musculature,   Abductor side leg raise 5/5, normal. OberTest    Special Tests:  Double Leg Squats (Standing): excellent  Lunges: good  Single Leg Squats: fair  Hip Hinge (Sit to Stand):  good  Double Leg Bridges: excellent  Single Leg Bridges: excellent  Single Leg Step-Downs : good    Distal Neurovascular exam is intact (foot sensation, pulses, and motor exam)        Diagnostics:  No new imaging was obtained today       Assessment: Patient is a 64 y.o. female who is doing great 3 months post left hip revision arthroscopy. She is completely within her postoperative timelines for range of motion function and strength   Has made significant functional gains since her last visit.     Impression:  Visit Diagnoses       Codes    S/P hip arthroscopy    -  Primary Z98.890          Office Procedures:  Orders Placed This Encounter   Medications    Diclofenac Sodium POWD     Sig: Apply 1-2 g topically 3 times daily as needed (As needed for pain) Ketamine 5% Gabapentin 5% Cyclobenzaprine 2% Diclofenac 3% Lidocaine 3%     Dispense:  100 g     Refill:  0    DISCONTD: gabapentin (NEURONTIN) 100 MG capsule     Sig: Take 1 capsule by mouth 2 times daily for 30 days. Dispense:  60 capsule     Refill:  0    gabapentin (NEURONTIN) 100 MG capsule     Sig: Take 1 capsule by mouth 2 times daily for 30 days. Dispense:  60 capsule     Refill:  2     No orders of the defined types were placed in this encounter. Plan:  She is doing great. We recommend that She continue with physical therapy and home exercise program for advanced o progression of motion, dynamic loading, and trunk/hip/core/thigh strengthening. Patient can start phase 4 of our hip rehabilitation protocol. I would place emphasis on resisted hip hinges with push-out to strengthen her trunk control and with further emphasis on deep rotator control and single-leg strengthening. All the patient's questions were answered while in the clinic. The patient is understanding of all instructions and agrees with the plan. Approximately 25 minutes was spent on patient education and coordinating care. Follow up in: Return in about 3 months (around 5/24/2022). Sincerely,    Angelita Bay MD [facility] [address] [place] [id number]  Email: [email or web address]  Office: [phone]    02/24/22  5:32 PM        The encounter with Jennifer Vang was carried out by myself, Dr Nani Vela, who personally examined the patient and reviewed the plan. This dictation was performed with a verbal recognition program (DRAGON) and it was checked for errors. It is possible that there are still dictated errors within this office note. If so, please bring any errors to my attention for an addendum. All efforts were made to ensure that this office note is accurate.

## 2022-02-24 NOTE — FLOWSHEET NOTE
[facility] and [address] [place] [address], [facility] [address], [address] [place] [address]  Phone: [phone]   Fax:     [phone]    Physical Therapy Progress/Daily Treatment Note  Date:  2022    Patient Name:  Opal Norris    :  1965  MRN: [id number]  Restrictions/Precautions:    Medical/Treatment Diagnosis Information:  · Diagnosis: S76.212A (ICD-10-CM) - Strain of left groin  · Treatment Diagnosis: M25.552 Left hip pain  Insurance/Certification information:  PT Insurance Information: [facility]  Physician Information:  Referring Practitioner: Manjinder Bae   Has the plan of care been signed (Y/N):        [x]  Yes  []  No     Date of Patient follow up with Physician: PRN      Is this a Progress Report:     []  Yes  [x]  No        If Yes:  Date Range for reporting period:  Beginning 21  Ending  22    Progress report will be due (10 Rx or 30 days whichever is less):  8/3/40      Recertification will be due (POC Duration  / 90 days whichever is less): 3/2/22        Visit # Insurance Allowable Auth Required   14 visits   58 total   [facility]  UNL []  Yes [x]  No        Functional Scale:   iHPT-12   52% deficit Date assessed: 22   iHOT-12  62% deficit Date assessed: 22  iHOT-12: 77% deficit Date assessed: 21       Latex Allergy:  [x]NO      []YES  Preferred Language for Healthcare:   [x]English       []other:      Pain level:  1/10 2/24    SUBJECTIVE:   Pt saw MD this morning and he was pleased with progress but instructed her to continue with PT to further increase strength. Reports she is a little sore from activities she did during visit. States earlier this week she got up from a stool and felt her L LE initially buckle when she tried to put weight through it. Denies any pain or persistence of sensation and no pain reported.      OBJECTIVE:   Updated below on 22  ROM PROM AROM Comments     Left Right Left Right     Flexion 108 130 95 110     Extension 10   8 12      Abduction 33 48 30 45     ER 36 40  38     IR 45 60  55     Knee flexion 137  135 130     Knee ext  0   0  0          Flexibility Left NT d/t acute post op  Right Comments   Hamstrings 0 0     ITB (Obers test) WNL  WNL     Hip flexor(Mikhail test) Mild restriction WNL                    Strength Left  Right Comments   Hip flexors 3+ 4 SLR   Hip extension 4+ 5     Hip abduction 4 5     Hip adduction 4- 4+     Hip ER 4 mild pain 4+     Hip IR 4- 5     Quads 4+ 5     Hamstrings 5 5           Palpation: Tenderness and muscle tension L proximal hip flexor, L greater trochanter 2/8     Functional Mobility/Transfers: Independent      Posture:  WNL      Gait: Independent, WNL        RESTRICTIONS/PRECAUTIONS: LEFT REVISION HIP ARTHROSCOPY, SYNOVECTOMY AND LYSIS OF ADHESIONS, REVISION ACETABULOPLASTY, ALLOGRAFT LABRAL RECONSTRUCTION, ENDOSCOPIC GREATER TROCHANTERIC BURSECTOMY, FRACTIONAL PSOAS LENGTHENING 11/30/21    Exercises/Interventions:     ROM/STRETCHES     Upright bike seat 4 5 min ^1/5   Ankle pumps  12/2   Heel slides  ^12/9 supine, strap for assistance   12/9 withheld d/t soreness after last visit and focused more on PROM below    Prone lying  1/5 withheld d/t improvement in low back syptoms    Adductor stretch 30 sec x3 L Start 1/11   Prone elbow   start12/16   Quad stretch prone   [id number] ^1/27 towel roll under thigh    Prone hip ER/IR AROM 3x10 L Start 1/27   SKTC 30\"hx3 L ^2/15   SB roll ins  Start 1/11   SB LTR Start 1/11 did not complete to L              PREs     Hip hinging stage 1  ^1/18   TA marching 1/11   TA with LE ext ^1/20   SLR with TA 1# 3x10 L ^2/22 supine   Leg press 85# 3x10 DL concentric, SL eccentric L ^2/22   Hamstring curl 25# 3x10 SL on L ^2/15 completed on weight machine   Knee extension machine  15# 3x10 SL on L Start 2/8 90<>30 deg    SLR abd 6# 3x10 L ^2/22 side lying    Bridges 3x10 L Start /224   Clams Withheld d/t fatigue cont with HEP 2/10 LAQ  2/8 progressed knee extension machine   Hip extension  2/24 progress with SL bridge   LTD 2x10 L stsart 2/24 4\" step   Step ups forward  3x10 L ^2/4 8\" step    Step ups lateral  Start 2/22 6\" step                   Manual Therapy     PROM ' 1/27 withheld d/t improvements and having pt complete AROM above    STM 1/25 withheld d/t decrease in muscle tension   Neuromuscular re-education     SLS 30\"hx3 L ^2/22 Biodex postural stability L11   Biodex random control  x3' bilat LE ^2/22 L9    Mini squat  20\"h 1x5 bilat ^2/22 completed on TRX   Therapeutic activities     Gait training  1/7 Withheld d/t demonstrating appropriate sequencing with unilateral cane   Stair training 1/27 withheld d/t fatigue   Functional movement         Therapeutic Exercise and NMR EXR  [x] (86259) Provided verbal/tactile cueing for activities related to strengthening, flexibility, endurance, ROM for improvements in LE, proximal hip, and core control with self care, mobility, lifting, ambulation. [x] (00048) Provided verbal/tactile cueing for activities related to improving balance, coordination, kinesthetic sense, posture, motor skill, proprioception  to assist with LE, proximal hip, and core control in self care, mobility, lifting, ambulation and eccentric single leg control.      NMR and Therapeutic Activities:    [x] (59245 or 95794) Provided verbal/tactile cueing for activities related to improving balance, coordination, kinesthetic sense, posture, motor skill, proprioception and motor activation to allow for proper function of core, proximal hip and LE with self care and ADLs  [] (33013) Gait Re-education- Provided training and instruction to the patient for proper LE, core and proximal hip recruitment and positioning and eccentric body weight control with ambulation re-education including up and down stairs     Home Exercise Program:    [x] (98183) Reviewed/Progressed HEP activities related to strengthening, flexibility, endurance, ROM of core, proximal hip and LE for functional self-care, mobility, lifting and ambulation/stair navigation   [] (47225)Reviewed/Progressed HEP activities related to improving balance, coordination, kinesthetic sense, posture, motor skill, proprioception of core, proximal hip and LE for self care, mobility, lifting, and ambulation/stair navigation      Manual Treatments:  PROM / STM / Oscillations-Mobs:  G-I, II, III, IV (PA's, Inf., Post.)  [] (25003) Provided manual therapy to mobilize LE, proximal hip and/or LS spine soft tissue/joints for the purpose of modulating pain, promoting relaxation,  increasing ROM, reducing/eliminating soft tissue swelling/inflammation/restriction, improving soft tissue extensibility and allowing for proper ROM for normal function with self care, mobility, lifting and ambulation. Modalities:  CP declined d/t time 2/24    Charges:  Timed Code Treatment Minutes: 52'   Total Treatment Minutes: 10:17-11:09  46'       [] EVAL (LOW) 455 [id number] (typically 20 minutes face-to-face)  [] EVAL (MOD) 04296 (typically 30 minutes face-to-face)  [] EVAL (HIGH) 60704 (typically 45 minutes face-to-face)  [] RE-EVAL     [x] PK(51618) x   2  [] IONTO  [x] NMR (32535) x   1  [] VASO  [] Manual (26632) x       [] Other:  [] TA x      [] Mech Traction (90720)  [] ES(attended) (70131)      [] ES (un) (11589):     GOALS: Adjusted on 12/2/21 d/t surgical intervention  Patient stated goal:  Walk without pain   [x]? Progressing: []? Met: []? Not Met: []? Adjusted     Therapist goals for Patient:   Short Term Goals: To be achieved in: 4 weeks  1. Independent in HEP and progression per patient tolerance, in order to prevent further injury prior to surgery   [x]? Progressing: []? Met: []? Not Met: []? Adjusted   2. Patient will have a decrease in pain to facilitate improvement in movement, function, and ADLs as indicated by Functional Deficits. [x]? Progressing: []? Met: []? Not Met: []?  Adjusted     Long Term Goals: To be achieved in: 8 weeks  1. Disability index score of 20% or less for the iHOT-12 to assist with reaching prior level of function. [x]? ? Progressing: []?? Met: []?? Not Met: []?? Adjusted  2. Patient will demonstrate increased L hip AROM that is equal to R to allow for proper joint functioning as indicated by patients Functional Deficits.    [x]? ? Progressing: []?? Met: []?? Not Met: []?? Adjusted  3. Patient will demonstrate an increase in L hip strength to 4/5 in all directions tested to allow for proper functional mobility as indicated by patients Functional Deficits. [x]? ? Progressing: []?? Met: []?? Not Met: []?? Adjusted  4. Patient will return to all functional activities without increased symptoms or restriction. [x]? ? Progressing: []?? Met: []?? Not Met: []?? Adjusted  5. Patient will be able to walk over 500 ft with proper gait sequencing, no assistive device and no c/o pain. [x]? ? Progressing: []?? Met: []?? Not Met: []?? Adjusted      Overall Progression Towards Functional goals/ Treatment Progress Update:  [x] Patient is progressing as expected based on MD protocol with increase in L hip ROM and LE strength allowing her to ambulate without assistive device. Based on surgical intervention and continued deficits she is appropriate for further PT to further increase LE strength and mobility. 2/8/22  [] Progression is slowed d/t  [] Progression has been slowed due to co-morbidities.   [] Plan just implemented, too soon to assess goals progression <30days   [] Goals require adjustment due to lack of progress  [] Patient is not progressing as expected and requires additional follow up with physician  []     Prognosis for POC: [x] Good [] Fair  [] Poor      Patient requires continued skilled intervention: [x] Yes  [] No    Treatment/Activity Tolerance:  [x] Patient able to complete treatment  [] Patient limited by fatigue  [] Patient limited by pain     [] Patient limited by other medical complications  [] Other:   2/24 Fatigued with increase in intensity of treatment but no adverse effects noted or reported. Patient Education:              2/24 Updated HEP   1/27 Educated on strengthening progress and milestones she needs to achieve before she attempts to baby sit independently. Updated HEP  1/25 Updated HEP   1/20 Updated HEP and educated on activity modification. 1/11 Updated HEP   1/7 Added prone hip extension to HEP  1/5 Educated on use of unilateral crutch for short distance and bilateral crutches for community ambulation. 12/9 Updated HEP   12/2 Reviewed precautions based on surgical intervention     Access Code: Ez Navarro        PLAN: See eval  [x] Continue per plan of care [x] Alter current plan (see comments below)  [] Plan of care initiated [] Hold pending MD visit [] Discharge  2//24 complete progress note NPV      Electronically signed by:  Dena Willoughby PT    Note: If patient does not return for scheduled/ recommended follow up visits, this note will serve as a discharge from care along with most recent update on progress.

## 2022-03-01 ENCOUNTER — HOSPITAL ENCOUNTER (OUTPATIENT)
Dept: PHYSICAL THERAPY | Age: 57
Setting detail: THERAPIES SERIES
Discharge: HOME | End: 2022-03-01
Payer: COMMERCIAL

## 2022-03-01 PROCEDURE — 97110 THERAPEUTIC EXERCISES: CPT

## 2022-03-01 PROCEDURE — 97112 NEUROMUSCULAR REEDUCATION: CPT

## 2022-03-01 NOTE — PROGRESS NOTES
The [address] [place] [address],[address], [address], [address] [place] [address]  Phone: [phone]   Fax:     [phone]   Physical Therapy Re-Certification Plan of Care    Dear Dr. Hailey Ivory,    We had the pleasure of treating the following patient for physical therapy services at [address] [place] [address]. A summary of our findings can be found in the updated assessment below. This includes our plan of care. If you have any questions or concerns regarding these findings, please do not hesitate to contact me at the office phone number checked above. Thank you for the referral.     Physician Signature:________________________________Date:__________________  By signing above (or electronic signature), therapists plan is approved by physician      Overall Response to Treatment:   [x]Patient is responding well to treatment and improvement is noted with regards to increase in L hip ROM and strength allowing for decrease in pain and increase in function. She continues to have strength and mild end range ROM deficits causing continued functional limitations with more intense activities in weight bearing such as lifting and squatting.  Based progress and continued limitations she would benefit from continued PT.    []Patient should continue to improve in reasonable time if they continue HEP   []Patient has plateaued and is no longer responding to skilled PT intervention    []Patient is getting worse and would benefit from return to referring MD   []Patient unable to adhere to initial POC   []Other:       Physical Therapy Progress/Daily Treatment Note  Date:  3/1/2022    Patient Name:  Jenna Desouza    :  1965  MRN: [id number]  Restrictions/Precautions:    Medical/Treatment Diagnosis Information:  · Diagnosis: W40.142E (ICD-10-CM) - Strain of left groin  · Treatment Diagnosis: M25.552 Left hip pain  Insurance/Certification information:  PT Insurance Information: [facility]  Physician Information:  Referring Practitioner: Harley Martin   Has the plan of care been signed (Y/N):        [x]  Yes  []  No     Date of Patient follow up with Physician: PRN      Is this a Progress Report:     [x]  Yes  []  No        If Yes:  Date Range for reporting period:  Beginning 12/2/21  Ending  3/1/22    Progress report will be due (10 Rx or 30 days whichever is less):  4/86/13      Recertification will be due (POC Duration  / 90 days whichever is less): 3/29/22        Visit # Insurance Allowable Auth Required   15 visits 2022  59 total  3/1 [address] []  Yes [x]  No        Functional Scale:   iHOT-12 25% deficit Date assessed: 3/1/22  iHOT-12   52% deficit Date assessed: 2/8/22   iHOT-12  62% deficit Date assessed: 1/5/22  iHOT-12: 77% deficit Date assessed: 12/2/21       Latex Allergy:  [x]NO      []YES  Preferred Language for Healthcare:   [x]English       []other:      Pain level:  1/10 3/1    SUBJECTIVE:  3/1: Pt stats she was sore two days following her last visit but symptoms located in L gluteal. Symptoms improved by 2/27. States she is feeling better today and has some soreness but not pain. OBJECTIVE:   Updated below on 3/1/22  ROM PROM AROM Comments     Left 3/1 Right Left 3/1 Right     Flexion 117 130 102 110     Extension 10   10 12      Abduction 42 48 36 45     ER 37 40  38     IR 46 60  55          Flexibility Left NT d/t acute post op  Right Comments   Hamstrings 0 0     ITB (Obers test) WNL  WNL     Hip flexor(Mikhail test) Mild restriction WNL                    Strength Left 3/1 Right Comments   Hip flexors 3+ muscle discomfort at proximal hip flexor  4 SLR   Hip extension 4+ 5     Hip abduction 5 5     Hip adduction 4 4+     Hip ER 4+ 4+     Hip IR 4 5     Quads 4+ 5     Hamstrings 5 5           Palpation: Tension L proximal hip flexor 3/1     Functional Mobility/Transfers: Independent      Posture:  WNL      Gait: Independent, WNL RESTRICTIONS/PRECAUTIONS: LEFT REVISION HIP ARTHROSCOPY, SYNOVECTOMY AND LYSIS OF ADHESIONS, REVISION ACETABULOPLASTY, ALLOGRAFT LABRAL RECONSTRUCTION, ENDOSCOPIC GREATER TROCHANTERIC BURSECTOMY, FRACTIONAL PSOAS LENGTHENING 11/30/21    Exercises/Interventions:     ROM/STRETCHES     Upright bike seat 4 5 min ^1/5   Ankle pumps  12/2   Heel slides  ^12/9 supine, strap for assistance   12/9 withheld d/t soreness after last visit and focused more on PROM below    Prone lying  1/5 withheld d/t improvement in low back syptoms    Adductor stretch 30 sec x3 L Start 1/11   Prone elbow   start12/16   Quad stretch prone   [id number] ^1/27 towel roll under thigh    Prone hip ER/IR AROM 10\"hx10 ead L ^3/1   SKTC 3/1 cont with HEP   Lower trunk rotation 3x10 bilat Start 3/1 emphasis on imrpovimg hip ROM into ER/IR   SB roll ins  Start 1/11   SB LTR Start 1/11 did not complete to L    Hip ER ROM 10\"hx10 L 3/1 supine L foot on bent R knee        PREs     Hip hinging stage 1  ^1/18   TA marching 1/11   TA with LE ext ^1/20   SLR with TA 1.5# 3x10 L ^3/1 supine   Leg press 85# 3x10 DL concentric, SL eccentric L ^2/22   Hamstring curl 3/1 withheld d/t progress and pt overall fatigue   Knee extension machine  3/1 withheld d/t progress and pt overall fatigue   SLR abd ^2/22 side lying    Bridges 3x10 L Start /224   Clams Withheld d/t fatigue cont with HEP 2/10    LAQ  2/8 progressed knee extension machine   Hip extension  2/24 progress with SL bridge   LTD 2x10 L stsart 2/24 4\" step   Step ups forward  3x10 L ^2/4 8\" step    Step ups lateral  Start 2/22 6\" step                   Manual Therapy     PROM ' 1/27 withheld d/t improvements and having pt complete AROM above    STM 1/25 withheld d/t decrease in muscle tension   Neuromuscular re-education     SLS 30\"hx3 L ^3/1 Biodex postural stability L10   Biodex random control  x3' bilat LE ^3/1 L8   Mini squat  20\"h 1x5 bilat ^2/22 completed on TRX   Therapeutic activities     Gait training  1/7 Withheld d/t demonstrating appropriate sequencing with unilateral cane   Stair training 1/27 withheld d/t fatigue   Functional movement         Therapeutic Exercise and NMR EXR  [x] (14542) Provided verbal/tactile cueing for activities related to strengthening, flexibility, endurance, ROM for improvements in LE, proximal hip, and core control with self care, mobility, lifting, ambulation. [x] (18650) Provided verbal/tactile cueing for activities related to improving balance, coordination, kinesthetic sense, posture, motor skill, proprioception  to assist with LE, proximal hip, and core control in self care, mobility, lifting, ambulation and eccentric single leg control.      NMR and Therapeutic Activities:    [x] (48927 or 93141) Provided verbal/tactile cueing for activities related to improving balance, coordination, kinesthetic sense, posture, motor skill, proprioception and motor activation to allow for proper function of core, proximal hip and LE with self care and ADLs  [] (36045) Gait Re-education- Provided training and instruction to the patient for proper LE, core and proximal hip recruitment and positioning and eccentric body weight control with ambulation re-education including up and down stairs     Home Exercise Program:    [x] (08846) Reviewed/Progressed HEP activities related to strengthening, flexibility, endurance, ROM of core, proximal hip and LE for functional self-care, mobility, lifting and ambulation/stair navigation   [] (70931)Reviewed/Progressed HEP activities related to improving balance, coordination, kinesthetic sense, posture, motor skill, proprioception of core, proximal hip and LE for self care, mobility, lifting, and ambulation/stair navigation      Manual Treatments:  PROM / STM / Oscillations-Mobs:  G-I, II, III, IV (PA's, Inf., Post.)  [] (00917) Provided manual therapy to mobilize LE, proximal hip and/or LS spine soft tissue/joints for the purpose of modulating pain, promoting relaxation,  increasing ROM, reducing/eliminating soft tissue swelling/inflammation/restriction, improving soft tissue extensibility and allowing for proper ROM for normal function with self care, mobility, lifting and ambulation. Modalities:  CP L hip x15' 3/1    Charges:  Timed Code Treatment Minutes: 52'   Total Treatment Minutes: 9:26-10:35  71'       [] EVAL (LOW) 07404 (typically 20 minutes face-to-face)  [] EVAL (MOD) 22517 (typically 30 minutes face-to-face)  [] EVAL (HIGH) 42386 (typically 45 minutes face-to-face)  [] RE-EVAL     [x] SK(56854) x   2  [] IONTO  [x] NMR (68608) x   1  [] VASO  [] Manual (67192) x       [] Other:  [] TA x      [] Mech Traction (76970)  [] ES(attended) (39296)      [] ES (un) (84735):     GOALS: Adjusted on 12/2/21 d/t surgical intervention  Patient stated goal:  Walk without pain   []? Progressing: []? Met: []? Not Met: []? Adjusted     Therapist goals for Patient:   Short Term Goals: To be achieved in: 4 weeks  1. Independent in HEP and progression per patient tolerance, in order to prevent further injury prior to surgery   []? Progressing: [x]? Met: []? Not Met: []? Adjusted   2. Patient will have a decrease in pain to facilitate improvement in movement, function, and ADLs as indicated by Functional Deficits. []? Progressing: [x]? Met: []? Not Met: []? Adjusted     Long Term Goals: To be achieved in: 8 weeks  1. Disability index score of 20% or less for the iHOT-12 to assist with reaching prior level of function. [x]? ? Progressing: []?? Met: []?? Not Met: []?? Adjusted  2. Patient will demonstrate increased L hip AROM that is equal to R to allow for proper joint functioning as indicated by patients Functional Deficits.    [x]? ? Progressing: []?? Met: []?? Not Met: []?? Adjusted  3. Patient will demonstrate an increase in L hip strength to 4/5 in all directions tested to allow for proper functional mobility as indicated by patients Functional Deficits. [x]?? Progressing: []?? Met: []?? Not Met: []?? Adjusted  4. Patient will return to all functional activities without increased symptoms or restriction. [x]? ? Progressing: []?? Met: []?? Not Met: []?? Adjusted  5. Patient will be able to walk over 500 ft with proper gait sequencing, no assistive device and no c/o pain. []?? Progressing: [x]? ? Met: []?? Not Met: []?? Adjusted      Overall Progression Towards Functional goals/ Treatment Progress Update:  [x] Patient is progressing as expected based on MD protocol with increase in L hip ROM and LE strength allowing for increase in functional independence with gait and ADL's in weight bearing. Based on surgical intervention and continued deficits she is appropriate for further PT to further increase LE strength and mobility to increase ability to do more strenuous activities in weight bearing without pain or compensation. 3/1/22  [] Progression is slowed d/t  [] Progression has been slowed due to co-morbidities. [] Plan just implemented, too soon to assess goals progression <30days   [] Goals require adjustment due to lack of progress  [] Patient is not progressing as expected and requires additional follow up with physician  []     Prognosis for POC: [x] Good [] Fair  [] Poor      Patient requires continued skilled intervention: [x] Yes  [] No    Treatment/Activity Tolerance:  [x] Patient able to complete treatment  [] Patient limited by fatigue  [] Patient limited by pain     [] Patient limited by other medical complications  [] Other:   3/1 Pt continues to have ROM restrictions with hip ER/IR and therefore introduced more stretches and ROM exercises to target restrictions. Responded well to exercises with no increase in symptoms. She presented with appropriate fatigue by end of treatment but no adverse effects noted or reported.       Patient Education:              3/1 Updated HEP with stretches and ROM exercises  2/24 Updated HEP   1/27 Educated on strengthening progress and milestones she needs to achieve before she attempts to baby sit independently. Updated HEP  1/25 Updated HEP   1/20 Updated HEP and educated on activity modification. 1/11 Updated HEP   1/7 Added prone hip extension to HEP  1/5 Educated on use of unilateral crutch for short distance and bilateral crutches for community ambulation. 12/9 Updated HEP   12/2 Reviewed precautions based on surgical intervention     Access Code: Karlenenickcésar Nadya        PLAN: See eval  [x] Continue per plan of care [x] Alter current plan (see comments below)  [] Plan of care initiated [] Hold pending MD visit [] Discharge  3/1 Continue with PT 1-2x a week for 4 weeks to further increase L hip ROM and strength in order to improve function before progressing to completing HEP independently. Electronically signed by:  Jama Davila, PT    Note: If patient does not return for scheduled/ recommended follow up visits, this note will serve as a discharge from care along with most recent update on progress.

## 2022-03-03 ENCOUNTER — HOSPITAL ENCOUNTER (OUTPATIENT)
Dept: PHYSICAL THERAPY | Age: 57
Setting detail: THERAPIES SERIES
Discharge: HOME | End: 2022-03-03
Payer: COMMERCIAL

## 2022-03-03 PROCEDURE — 97112 NEUROMUSCULAR REEDUCATION: CPT

## 2022-03-03 PROCEDURE — 97110 THERAPEUTIC EXERCISES: CPT

## 2022-03-03 NOTE — FLOWSHEET NOTE
[facility] [address] [place] [address], [facility] [address], [address] [place] [address]  Phone: [phone]   Fax:     [phone]     Physical Therapy Progress/Daily Treatment Note  Date:  3/3/2022    Patient Name:  Ron Rivera    :  1965  MRN: [id number]  Restrictions/Precautions:    Medical/Treatment Diagnosis Information:  · Diagnosis: S76.212A (ICD-10-CM) - Strain of left groin  · Treatment Diagnosis: M25.552 Left hip pain  Insurance/Certification information:  PT Insurance Information: [facility]  Physician Information:  Referring Practitioner: Lori Ochoa   Has the plan of care been signed (Y/N):        [x]  Yes  []  No     Date of Patient follow up with Physician: PRN      Is this a Progress Report:     []  Yes  [x]  No        If Yes:  Date Range for reporting period:  Beginning 21  Ending  3/1/22    Progress report will be due (10 Rx or 30 days whichever is less):  92      Recertification will be due (POC Duration  / 90 days whichever is less): 3/29/22        Visit # Insurance Allowable Auth Required   16 visits   60 total  3/3 [facility]  [facility] []  Yes [x]  No        Functional Scale:   iHOT-12 25% deficit Date assessed: 3/1/22  iHOT-12   52% deficit Date assessed: 22   iHOT-12  62% deficit Date assessed: 22  iHOT-12: 77% deficit Date assessed: 21       Latex Allergy:  [x]NO      []YES  Preferred Language for Healthcare:   [x]English       []other:      Pain level:  1/10 3/3    SUBJECTIVE:  3/3 Pt states she is doing well and went on a 1/4 mile walk yesterday with no adverse effects. States she has some soreness at L proximal hip flexor this morning but no significant pain or symptoms noted.      OBJECTIVE:   Updated below on 3/1/22  ROM PROM AROM Comments     Left 3/1 Right Left 3/1 Right     Flexion 117 130 102 110     Extension 10   10 12      Abduction 42 48 36 45     ER 37 40  38     IR 46 60  55          Flexibility Left NT d/t acute post op  Right Comments   Hamstrings 0 0     ITB (Obers test) WNL  WNL     Hip flexor(Mkihail test) Mild restriction WNL                    Strength Left 3/1 Right Comments   Hip flexors 3+ muscle discomfort at proximal hip flexor  4 SLR   Hip extension 4+ 5     Hip abduction 5 5     Hip adduction 4 4+     Hip ER 4+ 4+     Hip IR 4 5     Quads 4+ 5     Hamstrings 5 5           Palpation: Tension L proximal hip flexor 3/1     Functional Mobility/Transfers: Independent      Posture:  WNL      Gait: Independent, WNL        RESTRICTIONS/PRECAUTIONS: LEFT REVISION HIP ARTHROSCOPY, SYNOVECTOMY AND LYSIS OF ADHESIONS, REVISION ACETABULOPLASTY, ALLOGRAFT LABRAL RECONSTRUCTION, ENDOSCOPIC GREATER TROCHANTERIC BURSECTOMY, FRACTIONAL PSOAS LENGTHENING 11/30/21    Exercises/Interventions:     ROM/STRETCHES     Upright bike seat 4 5 min ^1/5   Adductor stretch 30 sec x3 L Start 1/11   Quad stretch prone   [id number] ^1/27 towel roll under thigh    Prone hip ER/IR AROM 10\"hx10 ead L ^3/1   Lower trunk rotation 3x10 bilat Start 3/1 emphasis on imrpovimg hip ROM into ER/IR   Hip ER ROM 30\"hx3 L ^3/3 supine L foot on bent R knee        PREs     Hip hinging stage 1  ^1/18   TA marching 1/11   TA with LE ext ^1/20   SLR with TA 1.5# 3x10 L ^3/1 supine  3/3 cues to avoid excessive hip ER   Leg press 90# 3x10 DL concentric, SL eccentric L ^3/3   Hamstring curl 3/1 withheld d/t progress and pt overall fatigue   Knee extension machine  3/1 withheld d/t progress and pt overall fatigue   SLR abd ^2/22 side lying    Bridges SL 3x10 L Start /224   Clams Withheld d/t fatigue cont with HEP 2/10    LAQ  2/8 progressed knee extension machine   Hip extension  2/24 progress with SL bridge   LTD 2x10 L stsart 2/24 4\" step   Step ups forward  3x10 L ^2/4 8\" step    Step ups lateral  Start 2/22 6\" step                   Manual Therapy     PROM ' 1/27 withheld d/t improvements and having pt complete AROM above    STM 1/25 withheld d/t decrease in muscle tension   Neuromuscular re-education     SLS 30\"hx3 L ^3/1 Biodex postural stability L10   Biodex random control  x3' bilat LE ^3/1 L8   Mini squat  30\"hx3 bilat ^3/3 completed on TRX   Therapeutic activities     Gait training  1/7 Withheld d/t demonstrating appropriate sequencing with unilateral cane   Stair training 1/27 withheld d/t fatigue   Functional movement         Therapeutic Exercise and NMR EXR  [x] (66061) Provided verbal/tactile cueing for activities related to strengthening, flexibility, endurance, ROM for improvements in LE, proximal hip, and core control with self care, mobility, lifting, ambulation. [x] (54704) Provided verbal/tactile cueing for activities related to improving balance, coordination, kinesthetic sense, posture, motor skill, proprioception  to assist with LE, proximal hip, and core control in self care, mobility, lifting, ambulation and eccentric single leg control.      NMR and Therapeutic Activities:    [x] (60120 or 57882) Provided verbal/tactile cueing for activities related to improving balance, coordination, kinesthetic sense, posture, motor skill, proprioception and motor activation to allow for proper function of core, proximal hip and LE with self care and ADLs  [] (33674) Gait Re-education- Provided training and instruction to the patient for proper LE, core and proximal hip recruitment and positioning and eccentric body weight control with ambulation re-education including up and down stairs     Home Exercise Program:    [x] (60454) Reviewed/Progressed HEP activities related to strengthening, flexibility, endurance, ROM of core, proximal hip and LE for functional self-care, mobility, lifting and ambulation/stair navigation   [] (02744)Reviewed/Progressed HEP activities related to improving balance, coordination, kinesthetic sense, posture, motor skill, proprioception of core, proximal hip and LE for self care, mobility, lifting, and ambulation/stair navigation Manual Treatments:  PROM / STM / Oscillations-Mobs:  G-I, II, III, IV (PA's, Inf., Post.)  [] (26034) Provided manual therapy to mobilize LE, proximal hip and/or LS spine soft tissue/joints for the purpose of modulating pain, promoting relaxation,  increasing ROM, reducing/eliminating soft tissue swelling/inflammation/restriction, improving soft tissue extensibility and allowing for proper ROM for normal function with self care, mobility, lifting and ambulation. Modalities:  CP L hip x15' 3/3    Charges:  Timed Code Treatment Minutes: 40'   Total Treatment Minutes: 9:24-10:24  60'       [] EVAL (LOW) 80924 (typically 20 minutes face-to-face)  [] EVAL (MOD) 36223 (typically 30 minutes face-to-face)  [] EVAL (HIGH) 81594 (typically 45 minutes face-to-face)  [] RE-EVAL     [x] BK(84728) x   2  [] IONTO  [x] NMR (81230) x   1  [] VASO  [] Manual (07224) x       [] Other:  [] TA x      [] Mech Traction (93942)  [] ES(attended) (35494)      [] ES (un) (54970):     GOALS: Adjusted on 12/2/21 d/t surgical intervention  Patient stated goal:  Walk without pain   []? Progressing: []? Met: []? Not Met: []? Adjusted     Therapist goals for Patient:   Short Term Goals: To be achieved in: 4 weeks  1. Independent in HEP and progression per patient tolerance, in order to prevent further injury prior to surgery   []? Progressing: [x]? Met: []? Not Met: []? Adjusted   2. Patient will have a decrease in pain to facilitate improvement in movement, function, and ADLs as indicated by Functional Deficits. []? Progressing: [x]? Met: []? Not Met: []? Adjusted     Long Term Goals: To be achieved in: 8 weeks  1. Disability index score of 20% or less for the iHOT-12 to assist with reaching prior level of function. [x]? ? Progressing: []?? Met: []?? Not Met: []?? Adjusted  2. Patient will demonstrate increased L hip AROM that is equal to R to allow for proper joint functioning as indicated by patients Functional Deficits.    [x]?? Progressing: []?? Met: []?? Not Met: []?? Adjusted  3. Patient will demonstrate an increase in L hip strength to 4/5 in all directions tested to allow for proper functional mobility as indicated by patients Functional Deficits. [x]? ? Progressing: []?? Met: []?? Not Met: []?? Adjusted  4. Patient will return to all functional activities without increased symptoms or restriction. [x]? ? Progressing: []?? Met: []?? Not Met: []?? Adjusted  5. Patient will be able to walk over 500 ft with proper gait sequencing, no assistive device and no c/o pain. []?? Progressing: [x]? ? Met: []?? Not Met: []?? Adjusted      Overall Progression Towards Functional goals/ Treatment Progress Update:  [x] Patient is progressing as expected based on MD protocol with increase in L hip ROM and LE strength allowing for increase in functional independence with gait and ADL's in weight bearing. Based on surgical intervention and continued deficits she is appropriate for further PT to further increase LE strength and mobility to increase ability to do more strenuous activities in weight bearing without pain or compensation. 3/1/22  [] Progression is slowed d/t  [] Progression has been slowed due to co-morbidities. [] Plan just implemented, too soon to assess goals progression <30days   [] Goals require adjustment due to lack of progress  [] Patient is not progressing as expected and requires additional follow up with physician  []     Prognosis for POC: [x] Good [] Fair  [] Poor      Patient requires continued skilled intervention: [x] Yes  [] No    Treatment/Activity Tolerance:  [x] Patient able to complete treatment  [] Patient limited by fatigue  [] Patient limited by pain     [] Patient limited by other medical complications  [] Other:   3/3 Pt fatigued by end of treatment but responded well to with proper form and no adverse effects noted or reported.      Patient Education:              3/1 Updated HEP with stretches and ROM exercises  2/24 Updated HEP   1/27 Educated on strengthening progress and milestones she needs to achieve before she attempts to baby sit independently. Updated HEP  1/25 Updated HEP   1/20 Updated HEP and educated on activity modification. 1/11 Updated HEP   1/7 Added prone hip extension to HEP  1/5 Educated on use of unilateral crutch for short distance and bilateral crutches for community ambulation. 12/9 Updated HEP   12/2 Reviewed precautions based on surgical intervention     Access Code: Palmira Morel        PLAN: See eval  [x] Continue per plan of care [x] Alter current plan (see comments below)  [] Plan of care initiated [] Hold pending MD visit [] Discharge  3/1 Continue with PT 1-2x a week for 4 weeks to further increase L hip ROM and strength in order to improve function before progressing to completing HEP independently. Electronically signed by:  Mark Rowell PT    Note: If patient does not return for scheduled/ recommended follow up visits, this note will serve as a discharge from care along with most recent update on progress.

## 2022-03-08 ENCOUNTER — HOSPITAL ENCOUNTER (OUTPATIENT)
Dept: PHYSICAL THERAPY | Age: 57
Setting detail: THERAPIES SERIES
Discharge: HOME | End: 2022-03-08
Payer: COMMERCIAL

## 2022-03-08 PROCEDURE — 97110 THERAPEUTIC EXERCISES: CPT

## 2022-03-08 PROCEDURE — 97112 NEUROMUSCULAR REEDUCATION: CPT

## 2022-03-08 NOTE — FLOWSHEET NOTE
The [facility] and [address] [facility], [address] [place] [address] [address], [address] [facility]  Phone: [phone]   Fax:     [phone]     Physical Therapy Progress/Daily Treatment Note  Date:  3/8/2022    Patient Name:  Cipriano Maki    :  1965  MRN: [id number]  Restrictions/Precautions:    Medical/Treatment Diagnosis Information:  · Diagnosis: S76.212A (ICD-10-CM) - Strain of left groin  · Treatment Diagnosis: M25.552 Left hip pain  Insurance/Certification information:  PT Insurance Information: [facility]  Physician Information:  Referring Practitioner: Kenny Martinez   Has the plan of care been signed (Y/N):        [x]  Yes  []  No     Date of Patient follow up with Physician: PRN      Is this a Progress Report:     []  Yes  [x]  No        If Yes:  Date Range for reporting period:  Beginning 21  Ending  3/1/22    Progress report will be due (10 Rx or 30 days whichever is less):        Recertification will be due (POC Duration  / 90 days whichever is less): 3/29/22        Visit # Insurance Allowable Auth Required   17 visits   61 total  3/8 [address] []  Yes [x]  No        Functional Scale:   iHOT-12 25% deficit Date assessed: 3/1/22  iHOT-12   52% deficit Date assessed: 22   iHOT-12  62% deficit Date assessed: 22  iHOT-12: 77% deficit Date assessed: 21       Latex Allergy:  [x]NO      []YES  Preferred Language for Healthcare:   [x]English       []other:      Pain level:  1/10 3/8    SUBJECTIVE:  3/8 Pt states she hosted a birthday party on 3/6 and mopped the kitchen floor as well as cleaned 2 bathrooms on 3/5. No issues during the activities but is having soreness/ tightness around hip today. Denies any pain deep in the hip and overall she feels she is doing well.      OBJECTIVE:   Updated below on 3/1/22  ROM PROM AROM Comments     Left 3/1 Right Left 3/1 Right     Flexion 117 130 102 110     Extension 10   10 12      Abduction 42 48 36 45   ER 37 40  38     IR 46 60  55          Flexibility Left NT d/t acute post op  Right Comments   Hamstrings 0 0     ITB (Obers test) WNL  WNL     Hip flexor(Mikhail test) Mild restriction WNL                    Strength Left 3/1 Right Comments   Hip flexors 3+ muscle discomfort at proximal hip flexor  4 SLR   Hip extension 4+ 5     Hip abduction 5 5     Hip adduction 4 4+     Hip ER 4+ 4+     Hip IR 4 5     Quads 4+ 5     Hamstrings 5 5           Palpation: Tension L proximal hip flexor 3/1     Functional Mobility/Transfers: Independent      Posture:  WNL      Gait: Independent, WNL        RESTRICTIONS/PRECAUTIONS: LEFT REVISION HIP ARTHROSCOPY, SYNOVECTOMY AND LYSIS OF ADHESIONS, REVISION ACETABULOPLASTY, ALLOGRAFT LABRAL RECONSTRUCTION, ENDOSCOPIC GREATER TROCHANTERIC BURSECTOMY, FRACTIONAL PSOAS LENGTHENING 11/30/21    Exercises/Interventions:     ROM/STRETCHES     Upright bike seat 4 5 min ^1/5   Adductor stretch 30 sec x3 L Start 1/11   Quad stretch prone   [id number] ^1/27 towel roll under thigh    Prone hip ER/IR AROM 30\"hx3 ea L ^3/8   Lower trunk rotation 3x10 bilat Start 3/1 emphasis on imrpovimg hip ROM into ER/IR   Hip ER ROM 30\"hx3 L ^3/3 supine L foot on bent R knee        PREs     Hip hinging stage 1  ^1/18   TA marching 1/11   TA with LE ext ^1/20   SLR with TA 2# 3x10 L ^3/8    Leg press 100# 3x10 DL concentric, SL eccentric L ^3/8   Hamstring curl 3/1 withheld d/t progress and pt overall fatigue   Knee extension machine  3/1 withheld d/t progress and pt overall fatigue   SLR abd ^2/22 side lying    Bridges SL 3x10 L Start /224   Clams Withheld d/t fatigue cont with HEP 2/10    LAQ  2/8 progressed knee extension machine   Hip extension  2/24 progress with SL bridge   LTD 3x10 L ^3/8 4\" step   Step ups forward  3x10 L ^3/8 BOSU    Step ups lateral  Start 2/22 6\" step    TRX squats 30\"hx3 DL 3/8             Manual Therapy     PROM ' 1/27 withheld d/t improvements and having pt complete AROM above    STM 1/25 withheld d/t decrease in muscle tension   Neuromuscular re-education     Plyo SLS 4# med ball 3x10 SLS on L Start 3/8    SLS 30\"hx3 L ^3/1 Biodex postural stability L10   Biodex random control  x3' bilat LE ^3/1 L8   Therapeutic activities     Gait training  1/7 Withheld d/t demonstrating appropriate sequencing with unilateral cane   Stair training 1/27 withheld d/t fatigue   Functional movement         Therapeutic Exercise and NMR EXR  [x] (70100) Provided verbal/tactile cueing for activities related to strengthening, flexibility, endurance, ROM for improvements in LE, proximal hip, and core control with self care, mobility, lifting, ambulation. [x] (62476) Provided verbal/tactile cueing for activities related to improving balance, coordination, kinesthetic sense, posture, motor skill, proprioception  to assist with LE, proximal hip, and core control in self care, mobility, lifting, ambulation and eccentric single leg control.      NMR and Therapeutic Activities:    [x] (68073 or 81703) Provided verbal/tactile cueing for activities related to improving balance, coordination, kinesthetic sense, posture, motor skill, proprioception and motor activation to allow for proper function of core, proximal hip and LE with self care and ADLs  [] (27578) Gait Re-education- Provided training and instruction to the patient for proper LE, core and proximal hip recruitment and positioning and eccentric body weight control with ambulation re-education including up and down stairs     Home Exercise Program:    [x] (14362) Reviewed/Progressed HEP activities related to strengthening, flexibility, endurance, ROM of core, proximal hip and LE for functional self-care, mobility, lifting and ambulation/stair navigation   [] (64778)Reviewed/Progressed HEP activities related to improving balance, coordination, kinesthetic sense, posture, motor skill, proprioception of core, proximal hip and LE for self care, mobility, lifting, and ambulation/stair navigation      Manual Treatments:  PROM / STM / Oscillations-Mobs:  G-I, II, III, IV (PA's, Inf., Post.)  [] (35362) Provided manual therapy to mobilize LE, proximal hip and/or LS spine soft tissue/joints for the purpose of modulating pain, promoting relaxation,  increasing ROM, reducing/eliminating soft tissue swelling/inflammation/restriction, improving soft tissue extensibility and allowing for proper ROM for normal function with self care, mobility, lifting and ambulation. Modalities:  CP L hip x15' 3/8    Charges:  Timed Code Treatment Minutes: 40'   Total Treatment Minutes: 9:22-10:22  60'       [] EVAL (LOW) 455 1011 (typically 20 minutes face-to-face)  [] EVAL (MOD) 87028 (typically 30 minutes face-to-face)  [] EVAL (HIGH) 00721 (typically 45 minutes face-to-face)  [] RE-EVAL     [x] IC(39743) x   2  [] IONTO  [x] NMR (70761) x   1  [] VASO  [] Manual (60745) x       [] Other:  [] TA x      [] Mech Traction (65272)  [] ES(attended) (08766)      [] ES (un) (43727):     GOALS: Adjusted on 12/2/21 d/t surgical intervention  Patient stated goal:  Walk without pain   []? Progressing: []? Met: []? Not Met: []? Adjusted     Therapist goals for Patient:   Short Term Goals: To be achieved in: 4 weeks  1. Independent in HEP and progression per patient tolerance, in order to prevent further injury prior to surgery   []? Progressing: [x]? Met: []? Not Met: []? Adjusted   2. Patient will have a decrease in pain to facilitate improvement in movement, function, and ADLs as indicated by Functional Deficits. []? Progressing: [x]? Met: []? Not Met: []? Adjusted     Long Term Goals: To be achieved in: 8 weeks  1. Disability index score of 20% or less for the iHOT-12 to assist with reaching prior level of function. [x]? ? Progressing: []?? Met: []?? Not Met: []?? Adjusted  2.  Patient will demonstrate increased L hip AROM that is equal to R to allow for proper joint functioning as indicated by patients Functional Deficits.    [x]? ? Progressing: []?? Met: []?? Not Met: []?? Adjusted  3. Patient will demonstrate an increase in L hip strength to 4/5 in all directions tested to allow for proper functional mobility as indicated by patients Functional Deficits. [x]? ? Progressing: []?? Met: []?? Not Met: []?? Adjusted  4. Patient will return to all functional activities without increased symptoms or restriction. [x]? ? Progressing: []?? Met: []?? Not Met: []?? Adjusted  5. Patient will be able to walk over 500 ft with proper gait sequencing, no assistive device and no c/o pain. []?? Progressing: [x]? ? Met: []?? Not Met: []?? Adjusted      Overall Progression Towards Functional goals/ Treatment Progress Update:  [x] Patient is progressing as expected based on MD protocol with increase in L hip ROM and LE strength allowing for increase in functional independence with gait and ADL's in weight bearing. Based on surgical intervention and continued deficits she is appropriate for further PT to further increase LE strength and mobility to increase ability to do more strenuous activities in weight bearing without pain or compensation. 3/1/22  [] Progression is slowed d/t  [] Progression has been slowed due to co-morbidities. [] Plan just implemented, too soon to assess goals progression <30days   [] Goals require adjustment due to lack of progress  [] Patient is not progressing as expected and requires additional follow up with physician  []     Prognosis for POC: [x] Good [] Fair  [] Poor      Patient requires continued skilled intervention: [x] Yes  [] No    Treatment/Activity Tolerance:  [x] Patient able to complete treatment  [] Patient limited by fatigue  [] Patient limited by pain     [] Patient limited by other medical complications  [] Other:   3/8: Patient was fatigued with increase in intensity of dynamic balance and stability exercises as well as mild increase in weight with strengthening exercises.  No increase in soreness noted or reported. Patient Education:              3/8 Educated on pacing activity level with cleaning and being on her feet for extended periods of time in order to decrease soreness. Educated on importance of continuing with strengthening to further increase endurance and tolerance to activities in weight bearing. 3/1 Updated HEP with stretches and ROM exercises  2/24 Updated HEP   1/27 Educated on strengthening progress and milestones she needs to achieve before she attempts to baby sit independently. Updated HEP  1/25 Updated HEP   1/20 Updated HEP and educated on activity modification. 1/11 Updated HEP   1/7 Added prone hip extension to HEP  1/5 Educated on use of unilateral crutch for short distance and bilateral crutches for community ambulation. 12/9 Updated HEP   12/2 Reviewed precautions based on surgical intervention     Access Code: Gila White        PLAN: See eval  [x] Continue per plan of care [x] Alter current plan (see comments below)  [] Plan of care initiated [] Hold pending MD visit [] Discharge  3/1 Continue with PT 1-2x a week for 4 weeks to further increase L hip ROM and strength in order to improve function before progressing to completing HEP independently. Electronically signed by:  Geri Garcia, PT    Note: If patient does not return for scheduled/ recommended follow up visits, this note will serve as a discharge from care along with most recent update on progress.

## 2022-03-10 ENCOUNTER — HOSPITAL ENCOUNTER (OUTPATIENT)
Dept: PHYSICAL THERAPY | Age: 57
Setting detail: THERAPIES SERIES
Discharge: HOME | End: 2022-03-10
Payer: COMMERCIAL

## 2022-03-10 PROCEDURE — 97112 NEUROMUSCULAR REEDUCATION: CPT

## 2022-03-10 PROCEDURE — 97110 THERAPEUTIC EXERCISES: CPT

## 2022-03-10 NOTE — FLOWSHEET NOTE
The [address] [place] [address],[address], [address] [facility] [address] [facility], [address] [place] [address]  Phone: [phone]   Fax:     [phone]     Physical Therapy Progress/Daily Treatment Note  Date:  3/10/2022    Patient Name:  Jesscia Gilbert    :  1965  MRN: [id number]  Restrictions/Precautions:    Medical/Treatment Diagnosis Information:  · Diagnosis: S76.212A (ICD-10-CM) - Strain of left groin  · Treatment Diagnosis: M25.552 Left hip pain  Insurance/Certification information:  PT Insurance Information: [employer]  Physician Information:  Referring Practitioner: Eddie Aguilar   Has the plan of care been signed (Y/N):        [x]  Yes  []  No     Date of Patient follow up with Physician: PRN      Is this a Progress Report:     []  Yes  [x]  No        If Yes:  Date Range for reporting period:  Beginning 21  Ending  3/1/22    Progress report will be due (10 Rx or 30 days whichever is less):        Recertification will be due (POC Duration  / 90 days whichever is less): 3/29/22        Visit # Insurance Allowable Auth Required   18 visits   62 total  3/10 [address] []  Yes [x]  No        Functional Scale:   iHOT-12 25% deficit Date assessed: 3/1/22  iHOT-12   52% deficit Date assessed: 22   iHOT-12  62% deficit Date assessed: 22  iHOT-12: 77% deficit Date assessed: 21       Latex Allergy:  [x]NO      []YES  Preferred Language for Healthcare:   [x]English       []other:      Pain level:  3/10 3/10    SUBJECTIVE:  3/10 Pt states Tuesday evening she noticed discomfort at proximal hip flexor on L and has had more pain since with bed mobility and walking. Reports she sat for a couple hours on Tuesday night listening to a speaker and that's when she started to notice discomfort.  Denies any deep sharp pain or radiating pain     OBJECTIVE:   Updated below on 3/1/22  ROM PROM AROM Comments     Left 3/1 Right Left 3/1 Right     Flexion 117 130 102 110     Extension 10   10 12      Abduction 42 48 36 45     ER 37 40  38     IR 46 60  55          Flexibility Left NT d/t acute post op  Right Comments   Hamstrings 0 0     ITB (Obers test) WNL  WNL     Hip flexor(Mikhail test) Mild restriction WNL                    Strength Left 3/1 Right Comments   Hip flexors 3+ muscle discomfort at proximal hip flexor  4 SLR   Hip extension 4+ 5     Hip abduction 5 5     Hip adduction 4 4+     Hip ER 4+ 4+     Hip IR 4 5     Quads 4+ 5     Hamstrings 5 5           Palpation: Tension L proximal hip flexor and iliopsoas 3/10     Functional Mobility/Transfers: Independent      Posture:  WNL      Gait: Independent, WNL        RESTRICTIONS/PRECAUTIONS: LEFT REVISION HIP ARTHROSCOPY, SYNOVECTOMY AND LYSIS OF ADHESIONS, REVISION ACETABULOPLASTY, ALLOGRAFT LABRAL RECONSTRUCTION, ENDOSCOPIC GREATER TROCHANTERIC BURSECTOMY, FRACTIONAL PSOAS LENGTHENING 11/30/21    Exercises/Interventions:     ROM/STRETCHES     Upright bike seat 4 ^1/5   Ellipitacl 5 min Start 3/10 introduced to decrease tension at proximal hip flexors that is present with biking   Adductor stretch 30 sec x3 L Start 1/11   Modified Mikhail stretch 30 sec x3 L Start 3/10    Quad stretch prone   [id number] ^1/27 towel roll under thigh    Prone hip ER/IR AROM 30\"hx3 ea L ^3/8   Lower trunk rotation Start 3/1 emphasis on imrpovimg hip ROM into ER/IR   Hip ER figure 4 stretch 30\"hx3 L ^3/3 supine L foot on bent R knee        PREs     Hip hinging stage 1  ^1/18   TA marching 1/11   TA with LE ext ^1/20   Posterior pelvic tilt 10\"hx10  3/10 completed in supine and reintroduced d/t symptoms at proximal hip fleoxrs   SLR with TA 3/10 withheld d/t symptoms at proximal hip fllexors   Leg press 100# 3x10 DL concentric, SL eccentric L ^3/8   Hamstring curl 3/1 withheld d/t progress and pt overall fatigue   Knee extension machine  3/1 withheld d/t progress and pt overall fatigue   SLR abd ^2/22 side lying    Bridges SL 3x10 L Start /224   Clams Withheld d/t fatigue cont with HEP 2/10    LAQ  2/8 progressed knee extension machine   Hip extension  2/24 progress with SL bridge   LTD 3x10 L ^3/8 4\" step   Step ups forward  3x10 L ^3/8 BOSU    Step ups lateral  Start 2/22 6\" step    TRX squats 30\"hx3 DL 3/8             Manual Therapy     PROM L hip ER/IR prone with 1/2 foam roll under thigh x3'  3/10 completed passively d/t increase in muscle tightness   STM L Iliopsoas and L proximal hip flexors x3' 3/10 reintroduced d/t increase in muslce tightness noted   Neuromuscular re-education     Plyo SLS 4# med ball 3x10 SLS on L Start 3/8    SLS 30\"hx3 L ^3/1 Biodex postural stability L10   Biodex random control  x3' bilat LE ^3/1 L8   Therapeutic activities     Gait training  1/7 Withheld d/t demonstrating appropriate sequencing with unilateral cane   Stair training 1/27 withheld d/t fatigue   Functional movement         Therapeutic Exercise and NMR EXR  [x] (73400) Provided verbal/tactile cueing for activities related to strengthening, flexibility, endurance, ROM for improvements in LE, proximal hip, and core control with self care, mobility, lifting, ambulation. [x] (26007) Provided verbal/tactile cueing for activities related to improving balance, coordination, kinesthetic sense, posture, motor skill, proprioception  to assist with LE, proximal hip, and core control in self care, mobility, lifting, ambulation and eccentric single leg control.      NMR and Therapeutic Activities:    [x] (81455 or 37320) Provided verbal/tactile cueing for activities related to improving balance, coordination, kinesthetic sense, posture, motor skill, proprioception and motor activation to allow for proper function of core, proximal hip and LE with self care and ADLs  [] (08070) Gait Re-education- Provided training and instruction to the patient for proper LE, core and proximal hip recruitment and positioning and eccentric body weight control with ambulation re-education including up and down stairs     Home Exercise Program:    [x] (22654) Reviewed/Progressed HEP activities related to strengthening, flexibility, endurance, ROM of core, proximal hip and LE for functional self-care, mobility, lifting and ambulation/stair navigation   [] (76179)Reviewed/Progressed HEP activities related to improving balance, coordination, kinesthetic sense, posture, motor skill, proprioception of core, proximal hip and LE for self care, mobility, lifting, and ambulation/stair navigation      Manual Treatments:  PROM / STM / Oscillations-Mobs:  G-I, II, III, IV (PA's, Inf., Post.)  [] (62008) Provided manual therapy to mobilize LE, proximal hip and/or LS spine soft tissue/joints for the purpose of modulating pain, promoting relaxation,  increasing ROM, reducing/eliminating soft tissue swelling/inflammation/restriction, improving soft tissue extensibility and allowing for proper ROM for normal function with self care, mobility, lifting and ambulation. Modalities:  CP L hip x15' 3/10    Charges:  Timed Code Treatment Minutes: 52'   Total Treatment Minutes: 9:21-10:28  67'       [] EVAL (LOW) 455 1011 (typically 20 minutes face-to-face)  [] EVAL (MOD) 60553 (typically 30 minutes face-to-face)  [] EVAL (HIGH) 02977 (typically 45 minutes face-to-face)  [] RE-EVAL     [x] VI(41351) x   2  [] IONTO  [x] NMR (92628) x   1  [] VASO  [] Manual (71368) x       [] Other:  [] TA x      [] Mech Traction (31715)  [] ES(attended) (32479)      [] ES (un) (90316):     GOALS: Adjusted on 12/2/21 d/t surgical intervention  Patient stated goal:  Walk without pain   []? Progressing: []? Met: []? Not Met: []? Adjusted     Therapist goals for Patient:   Short Term Goals: To be achieved in: 4 weeks  1. Independent in HEP and progression per patient tolerance, in order to prevent further injury prior to surgery   []? Progressing: [x]? Met: []? Not Met: []? Adjusted   2.  Patient will have a decrease in pain to facilitate improvement in movement, function, and ADLs as indicated by Functional Deficits. []? Progressing: [x]? Met: []? Not Met: []? Adjusted     Long Term Goals: To be achieved in: 8 weeks  1. Disability index score of 20% or less for the iHOT-12 to assist with reaching prior level of function. [x]? ? Progressing: []?? Met: []?? Not Met: []?? Adjusted  2. Patient will demonstrate increased L hip AROM that is equal to R to allow for proper joint functioning as indicated by patients Functional Deficits.    [x]? ? Progressing: []?? Met: []?? Not Met: []?? Adjusted  3. Patient will demonstrate an increase in L hip strength to 4/5 in all directions tested to allow for proper functional mobility as indicated by patients Functional Deficits. [x]? ? Progressing: []?? Met: []?? Not Met: []?? Adjusted  4. Patient will return to all functional activities without increased symptoms or restriction. [x]? ? Progressing: []?? Met: []?? Not Met: []?? Adjusted  5. Patient will be able to walk over 500 ft with proper gait sequencing, no assistive device and no c/o pain. []?? Progressing: [x]? ? Met: []?? Not Met: []?? Adjusted      Overall Progression Towards Functional goals/ Treatment Progress Update:  [x] Patient is progressing as expected based on MD protocol with increase in L hip ROM and LE strength allowing for increase in functional independence with gait and ADL's in weight bearing. Based on surgical intervention and continued deficits she is appropriate for further PT to further increase LE strength and mobility to increase ability to do more strenuous activities in weight bearing without pain or compensation. 3/1/22  [] Progression is slowed d/t  [] Progression has been slowed due to co-morbidities.   [] Plan just implemented, too soon to assess goals progression <30days   [] Goals require adjustment due to lack of progress  [] Patient is not progressing as expected and requires additional follow up with physician  []     Prognosis for POC: [x] Sabine Natarajan, PT    Note: If patient does not return for scheduled/ recommended follow up visits, this note will serve as a discharge from care along with most recent update on progress.

## 2022-03-15 ENCOUNTER — HOSPITAL ENCOUNTER (OUTPATIENT)
Dept: PHYSICAL THERAPY | Age: 57
Setting detail: THERAPIES SERIES
Discharge: HOME | End: 2022-03-15
Payer: COMMERCIAL

## 2022-03-15 PROCEDURE — 97140 MANUAL THERAPY 1/> REGIONS: CPT

## 2022-03-15 PROCEDURE — 97110 THERAPEUTIC EXERCISES: CPT

## 2022-03-15 NOTE — FLOWSHEET NOTE
[address], [address] [place] [address]  Phone: [phone]   Fax:     [phone]     Physical Therapy Progress/Daily Treatment Note  Date:  3/15/2022    Patient Name:  Abby Bee    :  1965  MRN: [id number]  Restrictions/Precautions:    Medical/Treatment Diagnosis Information:  · Diagnosis: S76.212A (ICD-10-CM) - Strain of left groin  · Treatment Diagnosis: M25.552 Left hip pain  Insurance/Certification information:  PT Insurance Information: [facility]  Physician Information:  Referring Practitioner: Radha Howell   Has the plan of care been signed (Y/N):        [x]  Yes  []  No     Date of Patient follow up with Physician: PRN      Is this a Progress Report:     []  Yes  [x]  No        If Yes:  Date Range for reporting period:  Beginning 21  Ending  3/1/22    Progress report will be due (10 Rx or 30 days whichever is less):        Recertification will be due (POC Duration  / 90 days whichever is less): 3/29/22        Visit # Insurance Allowable Auth Required   19 visits   63 total  3/15 [address] []  Yes [x]  No        Functional Scale:   iHOT-12 25% deficit Date assessed: 3/1/22  iHOT-12   52% deficit Date assessed: 22   iHOT-12  62% deficit Date assessed: 22  iHOT-12: 77% deficit Date assessed: 21       Latex Allergy:  [x]NO      []YES  Preferred Language for Healthcare:   [x]English       []other:      Pain level:  3/10 3/15    SUBJECTIVE:  3/15 Pt states on the evening of 3/12 she felt sharp pain through her L hip while she was in reclined position. Had difficulty sleeping and rolling in bed with continued  sharp pain through L hip on 3/13. Symptoms improved slightly yesterday with intermittent burning and tingling around whole hip as well as occasional sharp deep pain. Experienced relief in symptoms with stretching. Sitting is very uncomfortable.  Pt reports on 3/11 she lifted her grandchild up from seated position and carried her up the steps with no pain at the time. Also cleaned 2 bathrooms and did 4 loads of laundry on 3/12. OBJECTIVE:   Updated below on 3/1/22  ROM PROM AROM Comments     Left 3/1 Right Left 3/1 Right     Flexion 117 130 102 110     Extension 10   10 12      Abduction 42 48 36 45     ER 37 40  38     IR 46 60  55          Flexibility Left NT d/t acute post op  Right Comments   Hamstrings 0 0     ITB (Obers test) WNL  WNL     Hip flexor(Mikhail test) Mild restriction WNL                    Strength Left 3/1 Right Comments   Hip flexors 3+ muscle discomfort at proximal hip flexor  4 SLR   Hip extension 4+ 5     Hip abduction 5 5     Hip adduction 4 4+     Hip ER 4+ 4+     Hip IR 4 5     Quads 4+ 5     Hamstrings 5 5           Palpation: Tension L proximal hip flexor and iliopsoas 3/10     Functional Mobility/Transfers: Independent      Posture:  WNL      Gait: Independent, WNL        RESTRICTIONS/PRECAUTIONS: LEFT REVISION HIP ARTHROSCOPY, SYNOVECTOMY AND LYSIS OF ADHESIONS, REVISION ACETABULOPLASTY, ALLOGRAFT LABRAL RECONSTRUCTION, ENDOSCOPIC GREATER TROCHANTERIC BURSECTOMY, FRACTIONAL PSOAS LENGTHENING 11/30/21    Exercises/Interventions:     ROM/STRETCHES     Upright bike seat 4 5 min 3/15 reintroduced d/t symptoms   Ellipitacl  3/15 withheld d/t increase in symptoms   Adductor stretch 30 sec x3 L Start 1/11   Modified Mikhail stretch 30 sec x3 L Start 3/10    Quad stretch prone   [id number] ^1/27 towel roll under thigh    Prone hip ER/IR AROM  3/15 completed passively below    Lower trunk rotation Start 3/1 emphasis on imrpovimg hip ROM into ER/IR   Hip ER figure 4 stretch 30\"hx3 L ^3/3 supine L foot on bent R knee   TFL stretch 30\"hx3 L Start 3/10 side lying   PREs     Hip hinging stage 1  ^1/18   TA marching 1/11   TA with LE ext ^1/20   Posterior pelvic tilt 10\"hx10  3/10 completed in supine and reintroduced d/t symptoms at proximal hip fleoxrs   TA overhead 4# 3x10 3/15 supine   SLR with TA 0# 3x10 L 3/15 decreased weight d/t symptoms   Leg press  ^3/8   Hamstring curl 3/1 withheld d/t progress and pt overall fatigue   Knee extension machine  3/1 withheld d/t progress and pt overall fatigue   SLR abd ^2/22 side lying    Bridges SL 3x10 L Start /224   Clams Blue loop 3x10 L 3/15 side lying   SLR abd Red TB @ ankles 3x10 L 3/15   Hip IR Red TB 3x10 L Start 3/15 seated EOT with BS   LAQ  2/8 progressed knee extension machine   Hip extension  2/24 progress with SL bridge   LTD ^3/8 4\" step   Step ups forward  ^3/8 BOSU    Step ups lateral  Start 2/22 6\" step    TRX squats 3/8             Manual Therapy     PROM -L hip ER/IR prone with 1/2 foam roll under thigh x4'  -L hip flexor side lying x2'  ^3/15   STM L Iliopsoas 4' ^3/15   Neuromuscular re-education     Plyo SLS Start 3/8    SLS ^3/1 Biodex postural stability L10   Biodex random control  ^3/1 L8   Therapeutic activities     Gait training  1/7 Withheld d/t demonstrating appropriate sequencing with unilateral cane   Stair training 1/27 withheld d/t fatigue   Functional movement         Therapeutic Exercise and NMR EXR  [x] (12643) Provided verbal/tactile cueing for activities related to strengthening, flexibility, endurance, ROM for improvements in LE, proximal hip, and core control with self care, mobility, lifting, ambulation. [x] (18463) Provided verbal/tactile cueing for activities related to improving balance, coordination, kinesthetic sense, posture, motor skill, proprioception  to assist with LE, proximal hip, and core control in self care, mobility, lifting, ambulation and eccentric single leg control.      NMR and Therapeutic Activities:    [x] (20283 or 53870) Provided verbal/tactile cueing for activities related to improving balance, coordination, kinesthetic sense, posture, motor skill, proprioception and motor activation to allow for proper function of core, proximal hip and LE with self care and ADLs  [] (12471) progression per patient tolerance, in order to prevent further injury prior to surgery   []? Progressing: [x]? Met: []? Not Met: []? Adjusted   2. Patient will have a decrease in pain to facilitate improvement in movement, function, and ADLs as indicated by Functional Deficits. []? Progressing: [x]? Met: []? Not Met: []? Adjusted     Long Term Goals: To be achieved in: 8 weeks  1. Disability index score of 20% or less for the iHOT-12 to assist with reaching prior level of function. [x]? ? Progressing: []?? Met: []?? Not Met: []?? Adjusted  2. Patient will demonstrate increased L hip AROM that is equal to R to allow for proper joint functioning as indicated by patients Functional Deficits.    [x]? ? Progressing: []?? Met: []?? Not Met: []?? Adjusted  3. Patient will demonstrate an increase in L hip strength to 4/5 in all directions tested to allow for proper functional mobility as indicated by patients Functional Deficits. [x]? ? Progressing: []?? Met: []?? Not Met: []?? Adjusted  4. Patient will return to all functional activities without increased symptoms or restriction. [x]? ? Progressing: []?? Met: []?? Not Met: []?? Adjusted  5. Patient will be able to walk over 500 ft with proper gait sequencing, no assistive device and no c/o pain. []?? Progressing: [x]? ? Met: []?? Not Met: []?? Adjusted      Overall Progression Towards Functional goals/ Treatment Progress Update:  [x] Patient is progressing as expected based on MD protocol with increase in L hip ROM and LE strength allowing for increase in functional independence with gait and ADL's in weight bearing. Based on surgical intervention and continued deficits she is appropriate for further PT to further increase LE strength and mobility to increase ability to do more strenuous activities in weight bearing without pain or compensation. 3/1/22  [] Progression is slowed d/t  [] Progression has been slowed due to co-morbidities.   [] Plan just implemented, too soon to assess goals progression <30days   [] Goals require adjustment due to lack of progress  [] Patient is not progressing as expected and requires additional follow up with physician  []     Prognosis for POC: [x] Good [] Fair  [] Poor      Patient requires continued skilled intervention: [x] Yes  [] No    Treatment/Activity Tolerance:  [x] Patient able to complete treatment  [] Patient limited by fatigue  [] Patient limited by pain     [] Patient limited by other medical complications  [] Other:   3/15 Pt presented with significant increase in tension at L iliopsoas compared to last visit d/t increase in activity level over the weekend. Withheld exercises in weight bearing d/t increase in symptoms and pt scheduled to babysit Thursday and Friday this week. Responded well to stretches and manual therapy with soreness but decrease in muscle tension noted. Responded well to strengthening in non weight bearing with no adverse effects noted or reported. Patient Education:              3/15 Time spent reviewing activity level and cleaning the house a different day in order to give her a break after baby sitting and decrease severity of symptoms. 3/10 Updated HEP with new stretches d/t symptoms. 3/8 Educated on pacing activity level with cleaning and being on her feet for extended periods of time in order to decrease soreness. Educated on importance of continuing with strengthening to further increase endurance and tolerance to activities in weight bearing. 3/1 Updated HEP with stretches and ROM exercises  2/24 Updated HEP   1/27 Educated on strengthening progress and milestones she needs to achieve before she attempts to baby sit independently. Updated HEP  1/25 Updated HEP   1/20 Updated HEP and educated on activity modification. 1/11 Updated HEP   1/7 Added prone hip extension to HEP  1/5 Educated on use of unilateral crutch for short distance and bilateral crutches for community ambulation.     12/9 Updated HEP   12/2 Reviewed precautions based on surgical intervention     Access Code: Aileen Roldan        PLAN: See eval  [x] Continue per plan of care [x] Alter current plan (see comments below)  [] Plan of care initiated [] Hold pending MD visit [] Discharge  3/15 Pt to return to therapy again this week d/t increase in symptoms and pt scheduled to babysit Thursday and Friday this week. 3/1 Continue with PT 1-2x a week for 4 weeks to further increase L hip ROM and strength in order to improve function before progressing to completing HEP independently. Electronically signed by:  Marsha Marques, PT    Note: If patient does not return for scheduled/ recommended follow up visits, this note will serve as a discharge from care along with most recent update on progress.

## 2022-03-17 ENCOUNTER — HOSPITAL ENCOUNTER (OUTPATIENT)
Dept: PHYSICAL THERAPY | Age: 57
Setting detail: THERAPIES SERIES
Discharge: HOME | End: 2022-03-17
Payer: COMMERCIAL

## 2022-03-17 PROCEDURE — 97110 THERAPEUTIC EXERCISES: CPT

## 2022-03-17 PROCEDURE — 97140 MANUAL THERAPY 1/> REGIONS: CPT

## 2022-03-17 PROCEDURE — 97112 NEUROMUSCULAR REEDUCATION: CPT

## 2022-03-17 NOTE — FLOWSHEET NOTE
[facility], [address] [place] [address], [address] [place] [address]  Phone: [phone]   Fax:     [phone]     Physical Therapy Progress/Daily Treatment Note  Date:  3/17/2022    Patient Name:  Silas Platt    :  1965  MRN: [id number]  Restrictions/Precautions:    Medical/Treatment Diagnosis Information:  · Diagnosis: S76.212A (ICD-10-CM) - Strain of left groin  · Treatment Diagnosis: M25.552 Left hip pain  Insurance/Certification information:  PT Insurance Information: [facility]  Physician Information:  Referring Practitioner: Harley Martin   Has the plan of care been signed (Y/N):        [x]  Yes  []  No     Date of Patient follow up with Physician: PRN      Is this a Progress Report:     []  Yes  [x]  No        If Yes:  Date Range for reporting period:  Beginning 21  Ending  3/1/22    Progress report will be due (10 Rx or 30 days whichever is less):        Recertification will be due (POC Duration  / 90 days whichever is less): 3/29/22        Visit # Insurance Allowable Auth Required   20 visits   64 total  3/17 [facility]  [facility] []  Yes [x]  No        Functional Scale:   iHOT-12 25% deficit Date assessed: 3/1/22  iHOT-12   52% deficit Date assessed: 22   iHOT-12  62% deficit Date assessed: 22  iHOT-12: 77% deficit Date assessed: 21       Latex Allergy:  [x]NO      []YES  Preferred Language for Healthcare:   [x]English       []other:      Pain level:  1/10 3/17    SUBJECTIVE:  3/17 Pt states she took a absinthe salt bath after last visit and pain decreased significantly. She babysat grandchildren by herself for the first time today with no increase in pain and was cautious with movements and activities especially negotiating stairs.      OBJECTIVE:   Updated below on 3/1/22  ROM PROM AROM Comments     Left 3/1 Right Left 3/1 Right     Flexion 117 130 102 110     Extension 10   10 12      Abduction 42 48 36 45     ER 37 40  38     IR 46 60  55          Flexibility Left NT d/t acute post op  Right Comments   Hamstrings 0 0     ITB (Obers test) WNL  WNL     Hip flexor(Mikhail test) Mild restriction WNL                    Strength Left 3/1 Right Comments   Hip flexors 3+ muscle discomfort at proximal hip flexor  4 SLR   Hip extension 4+ 5     Hip abduction 5 5     Hip adduction 4 4+     Hip ER 4+ 4+     Hip IR 4 5     Quads 4+ 5     Hamstrings 5 5           Palpation: Tension L proximal hip flexor and iliopsoas 3/10     Functional Mobility/Transfers: Independent      Posture:  WNL      Gait: Independent, WNL        RESTRICTIONS/PRECAUTIONS: LEFT REVISION HIP ARTHROSCOPY, SYNOVECTOMY AND LYSIS OF ADHESIONS, REVISION ACETABULOPLASTY, ALLOGRAFT LABRAL RECONSTRUCTION, ENDOSCOPIC GREATER TROCHANTERIC BURSECTOMY, FRACTIONAL PSOAS LENGTHENING 11/30/21    Exercises/Interventions:     ROM/STRETCHES     Upright bike seat 4 5 min 3/15 reintroduced d/t symptoms   Ellipitacl  3/15 withheld d/t increase in symptoms   Adductor stretch 30 sec x3 L Start 1/11   Modified Mikhail stretch 30 sec x3 L Start 3/10    Quad stretch prone   [id number] ^1/27 towel roll under thigh    Prone hip ER/IR AROM  3/15 completed passively below    Lower trunk rotation Start 3/1 emphasis on imrpovimg hip ROM into ER/IR   Hip ER figure 4 stretch 30\"hx3 L ^3/3 supine L foot on bent R knee   TFL stretch 30\"hx3 L Start 3/10 side lying   PREs     Hip hinging stage 1  ^1/18   TA marching 1/11   TA with LE ext ^1/20   Posterior pelvic tilt 10\"hx10  3/10 completed in supine and reintroduced d/t symptoms at proximal hip fleoxrs   TA overhead 4# 3x10  3/15 supine   SLR with TA 0# 3x10 L 3/15 decreased weight d/t symptoms   Leg press  ^3/8   Hamstring curl 3/1 withheld d/t progress and pt overall fatigue   Knee extension machine  3/1 withheld d/t progress and pt overall fatigue   SLR abd ^2/22 side lying    Bridges SL 3x10 L Start /224   Clams Blue loop 3x10 L 3/15 side lying   SLR abd Red TB @ ankles 3x10 L 3/15   Hip IR Red TB 3x10 L Start 3/15 seated EOT with BS   LAQ  2/8 progressed knee extension machine   Hip extension  2/24 progress with SL bridge   LTD ^3/8 4\" step   Step ups forward  3x10 L 3/17 reintroduced with 8\" step    Step ups lateral  Start 2/22 6\" step    TRX squats 3/8             Manual Therapy     PROM -L hip ER/IR prone with 1/2 foam roll under thigh x4'  -L hip flexor side lying x2'  ^3/15   STM L Iliopsoas 4' ^3/15   Neuromuscular re-education     Plyo SLS Start 3/8    SLS 30\"hx3 L ^3/1 Biodex postural stability L10   Biodex random control  x3' bilat LE ^3/17 L7   Therapeutic activities     Gait training  1/7 Withheld d/t demonstrating appropriate sequencing with unilateral cane   Stair training 1/27 withheld d/t fatigue   Functional movement         Therapeutic Exercise and NMR EXR  [x] (40037) Provided verbal/tactile cueing for activities related to strengthening, flexibility, endurance, ROM for improvements in LE, proximal hip, and core control with self care, mobility, lifting, ambulation. [x] (53840) Provided verbal/tactile cueing for activities related to improving balance, coordination, kinesthetic sense, posture, motor skill, proprioception  to assist with LE, proximal hip, and core control in self care, mobility, lifting, ambulation and eccentric single leg control.      NMR and Therapeutic Activities:    [x] (88406 or 66775) Provided verbal/tactile cueing for activities related to improving balance, coordination, kinesthetic sense, posture, motor skill, proprioception and motor activation to allow for proper function of core, proximal hip and LE with self care and ADLs  [] (37559) Gait Re-education- Provided training and instruction to the patient for proper LE, core and proximal hip recruitment and positioning and eccentric body weight control with ambulation re-education including up and down stairs     Home Exercise Program:    [x] (68044) Reviewed/Progressed HEP activities related to strengthening, flexibility, endurance, ROM of core, proximal hip and LE for functional self-care, mobility, lifting and ambulation/stair navigation   [] (23708)Reviewed/Progressed HEP activities related to improving balance, coordination, kinesthetic sense, posture, motor skill, proprioception of core, proximal hip and LE for self care, mobility, lifting, and ambulation/stair navigation      Manual Treatments:  PROM / STM / Oscillations-Mobs:  G-I, II, III, IV (PA's, Inf., Post.)  [] (34589) Provided manual therapy to mobilize LE, proximal hip and/or LS spine soft tissue/joints for the purpose of modulating pain, promoting relaxation,  increasing ROM, reducing/eliminating soft tissue swelling/inflammation/restriction, improving soft tissue extensibility and allowing for proper ROM for normal function with self care, mobility, lifting and ambulation. Modalities:  CP L hip x15' 3/15    Charges:  Timed Code Treatment Minutes: 37'   Total Treatment Minutes: 4:06-5:09  61'       [] EVAL (LOW) 455 [id number] (typically 20 minutes face-to-face)  [] EVAL (MOD) 40714 (typically 30 minutes face-to-face)  [] EVAL (HIGH) 86829 (typically 45 minutes face-to-face)  [] RE-EVAL     [x] OE(59705) x   2  [] IONTO  [] NMR (74361) x     [] VASO  [x] Manual (52900) x 1       [] Other:  [] TA x      [] Mech Traction (55490)  [] ES(attended) (98060)      [] ES (un) (60701):     GOALS: Adjusted on 12/2/21 d/t surgical intervention  Patient stated goal:  Walk without pain   []? Progressing: []? Met: []? Not Met: []? Adjusted     Therapist goals for Patient:   Short Term Goals: To be achieved in: 4 weeks  1. Independent in HEP and progression per patient tolerance, in order to prevent further injury prior to surgery   []? Progressing: [x]? Met: []? Not Met: []? Adjusted   2. Patient will have a decrease in pain to facilitate improvement in movement, function, and ADLs as indicated by Functional Deficits.     []? Progressing: [x]? Met: []? Not Met: []? Adjusted     Long Term Goals: To be achieved in: 8 weeks  1. Disability index score of 20% or less for the iHOT-12 to assist with reaching prior level of function. [x]? ? Progressing: []?? Met: []?? Not Met: []?? Adjusted  2. Patient will demonstrate increased L hip AROM that is equal to R to allow for proper joint functioning as indicated by patients Functional Deficits.    [x]? ? Progressing: []?? Met: []?? Not Met: []?? Adjusted  3. Patient will demonstrate an increase in L hip strength to 4/5 in all directions tested to allow for proper functional mobility as indicated by patients Functional Deficits. [x]? ? Progressing: []?? Met: []?? Not Met: []?? Adjusted  4. Patient will return to all functional activities without increased symptoms or restriction. [x]? ? Progressing: []?? Met: []?? Not Met: []?? Adjusted  5. Patient will be able to walk over 500 ft with proper gait sequencing, no assistive device and no c/o pain. []?? Progressing: [x]? ? Met: []?? Not Met: []?? Adjusted      Overall Progression Towards Functional goals/ Treatment Progress Update:  [x] Patient is progressing as expected based on MD protocol with increase in L hip ROM and LE strength allowing for increase in functional independence with gait and ADL's in weight bearing. Based on surgical intervention and continued deficits she is appropriate for further PT to further increase LE strength and mobility to increase ability to do more strenuous activities in weight bearing without pain or compensation. 3/1/22  [] Progression is slowed d/t  [] Progression has been slowed due to co-morbidities.   [] Plan just implemented, too soon to assess goals progression <30days   [] Goals require adjustment due to lack of progress  [] Patient is not progressing as expected and requires additional follow up with physician  []     Prognosis for POC: [x] Good [] Fair  [] Poor      Patient requires continued skilled intervention: [x] Yes  [] No    Treatment/Activity Tolerance:  [x] Patient able to complete treatment  [] Patient limited by fatigue  [] Patient limited by pain     [] Patient limited by other medical complications  [] Other:   3/17 Pt continues to present with significant tightness at L proximal hip flexors and soreness reported and STM to msucles. Responded well to reintroduction of balance exercises with no adverse effects noted or reported. Fatigued by end of treatment with no increase in pain. Patient Education:              3/15 Time spent reviewing activity level and cleaning the house a different day in order to give her a break after baby sitting and decrease severity of symptoms. 3/10 Updated HEP with new stretches d/t symptoms. 3/8 Educated on pacing activity level with cleaning and being on her feet for extended periods of time in order to decrease soreness. Educated on importance of continuing with strengthening to further increase endurance and tolerance to activities in weight bearing. 3/1 Updated HEP with stretches and ROM exercises  2/24 Updated HEP   1/27 Educated on strengthening progress and milestones she needs to achieve before she attempts to baby sit independently. Updated HEP  1/25 Updated HEP   1/20 Updated HEP and educated on activity modification. 1/11 Updated HEP   1/7 Added prone hip extension to HEP  1/5 Educated on use of unilateral crutch for short distance and bilateral crutches for community ambulation. 12/9 Updated HEP   12/2 Reviewed precautions based on surgical intervention     Access Code: Kolby Santos        PLAN: See eval  [x] Continue per plan of care [x] Alter current plan (see comments below)  [] Plan of care initiated [] Hold pending MD visit [] Discharge  3/17 Monitor symptoms and reintroduce eccentric strengthening in weight bearing as tolerated.    3/1 Continue with PT 1-2x a week for 4 weeks to further increase L hip ROM and strength in order to improve function before progressing to completing HEP independently. Electronically signed by:  Elayne Vasquez PT    Note: If patient does not return for scheduled/ recommended follow up visits, this note will serve as a discharge from care along with most recent update on progress.

## 2022-03-22 ENCOUNTER — APPOINTMENT (OUTPATIENT)
Dept: PHYSICAL THERAPY | Age: 57
End: 2022-03-22
Payer: COMMERCIAL

## 2022-03-22 ENCOUNTER — HOSPITAL ENCOUNTER (OUTPATIENT)
Dept: PHYSICAL THERAPY | Age: 57
Setting detail: THERAPIES SERIES
Discharge: HOME | End: 2022-03-22
Payer: COMMERCIAL

## 2022-03-22 PROCEDURE — 97112 NEUROMUSCULAR REEDUCATION: CPT

## 2022-03-22 PROCEDURE — 97530 THERAPEUTIC ACTIVITIES: CPT

## 2022-03-22 PROCEDURE — 97110 THERAPEUTIC EXERCISES: CPT

## 2022-03-22 NOTE — FLOWSHEET NOTE
The [address] [place], [place] [address], 800 [facility] [address] [facility], [address] [place] [address]  Phone: [phone]   Fax:     [phone]     Physical Therapy Progress/Daily Treatment Note  Date:  3/22/2022    Patient Name:  Denise Jim    :  1965  MRN: [id number]  Restrictions/Precautions:    Medical/Treatment Diagnosis Information:  · Diagnosis: S76.212A (ICD-10-CM) - Strain of left groin  · Treatment Diagnosis: M25.552 Left hip pain  Insurance/Certification information:  PT Insurance Information: [facility]  Physician Information:  Referring Practitioner: Ashley Moore   Has the plan of care been signed (Y/N):        [x]  Yes  []  No     Date of Patient follow up with Physician: PRN      Is this a Progress Report:     []  Yes  [x]  No        If Yes:  Date Range for reporting period:  Beginning 21  Ending  3/1/22    Progress report will be due (10 Rx or 30 days whichever is less):        Recertification will be due (POC Duration  / 90 days whichever is less): 3/29/22        Visit # Insurance Allowable Auth Required   21 visits   65 total  3/22 [address] []  Yes [x]  No        Functional Scale:   iHOT-12 25% deficit Date assessed: 3/1/22  iHOT-12   52% deficit Date assessed: 22   iHOT-12  62% deficit Date assessed: 22  iHOT-12: 77% deficit Date assessed: 21       Latex Allergy:  [x]NO      []YES  Preferred Language for Healthcare:   [x]English       []other:      Pain level:  0-1/10 3/22    SUBJECTIVE:  3/22: Pt states she is feeling much better compared to last week and able to babysit two days in a row with no increase in symptoms. Pt states she is having soreness inner thigh with sitting and going sit <>stand but symptoms are mild and denies any deep or sharp pain.      OBJECTIVE:   Updated below on 3/1/22  ROM PROM AROM Comments     Left 3/1 Right Left 3/1 Right     Flexion 117 130 102 110     Extension 10   10 12      Abduction 42 48 36 45     ER 37 40  38     IR 46 60  55          Flexibility Left NT d/t acute post op  Right Comments   Hamstrings 0 0     ITB (Obers test) WNL  WNL     Hip flexor(Mikhail test) Mild restriction WNL                    Strength Left 3/1 Right Comments   Hip flexors 3+ muscle discomfort at proximal hip flexor  4 SLR   Hip extension 4+ 5     Hip abduction 5 5     Hip adduction 4 4+     Hip ER 4+ 4+     Hip IR 4 5     Quads 4+ 5     Hamstrings 5 5           Palpation: Tension L proximal hip flexor and iliopsoas 3/10     Functional Mobility/Transfers: Independent      Posture:  WNL      Gait: Independent, WNL        RESTRICTIONS/PRECAUTIONS: LEFT REVISION HIP ARTHROSCOPY, SYNOVECTOMY AND LYSIS OF ADHESIONS, REVISION ACETABULOPLASTY, ALLOGRAFT LABRAL RECONSTRUCTION, ENDOSCOPIC GREATER TROCHANTERIC BURSECTOMY, FRACTIONAL PSOAS LENGTHENING 11/30/21    Exercises/Interventions:     ROM/STRETCHES     Upright bike seat 4 3/15 reintroduced d/t symptoms   Ellipitacl 5 min 3/22 reintroduced d/t decrease in symptoms   Adductor stretch 30 sec x3 L Start 1/11   Modified Mikhail stretch 30 sec x3 L Start 3/10    Quad stretch prone   [id number] ^1/27 towel roll under thigh    Prone hip IR AROM 30\"hx3 bilat^3/22   Lower trunk rotation Start 3/1 emphasis on imrpovimg hip ROM into ER/IR   Hip ER figure 4 stretch 30\"hx3 L ^3/3 supine L foot on bent R knee   TFL stretch 30\"hx3 L Start 3/10 side lying   PREs     Hip hinging stage 1  ^1/18   TA marching 1/11   TA with LE ext ^1/20   Posterior pelvic tilt 10\"hx10  3/10 completed in supine and reintroduced d/t symptoms at proximal hip fleoxrs   TA overhead  3/15 supine   SLR with TA 0# 3x10 L 3/15 decreased weight d/t symptoms   Leg press 100# 3x10 DL concentric, SL eccentric L 3/22 reintroduced d/t improvement in symptoms   Hamstring curl 3/1 withheld d/t progress and pt overall fatigue   Knee extension machine  3/1 withheld d/t progress and pt overall fatigue   SLR abd ^2/22 side lying    Bridges SL 3x10 L Start /224   Clams 3/22 progressed with side stepping   SLR abd 3/15   Hip IR Start 3/15 seated EOT with BS   LAQ  2/8 progressed knee extension machine   Hip extension  2/24 progress with SL bridge   LTD 3x10 L ^3/8 4\" step   Step ups forward  3x10 L 3/17 reintroduced with 8\" step    Step ups lateral  Start 2/22 6\" step    TRX squats 30\"hx3 DL3/8   Side stepping Blue loop @ knees 3 laps Start 3/22        Manual Therapy     PROM 3/22 withheld d/t decrease in tightness/ tenderness   STM 3/22 withheld d/t decrease in tightness/ tenderness   Neuromuscular re-education     Plyo SLS 4# med ball 3x10 SLS on L3/22 reintroduced   SLS 30\"hx3 L ^3/1 Biodex postural stability L10   Biodex random control  x3' bilat LE ^3/17 L7   Therapeutic activities     Gait training  1/7 Withheld d/t demonstrating appropriate sequencing with unilateral cane   Stair training 1/27 withheld d/t fatigue   Functional movement         Therapeutic Exercise and NMR EXR  [x] (23055) Provided verbal/tactile cueing for activities related to strengthening, flexibility, endurance, ROM for improvements in LE, proximal hip, and core control with self care, mobility, lifting, ambulation. [x] (31651) Provided verbal/tactile cueing for activities related to improving balance, coordination, kinesthetic sense, posture, motor skill, proprioception  to assist with LE, proximal hip, and core control in self care, mobility, lifting, ambulation and eccentric single leg control.      NMR and Therapeutic Activities:    [x] (90179 or 06370) Provided verbal/tactile cueing for activities related to improving balance, coordination, kinesthetic sense, posture, motor skill, proprioception and motor activation to allow for proper function of core, proximal hip and LE with self care and ADLs  [] (99324) Gait Re-education- Provided training and instruction to the patient for proper LE, core and proximal hip recruitment and positioning and eccentric body weight control with ambulation re-education including up and down stairs     Home Exercise Program:    [x] (75653) Reviewed/Progressed HEP activities related to strengthening, flexibility, endurance, ROM of core, proximal hip and LE for functional self-care, mobility, lifting and ambulation/stair navigation   [] (67460)Reviewed/Progressed HEP activities related to improving balance, coordination, kinesthetic sense, posture, motor skill, proprioception of core, proximal hip and LE for self care, mobility, lifting, and ambulation/stair navigation      Manual Treatments:  PROM / STM / Oscillations-Mobs:  G-I, II, III, IV (PA's, Inf., Post.)  [] (79889) Provided manual therapy to mobilize LE, proximal hip and/or LS spine soft tissue/joints for the purpose of modulating pain, promoting relaxation,  increasing ROM, reducing/eliminating soft tissue swelling/inflammation/restriction, improving soft tissue extensibility and allowing for proper ROM for normal function with self care, mobility, lifting and ambulation. Modalities:  CP L hip x15' 3/22    Charges:  Timed Code Treatment Minutes: 48'   Total Treatment Minutes: 11:19-12:26  77'       [] EVAL (LOW) 20197 (typically 20 minutes face-to-face)  [] EVAL (MOD) 59654 (typically 30 minutes face-to-face)  [] EVAL (HIGH) 21747 (typically 45 minutes face-to-face)  [] RE-EVAL     [x] JF(00091) x   2  [] IONTO  [x] NMR (62488) x 1    [] VASO  [] Manual (43761) x        [] Other:  [x] TA x  1    [] Mech Traction (03189)  [] ES(attended) (70629)      [] ES (un) (59899):     GOALS: Adjusted on 12/2/21 d/t surgical intervention  Patient stated goal:  Walk without pain   []? Progressing: []? Met: []? Not Met: []? Adjusted     Therapist goals for Patient:   Short Term Goals: To be achieved in: 4 weeks  1. Independent in HEP and progression per patient tolerance, in order to prevent further injury prior to surgery   []? Progressing: [x]? Met: []? Not Met: []? Adjusted   2.  Patient will have a decrease in pain to facilitate improvement in movement, function, and ADLs as indicated by Functional Deficits. []? Progressing: [x]? Met: []? Not Met: []? Adjusted     Long Term Goals: To be achieved in: 8 weeks  1. Disability index score of 20% or less for the iHOT-12 to assist with reaching prior level of function. [x]? ? Progressing: []?? Met: []?? Not Met: []?? Adjusted  2. Patient will demonstrate increased L hip AROM that is equal to R to allow for proper joint functioning as indicated by patients Functional Deficits.    [x]? ? Progressing: []?? Met: []?? Not Met: []?? Adjusted  3. Patient will demonstrate an increase in L hip strength to 4/5 in all directions tested to allow for proper functional mobility as indicated by patients Functional Deficits. [x]? ? Progressing: []?? Met: []?? Not Met: []?? Adjusted  4. Patient will return to all functional activities without increased symptoms or restriction. [x]? ? Progressing: []?? Met: []?? Not Met: []?? Adjusted  5. Patient will be able to walk over 500 ft with proper gait sequencing, no assistive device and no c/o pain. []?? Progressing: [x]? ? Met: []?? Not Met: []?? Adjusted      Overall Progression Towards Functional goals/ Treatment Progress Update:  [x] Patient is progressing as expected based on MD protocol with increase in L hip ROM and LE strength allowing for increase in functional independence with gait and ADL's in weight bearing. Based on surgical intervention and continued deficits she is appropriate for further PT to further increase LE strength and mobility to increase ability to do more strenuous activities in weight bearing without pain or compensation. 3/1/22  [] Progression is slowed d/t  [] Progression has been slowed due to co-morbidities.   [] Plan just implemented, too soon to assess goals progression <30days   [] Goals require adjustment due to lack of progress  [] Patient is not progressing as expected and requires additional follow up with physician  []     Prognosis for POC: [x] Good [] Fair  [] Poor      Patient requires continued skilled intervention: [x] Yes  [] No    Treatment/Activity Tolerance:  [x] Patient able to complete treatment  [] Patient limited by fatigue  [] Patient limited by pain     [] Patient limited by other medical complications  [] Other:   3/22 Decrease muscle tension and tenderness noted at L hip flexors allowing for increase in intensity of treatment with no increase in symptoms noted. Pt reported muscle soreness that is appropriate based on treatment. Patient Education:              3/22 Updated HEP   3/15 Time spent reviewing activity level and cleaning the house a different day in order to give her a break after baby sitting and decrease severity of symptoms. 3/10 Updated HEP with new stretches d/t symptoms. 3/8 Educated on pacing activity level with cleaning and being on her feet for extended periods of time in order to decrease soreness. Educated on importance of continuing with strengthening to further increase endurance and tolerance to activities in weight bearing. 3/1 Updated HEP with stretches and ROM exercises  2/24 Updated HEP   1/27 Educated on strengthening progress and milestones she needs to achieve before she attempts to baby sit independently. Updated HEP  1/25 Updated HEP   1/20 Updated HEP and educated on activity modification. 1/11 Updated HEP   1/7 Added prone hip extension to HEP  1/5 Educated on use of unilateral crutch for short distance and bilateral crutches for community ambulation.     12/9 Updated HEP   12/2 Reviewed precautions based on surgical intervention     Access Code: Mindy Winnie        PLAN: See eval  [x] Continue per plan of care [x] Alter current plan (see comments below)  [] Plan of care initiated [] Hold pending MD visit [] Discharge  3/22 Based on decrease in symptoms and competence with HEP pt is to f/u in one week and will complete progress note in order to update POC. 3/1 Continue with PT 1-2x a week for 4 weeks to further increase L hip ROM and strength in order to improve function before progressing to completing HEP independently. Electronically signed by:  Rica Cuellar PT    Note: If patient does not return for scheduled/ recommended follow up visits, this note will serve as a discharge from care along with most recent update on progress.

## 2022-03-29 ENCOUNTER — HOSPITAL ENCOUNTER (OUTPATIENT)
Dept: PHYSICAL THERAPY | Age: 57
Setting detail: THERAPIES SERIES
Discharge: HOME | End: 2022-03-29
Payer: COMMERCIAL

## 2022-03-29 PROCEDURE — 97110 THERAPEUTIC EXERCISES: CPT

## 2022-03-29 PROCEDURE — 97112 NEUROMUSCULAR REEDUCATION: CPT

## 2022-03-29 NOTE — DISCHARGE SUMMARY
[facility], [address] [place] [address], [address] [place] [address]  Phone: [phone]   Fax:     [phone]    Physical Therapy Discharge Summary    Dear Dr. Jeanette Lindo,    We had the pleasure of treating the following patient for physical therapy services at [address] [place] [address]. A summary of our findings can be found in the discharge summary below. If you have any questions or concerns regarding these findings, please do not hesitate to contact me at the office phone number checked above. Thank you for the referral.     Physician Signature:________________________________Date:__________________  By signing above (or electronic signature), therapists plan is approved by physician      Overall Response to Treatment:   [x]Patient is responding well to treatment and improvement is noted with regards to increase in L hip ROM and strength allowing for decrease in pain and increase in function. Based on objective and subjective improvements she is appropriate for discharge from [facility].     []Patient should continue to improve in reasonable time if they continue HEP   []Patient has plateaued and is no longer responding to skilled PT intervention    []Patient is getting worse and would benefit from return to referring MD   []Patient unable to adhere to initial POC   []Other:     Physical Therapy Progress/Daily Treatment Note  Date:  3/29/2022    Patient Name:  Andre Montalvo    :  1965  MRN: [id number]  Restrictions/Precautions:    Medical/Treatment Diagnosis Information:  · Diagnosis: S76.212A (ICD-10-CM) - Strain of left groin  · Treatment Diagnosis: M25.552 Left hip pain  Insurance/Certification information:  PT Insurance Information: [facility]  Physician Information:  Referring Practitioner: Jeanette Lindo   Has the plan of care been signed (Y/N):        [x]  Yes  []  No     Date of Patient follow up with Physician: PRN      Is this a Progress Report:     [x]  Yes  []  No        If Yes:  Date Range for reporting period:  Beginning 12/2/21  Ending  3/1/22    Progress report will be due (10 Rx or 30 days whichever is less):       Recertification will be due (POC Duration  / 90 days whichever is less):         Visit # Insurance Allowable Auth Required   22 visits 2022  66 total  3/29 U[employer]  UNL []  Yes [x]  No        Functional Scale:   IHOT-12 9% deficit Date assessed: 3/29/22  iHOT-12 25% deficit Date assessed: 3/1/22  iHOT-12   52% deficit Date assessed: 2/8/22   iHOT-12  62% deficit Date assessed: 1/5/22  iHOT-12: 77% deficit Date assessed: 12/2/21       Latex Allergy:  [x]NO      []YES  Preferred Language for Healthcare:   [x]English       []other:      Pain level:  0-1/10 3/29    SUBJECTIVE:  3/29 Pt states she is doing well and completing HEP without any issues. Biggest complaint is stiffness when standing after being seated for 1 hour or more. Does have some discomfort with rolling in bed but denies any sharp pain. OBJECTIVE:   Updated below on 3/29/22  ROM PROM AROM Comments     Left 3/29 Right Left 3/29 Right     Flexion 120 130 102 110     Extension 10   10 12      Abduction 42 48 40 45     ER 47 40  38     IR 46 60  55          Flexibility Left 3/29 Right Comments   Hamstrings 0 0     ITB (Obers test) WNL  WNL     Hip flexor(Mikhail test) Mild restriction WNL                    Strength Left 3/29 Right Comments   Hip flexors 4- 4 SLR   Hip extension 5 5     Hip abduction 5 5     Hip adduction 4+ 4+     Hip ER 4+ 5     Hip IR 4+ 5     Quads 4+ 5     Hamstrings 5 5           Palpation: Tension and mild tenderness L proximal hip flexor and iliopsoas 3/29     Functional Mobility/Transfers: Independent      Posture:  WNL      Gait: Independent, WNL        RESTRICTIONS/PRECAUTIONS: LEFT REVISION HIP ARTHROSCOPY, SYNOVECTOMY AND LYSIS OF ADHESIONS, REVISION ACETABULOPLASTY, ALLOGRAFT LABRAL RECONSTRUCTION, ENDOSCOPIC GREATER TROCHANTERIC BURSECTOMY, FRACTIONAL PSOAS LENGTHENING 11/30/21    Exercises/Interventions:     ROM/STRETCHES     Upright bike seat 4 3/15 reintroduced d/t symptoms   Ellipitical 5 min 3/22 reintroduced d/t decrease in symptoms   Adductor stretch 30 sec x3 L Start 1/11   Modified Mikhail stretch 30 sec x3 L Start 3/10    Quad stretch prone   [id number] ^1/27 towel roll under thigh    Prone hip IR AROM 30\"hx3 bilat^3/22   Lower trunk rotation Start 3/1 emphasis on imrpovimg hip ROM into ER/IR   Hip ER figure 4 stretch 30\"hx3 L ^3/3 supine L foot on bent R knee   TFL stretch 30\"hx3 L Start 3/10 side lying   PREs     Hip hinging stage 1  ^1/18   TA marching 1/11   TA with LE ext ^1/20   Posterior pelvic tilt 3/10 completed in supine and reintroduced d/t symptoms at proximal hip fleoxrs   TA overhead  3/15 supine   SLR with TA 1# 3x10 L ^3/29   Leg press  3/22 reintroduced d/t improvement in symptoms   Hamstring curl 3/1 withheld d/t progress and pt overall fatigue   Knee extension machine  3/1 withheld d/t progress and pt overall fatigue   SLR abd ^2/22 side lying    Bridges SL Start /224   Clams Mallory loop 5\"h 2x10 R ^3/29 side lying   SLR abd 3/15   Hip IR Start 3/15 seated EOT with BS   LAQ  2/8 progressed knee extension machine   Hip extension  2/24 progress with SL bridge   LTD 3x10 L ^3/8 4\" step   Step ups forward  3x10 L ^3/29 8\" step with bilat 5# weights   Step ups lateral  Start 2/22 6\" step    Wall squats Blue loop 30\"hx3 DL^3/29   Side stepping Blue loop @ knees 3 laps Start 3/22   Band walks Blue loop @ knees 3 laps Start 3/29                   Manual Therapy     PROM 3/22 withheld d/t decrease in tightness/ tenderness   STM 3/22 withheld d/t decrease in tightness/ tenderness   Neuromuscular re-education     Plyo SLS 3/22 reintroduced   SLS with hip abd SLS on L with R hip abd 3x10 63/29   [employer] random control  ^3/17 L7   Therapeutic activities     Gait training  1/7 Withheld d/t demonstrating appropriate sequencing with unilateral cane   Stair training 1/27 withheld d/t fatigue   Functional movement         Therapeutic Exercise and NMR EXR  [x] (21077) Provided verbal/tactile cueing for activities related to strengthening, flexibility, endurance, ROM for improvements in LE, proximal hip, and core control with self care, mobility, lifting, ambulation. [x] (92883) Provided verbal/tactile cueing for activities related to improving balance, coordination, kinesthetic sense, posture, motor skill, proprioception  to assist with LE, proximal hip, and core control in self care, mobility, lifting, ambulation and eccentric single leg control.      NMR and Therapeutic Activities:    [x] (06981 or 76418) Provided verbal/tactile cueing for activities related to improving balance, coordination, kinesthetic sense, posture, motor skill, proprioception and motor activation to allow for proper function of core, proximal hip and LE with self care and ADLs  [] (44586) Gait Re-education- Provided training and instruction to the patient for proper LE, core and proximal hip recruitment and positioning and eccentric body weight control with ambulation re-education including up and down stairs     Home Exercise Program:    [x] (17534) Reviewed/Progressed HEP activities related to strengthening, flexibility, endurance, ROM of core, proximal hip and LE for functional self-care, mobility, lifting and ambulation/stair navigation   [] (47565)Reviewed/Progressed HEP activities related to improving balance, coordination, kinesthetic sense, posture, motor skill, proprioception of core, proximal hip and LE for self care, mobility, lifting, and ambulation/stair navigation      Manual Treatments:  PROM / STM / Oscillations-Mobs:  G-I, II, III, IV (PA's, Inf., Post.)  [] (50230) Provided manual therapy to mobilize LE, proximal hip and/or LS spine soft tissue/joints for the purpose of modulating pain, promoting relaxation,  increasing ROM, reducing/eliminating soft tissue swelling/inflammation/restriction, improving soft tissue extensibility and allowing for proper ROM for normal function with self care, mobility, lifting and ambulation. Modalities:  CP L hip x15' 3/29    Charges:  Timed Code Treatment Minutes: 52'   Total Treatment Minutes: 9:28:10-42  74'       [] EVAL (LOW) 69351 (typically 20 minutes face-to-face)  [] EVAL (MOD) 55735 (typically 30 minutes face-to-face)  [] EVAL (HIGH) 16636 (typically 45 minutes face-to-face)  [] RE-EVAL     [x] YZ(94142) x   2  [] IONTO  [x] NMR (95302) x 1    [] VASO  [] Manual (31430) x        [] Other:  [] TA x      [] Mech Traction (59696)  [] ES(attended) (80758)      [] ES (un) (38535):     GOALS: Adjusted on 12/2/21 d/t surgical intervention  Patient stated goal:  Walk without pain   []? Progressing: [x]? Met: []? Not Met: []? Adjusted     Therapist goals for Patient:   Short Term Goals: To be achieved in: 4 weeks  1. Independent in HEP and progression per patient tolerance, in order to prevent further injury prior to surgery   []? Progressing: [x]? Met: []? Not Met: []? Adjusted   2. Patient will have a decrease in pain to facilitate improvement in movement, function, and ADLs as indicated by Functional Deficits. []? Progressing: [x]? Met: []? Not Met: []? Adjusted     Long Term Goals: To be achieved in: 8 weeks  1. Disability index score of 20% or less for the iHOT-12 to assist with reaching prior level of function.   []?? Progressing: [x]? ? Met: []?? Not Met: []?? Adjusted  2. Patient will demonstrate increased L hip AROM that is equal to R to allow for proper joint functioning as indicated by patients Functional Deficits.    [x]? ? Progressing: []?? Met: []?? Not Met: []?? Adjusted  3. Patient will demonstrate an increase in L hip strength to 4/5 in all directions tested to allow for proper functional mobility as indicated by patients Functional Deficits. [x]? ? Progressing: []?? Met: []?? Not Met: []?? Adjusted  4. Patient will return to all functional activities without increased symptoms or restriction.   []?? Progressing: [x]? ? Met: []?? Not Met: []?? Adjusted  5. Patient will be able to walk over 500 ft with proper gait sequencing, no assistive device and no c/o pain. []?? Progressing: [x]? ? Met: []?? Not Met: []?? Adjusted      Overall Progression Towards Functional goals/ Treatment Progress Update:  [x] Patient is progressing as expected based on MD protocol with increase in L hip ROM and LE strength allowing for increase in functional independence with ADL's in weight bearing. 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 [] Progression is slowed d/t  [] Progression has been slowed due to co-morbidities. [] Plan just implemented, too soon to assess goals progression <30days   [] Goals require adjustment due to lack of progress  [] Patient is not progressing as expected and requires additional follow up with physician  []     Prognosis for POC: [x] Good [] Fair  [] Poor      Patient requires continued skilled intervention: [x] Yes  [] No    Treatment/Activity Tolerance:  [x] Patient able to complete treatment  [] Patient limited by fatigue  [] Patient limited by pain     [] Patient limited by other medical complications  [] Other:   3/29 Fatigued with exercises but no adverse effects noted. Demonstrated appropriate form with exercises     Patient Education:              3/29 Updated HEP and reviewed independent progression of exercises. 3/22 Updated HEP   3/15 Time spent reviewing activity level and cleaning the house a different day in order to give her a break after baby sitting and decrease severity of symptoms. 3/10 Updated HEP with new stretches d/t symptoms.    3/8 Educated on pacing activity level with cleaning and being on her feet for extended periods of time in order to decrease soreness. Educated on importance of continuing with strengthening to further increase endurance and tolerance to activities in weight bearing. 3/1 Updated HEP with stretches and ROM exercises  2/24 Updated HEP   1/27 Educated on strengthening progress and milestones she needs to achieve before she attempts to baby sit independently. Updated HEP  1/25 Updated HEP   1/20 Updated HEP and educated on activity modification. 1/11 Updated HEP   1/7 Added prone hip extension to HEP  1/5 Educated on use of unilateral crutch for short distance and bilateral crutches for community ambulation. 12/9 Updated HEP   12/2 Reviewed precautions based on surgical intervention     Access Code: Willma Just        PLAN: See eval  [x] Continue per plan of care [x] Alter current plan (see comments below)  [] Plan of care initiated [] Hold pending MD visit [] Discharge  3/29 Based on objective and subjective improvement she is appropriate for discharge in order to complete HEP independently. 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 Electronically signed by:  Amita Lainez PT    Note: If patient does not return for scheduled/ recommended follow up visits, this note will serve as a discharge from care along with most recent update on progress.

## 2022-03-31 ENCOUNTER — HOSPITAL ENCOUNTER (OUTPATIENT)
Dept: PHYSICAL THERAPY | Age: 57
Discharge: HOME | End: 2022-03-31

## 2022-03-31 ENCOUNTER — OFFICE VISIT (OUTPATIENT)
Dept: ORTHOPEDIC SURGERY | Age: 57
End: 2022-03-31
Payer: COMMERCIAL

## 2022-03-31 VITALS — BODY MASS INDEX: 31.54 KG/M2 | HEIGHT: 63 IN | WEIGHT: 178 LBS

## 2022-03-31 DIAGNOSIS — Z98.890 S/P HIP ARTHROSCOPY: Primary | ICD-10-CM

## 2022-03-31 PROCEDURE — G8427 DOCREV CUR MEDS BY ELIG CLIN: HCPCS | Performed by: ORTHOPAEDIC SURGERY

## 2022-03-31 PROCEDURE — G8417 CALC BMI ABV UP PARAM F/U: HCPCS | Performed by: ORTHOPAEDIC SURGERY

## 2022-03-31 PROCEDURE — 9990000029 HC GAP PACKAGE

## 2022-03-31 PROCEDURE — 1036F TOBACCO NON-USER: CPT | Performed by: ORTHOPAEDIC SURGERY

## 2022-03-31 PROCEDURE — 3017F COLORECTAL CA SCREEN DOC REV: CPT | Performed by: ORTHOPAEDIC SURGERY

## 2022-03-31 PROCEDURE — G8484 FLU IMMUNIZE NO ADMIN: HCPCS | Performed by: ORTHOPAEDIC SURGERY

## 2022-03-31 PROCEDURE — 99214 OFFICE O/P EST MOD 30 MIN: CPT | Performed by: ORTHOPAEDIC SURGERY

## 2022-03-31 NOTE — FLOWSHEET NOTE
[address], [address] [place] [address] Drive [address] [place] [address], [address] [place] [address]  Phone: [phone]   Fax:     [phone]      Lower Extremity Daily Performance Training Note  Date:  3/31/2022    Patient Name:  Micah Leon    :  1965  MRN: [id number]  Restrictions/Precautions:   Medical/Treatment Diagnosis Information:   ·    ·   Diagnosis: U66.379K (ICD-10-CM) - Strain of left groin  · Treatment Diagnosis: M25.552 Left hip pain  ·   Insurance/Certification information:   [facility]    Physician Information:    Referring Practitioner: Franchesca Shah       Pain level: 010     Visit Number: 1 (1 ) 3/31    Subjective: Pt feeling good. Just wants to continue to get stronger and get back to activities of daily living and being able to be a little more active with the grandchildren.      Objective:  Observation:     Exercises/Interventions:      ROM/STRETCHES       Upright bike seat 4  3/15 reintroduced d/t symptoms   Ellipitacl 5 min 3/22 reintroduced d/t decrease in symptoms   Adductor stretch 30 sec x3 L Start    Modified Mikhail stretch 30 sec x3 L Start 3/10    Quad stretch prone   [id number] ^ towel roll under thigh    Prone hip IR AROM 30\"hx3 bilat ^3/22   Lower trunk rotation  Start 3/1 emphasis on imrpovimg hip ROM into ER/IR   Hip ER figure 4 stretch 30\"hx3 L ^3/3 supine L foot on bent R knee   TFL stretch 30\"hx3 L Start 3/10 side lying   PREs       Hip hinging stage 1  ^   TA marching     TA with LE ext  ^   Posterior pelvic tilt 10\"hx10  3/10 completed in supine and reintroduced d/t symptoms at proximal hip fleoxrs   TA overhead  3/15 supine   SLR with TA 0# 3x10 L 3/15 decreased weight d/t symptoms   [employer]:     Leg press 100# 3x10 DL concentric, SL eccentric L 3/22 reintroduced d/t improvement in symptoms   Hamstring curl 30# 3x10 3/1 withheld d/t progress and pt overall fatigue   Knee extension machine  20# 3x10 SL on L 3/1 withheld d/t progress and pt overall fatigue   SLR abd  ^2/22 side lying    Bridges SL 3x10 L Start /224   Clams  3/22 progressed with side stepping   SLR abd  3/15   Hip IR  Start 3/15 seated EOT with BS   LAQ  2/8 progressed knee extension machine   Hip extension   2/24 progress with SL bridge   LTD 3x10 L ^3/8 4\" step   Step ups forward  3x10 L 3/17 reintroduced with 8\" step    Step ups lateral   Start 2/22 6\" step    TRX squats 30\"hx3 DL 3/8   Side stepping Blue loop @ knees 3 laps Start 3/22           Other Therapeutic Activities:  Ice 15'    Home Exercise Program:    Access Code: Senor [email or web address]  URL: [email or web address]  Date: 04/04/2022  Prepared by: Tigre Ryder    Exercises  Side Lunge Adductor Stretch - 2 x daily - 7 x weekly - 1 sets - 3 reps - 30 hold  Modified Mikhail Stretch - 2 x daily - 7 x weekly - 1 sets - 3 reps - 30 hold  Supine Hip External Rotation Stretch - 2 x daily - 7 x weekly - 1 sets - 10 reps - 10 hold  Sidelying TFL Stretch - 2 x daily - 7 x weekly - 1 sets - 3 reps - 30 hold  Prone Quadriceps Stretch with Strap - 2 x daily - 7 x weekly - 1 sets - 3 reps - 30 hold  Prone Hip Internal Rotation AROM - 2 x daily - 7 x weekly - 1 sets - 3 reps - 30 hold  Clam with Resistance - 1 x daily - 7 x weekly - 2 sets - 10 reps - 5 hold  Straight Leg Raise - 1 x daily - 7 x weekly - 3 sets - 10 reps  Seated Hip Internal Rotation with Resistance - 1 x daily - 7 x weekly - 3 sets - 10 reps  Side Stepping with Resistance at Thighs - 1 x daily - 7 x weekly - 3-5 reps - 1 sets  Forward Backward Monster Walk with Band at Thighs and Counter Support - 1 x daily - 7 x weekly - 1 sets - 3 reps  Lateral Step Down - 1 x daily - 7 x weekly - 3 sets - 10 reps  Wall Squat with Resistance Loop - 1 x daily - 7 x weekly - 1 sets - 3 reps - 30 hold  Step Up - 1 x daily - 7 x weekly - 3 sets - 10 reps  Standing Hip Abduction - 1 x daily - 7 x weekly - 3 sets - 10 reps  Clamshell in Abduction - 1 x daily - 3 sets - 10 reps      Patient Education:    (3/31): Discussed [employer] program and working toward pt stated goals of getting stronger and being able to do ADL's without issue        Assessment:  [x] Patient tolerated treatment well [] Patient limited by fatigue  [] Patient limited by pain  [] Patient limited by other medical complications  [] Other:     Prognosis: [x] Good [] Fair  [] Poor    Patient Requires Follow-up: [x] Yes  [] No    Plan:   [x] Continue per plan of care [] Alter current plan (see comments)  [] Plan of care initiated [] Hold pending MD visit [] Discharge  Plan for Next Session:  Progress as tolerated. MD Follow-up: 4/1/22    Electronically signed by:  Shashi Hinojosa ATC     Tx was performed by [facility] as a part of the [facility] program following PT's recommendations/guidance.        Cosigned by:

## 2022-04-01 NOTE — PROGRESS NOTES
Chief Complaint  Post-Op Check (4 MONTH POST OP LEFT HIP SCOPE 11/30/2021)      History of Present Illness:  Carlos Gomez is a pleasant 64 y.o. female who is here for 4 month follow-up post left hip revision arthroscopy, acetabuloplasty, segmental labral reconstruction, psoas lengthening capsular closure, andendoscopic GT bursectomy. She is doing a lot better. Has been doing physical therapy at our [facility] and has now transitioned to the [employer] program.  She no longer has a resting deep-seated groin pain and burning. Her stiffness and getting up from a seated position is becoming less frequent. She has been back to [employer]. She had a minor setback a few weeks ago into her hip flexor which lasted for about a week and a half. This has  resolved on its own. Her foot neuritis is improving when she resumed her gabapentin but it is noticing that it is causing some bilateral leg swelling. More so than what she would expect from her amlodipine. She found the wellness cream further contributory to her swelling. Her numbness is now mostly localized to just the deep peroneal nerve in the first webspace of left foot. .       Medical History:  Patient's medications, allergies, past medical, surgical, social and family histories were reviewed and updated as appropriate. Pain Assessment  Location of Pain:  (HIP)  Location Modifiers: Left  Severity of Pain: 1  Quality of Pain: Aching (TIGHT)  Frequency of Pain: Intermittent  Aggravating Factors:  (EXTENDED SITTING)  Limiting Behavior: Some  Relieving Factors: Rest,Ice,Heat,Exercise  Result of Injury: No  Work-Related Injury: No  Are there other pain locations you wish to document?: No  ROS: Review of systems reviewed from Patient History Form completed today and available in the patient's chart under the Media tab.       Pertinent items are noted in HPI  Review of systems reviewed from Patient History Form completed today and available in the patient's chart under the Media tab. Vital Signs:  Ht 5' 3\" (1.6 m)   Wt 178 lb (80.7 kg)   BMI 31.53 kg/m²         Neuro: Alert & oriented x 3,  normal,  no focal deficits noted. Normal affect. Eyes: sclera clear  Ears: Normal external ear  Mouth:  No perioral lesions  Pulm: Respirations unlabored and regular  Pulse: Extremities well perfused. 2+ peripheral pulses. Skin: Warm. No ulcerations. Constitutional: The physical examination finds the patient to be well-developed and well-nourished. The patient is alert and oriented x3 and was cooperative throughout the visit. Hip Examination: left    Skin/Inspection: Incisions full healed. No skin lesions, cellulitis, or extreme edema in the lower extremities. Standing/Walking: normal gait, negative Trendelenburg sign. Supine Exam: Non tender around the ASIS, AIIS  Flexion arc 0 to 100deg, IR 25 deg, ER 45 deg full range of motion  Special Tests:  negative Deep Flexion Test, negative  FADIR ,  negative  pain with   Resisted Abduction 5/5   Resisted Adduction 5/5   Resisted Hip Flexion 5/5    Side Lying Exam: mildly tender at greater trochanter, not tender abductor musculature,   Abductor side leg raise 5/5, normal. OberTest    Special Tests:  Double Leg Squats (Standing): excellent  Lunges: good  Single Leg Squats: fair  Hip Hinge (Sit to Stand):  good  Double Leg Bridges: excellent  Single Leg Bridges: excellent  Single Leg Step-Downs : good    Distal Neurovascular exam is intact (foot sensation, pulses, and motor exam), but hypersensitive to touch along the deep peroneal nerve in the first webspace dorsally. Diagnostics:  No new imaging was obtained today       Assessment: Patient is a 64 y.o. female who continues to improve 4 months post left hip revision arthroscopy. She is completely within her postoperative timelines for range of motion function and strength   Has made significant functional gains since her last visit.     Impression:  Visit Diagnoses       Codes    S/P hip arthroscopy    -  Primary H69.871          Office Procedures:  No orders of the defined types were placed in this encounter. No orders of the defined types were placed in this encounter. Plan:  She is doing great. Her neuritis has persisted I expect this to improve within the next 2 months hopefully. She should apply the wellness cream locally to the foot dorsum along the deep peroneal nerve. I recommend the gabapentin for least another 30 days if tolerated. All the patient's questions were answered while in the clinic. The patient is understanding of all instructions and agrees with the plan. Approximately 25 minutes was spent on patient education and coordinating care. Follow up in: Return in about 2 months (around 5/31/2022). Sincerely,    Joshua Mccord MD [address] [place], [place] [address]  Email: [email or web address]  Office: [phone]    04/01/22  4:09 PM        The encounter with Oscar Ernesto was carried out by myself, Dr Gina Quinn, who personally examined the patient and reviewed the plan. This dictation was performed with a verbal recognition program (DRAGON) and it was checked for errors. It is possible that there are still dictated errors within this office note. If so, please bring any errors to my attention for an addendum. All efforts were made to ensure that this office note is accurate.

## 2022-04-04 ENCOUNTER — HOSPITAL ENCOUNTER (OUTPATIENT)
Dept: PHYSICAL THERAPY | Age: 57
Discharge: HOME | End: 2022-04-04

## 2022-04-04 NOTE — FLOWSHEET NOTE
The [facility] and [address] [place] [address], [facility] [address] [facility], [address] [place] [address]  Phone: [phone]   Fax:     [phone]      Lower Extremity Daily Performance Training Note  Date:  2022    Patient Name:  Barb Monsivais    :  1965  MRN: [id number]  Restrictions/Precautions:   Medical/Treatment Diagnosis Information:      ·   Diagnosis: L24.769D (ICD-10-CM) - Strain of left groin  · Treatment Diagnosis: M25.552 Left hip pain  ·   Insurance/Certification information:   [facility]    Physician Information:    Referring Practitioner: Artur Laguerre       Pain level: 0/10     Visit Number: 2 ( of 7)     Subjective: Pt feeling good. Followed up with Dr. Ki Aceves regarding the numbness in her foot ( progress note).      Objective:  Observation:     Exercises/Interventions:      ROM/STRETCHES       Upright bike seat 4  3/15 reintroduced d/t symptoms   Ellipitacl 5 min 3/22 reintroduced d/t decrease in symptoms   Adductor stretch 30 sec x3 L Start    Modified Mikhail stretch 30 sec x3 L Start 3/10    Quad stretch prone   [id number] ^ towel roll under thigh    Prone hip IR AROM 30\"hx3 bilat ^3/22   Lower trunk rotation  Start 3/1 emphasis on imrpovimg hip ROM into ER/IR   Hip ER figure 4 stretch 30\"hx3 L ^3/3 supine L foot on bent R knee   TFL stretch 30\"hx3 L Start 3/10 side lying   PREs       Hip hinging stage 1  ^   TA marching     TA with LE ext  ^   Posterior pelvic tilt 10\"hx10  3/10 completed in supine and reintroduced d/t symptoms at proximal hip fleoxrs   TA overhead  3/15 supine   SLR with TA 0# 3x10 L 3/15 decreased weight d/t symptoms   [employer]:     Leg press 100# 3x10 DL concentric, SL eccentric L 3/22 reintroduced d/t improvement in symptoms   Hamstring curl 30# 3x10 3/1 withheld d/t progress and pt overall fatigue   Knee extension machine  20# 3x10 SL on L 3/1 withheld d/t progress and pt overall fatigue   SLR abd ^2/22 side lying    Bridges SL 3x10 L Start /224   Clams  3/22 progressed with side stepping   SLR abd  3/15   Hip IR  Start 3/15 seated EOT with BS   LAQ  2/8 progressed knee extension machine   Hip extension   2/24 progress with SL bridge   LTD 3x10 L ^3/8 4\" step   Step ups forward  3x10 L 3/17 reintroduced with 8\" step    Step ups lateral   Start 2/22 6\" step    TRX squats 30\"hx3 DL 3/8   Side stepping Blue loop @ knees 3 laps Start 3/22           Other Therapeutic Activities:  Ice 15'    Home Exercise Program:    Access Code: Sigrid Rodrigues  URL: [email or web address]  Date: 04/04/2022  Prepared by: Morro Ruby    Exercises  Side Lunge Adductor Stretch - 2 x daily - 7 x weekly - 1 sets - 3 reps - 30 hold  Modified Mikhail Stretch - 2 x daily - 7 x weekly - 1 sets - 3 reps - 30 hold  Supine Hip External Rotation Stretch - 2 x daily - 7 x weekly - 1 sets - 10 reps - 10 hold  Sidelying TFL Stretch - 2 x daily - 7 x weekly - 1 sets - 3 reps - 30 hold  Prone Quadriceps Stretch with Strap - 2 x daily - 7 x weekly - 1 sets - 3 reps - 30 hold  Prone Hip Internal Rotation AROM - 2 x daily - 7 x weekly - 1 sets - 3 reps - 30 hold  Clam with Resistance - 1 x daily - 7 x weekly - 2 sets - 10 reps - 5 hold  Straight Leg Raise - 1 x daily - 7 x weekly - 3 sets - 10 reps  Seated Hip Internal Rotation with Resistance - 1 x daily - 7 x weekly - 3 sets - 10 reps  Side Stepping with Resistance at Thighs - 1 x daily - 7 x weekly - 3-5 reps - 1 sets  Forward Backward Monster Walk with Band at Thighs and Counter Support - 1 x daily - 7 x weekly - 1 sets - 3 reps  Lateral Step Down - 1 x daily - 7 x weekly - 3 sets - 10 reps  Wall Squat with Resistance Loop - 1 x daily - 7 x weekly - 1 sets - 3 reps - 30 hold  Step Up - 1 x daily - 7 x weekly - 3 sets - 10 reps  Standing Hip Abduction - 1 x daily - 7 x weekly - 3 sets - 10 reps  Clamshell in Abduction - 1 x daily - 3 sets - 10 reps      Patient Education:    (3/31): Discussed GAP program and working toward pt stated goals of getting stronger and being able to do ADL's without issue        Assessment:  [x] Patient tolerated treatment well [] Patient limited by fatigue  [] Patient limited by pain  [] Patient limited by other medical complications  [] Other:     Prognosis: [x] Good [] Fair  [] Poor    Patient Requires Follow-up: [x] Yes  [] No    Plan:   [x] Continue per plan of care [] Alter current plan (see comments)  [] Plan of care initiated [] Hold pending MD visit [] Discharge  Plan for Next Session:  Progress as tolerated. MD Follow-up: 4/1/22    Electronically signed by:  Doris Singletary ATC     Tx was performed by [facility] as a part of the [employer] program following PT's recommendations/guidance.        Cosigned by:

## 2022-04-12 ENCOUNTER — HOSPITAL ENCOUNTER (OUTPATIENT)
Dept: PHYSICAL THERAPY | Age: 57
Discharge: HOME | End: 2022-04-12

## 2022-04-12 NOTE — FLOWSHEET NOTE
The [facility] and [address] [place] [address], [facility] [address], [address] [place] [address]  Phone: [phone]   Fax:     [phone]      Lower Extremity Daily Performance Training Note  Date:  2022    Patient Name:  Trisha Morgan    :  1965  MRN: [id number]  Restrictions/Precautions:   Medical/Treatment Diagnosis Information:      ·   Diagnosis: S76.212A (ICD-10-CM) - Strain of left groin  · Treatment Diagnosis: M25.552 Left hip pain  ·   Insurance/Certification information:   [facility]    Physician Information:    Referring Practitioner: Bharati Smith       Pain level: 0/10     Visit Number: 2 (3 of 7)     Subjective: Pt feeling good. Numbness in foot about the same. Hip if feeling good, activities involving hip flexion still are a struggle.      Objective:  Observation:     Exercises/Interventions:      ROM/STRETCHES       Upright bike seat 4  3/15 reintroduced d/t symptoms   Ellipitacl 5 min 3/22 reintroduced d/t decrease in symptoms   Adductor stretch 30 sec x3 L Start    Modified Mikhail stretch 30 sec x3 L Start 3/10    Quad stretch prone   [id number] ^ towel roll under thigh    Prone hip IR AROM 30\"hx3 bilat ^3/22   Lower trunk rotation  Start 3/1 emphasis on imrpovimg hip ROM into ER/IR   Hip ER figure 4 stretch 30\"hx3 L ^3/3 supine L foot on bent R knee   TFL stretch 30\"hx3 L Start 3/10 side lying   PREs       Hip hinging stage 1  ^   TA marching     TA with LE ext  ^   Posterior pelvic tilt 10\"hx10  3/10 completed in supine and reintroduced d/t symptoms at proximal hip fleoxrs   TA overhead  3/15 supine   SLR with TA 0# 3x10 L 3/15 decreased weight d/t symptoms   [employer]:     Leg press 100# 3x10 DL concentric, SL eccentric L 3/22 reintroduced d/t improvement in symptoms   Hamstring curl 30# 3x10 3/1 withheld d/t progress and pt overall fatigue   Knee extension machine  20# 3x10 SL on L 3/1 withheld d/t progress and pt overall fatigue   SLR abd  ^2/22 side lying    Bridges SL 3x10 L Start /224   Clams  3/22 progressed with side stepping   SLR abd  3/15   Hip IR  Start 3/15 seated EOT with BS   LAQ  2/8 progressed knee extension machine   Hip extension   2/24 progress with SL bridge   LTD 3x10 L ^3/8 4\" step   Step ups forward  3x10 L 3/17 reintroduced with 8\" step    Step ups lateral   Start 2/22 6\" step    TRX squats 30\"hx3 DL 3/8   Side stepping Blue loop @ knees 3 laps Start 3/22           Other Therapeutic Activities:  Ice 15', Hawk  3' dorsum of L foot. Home Exercise Program:    Access Code: [facility] ON THE [employer]  URL: [email or web address]  Date: 04/04/2022  Prepared by: Xiao Morin    Exercises  Side Lunge Adductor Stretch - 2 x daily - 7 x weekly - 1 sets - 3 reps - 30 hold  Modified Mikhail Stretch - 2 x daily - 7 x weekly - 1 sets - 3 reps - 30 hold  Supine Hip External Rotation Stretch - 2 x daily - 7 x weekly - 1 sets - 10 reps - 10 hold  Sidelying TFL Stretch - 2 x daily - 7 x weekly - 1 sets - 3 reps - 30 hold  Prone Quadriceps Stretch with Strap - 2 x daily - 7 x weekly - 1 sets - 3 reps - 30 hold  Prone Hip Internal Rotation AROM - 2 x daily - 7 x weekly - 1 sets - 3 reps - 30 hold  Clam with Resistance - 1 x daily - 7 x weekly - 2 sets - 10 reps - 5 hold  Straight Leg Raise - 1 x daily - 7 x weekly - 3 sets - 10 reps  Seated Hip Internal Rotation with Resistance - 1 x daily - 7 x weekly - 3 sets - 10 reps  Side Stepping with Resistance at Thighs - 1 x daily - 7 x weekly - 3-5 reps - 1 sets  Forward Backward Monster Walk with Band at Thighs and Counter Support - 1 x daily - 7 x weekly - 1 sets - 3 reps  Lateral Step Down - 1 x daily - 7 x weekly - 3 sets - 10 reps  Wall Squat with Resistance Loop - 1 x daily - 7 x weekly - 1 sets - 3 reps - 30 hold  Step Up - 1 x daily - 7 x weekly - 3 sets - 10 reps  Standing Hip Abduction - 1 x daily - 7 x weekly - 3 sets - 10 reps  Clamshell in Abduction - 1 x daily - 3 sets - 10 reps      Patient Education:    (3/31): Discussed GAP program and working toward pt stated goals of getting stronger and being able to do ADL's without issue        Assessment:  [x] Patient tolerated treatment well [] Patient limited by fatigue  [] Patient limited by pain  [] Patient limited by other medical complications  [] Other:     Prognosis: [x] Good [] Fair  [] Poor    Patient Requires Follow-up: [x] Yes  [] No    Plan:   [x] Continue per plan of care [] Alter current plan (see comments)  [] Plan of care initiated [] Hold pending MD visit [] Discharge  Plan for Next Session:  Progress as tolerated. MD Follow-up: 4/1/22    Electronically signed by:  Jacques Terrazas ATC     Tx was performed by [facility] as a part of the [facility] program following PT's recommendations/guidance.        Cosigned by:

## 2022-04-19 ENCOUNTER — HOSPITAL ENCOUNTER (OUTPATIENT)
Dept: PHYSICAL THERAPY | Age: 57
Discharge: HOME | End: 2022-04-19

## 2022-04-19 NOTE — FLOWSHEET NOTE
The [facility] and [facility], [facility] [address], [address] [facility]  Phone: [phone]   Fax:     [phone]      Lower Extremity Daily Performance Training Note  Date:  2022    Patient Name:  Barb Monsivais    :  1965  MRN: [id number]  Restrictions/Precautions:   Medical/Treatment Diagnosis Information:      ·   Diagnosis: S76.212A (ICD-10-CM) - Strain of left groin  · Treatment Diagnosis: M25.552 Left hip pain  ·   Insurance/Certification information:   [facility]    Physician Information:    Referring Practitioner: Artur Laguerre       Pain level: 0/10     Visit Number: 4 (4 of 7)     Subjective: Pt feeling good. Hip was a little more aggravated after doing step ups with hip flexion last visit.       Objective:  Observation:     Exercises/Interventions:      ROM/STRETCHES       Upright bike seat 4  3/15 reintroduced d/t symptoms   Ellipitacl 5 min 3/22 reintroduced d/t decrease in symptoms   Adductor stretch 30 sec x3 L Start    Modified Mikhail stretch 30 sec x3 L Start 3/10    Quad stretch prone   [id number] ^ towel roll under thigh    Prone hip IR AROM 30\"hx3 bilat ^3/22   Lower trunk rotation  Start 3/1 emphasis on imrpovimg hip ROM into ER/IR   Hip ER figure 4 stretch 30\"hx3 L ^3/3 supine L foot on bent R knee   TFL stretch 30\"hx3 L Start 3/10 side lying   PREs       Hip hinging stage 1  ^   TA marching     TA with LE ext  ^   Posterior pelvic tilt 10\"hx10  3/10 completed in supine and reintroduced d/t symptoms at proximal hip fleoxrs   TA overhead  3/15 supine   SLR with TA 0# 3x10 L 3/15 decreased weight d/t symptoms   [employer]:     Leg press 100# 3x10 DL concentric, SL eccentric L 3/22 reintroduced d/t improvement in symptoms   Hamstring curl 35# 3x10 3/ withheld d/t progress and pt overall fatigue   Knee extension machine  25# 3x10 SL on L 3/1 withheld d/t progress and pt overall fatigue   SLR abd ^2/22 side lying    Bridges SL 3x10 L Start /224   Clams  3/22 progressed with side stepping   SLR abd  3/15   Hip IR  Start 3/15 seated EOT with BS   LAQ  2/8 progressed knee extension machine   Hip extension   2/24 progress with SL bridge   LTD 3x10 L ^3/8 4\" step   Step ups forward  3x10 L 3/17 reintroduced with 8\" step    Step ups lateral   Start 2/22 6\" step    TRX squats 30\"hx3 DL 3/8   Side stepping Blue loop @ knees 3 laps Start 3/22           Other Therapeutic Activities:  Ice 15', Hawk  3' dorsum of L foot. Home Exercise Program:    Access Code: [facility] ON THE [facility]  URL: [email or web address]  Date: 04/04/2022  Prepared by: Doris Singletary    Exercises  Side Lunge Adductor Stretch - 2 x daily - 7 x weekly - 1 sets - 3 reps - 30 hold  Modified Mikhail Stretch - 2 x daily - 7 x weekly - 1 sets - 3 reps - 30 hold  Supine Hip External Rotation Stretch - 2 x daily - 7 x weekly - 1 sets - 10 reps - 10 hold  Sidelying TFL Stretch - 2 x daily - 7 x weekly - 1 sets - 3 reps - 30 hold  Prone Quadriceps Stretch with Strap - 2 x daily - 7 x weekly - 1 sets - 3 reps - 30 hold  Prone Hip Internal Rotation AROM - 2 x daily - 7 x weekly - 1 sets - 3 reps - 30 hold  Clam with Resistance - 1 x daily - 7 x weekly - 2 sets - 10 reps - 5 hold  Straight Leg Raise - 1 x daily - 7 x weekly - 3 sets - 10 reps  Seated Hip Internal Rotation with Resistance - 1 x daily - 7 x weekly - 3 sets - 10 reps  Side Stepping with Resistance at Thighs - 1 x daily - 7 x weekly - 3-5 reps - 1 sets  Forward Backward Monster Walk with Band at Thighs and Counter Support - 1 x daily - 7 x weekly - 1 sets - 3 reps  Lateral Step Down - 1 x daily - 7 x weekly - 3 sets - 10 reps  Wall Squat with Resistance Loop - 1 x daily - 7 x weekly - 1 sets - 3 reps - 30 hold  Step Up - 1 x daily - 7 x weekly - 3 sets - 10 reps  Standing Hip Abduction - 1 x daily - 7 x weekly - 3 sets - 10 reps  Clamshell in Abduction - 1 x daily - 3 sets - 10 reps      Patient Education:    (3/31): Discussed GAP program and working toward pt stated goals of getting stronger and being able to do ADL's without issue        Assessment:  [x] Patient tolerated treatment well [] Patient limited by fatigue  [] Patient limited by pain  [] Patient limited by other medical complications  [] Other:     Prognosis: [x] Good [] Fair  [] Poor    Patient Requires Follow-up: [x] Yes  [] No    Plan:   [x] Continue per plan of care [] Alter current plan (see comments)  [] Plan of care initiated [] Hold pending MD visit [] Discharge  Plan for Next Session:  Progress as tolerated. MD Follow-up: 4/1/22    Electronically signed by:  Chika Mora ATC     Tx was performed by [facility] as a part of the [facility] program following PT's recommendations/guidance.        Cosigned by:

## 2022-04-25 ENCOUNTER — HOSPITAL ENCOUNTER (OUTPATIENT)
Dept: PHYSICAL THERAPY | Age: 57
Discharge: HOME | End: 2022-04-25

## 2022-04-25 NOTE — FLOWSHEET NOTE
side lying    Bridges SL 3x10 L Start /224   Clams  3/22 progressed with side stepping   SLR abd  3/15   Hip IR  Start 3/15 seated EOT with BS   LAQ  2/8 progressed knee extension machine   Hip extension   2/24 progress with SL bridge   LTD 3x10 L ^3/8 4\" step   Step ups forward  3x10 L 3/17 reintroduced with 8\" step    Step ups lateral   Start 2/22 6\" step    TRX squats 30\"hx3 DL 3/8   Side stepping Blue loop @ knees 3 laps Start 3/22           Other Therapeutic Activities:  Ice 15', Hawk  3' dorsum of L foot. Home Exercise Program:    Access Code:Access Code: Soni Carbone  URL: [email or web address]  Date: 05/03/2022  Prepared by: Taye Bryant    Exercises  Side Lunge Adductor Stretch - 2 x daily - 7 x weekly - 1 sets - 3 reps - 30 hold  Modified Mikhail Stretch - 2 x daily - 7 x weekly - 1 sets - 3 reps - 30 hold  Supine Hip External Rotation Stretch - 2 x daily - 7 x weekly - 1 sets - 10 reps - 10 hold  Sidelying TFL Stretch - 2 x daily - 7 x weekly - 1 sets - 3 reps - 30 hold  Prone Quadriceps Stretch with Strap - 2 x daily - 7 x weekly - 1 sets - 3 reps - 30 hold  Prone Hip Internal Rotation AROM - 2 x daily - 7 x weekly - 1 sets - 3 reps - 30 hold  Clam with Resistance - 1 x daily - 7 x weekly - 2 sets - 10 reps - 5 hold  Straight Leg Raise - 1 x daily - 7 x weekly - 3 sets - 10 reps  Side Stepping with Resistance at Thighs - 1 x daily - 7 x weekly - 3-5 reps - 1 sets  Forward Backward Monster Walk with Band at Thighs and Counter Support - 1 x daily - 7 x weekly - 1 sets - 3 reps  Lateral Step Down - 1 x daily - 7 x weekly - 3 sets - 10 reps  Wall Squat with Resistance Loop - 1 x daily - 7 x weekly - 1 sets - 3 reps - 30 hold  Step Up - 1 x daily - 7 x weekly - 3 sets - 10 reps  Clamshell in Abduction - 1 x daily - 3 sets - 10 reps  Mini Squat with Counter Support - 1 x daily - 3 sets - 10 reps - 20sec hold  Single Leg Stance - 1 x daily - 5 sets - 30sec hold     URL: [email or web address]  Date: 04/04/2022  Prepared by: Lisa Lui    Exercises  Side Lunge Adductor Stretch - 2 x daily - 7 x weekly - 1 sets - 3 reps - 30 hold  Modified Mikhail Stretch - 2 x daily - 7 x weekly - 1 sets - 3 reps - 30 hold  Supine Hip External Rotation Stretch - 2 x daily - 7 x weekly - 1 sets - 10 reps - 10 hold  Sidelying TFL Stretch - 2 x daily - 7 x weekly - 1 sets - 3 reps - 30 hold  Prone Quadriceps Stretch with Strap - 2 x daily - 7 x weekly - 1 sets - 3 reps - 30 hold  Prone Hip Internal Rotation AROM - 2 x daily - 7 x weekly - 1 sets - 3 reps - 30 hold  Clam with Resistance - 1 x daily - 7 x weekly - 2 sets - 10 reps - 5 hold  Straight Leg Raise - 1 x daily - 7 x weekly - 3 sets - 10 reps  Seated Hip Internal Rotation with Resistance - 1 x daily - 7 x weekly - 3 sets - 10 reps  Side Stepping with Resistance at Thighs - 1 x daily - 7 x weekly - 3-5 reps - 1 sets  Forward Backward Monster Walk with Band at Thighs and Counter Support - 1 x daily - 7 x weekly - 1 sets - 3 reps  Lateral Step Down - 1 x daily - 7 x weekly - 3 sets - 10 reps  Wall Squat with Resistance Loop - 1 x daily - 7 x weekly - 1 sets - 3 reps - 30 hold  Step Up - 1 x daily - 7 x weekly - 3 sets - 10 reps  Standing Hip Abduction - 1 x daily - 7 x weekly - 3 sets - 10 reps  Clamshell in Abduction - 1 x daily - 3 sets - 10 reps      Patient Education:    (3/31): Discussed GAP program and working toward pt stated goals of getting stronger and being able to do ADL's without issue        Assessment:  [x] Patient tolerated treatment well [] Patient limited by fatigue  [] Patient limited by pain  [] Patient limited by other medical complications  [] Other:     Prognosis: [x] Good [] Fair  [] Poor    Patient Requires Follow-up: [x] Yes  [] No    Plan:   [x] Continue per plan of care [] Alter current plan (see comments)  [] Plan of care initiated [] Hold pending MD visit [] Discharge  Plan for Next Session:  Progress as tolerated. MD Follow-up: 4/1/22    Electronically signed by:  Spring Umana ATC     Tx was performed by [facility] as a part of the [facility] program following PT's recommendations/guidance.        Cosigned by:

## 2022-05-03 ENCOUNTER — HOSPITAL ENCOUNTER (OUTPATIENT)
Dept: PHYSICAL THERAPY | Age: 57
Discharge: HOME | End: 2022-05-03

## 2022-05-03 NOTE — FLOWSHEET NOTE
The [facility] and [facility], [facility] [address], [address] [place] [address]  Phone: [phone]   Fax:     [phone]      Lower Extremity Daily Performance Training Note  Date:  5/3/2022    Patient Name:  Oscar Orozco    :  1965  MRN: [id number]  Restrictions/Precautions:   Medical/Treatment Diagnosis Information:      ·   Diagnosis: H61.753E (ICD-10-CM) - Strain of left groin  · Treatment Diagnosis: M25.552 Left hip pain  ·   Insurance/Certification information:   [facility]    Physician Information:    Referring Practitioner: Joshua Mccord       Pain level: 0/10     Visit Number: 6 (6 of 7) 5/3    Subjective: Pt feeling better. Front of the hip isn't as agitated. Numbness in foot seems to be improving.        Objective:  Observation:     Exercises/Interventions:      ROM/STRETCHES       Upright bike seat 4  3/15 reintroduced d/t symptoms   Ellipitacl 5 min 3/22 reintroduced d/t decrease in symptoms   Adductor stretch 30 sec x3 L Start    Modified Mikhail stretch 30 sec x3 L Start 3/10    Quad stretch prone   [id number] ^ towel roll under thigh    Prone hip IR AROM 30\"hx3 bilat ^3/22   Lower trunk rotation  Start 3/1 emphasis on imrpovimg hip ROM into ER/IR   Hip ER figure 4 stretch 30\"hx3 L ^3/3 supine L foot on bent R knee   TFL stretch 30\"hx3 L Start 3/10 side lying   PREs       Hip hinging stage 1  ^   TA marching     TA with LE ext  ^   Posterior pelvic tilt 10\"hx10  3/10 completed in supine and reintroduced d/t symptoms at proximal hip fleoxrs   TA overhead  3/15 supine   SLR with TA 0# 3x10 L 3/15 decreased weight d/t symptoms   [employer]:     Leg press 100# 3x10 DL concentric, SL eccentric L 3/22 reintroduced d/t improvement in symptoms   Hamstring curl 35# 3x10 3/1 withheld d/t progress and pt overall fatigue   Knee extension machine  25# 3x10 SL on L 3/1 withheld d/t progress and pt overall fatigue   SLR abd  ^ side lying    Bridges SL 3x10 L Start /224   Clams  3/22 progressed with side stepping   SLR abd  3/15   Hip IR  Start 3/15 seated EOT with BS   LAQ  2/8 progressed knee extension machine   Hip extension   2/24 progress with SL bridge   LTD 3x10 L ^3/8 4\" step   Step ups forward  3x10 L 3/17 reintroduced with 8\" step    Step ups lateral   Start 2/22 6\" step    TRX squats 30\"hx3 DL 3/8   Side stepping Blue loop @ knees 3 laps Start 3/22           Other Therapeutic Activities:  Ice 15', Hawk  3' dorsum of L foot. Home Exercise Program:    Access Code:Access Code: Gurwinder Springer  URL: [email or web address]  Date: 05/03/2022  Prepared by: Ta Wilde    Exercises  Side Lunge Adductor Stretch - 2 x daily - 7 x weekly - 1 sets - 3 reps - 30 hold  Modified Mikhail Stretch - 2 x daily - 7 x weekly - 1 sets - 3 reps - 30 hold  Supine Hip External Rotation Stretch - 2 x daily - 7 x weekly - 1 sets - 10 reps - 10 hold  Sidelying TFL Stretch - 2 x daily - 7 x weekly - 1 sets - 3 reps - 30 hold  Prone Quadriceps Stretch with Strap - 2 x daily - 7 x weekly - 1 sets - 3 reps - 30 hold  Prone Hip Internal Rotation AROM - 2 x daily - 7 x weekly - 1 sets - 3 reps - 30 hold  Clam with Resistance - 1 x daily - 7 x weekly - 2 sets - 10 reps - 5 hold  Straight Leg Raise - 1 x daily - 7 x weekly - 3 sets - 10 reps  Side Stepping with Resistance at Thighs - 1 x daily - 7 x weekly - 3-5 reps - 1 sets  Forward Backward Monster Walk with Band at Thighs and Counter Support - 1 x daily - 7 x weekly - 1 sets - 3 reps  Lateral Step Down - 1 x daily - 7 x weekly - 3 sets - 10 reps  Wall Squat with Resistance Loop - 1 x daily - 7 x weekly - 1 sets - 3 reps - 30 hold  Step Up - 1 x daily - 7 x weekly - 3 sets - 10 reps  Clamshell in Abduction - 1 x daily - 3 sets - 10 reps  Mini Squat with Counter Support - 1 x daily - 3 sets - 10 reps - 20sec hold  Single Leg Stance - 1 x daily - 5 sets - 30sec hold     URL: [email or web address]  Date: 04/04/2022  Prepared by: Chika Mora    Exercises  Side Lunge Adductor Stretch - 2 x daily - 7 x weekly - 1 sets - 3 reps - 30 hold  Modified Mikhail Stretch - 2 x daily - 7 x weekly - 1 sets - 3 reps - 30 hold  Supine Hip External Rotation Stretch - 2 x daily - 7 x weekly - 1 sets - 10 reps - 10 hold  Sidelying TFL Stretch - 2 x daily - 7 x weekly - 1 sets - 3 reps - 30 hold  Prone Quadriceps Stretch with Strap - 2 x daily - 7 x weekly - 1 sets - 3 reps - 30 hold  Prone Hip Internal Rotation AROM - 2 x daily - 7 x weekly - 1 sets - 3 reps - 30 hold  Clam with Resistance - 1 x daily - 7 x weekly - 2 sets - 10 reps - 5 hold  Straight Leg Raise - 1 x daily - 7 x weekly - 3 sets - 10 reps  Seated Hip Internal Rotation with Resistance - 1 x daily - 7 x weekly - 3 sets - 10 reps  Side Stepping with Resistance at Thighs - 1 x daily - 7 x weekly - 3-5 reps - 1 sets  Forward Backward Monster Walk with Band at Thighs and Counter Support - 1 x daily - 7 x weekly - 1 sets - 3 reps  Lateral Step Down - 1 x daily - 7 x weekly - 3 sets - 10 reps  Wall Squat with Resistance Loop - 1 x daily - 7 x weekly - 1 sets - 3 reps - 30 hold  Step Up - 1 x daily - 7 x weekly - 3 sets - 10 reps  Standing Hip Abduction - 1 x daily - 7 x weekly - 3 sets - 10 reps  Clamshell in Abduction - 1 x daily - 3 sets - 10 reps      Patient Education:    (3/31): Discussed GAP program and working toward pt stated goals of getting stronger and being able to do ADL's without issue        Assessment:  [x] Patient tolerated treatment well [] Patient limited by fatigue  [] Patient limited by pain  [] Patient limited by other medical complications  [] Other:     Prognosis: [x] Good [] Fair  [] Poor    Patient Requires Follow-up: [x] Yes  [] No    Plan:   [x] Continue per plan of care [] Alter current plan (see comments)  [] Plan of care initiated [] Hold pending MD visit [] Discharge  Plan for Next Session:  Progress as tolerated. MD Follow-up: 4/1/22    Electronically signed by:  Lyndsay Ham ATC     Tx was performed by ATC as a part of the [facility] program following PT's recommendations/guidance.        Cosigned by:

## 2022-05-11 ENCOUNTER — HOSPITAL ENCOUNTER (OUTPATIENT)
Dept: PHYSICAL THERAPY | Age: 57
Discharge: HOME | End: 2022-05-11

## 2022-05-11 ENCOUNTER — TELEPHONE (OUTPATIENT)
Dept: ORTHOPEDIC SURGERY | Age: 57
End: 2022-05-11

## 2022-05-11 NOTE — PROGRESS NOTES
The [address] [place] [address],[address], [address], [address] [place] [address]  Phone: [phone]   Fax:     [phone]   Physical Therapy Re-Certification Plan of Care    Dear Conor Cyr. Tatum Avila MD,    We had the pleasure of treating the following patient for physical therapy services at [address] [place] [address]. A summary of our findings can be found in the updated assessment below. This includes our plan of care. If you have any questions or concerns regarding these findings, please do not hesitate to contact me at the office phone number checked above. Thank you for the referral.     Physician Signature:________________________________Date:__________________  By signing above (or electronic signature), therapists plan is approved by physician      Overall Response to Treatment:   [x] Pt previously attempted PT but was referred back to MD d/t continued pain. She returns to PT after f/u with MD and having an MRI that was inconclusive of Labral tear. Pt received an injection and was instructed to attempt PT for 4-6 weeks to decrease symptoms and avoid surgical compensation        Physical Therapy Daily Treatment Note  Date:  2021    Patient Name:  Nilesh Saeed    :  1965  MRN: [id number]  Restrictions/Precautions:    Medical/Treatment Diagnosis Information:  · Diagnosis: F69.890B (ICD-10-CM) - Strain of left groin  · Treatment Diagnosis: M25.552 Left hip pain  Insurance/Certification information:  PT Insurance Information: [facility]  Physician Information:  Referring Practitioner: Conor Avila MD  Has the plan of care been signed (Y/N):        []  Yes  [x]  No     Date of Patient follow up with Physician: PRN      Is this a Progress Report:     []  Yes  [x]  No        If Yes:  Date Range for reporting period:  Beginning 3/11/21  Ending 21    Progress report will be due (10 Rx or 30 days whichever is less): 21 Recertification will be due (POC Duration  / 90 days whichever is less): 5/26/21         Visit # Insurance Allowable Auth Required   3  4/14 [facility]  UNL []  Yes []  No        Functional Scale:   WOMAC 58% deficit  Date assessed: 4/14/21       Latex Allergy:  [x]NO      []YES  Preferred Language for Healthcare:   [x]English       []other:      Pain level:  2/10  4/14      SUBJECTIVE:  4/14 Pt has returned to PT after f/u with orthopedic MD tat she has been seeing for the past 1-2 years. She had MRI of L hip that was inconclusive of L labral tear. She received and injection into the hip joint that relieved symptoms. She was instructed to attempt PT 4-6 weeks and if symptoms do not improve she is going to f/u with MD for potential surgical intervention. She continues to have pain with lifting up to her leg o put on socks but symptoms are not as severe when she started PT in March. Continues to have discomfort with sleeping. OBJECTIVE: Assessed below on 4/14/21  ROM PROM AROM Comments     Left Right Left Right     Flexion 100 with pain  116 92 99     Extension     10 8 symptoms reported L hip      Abduction 31 pain  38 29 36     ER 41 40 39 38     IR 40 60 36 55                      Flexibility Left Right Comments   Hamstrings -28 -25     ITB (Obers test) Mild restriction  Mild restriction     Hip flexor(Mikhail test) Mild restriction with pain WNL                  Special  Test Left Right Comments   SLR Pain and limited motion WNL     FABERS Pos Neg     Scour test Pos Neg     Impingement test Pos Neg     Posterior shear Neg Neg      Slump Neg Neg     Facet Closure Neg Neg Standing and sitting       Strength Left Right Comments   Hip flexors 4- with pain  4     Hip extension 4+ 4+     Hip abduction 4+ 4+     Hip adduction 4+ 4     Hip ER 4 with pain 4+     Hip IR 4 with pain  4+     Quads 4+ 5     Hamstrings 5 5        Joint mobility:               []?Normal                       [x]? Hypo L hip into flexion and IR with reproduction in pain              []?Hyper     Palpation: Tenderness L ASIS, L proximal hip flexors     Functional Mobility/Transfers: Independent     Posture: Mild hip flexion and ER noted on L compared to R when resting in supine     Bandages/Dressings/Incisions: N/A     Gait: Independent, Mild Trendelenburg on L     Orthopedic Special Tests: See above      RESTRICTIONS/PRECAUTIONS: HTN. 2 previous L hip labral repairs    Exercises/Interventions:     ROM/STRETCHES     HF stretch 30\"hx3 L 4/14 half kneel with airex pad for L knee   Prone quad stretch 30\"hx3 L 4/14 towel roll with stretch   Hip ER/IR AROM 3x10 ea L 4/14 prone with towel roll under thigh   Hamstring stretch Cont with HEP 4/14             PREs     Prone hip extension 0# 3x10 L 4/14 pillow under abdomen   Jaqueline New TB 4/14 reviewed for HEP   Clams Carmen Nicholas TB 10\"hx10 4/14 reviewed for HEP                                       Functional movement  Educated on proper form and sequencing with squatting and bending over to decrease pressure on L hip  Educated on prone position to stretch anterior musculature 4/14             Manual interventions                   Therapeutic Exercise and NMR EXR  [x] (33721) Provided verbal/tactile cueing for activities related to strengthening, flexibility, endurance, ROM for improvements in LE, proximal hip, and core control with self care, mobility, lifting, ambulation. [x] (20579) Provided verbal/tactile cueing for activities related to improving balance, coordination, kinesthetic sense, posture, motor skill, proprioception  to assist with LE, proximal hip, and core control in self care, mobility, lifting, ambulation and eccentric single leg control.      NMR and Therapeutic Activities:    [] (78532 or 07732) Provided verbal/tactile cueing for activities related to improving balance, coordination, kinesthetic sense, posture, motor skill, proprioception and motor activation to allow for proper function of core, Adjusted     Therapist goals for Patient:   Short Term Goals: To be achieved in: 4 weeks  1. Independent in HEP and progression per patient tolerance, in order to prevent re-injury. []? Progressing: []? Met: []? Not Met: []? Adjusted   2. Patient will have a decrease in pain to facilitate improvement in movement, function, and ADLs as indicated by Functional Deficits. []? Progressing: []? Met: []? Not Met: []? Adjusted     Long Term Goals: To be achieved in: 6 weeks  1. Disability index score of 10% or less for the [facility] to assist with reaching prior level of function. []? Progressing: []? Met: []? Not Met: []? Adjusted  2. Patient will demonstrate increased L hip AROM that is equal to R to allow for proper joint functioning as indicated by patients Functional Deficits. []? Progressing: []? Met: []? Not Met: []? Adjusted  3. Patient will demonstrate an increase in L hip strength that is equal to R to allow for proper functional mobility as indicated by patients Functional Deficits. []? Progressing: []? Met: []? Not Met: []? Adjusted  4. Patient will return to all functional activities without increased symptoms or restriction. []? Progressing: []? Met: []? Not Met: []? Adjusted  5. Patient will have increase strength and ROM in order to  10 lbs from floor with proper sequencing and no c/o pain allowing her to better take care of her grand children  []? Progressing: []? Met: []? Not Met: []? Adjusted      Overall Progression Towards Functional goals/ Treatment Progress Update:  [] Patient is progressing as expected towards functional goals listed. [] Progression is slowed due to complexities/Impairments listed. [] Progression has been slowed due to co-morbidities.   [] Plan just implemented, too soon to assess goals progression <30days   [] Goals require adjustment due to lack of progress  [] Patient is not progressing as expected and requires additional follow up with physician  [x] Other Pt presents with mild increase in L hip ROM and strength possibly d/t injection she received. She continues to have pain with flexion and IR ROM as well as tenderness and strength deficits. Based on symptoms she is appropriate for continued PT to increase glut strength and decrease tissue tightness. Prognosis for POC: [x] Good [] Fair  [] Poor      Patient requires continued skilled intervention: [x] Yes  [] No    Treatment/Activity Tolerance:  [x] Patient able to complete treatment  [] Patient limited by fatigue  [] Patient limited by pain     [] Patient limited by other medical complications  [] Other:   4/14 Tolerated treatment well with no adverse effects and demonstrated appropriate form with HEP. Patient Education:              4/14 Educated on precautions, use of ice and importance of HEP. Educated on activity modification when taking care of grandchildren and avoid excessive hip flexion, adduction and internal rotation. Access Code: [email or web address]: [email or web address]: 04/14/2021Prepared by: Rena Carney RyanExercises   Seated Hamstring Stretch - 2-3 x daily - 7 x weekly - 5 reps - 1 sets - 10 hold   Half Kneeling Hip Flexor Stretch - 2 x daily - 7 x weekly - 1 sets - 3 reps - 30 hold   Prone Quad Stretch with Towel Roll and Strap - 2 x daily - 7 x weekly - 3 reps - 1 sets - 30 hold   Prone Hip Internal and External Rotation AROM - 2 x daily - 7 x weekly - 3 sets - 10 reps   Prone Hip Extension with Pillow Under Abdomen - 1 x daily - 7 x weekly - 10 reps - 3 sets   Supine Bridge with Resistance Band - 2 x daily - 7 x weekly - 10 reps - 3 sets   Hooklying Clamshell with Resistance - 2 x daily - 7 x weekly - 1 sets - 10 reps - 10 hold          PLAN: See eval  [] Continue per plan of care [x] Alter current plan (see comments below)  [x] Plan of care initiated [] Hold pending MD visit [] Discharge  4/14 Pt to attempt PT 2x a week for 4-6 weeks to increase L hip mobility and strength as well as decrease tissue tightness and tenderness in order to improve function and prevent surgical intervention. Electronically signed by:  Gilberto Aguillon PT    Note: If patient does not return for scheduled/ recommended follow up visits, this note will serve as a discharge from care along with most recent update on progress. Bilobed Flap Text: The defect edges were debeveled with a #15 scalpel blade.  Given the location of the defect and the proximity to free margins a bilobe flap was deemed most appropriate.  Using a sterile surgical marker, an appropriate bilobe flap drawn around the defect.    The area thus outlined was incised deep to adipose tissue with a #15 scalpel blade.  The skin margins were undermined to an appropriate distance in all directions utilizing iris scissors.

## 2022-05-11 NOTE — FLOWSHEET NOTE
The [address] [place] [address],[address], [address], [address] [place] [address]  Phone: [phone]   Fax:     [phone]      Lower Extremity Daily Performance Training Note  Date:  2022    Patient Name:  Ninfa Gonzalez    :  1965  MRN: [id number]  Restrictions/Precautions:   Medical/Treatment Diagnosis Information:      ·   Diagnosis: S76.212A (ICD-10-CM) - Strain of left groin  · Treatment Diagnosis: M25.552 Left hip pain  ·   Insurance/Certification information:   [facility]    Physician Information:    Referring Practitioner: Jacqueline Mckeon       Pain level: 0/10     Visit Number: 6 (6 of 7) 5/3    Subjective: Pt feeling better. Front of the hip isn't as agitated. Numbness in foot seems to be improving.        Objective:  Observation:     Exercises/Interventions:      ROM/STRETCHES       Upright bike seat 4  3/15 reintroduced d/t symptoms   Ellipitacl 5 min 3/22 reintroduced d/t decrease in symptoms   Adductor stretch 30 sec x3 L Start    Modified Mikhail stretch 30 sec x3 L Start 3/10    Quad stretch prone   [id number] ^ towel roll under thigh    Prone hip IR AROM 30\"hx3 bilat ^3/22   Lower trunk rotation  Start 3/1 emphasis on imrpovimg hip ROM into ER/IR   Hip ER figure 4 stretch 30\"hx3 L ^3/3 supine L foot on bent R knee   TFL stretch 30\"hx3 L Start 3/10 side lying   PREs       Hip hinging stage 1  ^   TA marching     TA with LE ext  ^   Posterior pelvic tilt 10\"hx10  3/10 completed in supine and reintroduced d/t symptoms at proximal hip fleoxrs   TA overhead  3/15 supine   SLR with TA 0# 3x10 L 3/15 decreased weight d/t symptoms   [employer]:     Leg press 100# 3x10 DL concentric, SL eccentric L 3/22 reintroduced d/t improvement in symptoms   Hamstring curl 35# 3x10 3/1 withheld d/t progress and pt overall fatigue   Knee extension machine  25# 3x10 SL on L 3/1 withheld d/t progress and pt overall fatigue   SLR abd  ^ side lying    Bridges SL 3x10 L Start /224   Clams  3/22 progressed with side stepping   SLR abd  3/15   Hip IR  Start 3/15 seated EOT with BS   LAQ  2/8 progressed knee extension machine   Hip extension   2/24 progress with SL bridge   LTD 3x10 L ^3/8 4\" step   Step ups forward  3x10 L 3/17 reintroduced with 8\" step    Step ups lateral   Start 2/22 6\" step    TRX squats 30\"hx3 DL 3/8   Side stepping Blue loop @ knees 3 laps Start 3/22           Other Therapeutic Activities:  Ice 15', Hawk  3' dorsum of L foot. Home Exercise Program:    Access Code:Access Code: Steve Mims  URL: [email or web address]  Date: 05/03/2022  Prepared by: Israel Murphy    Exercises  Side Lunge Adductor Stretch - 2 x daily - 7 x weekly - 1 sets - 3 reps - 30 hold  Modified Mikhail Stretch - 2 x daily - 7 x weekly - 1 sets - 3 reps - 30 hold  Supine Hip External Rotation Stretch - 2 x daily - 7 x weekly - 1 sets - 10 reps - 10 hold  Sidelying TFL Stretch - 2 x daily - 7 x weekly - 1 sets - 3 reps - 30 hold  Prone Quadriceps Stretch with Strap - 2 x daily - 7 x weekly - 1 sets - 3 reps - 30 hold  Prone Hip Internal Rotation AROM - 2 x daily - 7 x weekly - 1 sets - 3 reps - 30 hold  Clam with Resistance - 1 x daily - 7 x weekly - 2 sets - 10 reps - 5 hold  Straight Leg Raise - 1 x daily - 7 x weekly - 3 sets - 10 reps  Side Stepping with Resistance at Thighs - 1 x daily - 7 x weekly - 3-5 reps - 1 sets  Forward Backward Monster Walk with Band at Thighs and Counter Support - 1 x daily - 7 x weekly - 1 sets - 3 reps  Lateral Step Down - 1 x daily - 7 x weekly - 3 sets - 10 reps  Wall Squat with Resistance Loop - 1 x daily - 7 x weekly - 1 sets - 3 reps - 30 hold  Step Up - 1 x daily - 7 x weekly - 3 sets - 10 reps  Clamshell in Abduction - 1 x daily - 3 sets - 10 reps  Mini Squat with Counter Support - 1 x daily - 3 sets - 10 reps - 20sec hold  Single Leg Stance - 1 x daily - 5 sets - 30sec hold     URL: [email or web address]  Date: 04/04/2022  Prepared by: Israel Murphy    Exercises  Side Lunge Adductor Stretch - 2 x daily - 7 x weekly - 1 sets - 3 reps - 30 hold  Modified Mikhail Stretch - 2 x daily - 7 x weekly - 1 sets - 3 reps - 30 hold  Supine Hip External Rotation Stretch - 2 x daily - 7 x weekly - 1 sets - 10 reps - 10 hold  Sidelying TFL Stretch - 2 x daily - 7 x weekly - 1 sets - 3 reps - 30 hold  Prone Quadriceps Stretch with Strap - 2 x daily - 7 x weekly - 1 sets - 3 reps - 30 hold  Prone Hip Internal Rotation AROM - 2 x daily - 7 x weekly - 1 sets - 3 reps - 30 hold  Clam with Resistance - 1 x daily - 7 x weekly - 2 sets - 10 reps - 5 hold  Straight Leg Raise - 1 x daily - 7 x weekly - 3 sets - 10 reps  Seated Hip Internal Rotation with Resistance - 1 x daily - 7 x weekly - 3 sets - 10 reps  Side Stepping with Resistance at Thighs - 1 x daily - 7 x weekly - 3-5 reps - 1 sets  Forward Backward Monster Walk with Band at Thighs and Counter Support - 1 x daily - 7 x weekly - 1 sets - 3 reps  Lateral Step Down - 1 x daily - 7 x weekly - 3 sets - 10 reps  Wall Squat with Resistance Loop - 1 x daily - 7 x weekly - 1 sets - 3 reps - 30 hold  Step Up - 1 x daily - 7 x weekly - 3 sets - 10 reps  Standing Hip Abduction - 1 x daily - 7 x weekly - 3 sets - 10 reps  Clamshell in Abduction - 1 x daily - 3 sets - 10 reps      Patient Education:    (3/31): Discussed GAP program and working toward pt stated goals of getting stronger and being able to do ADL's without issue        Assessment:  [x] Patient tolerated treatment well [] Patient limited by fatigue  [] Patient limited by pain  [] Patient limited by other medical complications  [] Other:     Prognosis: [x] Good [] Fair  [] Poor    Patient Requires Follow-up: [x] Yes  [] No    Plan:   [x] Continue per plan of care [] Alter current plan (see comments)  [] Plan of care initiated [] Hold pending MD visit [] Discharge  Plan for Next Session:  Progress as tolerated. MD Follow-up: 4/1/22    Electronically signed by:  Doris Singletary ATC     Tx was performed by ATC as a part of the [employer] program following PT's recommendations/guidance.        Cosigned by:

## 2022-05-26 ENCOUNTER — OFFICE VISIT (OUTPATIENT)
Dept: ORTHOPEDIC SURGERY | Age: 57
End: 2022-05-26
Payer: COMMERCIAL

## 2022-05-26 VITALS — HEIGHT: 63 IN | BODY MASS INDEX: 31.54 KG/M2 | WEIGHT: 178 LBS

## 2022-05-26 DIAGNOSIS — Z98.890 S/P HIP ARTHROSCOPY: Primary | ICD-10-CM

## 2022-05-26 PROCEDURE — 99214 OFFICE O/P EST MOD 30 MIN: CPT | Performed by: ORTHOPAEDIC SURGERY

## 2022-05-26 PROCEDURE — 3017F COLORECTAL CA SCREEN DOC REV: CPT | Performed by: ORTHOPAEDIC SURGERY

## 2022-05-26 PROCEDURE — G8427 DOCREV CUR MEDS BY ELIG CLIN: HCPCS | Performed by: ORTHOPAEDIC SURGERY

## 2022-05-26 PROCEDURE — G8417 CALC BMI ABV UP PARAM F/U: HCPCS | Performed by: ORTHOPAEDIC SURGERY

## 2022-05-26 PROCEDURE — 1036F TOBACCO NON-USER: CPT | Performed by: ORTHOPAEDIC SURGERY

## 2022-05-26 RX ORDER — LOSARTAN POTASSIUM 50 MG/1
50 TABLET ORAL DAILY
COMMUNITY

## 2022-05-26 NOTE — PROGRESS NOTES
Chief Complaint  Post-Op Check (6 MONTH POST OP LEFT HIP SCOPE 11/30/2021)      History of Present Illness:  Lynne Hurtado is a pleasant 64 y.o. female who is here for 6 month follow-up post left hip revision arthroscopy, acetabuloplasty, segmental labral reconstruction, psoas lengthening capsular closure, andendoscopic GT bursectomy. She is doing a lot better. She had another minor setback a few weeks ago into her hip flexor/adductor which lasted for about a week and a half. This has caused inner adductor pain. She completed her GAP program.    Her foot neuritis is improving to the first webspace. Medical History:  Patient's medications, allergies, past medical, surgical, social and family histories were reviewed and updated as appropriate. Pain Assessment  Location of Pain:  (HIP)  Location Modifiers: Left  Severity of Pain: 5  Quality of Pain:  (PINCHING/CATCHING)  Frequency of Pain: Intermittent  Aggravating Factors:  (STEPPING DOWN)  Limiting Behavior: Some  Relieving Factors: Rest  Result of Injury: No  Work-Related Injury: No  Are there other pain locations you wish to document?: No  ROS: Review of systems reviewed from Patient History Form completed today and available in the patient's chart under the Media tab. Pertinent items are noted in HPI  Review of systems reviewed from Patient History Form completed today and available in the patient's chart under the Media tab. Vital Signs:  Ht 5' 3\" (1.6 m)   Wt 178 lb (80.7 kg)   BMI 31.53 kg/m²         Neuro: Alert & oriented x 3,  normal,  no focal deficits noted. Normal affect. Eyes: sclera clear  Ears: Normal external ear  Mouth:  No perioral lesions  Pulm: Respirations unlabored and regular  Pulse: Extremities well perfused. 2+ peripheral pulses. Skin: Warm. No ulcerations. Constitutional: The physical examination finds the patient to be well-developed and well-nourished.   The patient is alert and oriented x3 and was cooperative throughout the visit. Hip Examination: left    Skin/Inspection: Incisions full healed. No skin lesions, cellulitis, or extreme edema in the lower extremities. Standing/Walking: normal gait, negative Trendelenburg sign. Supine Exam: Non tender around the ASIS, AIIS. Tender adductor longus  Flexion arc 0 to 100deg, IR 25 deg, ER 45 deg full range of motion  Special Tests:  negative Deep Flexion Test, negative  FADIR ,  negative  pain with   Resisted Abduction 5/5   Resisted Adduction 5/5   Resisted Hip Flexion 5/5    Side Lying Exam: mildly tender at greater trochanter, not tender abductor musculature,   Abductor side leg raise 5/5, normal. OberTest.     Special Tests:  Double Leg Squats (Standing): excellent  Lunges: good  Single Leg Squats: fair  Hip Hinge (Sit to Stand):  good  Double Leg Bridges: excellent  Single Leg Bridges: excellent  Single Leg Step-Downs : good    Distal Neurovascular exam is intact (foot sensation, pulses, and motor exam), less hypersensitive to touch along the deep peroneal nerve in the first webspace dorsally. Diagnostics:  No new imaging was obtained today       Assessment: Patient is a 64 y.o. female who Has made significant functional gains at 6 months post revision left hip revision arthroscopy. She has residual adductor longus discomfort that would benefit from eccentric strengthening. Impression:  Visit Diagnoses       Codes    S/P hip arthroscopy    -  Primary P45.592          Office Procedures:  No orders of the defined types were placed in this encounter. No orders of the defined types were placed in this encounter. Plan:  She is doing great. I recommend continuing with her home exercise program, and I directed her toward some modified Bryant's and resisted side lunges for abductor strength    All the patient's questions were answered while in the clinic.   The patient is understanding of all instructions and agrees with the plan.    Approximately 25 minutes was spent on patient education and coordinating care. Follow up in: Return in about 6 months (around 11/26/2022). Plus new x-rays and patient reported outcome measures. Sincerely,    Bharati Smith MD [address] [facility]   [place] [facility] [address] [place] [address], [address]  Email: [email or web address]  Office: [phone]    05/26/22  4:48 PM        The encounter with Trisha Morgan was carried out by myself, Dr Yoan Starks, who personally examined the patient and reviewed the plan. This dictation was performed with a verbal recognition program (DRAGON) and it was checked for errors. It is possible that there are still dictated errors within this office note. If so, please bring any errors to my attention for an addendum. All efforts were made to ensure that this office note is accurate.

## 2022-12-05 ENCOUNTER — OFFICE VISIT (OUTPATIENT)
Dept: ORTHOPEDIC SURGERY | Age: 57
End: 2022-12-05

## 2022-12-05 VITALS — WEIGHT: 172 LBS | RESPIRATION RATE: 12 BRPM | HEIGHT: 63 IN | BODY MASS INDEX: 30.48 KG/M2

## 2022-12-05 DIAGNOSIS — M25.852 FEMOROACETABULAR IMPINGEMENT OF LEFT HIP: ICD-10-CM

## 2022-12-05 DIAGNOSIS — Z98.890 S/P HIP ARTHROSCOPY: Primary | ICD-10-CM

## 2022-12-05 NOTE — LETTER
[address] [place] [address]  Phone: [phone]  Fax: [phone]    Mnaisha Yap MD    December 7, 2022     [address] [facility],[address] [place] [id number]    Patient: Steph Perales   MR Number: [id number]   YOB: 1965   Date of Visit: 12/5/2022       Dear Zhanna Diggs:    Thank you for referring Steph Perales to me for evaluation/treatment. Below are the relevant portions of my assessment and plan of care. If you have questions, please do not hesitate to call me. I look forward to following Suzy along with you.     Sincerely,      Manisha Yap MD

## 2022-12-05 NOTE — PROGRESS NOTES
Chief Complaint  Hip Pain (F/u left hip. Post-op 1 year. Doing well.  )      History of Present Illness:  Ricardo Grewal is a pleasant 62 y.o. female who is here for 1 year follow-up post left hip revision arthroscopy, acetabuloplasty, segmental labral reconstruction, psoas lengthening capsular closure, andendoscopic GT bursectomy. She is doing well with no pain at this time. She has been symptom free for the past 2 months. She has continued to watch her grandchildren 3 times weekly without complication. Medical History:  Patient's medications, allergies, past medical, surgical, social and family histories were reviewed and updated as appropriate. Pain Assessment  Location of Pain: Hip  Location Modifiers: Left  Severity of Pain: 0  Quality of Pain: Dull, Other (Comment) (Stiffness)  Duration of Pain: A few hours  Frequency of Pain: Intermittent  Aggravating Factors: Other (Comment) (When first getting up from a seated position or after lying down)  Limiting Behavior: No  Relieving Factors: Rest  Result of Injury: No  Work-Related Injury: No  Are there other pain locations you wish to document?: No  ROS: Review of systems reviewed from Patient History Form completed today and available in the patient's chart under the Media tab. Pertinent items are noted in HPI  Review of systems reviewed from Patient History Form completed today and available in the patient's chart under the Media tab. Vital Signs:  Resp 12   Ht 5' 3\" (1.6 m)   Wt 172 lb (78 kg)   BMI 30.47 kg/m²         Neuro: Alert & oriented x 3,  normal,  no focal deficits noted. Normal affect. Eyes: sclera clear  Ears: Normal external ear  Mouth:  No perioral lesions  Pulm: Respirations unlabored and regular  Pulse: Extremities well perfused. 2+ peripheral pulses. Skin: Warm. No ulcerations. Constitutional: The physical examination finds the patient to be well-developed and well-nourished.   The patient is alert and oriented x3 and was cooperative throughout the visit. Hip Examination: left    Skin/Inspection: Incisions full healed. No skin lesions, cellulitis, or extreme edema in the lower extremities. Standing/Walking: normal gait, negative Trendelenburg sign. Supine Exam: Non tender around the ASIS, AIIS.,adductor longus  Flexion arc 0 to 100deg, IR 25 deg, ER 45 deg full range of motion  Special Tests:  negative Deep Flexion Test, negative  FADIR ,  negative  pain with   Resisted Abduction 5/5   Resisted Adduction 5/5   Resisted Hip Flexion 5/5    Side Lying Exam: mildly tender at greater trochanter, not tender abductor musculature,   Abductor side leg raise 5/5, normal. OberTest.     Special Tests:  Single leg glute bridge good. Side plank good. Distal Neurovascular exam is intact (foot sensation, pulses, and motor exam), normal today. Diagnostics:  No new imaging was obtained today       Assessment: Patient is a 62 y.o. female who Has made significant functional gains at 1 year  post revision left hip revision arthroscopy. She has no residual symptoms of pain or weakness. She has met PASS, Patient acceptable symptomatic state. Impression:  Visit Diagnoses         Codes    S/P hip arthroscopy    -  Primary Z98.890    Femoroacetabular impingement of left hip     M25.852            Office Procedures:  No orders of the defined types were placed in this encounter. Orders Placed This Encounter   Procedures    XR HIP 2-3 VW W PELVIS LEFT     ROOM 3     Standing Status:   Future     Number of Occurrences:   1     Standing Expiration Date:   12/1/2023       Plan:  She is doing great and is at a PASS, Patient acceptable symptomatic state. I recommend continuing with her home exercise program for stretching and strengthening. We will see her back in one year for outcomes. All the patient's questions were answered while in the clinic.   The patient is understanding of all instructions and agrees with the plan.    Approximately 25 minutes was spent on patient education and coordinating care. Follow up in: Return in about 1 year (around 12/5/2023) for [address] [place] [address] . Plus new x-rays and patient reported outcome measures. Sincerely,  SenicBREANN, Senic, [address], am scribing for and in the presence of John Clarke MD.      John Clarke  [address] [facility] [address] [place] [address], [address]  Email: [email or web address]  Office: [phone]    12/05/22  1:38 PM        The encounter with Earl Schwartz was carried out by myself, Dr Jeanette Mccormack, who personally examined the patient and reviewed the plan. This dictation was performed with a verbal recognition program (DRAGON) and it was checked for errors. It is possible that there are still dictated errors within this office note. If so, please bring any errors to my attention for an addendum. All efforts were made to ensure that this office note is accurate.

## 2024-08-05 ENCOUNTER — TELEPHONE (OUTPATIENT)
Dept: ORTHOPEDIC SURGERY | Age: 59
End: 2024-08-05

## 2025-05-20 NOTE — LETTER
[facility]  [address] [facility] [address]  Phone: [phone]  Fax: [phone]    David Johnson MD    May 26, 2022     [address] [facility] [address] [address]    Patient: Ford Burdick   MR Number: [id number]   YOB: 1965   Date of Visit: 5/26/2022       Dear Jerica Dao:    Thank you for referring Ford Burdick to me for evaluation/treatment. Below are the relevant portions of my assessment and plan of care. If you have questions, please do not hesitate to call me. I look forward to following Suzy along with you.     Sincerely,      David Johnson MD
Home

## (undated) DEVICE — BLANKET WRM W29.9XL79.1IN UP BODY FORC AIR MISTRAL-AIR

## (undated) DEVICE — GLOVE ORTHO 7   MSG9470

## (undated) DEVICE — UNIVERSAL BLOCK TRAY: Brand: MEDLINE INDUSTRIES, INC.

## (undated) DEVICE — CAUTERY TIP POLISHER: Brand: DEVON

## (undated) DEVICE — PHOENIX DRILL BIT: Brand: PHOENIX

## (undated) DEVICE — TRANSPORT CANNULA 8MM LENGTH 7, 8, 9: Brand: TRANSPORT

## (undated) DEVICE — SOLUTION IRRIG 3000ML LAC R FLX CONT

## (undated) DEVICE — TOWEL,STOP FLAG GOLD N-W: Brand: MEDLINE

## (undated) DEVICE — GENERAL: Brand: MEDLINE INDUSTRIES, INC.

## (undated) DEVICE — AMBIENT HIPVAC 50 IFS: Brand: AMBIENT

## (undated) DEVICE — SOLUTION IV IRRIG LACTATED RINGERS 3000ML 2B7487

## (undated) DEVICE — TUBING, SUCTION, 1/4" X 12', STRAIGHT: Brand: MEDLINE

## (undated) DEVICE — TOWEL,OR,DSP,ST,BLUE,STD,4/PK,20PK/CS: Brand: MEDLINE

## (undated) DEVICE — TUBING PMP L16FT MAIN DISP FOR AR-6400 AR-6475

## (undated) DEVICE — XL, HIP 8-FLUTE PEAR BUR. ARTHROSCOPIC SHAVER BLADE. FOR USE WITH: REF 0375-701-500, 0375-704-500, 0375-708-500. DO NOT USE IF PACKAGE IS DAMAGED, KEEP DRY, KEEP AWAY FROM SUNLIGHT: Brand: FORMULA

## (undated) DEVICE — C-ARM: Brand: UNBRANDED

## (undated) DEVICE — SPONGE GZ W4XL8IN COT WVN 12 PLY

## (undated) DEVICE — SUTURE ETHLN SZ 3-0 L18IN NONABSORBABLE BLK FS-1 L24MM 3/8 663H

## (undated) DEVICE — 3M™ STERI-DRAPE™ INSTRUMENT POUCH 1018: Brand: STERI-DRAPE™

## (undated) DEVICE — ALCOHOL RUBBING 16OZ 70% ISO

## (undated) DEVICE — SUTURE ETHBND EXCEL SZ 2 L30IN NONABSORBABLE GRN L40MM V-37 MX69G

## (undated) DEVICE — PORTAL ENTRY KIT

## (undated) DEVICE — 1.8MM Q-FIX DISPOSABLE FLEXIBLE DRILL: Brand: Q-FIX

## (undated) DEVICE — SLINGSHOT 70 UP: Brand: SLINGSHOT

## (undated) DEVICE — DRESSING GZ W1XL8IN COT XRFRM N ADH OVERWRAP CURAD

## (undated) DEVICE — PAD,ABDOMINAL,5"X9",ST,LF,25/BX: Brand: MEDLINE INDUSTRIES, INC.

## (undated) DEVICE — SUTURE VCRL SZ 2-0 L18IN ABSRB UD CT-1 L36MM 1/2 CIR J839D

## (undated) DEVICE — XL AGGRESSIVE PLUS, HIP CUTTER. ARTHROSCOPIC SHAVER BLADE. FOR USE WITH: REF 0375-701-500, 0375-704-500, 0375-708-500. DO NOT USE IF PACKAGE IS DAMAGED, KEEP DRY, KEEP AWAY FROM SUNLIGHT: Brand: FORMULA

## (undated) DEVICE — GAUZE,SPONGE,4"X4",16PLY,STRL,LF,10/TRAY: Brand: MEDLINE

## (undated) DEVICE — IMPLANTABLE DEVICE
Type: IMPLANTABLE DEVICE | Site: HIP | Status: NON-FUNCTIONAL
Removed: 2021-11-30

## (undated) DEVICE — TUBING PMP L6FT CONT WAVE EXTN

## (undated) DEVICE — Z DISCONTINUED USE 2429233 DRESSING FOAM W10XL10CM 5 LAYR SELF ADH VERSATILE SAFETAC

## (undated) DEVICE — GLOVE SURG SZ 7 L12IN THK7.5MIL DK GRN LTX FREE MSG6570] MEDLINE INDUSTRIES INC]

## (undated) DEVICE — 6619 2 PTNT ISO SYS INCISE AREA&LT;(&GT;&&LT;)&GT;P: Brand: STERI-DRAPE™ IOBAN™ 2

## (undated) DEVICE — TUBING FLD MGMT Y DBL SPIK DUALWAVE

## (undated) DEVICE — SUTURE N ABSRB BRAIDED 2-0 MO-6 39 IN 26 MM 1/2 CIR BLU BLK 3910900023

## (undated) DEVICE — CHLORAPREP 26ML ORANGE

## (undated) DEVICE — XL TOMCAT, ANGLED HIP CUTTER. ARTHROSCOPIC SHAVER BLADE. FOR USE WITH: REF 0375-701-500, 0375-704-500, 0375-708-500. DO NOT USE IF PACKAGE IS DAMAGED, KEEP DRY, KEEP AWAY FROM SUNLIGHT: Brand: FORMULA

## (undated) DEVICE — Device

## (undated) DEVICE — SHEET, ORTHO, SPLIT, STERILE: Brand: MEDLINE

## (undated) DEVICE — POSITIONER ORTHO HIP KIT POSTLESS PNK

## (undated) DEVICE — Z DISCONTINUED USE 2272114 DRAPE SURG UTIL 26X15 IN W/ TAPE N INVASIVE MULTLYR DISP

## (undated) DEVICE — XL, ARTHROSCOPIC SHAVER BLADES, DIAMOND ROUND BUR.  DO NOT RESTERILIZE, DO NOT USE IF PACKAGE IS DAMAGED, KEEP DRY, KEEP AWAY FROM SUNLIGHT: Brand: CROSSBLADE

## (undated) DEVICE — INTENDED FOR TISSUE SEPARATION, AND OTHER PROCEDURES THAT REQUIRE A SHARP SURGICAL BLADE TO PUNCTURE OR CUT.: Brand: BARD-PARKER ® CARBON RIB-BACK BLADES

## (undated) DEVICE — PAD DRY FLOOR ABS 32X58IN GRN

## (undated) DEVICE — CUFF RESTRN WRST OR ANK 45FT AD FOAM

## (undated) DEVICE — SAMURAI CURVED BLADE: Brand: SAMURAI

## (undated) DEVICE — NANOTACK FLEX DRILL BIT: Brand: NANOTACK FLEX

## (undated) DEVICE — INJECTOR II NEEDLE CARTRIDGE: Brand: INJECTOR

## (undated) DEVICE — DRESSING THERABOND 3D ANTIMIC CNTCT SYS 15INCHX10INCH

## (undated) DEVICE — PROBE SERFAS 50 - S SWEEP + XL

## (undated) DEVICE — 50-S XL SUCTION PROBE, NON-BENDABLE, MAX CUT LEVEL 11: Brand: SERFAS ENERGY

## (undated) DEVICE — STERILE POLYISOPRENE POWDER-FREE SURGICAL GLOVES: Brand: PROTEXIS

## (undated) DEVICE — GLOVE SURG SZ 7 L12IN FNGR THK79MIL GRN LTX FREE